# Patient Record
Sex: MALE | Race: WHITE | NOT HISPANIC OR LATINO | Employment: OTHER | URBAN - METROPOLITAN AREA
[De-identification: names, ages, dates, MRNs, and addresses within clinical notes are randomized per-mention and may not be internally consistent; named-entity substitution may affect disease eponyms.]

---

## 2017-01-03 RX ORDER — AMLODIPINE BESYLATE AND BENAZEPRIL HYDROCHLORIDE 5; 40 MG/1; MG/1
CAPSULE ORAL
Qty: 90 CAPSULE | Refills: 2 | Status: SHIPPED | OUTPATIENT
Start: 2017-01-03 | End: 2017-11-20 | Stop reason: SDUPTHER

## 2017-01-16 DIAGNOSIS — F41.9 ANXIETY: ICD-10-CM

## 2017-01-16 RX ORDER — ALPRAZOLAM 2 MG/1
TABLET ORAL
Qty: 90 TABLET | Refills: 0 | OUTPATIENT
Start: 2017-01-16 | End: 2017-02-15 | Stop reason: SDUPTHER

## 2017-02-15 DIAGNOSIS — F41.9 ANXIETY: ICD-10-CM

## 2017-02-15 RX ORDER — ALPRAZOLAM 2 MG/1
TABLET ORAL
Qty: 90 TABLET | Refills: 0 | Status: SHIPPED | OUTPATIENT
Start: 2017-02-15 | End: 2017-03-08 | Stop reason: SDUPTHER

## 2017-02-15 RX ORDER — HYDRALAZINE HYDROCHLORIDE 50 MG/1
TABLET, FILM COATED ORAL
Qty: 60 TABLET | Refills: 2 | Status: SHIPPED | OUTPATIENT
Start: 2017-02-15 | End: 2017-08-11 | Stop reason: SDUPTHER

## 2017-03-08 ENCOUNTER — OFFICE VISIT (OUTPATIENT)
Dept: FAMILY MEDICINE CLINIC | Facility: CLINIC | Age: 57
End: 2017-03-08

## 2017-03-08 VITALS
TEMPERATURE: 98.1 F | WEIGHT: 276.6 LBS | DIASTOLIC BLOOD PRESSURE: 86 MMHG | HEIGHT: 74 IN | OXYGEN SATURATION: 95 % | SYSTOLIC BLOOD PRESSURE: 130 MMHG | BODY MASS INDEX: 35.5 KG/M2 | HEART RATE: 62 BPM

## 2017-03-08 DIAGNOSIS — F41.9 ANXIETY: Primary | ICD-10-CM

## 2017-03-08 PROCEDURE — 99213 OFFICE O/P EST LOW 20 MIN: CPT | Performed by: NURSE PRACTITIONER

## 2017-03-08 RX ORDER — ALPRAZOLAM 2 MG/1
2 TABLET ORAL 3 TIMES DAILY PRN
Qty: 90 TABLET | Refills: 0 | Status: SHIPPED | OUTPATIENT
Start: 2017-03-08 | End: 2017-04-20 | Stop reason: SDUPTHER

## 2017-03-08 NOTE — PROGRESS NOTES
"Subjective   Phani Landis is a 56 y.o. male.     History of Present Illness   Anxiety: Patient complains of anxiety disorder.  He has the following symptoms: chest pain, difficulty concentrating, racing thoughts, shortness of breath. Onset of symptoms was approximately several years ago, stable since that time. He denies current suicidal and homicidal ideation. Family history significant for no psychiatric illness.Possible organic causes contributing are: none. Risk factors: none Previous treatment includes Xanax He complains of the following side effects from the treatment: none.        The following portions of the patient's history were reviewed and updated as appropriate: allergies, current medications, past social history and problem list.    Review of Systems   All other systems reviewed and are negative.      Objective   Visit Vitals   • /86 (BP Location: Right arm, Patient Position: Sitting)   • Pulse 62   • Temp 98.1 °F (36.7 °C)   • Ht 74\" (188 cm)   • Wt 276 lb 9.6 oz (125 kg)   • SpO2 95%   • BMI 35.51 kg/m2     Physical Exam   Constitutional: He is oriented to person, place, and time. Vital signs are normal. He appears well-developed and well-nourished. No distress.   HENT:   Head: Normocephalic.   Cardiovascular: Normal rate, regular rhythm and normal heart sounds.    Pulmonary/Chest: Effort normal and breath sounds normal.   Neurological: He is alert and oriented to person, place, and time. Gait normal.   Psychiatric: He has a normal mood and affect. His behavior is normal. Judgment and thought content normal.   Vitals reviewed.  The patient has read and signed the [facility] Controlled Substance Contract.  I will continue to see patient for regular follow up appointments.  They are well controlled on their medication.  YUE has been reviewed by me and is updated every 3 months. The patient is aware of the potential for addiction and dependence.      Assessment/Plan   Problem List Items " Addressed This Visit        Other    Anxiety - Primary    Relevant Medications    ALPRAZolam (XANAX) 2 MG tablet        rto in 3 mons   Discussed with Patricia that he needs to cut back on his Xanax the patient states that he will actively try to only take 2 a day we will reassess this in 3 months and hopefully decrease the prescription amount    Pt refusing colonoscopy

## 2017-03-15 RX ORDER — CHLORTHALIDONE 50 MG/1
TABLET ORAL
Qty: 90 TABLET | Refills: 1 | Status: CANCELLED | OUTPATIENT
Start: 2017-03-15

## 2017-03-15 RX ORDER — BISOPROLOL FUMARATE 10 MG/1
TABLET, FILM COATED ORAL
Qty: 90 TABLET | Refills: 1 | Status: CANCELLED | OUTPATIENT
Start: 2017-03-15

## 2017-03-15 RX ORDER — BISOPROLOL FUMARATE 10 MG/1
10 TABLET, FILM COATED ORAL DAILY
Qty: 90 TABLET | Refills: 2 | Status: SHIPPED | OUTPATIENT
Start: 2017-03-15 | End: 2018-03-27 | Stop reason: SDUPTHER

## 2017-03-15 RX ORDER — CHLORTHALIDONE 50 MG/1
50 TABLET ORAL DAILY
Qty: 90 TABLET | Refills: 2 | Status: SHIPPED | OUTPATIENT
Start: 2017-03-15 | End: 2018-03-27 | Stop reason: SDUPTHER

## 2017-04-20 DIAGNOSIS — F41.9 ANXIETY: ICD-10-CM

## 2017-04-20 RX ORDER — INDOMETHACIN 50 MG/1
50 CAPSULE ORAL
Qty: 30 CAPSULE | Refills: 3 | Status: SHIPPED | OUTPATIENT
Start: 2017-04-20 | End: 2018-06-29 | Stop reason: SDUPTHER

## 2017-04-20 RX ORDER — ALPRAZOLAM 2 MG/1
2 TABLET ORAL 3 TIMES DAILY PRN
Qty: 90 TABLET | Refills: 0 | OUTPATIENT
Start: 2017-04-20 | End: 2017-05-23 | Stop reason: SDUPTHER

## 2017-04-20 RX ORDER — INDOMETHACIN 50 MG/1
CAPSULE ORAL
Qty: 30 CAPSULE | Refills: 1 | Status: CANCELLED | OUTPATIENT
Start: 2017-04-20

## 2017-04-20 RX ORDER — ALPRAZOLAM 2 MG/1
TABLET ORAL
Qty: 90 TABLET | Refills: 0 | Status: CANCELLED | OUTPATIENT
Start: 2017-04-20

## 2017-04-24 DIAGNOSIS — F41.9 ANXIETY: ICD-10-CM

## 2017-04-24 RX ORDER — ALPRAZOLAM 2 MG/1
TABLET ORAL
Qty: 90 TABLET | Refills: 0 | OUTPATIENT
Start: 2017-04-24

## 2017-05-23 DIAGNOSIS — F41.9 ANXIETY: ICD-10-CM

## 2017-05-23 RX ORDER — ALPRAZOLAM 2 MG/1
TABLET ORAL
Qty: 90 TABLET | Refills: 0 | OUTPATIENT
Start: 2017-05-23 | End: 2017-07-24 | Stop reason: SDUPTHER

## 2017-07-24 ENCOUNTER — OFFICE VISIT (OUTPATIENT)
Dept: FAMILY MEDICINE CLINIC | Facility: CLINIC | Age: 57
End: 2017-07-24

## 2017-07-24 VITALS
TEMPERATURE: 97.4 F | OXYGEN SATURATION: 98 % | HEART RATE: 68 BPM | HEIGHT: 74 IN | WEIGHT: 267 LBS | SYSTOLIC BLOOD PRESSURE: 112 MMHG | DIASTOLIC BLOOD PRESSURE: 70 MMHG | BODY MASS INDEX: 34.27 KG/M2

## 2017-07-24 DIAGNOSIS — F41.9 ANXIETY: ICD-10-CM

## 2017-07-24 DIAGNOSIS — R21 RASH: Primary | ICD-10-CM

## 2017-07-24 PROCEDURE — 99214 OFFICE O/P EST MOD 30 MIN: CPT | Performed by: NURSE PRACTITIONER

## 2017-07-24 RX ORDER — CLOTRIMAZOLE AND BETAMETHASONE DIPROPIONATE 10; .64 MG/G; MG/G
CREAM TOPICAL 2 TIMES DAILY
Qty: 45 G | Refills: 1 | Status: SHIPPED | OUTPATIENT
Start: 2017-07-24 | End: 2018-04-03 | Stop reason: SDUPTHER

## 2017-07-24 RX ORDER — TRAZODONE HYDROCHLORIDE 150 MG/1
150 TABLET ORAL NIGHTLY
Qty: 30 TABLET | Refills: 6 | Status: SHIPPED | OUTPATIENT
Start: 2017-07-24 | End: 2019-06-05

## 2017-07-24 RX ORDER — ALPRAZOLAM 2 MG/1
2 TABLET ORAL 2 TIMES DAILY
Qty: 60 TABLET | Refills: 0 | Status: SHIPPED | OUTPATIENT
Start: 2017-07-24 | End: 2017-08-28 | Stop reason: SDUPTHER

## 2017-07-24 NOTE — PROGRESS NOTES
"Subjective   Phani Landis is a 56 y.o. male.     History of Present Illness   Patient presented to the office today for refill of his Xanax.  His last refill was in May 90 pills that he is just now ran out about a week ago.  He states his anxiety has been pretty high lately.  I discussed with him at length the need to get him off this medication completely he understands and he is willing to try another medication to help him with his anxiety.  Also needing a cream refilled for rash.    The following portions of the patient's history were reviewed and updated as appropriate: allergies, current medications, past social history and problem list.    Review of Systems   Constitutional: Negative for fever.   All other systems reviewed and are negative.      Objective   /70 (BP Location: Right arm, Patient Position: Sitting)  Pulse 68  Temp 97.4 °F (36.3 °C)  Ht 74\" (188 cm)  Wt 267 lb (121 kg)  SpO2 98%  BMI 34.28 kg/m2  Physical Exam   Constitutional: He is oriented to person, place, and time. Vital signs are normal. He appears well-developed and well-nourished. No distress.   HENT:   Head: Normocephalic.   Cardiovascular: Normal rate, regular rhythm and normal heart sounds.    Pulmonary/Chest: Effort normal and breath sounds normal.   Neurological: He is alert and oriented to person, place, and time. Gait normal.   Psychiatric: He has a normal mood and affect. His behavior is normal. Judgment and thought content normal.   Vitals reviewed.    The patient has read and signed the [facility] Controlled Substance Contract.  I will continue to see patient for regular follow up appointments.  They are well controlled on their medication.  YUE has been reviewed by me and is updated every 3 months. The patient is aware of the potential for addiction and dependence.    Assessment/Plan   Problem List Items Addressed This Visit        Musculoskeletal and Integument    Rash - Primary    Relevant Medications    " clotrimazole-betamethasone (LOTRISONE) 1-0.05 % cream       Other    Anxiety    Relevant Medications    ALPRAZolam (XANAX) 2 MG tablet    traZODone (DESYREL) 150 MG tablet      Alexandre trazodone 150 mg at night to help him with sleep and anxiety.  Decreasing his Xanax down to 60 pills  will decrease again at next visit

## 2017-08-11 RX ORDER — HYDRALAZINE HYDROCHLORIDE 50 MG/1
TABLET, FILM COATED ORAL
Qty: 60 TABLET | Refills: 1 | Status: SHIPPED | OUTPATIENT
Start: 2017-08-11 | End: 2018-03-27 | Stop reason: SDUPTHER

## 2017-08-28 DIAGNOSIS — F41.9 ANXIETY: ICD-10-CM

## 2017-08-28 RX ORDER — ALPRAZOLAM 2 MG/1
2 TABLET ORAL 2 TIMES DAILY
Qty: 60 TABLET | Refills: 0 | OUTPATIENT
Start: 2017-08-28 | End: 2017-09-29 | Stop reason: SDUPTHER

## 2017-09-29 ENCOUNTER — TELEPHONE (OUTPATIENT)
Dept: FAMILY MEDICINE CLINIC | Facility: CLINIC | Age: 57
End: 2017-09-29

## 2017-09-29 DIAGNOSIS — F41.9 ANXIETY: ICD-10-CM

## 2017-09-29 RX ORDER — ALPRAZOLAM 2 MG/1
2 TABLET ORAL 2 TIMES DAILY
Qty: 60 TABLET | Refills: 0 | OUTPATIENT
Start: 2017-09-29 | End: 2018-03-29

## 2017-09-29 NOTE — TELEPHONE ENCOUNTER
Patient requesting refill for Xanax - pharmacy is saying they have already contacted the office.

## 2017-11-21 RX ORDER — AMLODIPINE BESYLATE AND BENAZEPRIL HYDROCHLORIDE 5; 40 MG/1; MG/1
CAPSULE ORAL
Qty: 90 CAPSULE | Refills: 1 | Status: SHIPPED | OUTPATIENT
Start: 2017-11-21 | End: 2018-03-27 | Stop reason: SDUPTHER

## 2018-03-27 ENCOUNTER — TELEPHONE (OUTPATIENT)
Dept: FAMILY MEDICINE CLINIC | Facility: CLINIC | Age: 58
End: 2018-03-27

## 2018-03-27 RX ORDER — BISOPROLOL FUMARATE 10 MG/1
10 TABLET, FILM COATED ORAL DAILY
Qty: 90 TABLET | Refills: 2 | Status: SHIPPED | OUTPATIENT
Start: 2018-03-27 | End: 2018-12-27 | Stop reason: SDUPTHER

## 2018-03-27 RX ORDER — CHLORTHALIDONE 50 MG/1
50 TABLET ORAL DAILY
Qty: 90 TABLET | Refills: 2 | Status: SHIPPED | OUTPATIENT
Start: 2018-03-27 | End: 2018-12-27 | Stop reason: SDUPTHER

## 2018-03-27 RX ORDER — AMLODIPINE BESYLATE AND BENAZEPRIL HYDROCHLORIDE 5; 40 MG/1; MG/1
1 CAPSULE ORAL DAILY
Qty: 90 CAPSULE | Refills: 1 | Status: SHIPPED | OUTPATIENT
Start: 2018-03-27 | End: 2018-12-27 | Stop reason: SDUPTHER

## 2018-03-27 RX ORDER — HYDRALAZINE HYDROCHLORIDE 50 MG/1
50 TABLET, FILM COATED ORAL 2 TIMES DAILY
Qty: 180 TABLET | Refills: 0 | Status: SHIPPED | OUTPATIENT
Start: 2018-03-27 | End: 2018-09-20 | Stop reason: SDUPTHER

## 2018-03-29 ENCOUNTER — TELEPHONE (OUTPATIENT)
Dept: FAMILY MEDICINE CLINIC | Facility: CLINIC | Age: 58
End: 2018-03-29

## 2018-03-29 ENCOUNTER — OFFICE VISIT (OUTPATIENT)
Dept: FAMILY MEDICINE CLINIC | Facility: CLINIC | Age: 58
End: 2018-03-29

## 2018-03-29 VITALS
DIASTOLIC BLOOD PRESSURE: 100 MMHG | TEMPERATURE: 98.5 F | SYSTOLIC BLOOD PRESSURE: 140 MMHG | HEART RATE: 74 BPM | OXYGEN SATURATION: 98 % | BODY MASS INDEX: 38.58 KG/M2 | HEIGHT: 74 IN | WEIGHT: 300.6 LBS

## 2018-03-29 DIAGNOSIS — I10 ESSENTIAL HYPERTENSION: ICD-10-CM

## 2018-03-29 DIAGNOSIS — I10 ESSENTIAL HYPERTENSION: Primary | ICD-10-CM

## 2018-03-29 DIAGNOSIS — L03.90 CELLULITIS, UNSPECIFIED CELLULITIS SITE: ICD-10-CM

## 2018-03-29 PROCEDURE — 99214 OFFICE O/P EST MOD 30 MIN: CPT | Performed by: NURSE PRACTITIONER

## 2018-03-29 RX ORDER — CLONIDINE HYDROCHLORIDE 0.1 MG/1
0.1 TABLET ORAL DAILY
Qty: 30 TABLET | Refills: 2 | Status: SHIPPED | OUTPATIENT
Start: 2018-03-29 | End: 2018-03-29 | Stop reason: SDUPTHER

## 2018-03-29 RX ORDER — CEPHALEXIN 500 MG/1
500 CAPSULE ORAL 3 TIMES DAILY
Qty: 30 CAPSULE | Refills: 0 | Status: SHIPPED | OUTPATIENT
Start: 2018-03-29 | End: 2018-05-04 | Stop reason: SDUPTHER

## 2018-03-29 RX ORDER — CLONIDINE HYDROCHLORIDE 0.1 MG/1
0.1 TABLET ORAL DAILY
Qty: 30 TABLET | Refills: 2 | Status: SHIPPED | OUTPATIENT
Start: 2018-03-29 | End: 2019-06-05

## 2018-03-29 NOTE — TELEPHONE ENCOUNTER
Patient forgot to ask when he was in the room if his blood pressure changes is it ok for him to take two blood pressure pills. Please call him at [phone]

## 2018-03-29 NOTE — PROGRESS NOTES
"Subjective   Phani Landis is a 57 y.o. male.     History of Present Illness   Patient presenting to the office today to follow-up on his elevated blood pressure.  He has not used any of his blood pressure medications for 2 months because he ran out of the medication he called the office 2 days ago and got refills on them 3 start taking his blood pressure medication for 2 days.  He has been monitoring his blood pressure every day and today and yesterday.  The best blood pressures that he is having quite a while.    He has also has a red swollen rash on the right lower leg that has been there for at least 2 weeks he did have an abrasion on it about 3 weeks ago but that is healed.    The following portions of the patient's history were reviewed and updated as appropriate: allergies, current medications, past social history and problem list.    Review of Systems   Neurological: Positive for headaches.   All other systems reviewed and are negative.      Objective   /100 (BP Location: Left arm, Patient Position: Sitting)   Pulse 74   Temp 98.5 °F (36.9 °C)   Ht 188 cm (74.02\")   Wt (!) 136 kg (300 lb 9.6 oz)   SpO2 98%   BMI 38.58 kg/m²   Physical Exam   Constitutional: He is oriented to person, place, and time. Vital signs are normal. He appears well-developed and well-nourished. No distress.   HENT:   Head: Normocephalic.   Cardiovascular: Normal rate, regular rhythm and normal heart sounds.    Pulmonary/Chest: Effort normal and breath sounds normal.   Neurological: He is alert and oriented to person, place, and time. Gait normal.   Psychiatric: He has a normal mood and affect. His behavior is normal. Judgment and thought content normal.   Vitals reviewed.      Assessment/Plan   Problem List Items Addressed This Visit        Cardiovascular and Mediastinum    Hypertension - Primary       Other    Cellulitis    Relevant Medications    cephalexin (KEFLEX) 500 MG capsule      Other Visit Diagnoses    None.   "     Call the office Monday with blood pressure results.  Make an appointment for a CPE with fasting labs in 4 weeks

## 2018-04-02 ENCOUNTER — TELEPHONE (OUTPATIENT)
Dept: FAMILY MEDICINE CLINIC | Facility: CLINIC | Age: 58
End: 2018-04-02

## 2018-04-02 NOTE — TELEPHONE ENCOUNTER
Patient was told to call [facility] on Monday with b/p readings. His readings are 125/80 and 117/76. Patient says he is feeling much better no more headaches. He did stay at the ER and had CT scan and IV medication to help with migraine

## 2018-04-03 DIAGNOSIS — R21 RASH: ICD-10-CM

## 2018-04-04 RX ORDER — CLOTRIMAZOLE AND BETAMETHASONE DIPROPIONATE 10; .64 MG/G; MG/G
CREAM TOPICAL
Qty: 45 G | Refills: 0 | Status: SHIPPED | OUTPATIENT
Start: 2018-04-04 | End: 2018-05-17 | Stop reason: SDUPTHER

## 2018-05-04 ENCOUNTER — TELEPHONE (OUTPATIENT)
Dept: FAMILY MEDICINE CLINIC | Facility: CLINIC | Age: 58
End: 2018-05-04

## 2018-05-04 DIAGNOSIS — L03.90 CELLULITIS, UNSPECIFIED CELLULITIS SITE: ICD-10-CM

## 2018-05-04 RX ORDER — CEPHALEXIN 500 MG/1
500 CAPSULE ORAL 3 TIMES DAILY
Qty: 30 CAPSULE | Refills: 0 | Status: SHIPPED | OUTPATIENT
Start: 2018-05-04 | End: 2018-11-12 | Stop reason: SDUPTHER

## 2018-05-04 NOTE — TELEPHONE ENCOUNTER
Patient called and said the antibiotic he was given for cellulitis almost cleared it up but he finished that and says he is having redness again on on his legs he is wondering if he can have another prescription of antibiotic for it?    Patient was informed that [facility] is out of the office today and wont receive this message until Monday was advised if symptoms get worse over the weekend to go to an immediate care center

## 2018-05-17 DIAGNOSIS — R21 RASH: ICD-10-CM

## 2018-05-17 RX ORDER — CLOTRIMAZOLE AND BETAMETHASONE DIPROPIONATE 10; .64 MG/G; MG/G
CREAM TOPICAL
Qty: 1 EACH | Refills: 3 | Status: SHIPPED | OUTPATIENT
Start: 2018-05-17 | End: 2019-09-12 | Stop reason: SDUPTHER

## 2018-05-25 ENCOUNTER — TELEPHONE (OUTPATIENT)
Dept: FAMILY MEDICINE CLINIC | Facility: CLINIC | Age: 58
End: 2018-05-25

## 2018-05-25 NOTE — TELEPHONE ENCOUNTER
Patient called and said he finished the second dose of cream for his cellulitis and still has some pinkness in his leg is wondering what he needs to do?

## 2018-05-25 NOTE — TELEPHONE ENCOUNTER
Some pinkness is probably going to be your baseline.  I may need to look at your leg early next week.  Give it through the weekend

## 2018-07-02 RX ORDER — INDOMETHACIN 50 MG/1
CAPSULE ORAL
Qty: 30 CAPSULE | Refills: 2 | Status: SHIPPED | OUTPATIENT
Start: 2018-07-02 | End: 2018-10-22 | Stop reason: SDUPTHER

## 2018-09-20 RX ORDER — HYDRALAZINE HYDROCHLORIDE 50 MG/1
TABLET, FILM COATED ORAL
Qty: 180 TABLET | Refills: 0 | Status: SHIPPED | OUTPATIENT
Start: 2018-09-20 | End: 2018-12-27 | Stop reason: SDUPTHER

## 2018-10-22 RX ORDER — INDOMETHACIN 50 MG/1
CAPSULE ORAL
Qty: 30 CAPSULE | Refills: 1 | Status: SHIPPED | OUTPATIENT
Start: 2018-10-22 | End: 2018-12-27 | Stop reason: SDUPTHER

## 2018-11-12 ENCOUNTER — TELEPHONE (OUTPATIENT)
Dept: FAMILY MEDICINE CLINIC | Facility: CLINIC | Age: 58
End: 2018-11-12

## 2018-11-12 RX ORDER — CEPHALEXIN 500 MG/1
500 CAPSULE ORAL 3 TIMES DAILY
Qty: 21 CAPSULE | Refills: 0 | Status: SHIPPED | OUTPATIENT
Start: 2018-11-12 | End: 2019-01-17

## 2018-11-12 NOTE — TELEPHONE ENCOUNTER
Pt is experiencing itchiness from the cellulitis. He states you prescribed cephalexin (KEFLEX) 500 MG in May that helped at the time. He is curious if he can get a refill for this? Pt does not live close so he is wanting this without appt if possible  ([facility])

## 2018-11-16 ENCOUNTER — TELEPHONE (OUTPATIENT)
Dept: FAMILY MEDICINE CLINIC | Facility: CLINIC | Age: 58
End: 2018-11-16

## 2018-11-16 RX ORDER — SULFAMETHOXAZOLE AND TRIMETHOPRIM 800; 160 MG/1; MG/1
1 TABLET ORAL 2 TIMES DAILY
Qty: 20 TABLET | Refills: 0 | Status: SHIPPED | OUTPATIENT
Start: 2018-11-16 | End: 2019-01-17

## 2018-11-16 NOTE — TELEPHONE ENCOUNTER
Pt may have experienced a allergic reaction to the cephalexin that was prescribed. Pt took first dose on Monday. By Tuesday pt could not stop itching and a red rash formed on pt's chest. Pt discontinued medication by Wednesday. Rash and itching are gone. Did pt have an allergic reaction? Should another antibiotic be prescribed?

## 2018-11-16 NOTE — TELEPHONE ENCOUNTER
Sounds like you should avoid the med.  Is the cellulitis better or no  If not bactrim ds #20 1 po bid

## 2018-12-27 ENCOUNTER — TELEPHONE (OUTPATIENT)
Dept: FAMILY MEDICINE CLINIC | Facility: CLINIC | Age: 58
End: 2018-12-27

## 2018-12-27 RX ORDER — HYDRALAZINE HYDROCHLORIDE 50 MG/1
50 TABLET, FILM COATED ORAL 2 TIMES DAILY
Qty: 180 TABLET | Refills: 0 | Status: SHIPPED | OUTPATIENT
Start: 2018-12-27 | End: 2019-09-12 | Stop reason: SDUPTHER

## 2018-12-27 RX ORDER — BISOPROLOL FUMARATE 10 MG/1
10 TABLET, FILM COATED ORAL DAILY
Qty: 90 TABLET | Refills: 2 | Status: SHIPPED | OUTPATIENT
Start: 2018-12-27 | End: 2019-09-12 | Stop reason: SDUPTHER

## 2018-12-27 RX ORDER — AMLODIPINE BESYLATE AND BENAZEPRIL HYDROCHLORIDE 5; 40 MG/1; MG/1
1 CAPSULE ORAL DAILY
Qty: 90 CAPSULE | Refills: 1 | Status: SHIPPED | OUTPATIENT
Start: 2018-12-27 | End: 2019-06-13 | Stop reason: SDUPTHER

## 2018-12-27 RX ORDER — CHLORTHALIDONE 50 MG/1
50 TABLET ORAL DAILY
Qty: 90 TABLET | Refills: 2 | Status: SHIPPED | OUTPATIENT
Start: 2018-12-27 | End: 2019-09-12 | Stop reason: SDUPTHER

## 2018-12-27 RX ORDER — INDOMETHACIN 50 MG/1
CAPSULE ORAL
Qty: 30 CAPSULE | Refills: 1 | Status: SHIPPED | OUTPATIENT
Start: 2018-12-27 | End: 2019-02-12

## 2018-12-27 NOTE — TELEPHONE ENCOUNTER
Pt requesting five, one month supply prescriptions to be sent to [facility] in [place], [place].    amLODIPine-benazepril (LOTREL) 5-40 MG per capsule    bisoprolol (ZEBeta) 10 MG tablet    chlorthalidone (HYGROTEN) 50 MG tablet    hydrALAZINE (APRESOLINE) 50 MG tablet     indomethacin (INDOCIN) 50 MG capsule

## 2019-01-17 ENCOUNTER — OFFICE VISIT (OUTPATIENT)
Dept: FAMILY MEDICINE CLINIC | Facility: CLINIC | Age: 59
End: 2019-01-17

## 2019-01-17 VITALS
DIASTOLIC BLOOD PRESSURE: 90 MMHG | HEIGHT: 74 IN | HEART RATE: 78 BPM | BODY MASS INDEX: 37.22 KG/M2 | OXYGEN SATURATION: 98 % | TEMPERATURE: 98.3 F | WEIGHT: 290 LBS | SYSTOLIC BLOOD PRESSURE: 138 MMHG

## 2019-01-17 DIAGNOSIS — I10 ESSENTIAL HYPERTENSION: Primary | ICD-10-CM

## 2019-01-17 DIAGNOSIS — M25.562 ACUTE PAIN OF LEFT KNEE: ICD-10-CM

## 2019-01-17 DIAGNOSIS — Z13.6 SCREENING FOR ISCHEMIC HEART DISEASE: ICD-10-CM

## 2019-01-17 DIAGNOSIS — Z12.5 SPECIAL SCREENING FOR MALIGNANT NEOPLASM OF PROSTATE: ICD-10-CM

## 2019-01-17 DIAGNOSIS — Z13.0 SCREENING FOR IRON DEFICIENCY ANEMIA: ICD-10-CM

## 2019-01-17 DIAGNOSIS — Z13.1 SCREENING FOR DIABETES MELLITUS: ICD-10-CM

## 2019-01-17 PROBLEM — L03.90 CELLULITIS: Status: RESOLVED | Noted: 2018-03-29 | Resolved: 2019-01-17

## 2019-01-17 PROBLEM — R21 RASH: Status: RESOLVED | Noted: 2017-07-24 | Resolved: 2019-01-17

## 2019-01-17 PROCEDURE — 99213 OFFICE O/P EST LOW 20 MIN: CPT | Performed by: NURSE PRACTITIONER

## 2019-01-17 RX ORDER — METHYLPREDNISOLONE 4 MG/1
TABLET ORAL
Qty: 21 TABLET | Refills: 0 | Status: SHIPPED | OUTPATIENT
Start: 2019-01-17 | End: 2019-02-12

## 2019-01-17 NOTE — PROGRESS NOTES
"Subjective   Phani Landis is a 58 y.o. male.     History of Present Illness   Patient presenting to the office today to follow-up on his blood pressure.  He's taking his medications daily as directed without side effects and blood pressure is well-controlled at home.    Patient has been experiencing left knee pain for the past month he believes it to be gout he has been using indomethacin which has been helping but is not totally gone pain and stiffness and slight swelling is been there for 2 months.  The following portions of the patient's history were reviewed and updated as appropriate: allergies, current medications, past social history and problem list.    Review of Systems   Musculoskeletal:        Knee pain       Objective   /90   Pulse 78   Temp 98.3 °F (36.8 °C) (Oral)   Ht 188 cm (74\")   Wt 132 kg (290 lb)   SpO2 98%   BMI 37.23 kg/m²   Physical Exam   Constitutional: He is oriented to person, place, and time. Vital signs are normal. He appears well-developed and well-nourished. No distress.   HENT:   Head: Normocephalic.   Cardiovascular: Normal rate, regular rhythm and normal heart sounds.   Pulmonary/Chest: Effort normal and breath sounds normal.   Neurological: He is alert and oriented to person, place, and time. Gait normal.   Psychiatric: He has a normal mood and affect. His behavior is normal. Judgment and thought content normal.   Vitals reviewed.      Assessment/Plan      Diagnosis Plan   1. Essential hypertension     2. Acute pain of left knee  MethylPREDNISolone (MEDROL) 4 MG tablet    Uric Acid   3. Screening for iron deficiency anemia  CBC & Differential   4. Screening for diabetes mellitus  Comprehensive Metabolic Panel   5. Screening for ischemic heart disease  Lipid Panel With LDL / HDL Ratio   6. Special screening for malignant neoplasm of prostate  PSA Screen     Follow up after labs   rto if medrol doesn't help  Castillo Velasco, APRN  1/17/2019    "

## 2019-01-18 LAB
ALBUMIN SERPL-MCNC: 4.3 G/DL (ref 3.5–5.2)
ALBUMIN/GLOB SERPL: 1.2 G/DL
ALP SERPL-CCNC: 80 U/L (ref 39–117)
ALT SERPL-CCNC: 46 U/L (ref 1–41)
AST SERPL-CCNC: 34 U/L (ref 1–40)
BASOPHILS # BLD AUTO: 0.21 10*3/MM3 (ref 0–0.2)
BASOPHILS NFR BLD AUTO: 2 % (ref 0–1.5)
BILIRUB SERPL-MCNC: 0.3 MG/DL (ref 0.1–1.2)
BUN SERPL-MCNC: 16 MG/DL (ref 6–20)
BUN/CREAT SERPL: 14.7 (ref 7–25)
CALCIUM SERPL-MCNC: 10.9 MG/DL (ref 8.6–10.5)
CHLORIDE SERPL-SCNC: 98 MMOL/L (ref 98–107)
CHOLEST SERPL-MCNC: 118 MG/DL (ref 0–200)
CO2 SERPL-SCNC: 28.7 MMOL/L (ref 22–29)
CREAT SERPL-MCNC: 1.09 MG/DL (ref 0.76–1.27)
EOSINOPHIL # BLD AUTO: 0.29 10*3/MM3 (ref 0–0.7)
EOSINOPHIL NFR BLD AUTO: 2.7 % (ref 0.3–6.2)
ERYTHROCYTE [DISTWIDTH] IN BLOOD BY AUTOMATED COUNT: 14.7 % (ref 11.5–14.5)
GLOBULIN SER CALC-MCNC: 3.6 GM/DL
GLUCOSE SERPL-MCNC: 96 MG/DL (ref 65–99)
HCT VFR BLD AUTO: 50.3 % (ref 40.4–52.2)
HDLC SERPL-MCNC: 42 MG/DL (ref 40–60)
HGB BLD-MCNC: 16.6 G/DL (ref 13.7–17.6)
IMM GRANULOCYTES # BLD AUTO: 0.05 10*3/MM3 (ref 0–0.03)
IMM GRANULOCYTES NFR BLD AUTO: 0.5 % (ref 0–0.5)
LDLC SERPL CALC-MCNC: 58 MG/DL (ref 0–100)
LDLC/HDLC SERPL: 1.37 {RATIO}
LYMPHOCYTES # BLD AUTO: 2.2 10*3/MM3 (ref 0.9–4.8)
LYMPHOCYTES NFR BLD AUTO: 20.8 % (ref 19.6–45.3)
MCH RBC QN AUTO: 29.3 PG (ref 27–32.7)
MCHC RBC AUTO-ENTMCNC: 33 G/DL (ref 32.6–36.4)
MCV RBC AUTO: 88.9 FL (ref 79.8–96.2)
MONOCYTES # BLD AUTO: 1.27 10*3/MM3 (ref 0.2–1.2)
MONOCYTES NFR BLD AUTO: 12 % (ref 5–12)
NEUTROPHILS # BLD AUTO: 6.59 10*3/MM3 (ref 1.9–8.1)
NEUTROPHILS NFR BLD AUTO: 62.5 % (ref 42.7–76)
PLATELET # BLD AUTO: 819 10*3/MM3 (ref 140–500)
POTASSIUM SERPL-SCNC: 4.8 MMOL/L (ref 3.5–5.2)
PROT SERPL-MCNC: 7.9 G/DL (ref 6–8.5)
PSA SERPL-MCNC: 0.51 NG/ML (ref 0–4)
RBC # BLD AUTO: 5.66 10*6/MM3 (ref 4.6–6)
SODIUM SERPL-SCNC: 139 MMOL/L (ref 136–145)
TRIGL SERPL-MCNC: 92 MG/DL (ref 0–150)
URATE SERPL-MCNC: 7.1 MG/DL (ref 3.4–7)
VLDLC SERPL CALC-MCNC: 18.4 MG/DL (ref 5–40)
WBC # BLD AUTO: 10.56 10*3/MM3 (ref 4.5–10.7)

## 2019-02-12 ENCOUNTER — OFFICE VISIT (OUTPATIENT)
Dept: FAMILY MEDICINE CLINIC | Facility: CLINIC | Age: 59
End: 2019-02-12

## 2019-02-12 VITALS
DIASTOLIC BLOOD PRESSURE: 86 MMHG | HEART RATE: 66 BPM | WEIGHT: 309.2 LBS | SYSTOLIC BLOOD PRESSURE: 130 MMHG | TEMPERATURE: 98.8 F | HEIGHT: 74 IN | OXYGEN SATURATION: 96 % | BODY MASS INDEX: 39.68 KG/M2

## 2019-02-12 DIAGNOSIS — M25.562 ACUTE PAIN OF LEFT KNEE: Primary | ICD-10-CM

## 2019-02-12 PROCEDURE — 99213 OFFICE O/P EST LOW 20 MIN: CPT | Performed by: NURSE PRACTITIONER

## 2019-02-12 RX ORDER — MELOXICAM 15 MG/1
15 TABLET ORAL DAILY
Qty: 90 TABLET | Refills: 3 | Status: SHIPPED | OUTPATIENT
Start: 2019-02-12 | End: 2020-03-12

## 2019-02-12 NOTE — PROGRESS NOTES
"Subjective   Phani Landis is a 58 y.o. male.     History of Present Illness   Patient presenting to the office today to follow-up on left knee pain that was diagnosed a few weeks ago he had a steroid pack which worked well we do believe that's an acute gout flare.  He states that all that is over but he still has some stiffness is worse after he is been sitting for a long time.  He's not tried any other medications for this.  The following portions of the patient's history were reviewed and updated as appropriate: allergies, current medications, past social history and problem list.    Review of Systems   All other systems reviewed and are negative.      Objective   /86 (BP Location: Right arm, Patient Position: Sitting)   Pulse 66   Temp 98.8 °F (37.1 °C)   Ht 188 cm (74.02\")   Wt (!) 140 kg (309 lb 3.2 oz)   SpO2 96%   BMI 39.68 kg/m²   Physical Exam   Constitutional: He is oriented to person, place, and time. Vital signs are normal. He appears well-developed and well-nourished. No distress.   HENT:   Head: Normocephalic.   Cardiovascular: Normal rate, regular rhythm and normal heart sounds.   Pulmonary/Chest: Effort normal and breath sounds normal.   Neurological: He is alert and oriented to person, place, and time. Gait normal.   Psychiatric: He has a normal mood and affect. His behavior is normal. Judgment and thought content normal.   Vitals reviewed.      Assessment/Plan      Diagnosis Plan   1. Acute pain of left knee  meloxicam (MOBIC) 15 MG tablet     rto prn   Castillo Velasco, APRN  2/12/2019    "

## 2019-02-19 ENCOUNTER — TELEPHONE (OUTPATIENT)
Dept: FAMILY MEDICINE CLINIC | Facility: CLINIC | Age: 59
End: 2019-02-19

## 2019-02-19 NOTE — TELEPHONE ENCOUNTER
Patient called and said that he was seen not long ago and had chest congestion cough he said elvia told him to call if he did not get better. He said his cough is still there hurting ribs when coughing a lot of chest congestion wanting to know if elvia will call him in something?

## 2019-02-20 RX ORDER — AZITHROMYCIN 250 MG/1
TABLET, FILM COATED ORAL
Qty: 6 TABLET | Refills: 0 | Status: SHIPPED | OUTPATIENT
Start: 2019-02-20 | End: 2019-06-05

## 2019-06-05 ENCOUNTER — OFFICE VISIT (OUTPATIENT)
Dept: FAMILY MEDICINE CLINIC | Facility: CLINIC | Age: 59
End: 2019-06-05

## 2019-06-05 VITALS
BODY MASS INDEX: 40.07 KG/M2 | TEMPERATURE: 98.4 F | DIASTOLIC BLOOD PRESSURE: 78 MMHG | HEART RATE: 80 BPM | WEIGHT: 312.2 LBS | OXYGEN SATURATION: 95 % | SYSTOLIC BLOOD PRESSURE: 132 MMHG | HEIGHT: 74 IN

## 2019-06-05 DIAGNOSIS — M25.562 ACUTE PAIN OF LEFT KNEE: Primary | ICD-10-CM

## 2019-06-05 PROBLEM — Z12.5 SPECIAL SCREENING FOR MALIGNANT NEOPLASM OF PROSTATE: Status: RESOLVED | Noted: 2019-01-17 | Resolved: 2019-06-05

## 2019-06-05 PROBLEM — Z13.6 SCREENING FOR ISCHEMIC HEART DISEASE: Status: RESOLVED | Noted: 2019-01-17 | Resolved: 2019-06-05

## 2019-06-05 PROCEDURE — 99214 OFFICE O/P EST MOD 30 MIN: CPT | Performed by: NURSE PRACTITIONER

## 2019-06-05 PROCEDURE — 73560 X-RAY EXAM OF KNEE 1 OR 2: CPT | Performed by: NURSE PRACTITIONER

## 2019-06-05 NOTE — PROGRESS NOTES
"Subjective   Phani Landis is a 58 y.o. male.     History of Present Illness   Patient presented to the office today with continued left knee pain.  He states that it does hurt him to continuously.  Hurts him worse after he has been on it for quite a while.  It is very swollen today no redness.  He has been using ice and elevation without much help.      The following portions of the patient's history were reviewed and updated as appropriate: allergies, current medications, past social history and problem list.    Review of Systems   Musculoskeletal:        Knee pain   All other systems reviewed and are negative.      Objective   /78 (BP Location: Left arm, Patient Position: Sitting)   Pulse 80   Temp 98.4 °F (36.9 °C)   Ht 188 cm (74.02\")   Wt (!) 142 kg (312 lb 3.2 oz)   SpO2 95%   BMI 40.07 kg/m²   Physical Exam   Constitutional: He is oriented to person, place, and time. Vital signs are normal. He appears well-developed and well-nourished. No distress.   HENT:   Head: Normocephalic.   Cardiovascular: Normal rate, regular rhythm and normal heart sounds.   Pulmonary/Chest: Effort normal and breath sounds normal.   Neurological: He is alert and oriented to person, place, and time. Gait normal.   Psychiatric: He has a normal mood and affect. His behavior is normal. Judgment and thought content normal.   Vitals reviewed.    Xray- left knee    Findings-arthritis decreased spacing  No comparison      Injection Tendon or Ligament  Date/Time: 6/6/2019 7:35 AM  Performed by: Castillo Velasco APRN  Authorized by: Castillo Velasco APRN   Consent: Verbal consent obtained.  Risks and benefits: risks, benefits and alternatives were discussed  Consent given by: patient  Patient understanding: patient states understanding of the procedure being performed  Patient consent: the patient's understanding of the procedure matches consent given  Procedure consent: procedure consent matches procedure scheduled  Relevant " documents: relevant documents present and verified  Test results: test results available and properly labeled  Site marked: the operative site was marked  Imaging studies: imaging studies available  Patient identity confirmed: verbally with patient  Preparation: Patient was prepped and draped in the usual sterile fashion.  Local anesthesia used: yes    Anesthesia:  Local anesthesia used: yes  Local Anesthetic: topical anesthetic    Sedation:  Patient sedated: no    Patient tolerance: Patient tolerated the procedure well with no immediate complications  Comments: Right knee  Medications administered: 40 mg triamcinolone acetonide 40 MG/ML          Assessment/Plan      Diagnosis Plan   1. Acute pain of left knee  XR Knee 1 or 2 View Left    Injection Tendon or Ligament    Ambulatory Referral to Orthopedic Surgery     Ice knee  elevate    Castillo Velasco, AG  6/6/2019

## 2019-06-06 ENCOUNTER — TELEPHONE (OUTPATIENT)
Dept: FAMILY MEDICINE CLINIC | Facility: CLINIC | Age: 59
End: 2019-06-06

## 2019-06-06 RX ORDER — TRIAMCINOLONE ACETONIDE 40 MG/ML
40 INJECTION, SUSPENSION INTRA-ARTICULAR; INTRAMUSCULAR
Status: DISCONTINUED | OUTPATIENT
Start: 2019-06-06 | End: 2019-06-06 | Stop reason: HOSPADM

## 2019-06-06 RX ADMIN — TRIAMCINOLONE ACETONIDE 40 MG: 40 INJECTION, SUSPENSION INTRA-ARTICULAR; INTRAMUSCULAR at 07:35

## 2019-06-06 NOTE — TELEPHONE ENCOUNTER
Pt seen yesterday and states there was suppose to be an rx sent in for sleep that was not. Please advise    [facility]

## 2019-06-07 DIAGNOSIS — G47.00 INSOMNIA, UNSPECIFIED TYPE: Primary | ICD-10-CM

## 2019-06-07 RX ORDER — ZOLPIDEM TARTRATE 10 MG/1
10 TABLET ORAL NIGHTLY PRN
Qty: 90 TABLET | Refills: 0 | Status: SHIPPED | OUTPATIENT
Start: 2019-06-07 | End: 2020-06-22 | Stop reason: SDUPTHER

## 2019-06-13 ENCOUNTER — PRIOR AUTHORIZATION (OUTPATIENT)
Dept: FAMILY MEDICINE CLINIC | Facility: CLINIC | Age: 59
End: 2019-06-13

## 2019-06-13 RX ORDER — AMLODIPINE BESYLATE AND BENAZEPRIL HYDROCHLORIDE 5; 40 MG/1; MG/1
CAPSULE ORAL
Qty: 90 CAPSULE | Refills: 0 | Status: SHIPPED | OUTPATIENT
Start: 2019-06-13 | End: 2019-09-12 | Stop reason: SDUPTHER

## 2019-06-18 ENCOUNTER — OFFICE VISIT (OUTPATIENT)
Dept: FAMILY MEDICINE CLINIC | Facility: CLINIC | Age: 59
End: 2019-06-18

## 2019-06-18 VITALS
HEART RATE: 83 BPM | BODY MASS INDEX: 40.43 KG/M2 | WEIGHT: 315 LBS | SYSTOLIC BLOOD PRESSURE: 122 MMHG | HEIGHT: 74 IN | OXYGEN SATURATION: 99 % | DIASTOLIC BLOOD PRESSURE: 86 MMHG | TEMPERATURE: 98.8 F

## 2019-06-18 DIAGNOSIS — M25.462 PAIN AND SWELLING OF LEFT KNEE: Primary | ICD-10-CM

## 2019-06-18 DIAGNOSIS — M25.562 PAIN AND SWELLING OF LEFT KNEE: Primary | ICD-10-CM

## 2019-06-18 PROCEDURE — 20610 DRAIN/INJ JOINT/BURSA W/O US: CPT | Performed by: NURSE PRACTITIONER

## 2019-06-18 NOTE — PROGRESS NOTES
Procedure   Arthrocentesis  Date/Time: 6/18/2019 2:12 PM  Performed by: Castillo Velasco APRN  Authorized by: Castillo Velasco APRN   Consent: Verbal consent obtained.  Risks and benefits: risks, benefits and alternatives were discussed  Consent given by: patient  Patient understanding: patient states understanding of the procedure being performed  Patient consent: the patient's understanding of the procedure matches consent given  Procedure consent: procedure consent matches procedure scheduled  Relevant documents: relevant documents present and verified  Test results: test results available and properly labeled  Site marked: the operative site was marked  Imaging studies: imaging studies available  Patient identity confirmed: verbally with patient  Indications: joint swelling and pain   Body area: knee  Joint: left knee  Local anesthesia used: yes    Anesthesia:  Local anesthesia used: yes  Local Anesthetic: topical anesthetic    Sedation:  Patient sedated: no    Needle size: 18 G  Ultrasound guidance: no  Approach: lateral  Aspirate: clear and yellow  Aspirate amount: 100 mL  Patient tolerance: Patient tolerated the procedure well with no immediate complications

## 2019-07-10 ENCOUNTER — OFFICE VISIT (OUTPATIENT)
Dept: ORTHOPEDIC SURGERY | Facility: CLINIC | Age: 59
End: 2019-07-10

## 2019-07-10 VITALS — WEIGHT: 313.8 LBS | TEMPERATURE: 99.5 F | BODY MASS INDEX: 40.27 KG/M2 | HEIGHT: 74 IN

## 2019-07-10 DIAGNOSIS — M17.12 ARTHRITIS OF LEFT KNEE: Primary | ICD-10-CM

## 2019-07-10 PROCEDURE — 99203 OFFICE O/P NEW LOW 30 MIN: CPT | Performed by: ORTHOPAEDIC SURGERY

## 2019-07-10 NOTE — PROGRESS NOTES
Patient Name: Phani Landis   YOB: 1960  Referring Primary Care Physician: Castillo Velasco APRN  BMI: Body mass index is 40.29 kg/m².    Chief Complaint:    Chief Complaint   Patient presents with   • Left Knee - Establish Care, Pain        HPI:     Phani Landis is a 58 y.o. male who presents today for evaluation of   Chief Complaint   Patient presents with   • Left Knee - Establish Care, Pain   .  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  He has noticed since November 2018 that he has had more pain in his left knee.  Really no trauma.  It hurts him after he has been mowing for example and gets stiff and sore.  He has had 2 cortisone shots including one about a month ago by his primary care physician that helped some.  He is also been on Mobic for about 2 months.  It hurts when he pivots and it sore it does not lock    This problem is new to this examiner.     Subjective   Medications:   Home Medications:  Current Outpatient Medications on File Prior to Visit   Medication Sig   • amLODIPine-benazepril (LOTREL) 5-40 MG per capsule TAKE ONE CAPSULE BY MOUTH DAILY   • bisoprolol (ZEBeta) 10 MG tablet Take 1 tablet by mouth Daily.   • chlorthalidone (HYGROTEN) 50 MG tablet Take 1 tablet by mouth Daily.   • clotrimazole-betamethasone (LOTRISONE) 1-0.05 % cream APPLY 2 TIMES A DAY   • hydrALAZINE (APRESOLINE) 50 MG tablet Take 1 tablet by mouth 2 (Two) Times a Day.   • meloxicam (MOBIC) 15 MG tablet Take 1 tablet by mouth Daily.   • zolpidem (AMBIEN) 10 MG tablet Take 1 tablet by mouth At Night As Needed for Sleep.     No current facility-administered medications on file prior to visit.      Current Medications:  Scheduled Meds:  Continuous Infusions:  No current facility-administered medications for this visit.   PRN Meds:.    I have reviewed the patient's medical history in detail and updated the computerized patient record.  Review and summarization of old records includes:    Past Medical  "History:   Diagnosis Date   • Anxiety    • Hypertension         Past Surgical History:   Procedure Laterality Date   • CHEST SURGERY          Social History     Occupational History   • Not on file   Tobacco Use   • Smoking status: Never Smoker   • Smokeless tobacco: Never Used   Substance and Sexual Activity   • Alcohol use: Yes   • Drug use: Not on file   • Sexual activity: Not on file      Social History     Social History Narrative   • Not on file        Family History   Problem Relation Age of Onset   • Hypertension Father        ROS: 14 point review of systems was performed and all other systems were reviewed and are negative except for documented findings in HPI and today's encounter.     Allergies:   Allergies   Allergen Reactions   • Cephalexin Itching     Constitutional:  Denies fever, shaking or chills   Eyes:  Denies change in visual acuity   HENT:  Denies nasal congestion or sore throat   Respiratory:  Denies cough or shortness of breath   Cardiovascular:  Denies chest pain or severe LE edema   GI:  Denies abdominal pain, nausea, vomiting, bloody stools or diarrhea   Musculoskeletal:  Numbness, tingling, pain, or loss of motor function only as noted above in history of present illness.  : Denies painful urination or hematuria  Integument:  Denies rash, lesion or ulceration   Neurologic:  Denies headache or focal weakness  Endocrine:  Denies lymphadenopathy  Psych:  Denies confusion or change in mental status   Hem:  Denies active bleeding    OBJECTIVE:  Physical Exam:   Temp 99.5 °F (37.5 °C) (Temporal)   Ht 188 cm (74\")   Wt (!) 142 kg (313 lb 12.8 oz)   BMI 40.29 kg/m²     General Appearance:    Alert, cooperative, in no acute distress                  Eyes: conjunctiva clear  ENT: external ears and nose atraumatic  CV: no peripheral edema  Resp: normal respiratory effort  Skin: no rashes or wounds; normal turgor  Psych: mood and affect appropriate  Lymph: no nodes appreciated  Neuro: gross " sensation intact  Vascular:  Palpable peripheral pulse in noted extremity  Musculoskeletal Extremities: Exam today shows pleasant gentleman BMI of 40.  He has diffuse medial joint line tenderness Yonathan's is negative a little pseudolaxity hips noncontributory some synovitis no true limp    Radiology:   AP lateral x-rays from [facility] office show advanced end-stage osteoarthritis with a varus pattern without comparison views available    Assessment:     ICD-10-CM ICD-9-CM   1. Arthritis of left knee M17.12 716.96        Procedures       Plan: Biomechanics of pertinent body area discussed.  Risks, benefits, alternatives, comparisons, and complications of accepted medicines, injections, recommendations, surgical procedures, and therapies explained and education provided in laymen's terms. Natural history and expected course of this patient's diagnosis discussed along with evaluation of therapies. Questions answered. When appropriate I also discussed proper use of cane, walker, trekking poles.   BMI:  The concept of BMI body mass index and its importance and implications discussed.  BMI suggested to be < 40 or as low as possible. Lifestyle measures for weight loss and how this affects orthopedic condition.  EXERCISES:  Advice on benefits of, and types of regular/moderate exercise including biomechanical forces involved as it pertains to this complaint.  RICE: Rest, ice, compression, and elevation therapy, Cryotherapy/brachy therapy, and or OTC linaments as indicated with instructions.   PT referral.have him follow-up in about 6 to 8 weeks for new injection if he is not doing better.      7/10/2019    Much of this encounter note is an electronic transcription/translation of spoken language to printed text. The electronic translation of spoken language may permit erroneous, or at times, nonsensical words or phrases to be inadvertently transcribed; Although I have reviewed the note for such errors, some may still  exist

## 2019-07-26 ENCOUNTER — PRIOR AUTHORIZATION (OUTPATIENT)
Dept: FAMILY MEDICINE CLINIC | Facility: CLINIC | Age: 59
End: 2019-07-26

## 2019-08-21 ENCOUNTER — OFFICE VISIT (OUTPATIENT)
Dept: ORTHOPEDIC SURGERY | Facility: CLINIC | Age: 59
End: 2019-08-21

## 2019-08-21 VITALS — BODY MASS INDEX: 39.27 KG/M2 | WEIGHT: 306 LBS | HEIGHT: 74 IN | TEMPERATURE: 97.8 F

## 2019-08-21 DIAGNOSIS — M17.12 ARTHRITIS OF LEFT KNEE: Primary | ICD-10-CM

## 2019-08-21 PROCEDURE — 99213 OFFICE O/P EST LOW 20 MIN: CPT | Performed by: ORTHOPAEDIC SURGERY

## 2019-08-21 PROCEDURE — 73560 X-RAY EXAM OF KNEE 1 OR 2: CPT | Performed by: ORTHOPAEDIC SURGERY

## 2019-08-21 NOTE — PROGRESS NOTES
Patient Name: Phani Landis   YOB: 1960  Referring Primary Care Physician: Castillo Velasco APRN  BMI: Body mass index is 39.29 kg/m².    Chief Complaint:    Chief Complaint   Patient presents with   • Left Knee - Follow-up, Pain        HPI:     Phani Landis is a 58 y.o. male who presents today for evaluation of   Chief Complaint   Patient presents with   • Left Knee - Follow-up, Pain   .  She follows up today on his left knee.  He said he and injections been doing some therapy and doing activities.  Feels much much better.  He built a pole barn last week is been mowing his grass etc.    This problem is not new to this examiner.     Subjective   Medications:   Home Medications:  Current Outpatient Medications on File Prior to Visit   Medication Sig   • amLODIPine-benazepril (LOTREL) 5-40 MG per capsule TAKE ONE CAPSULE BY MOUTH DAILY   • bisoprolol (ZEBeta) 10 MG tablet Take 1 tablet by mouth Daily.   • chlorthalidone (HYGROTEN) 50 MG tablet Take 1 tablet by mouth Daily.   • clotrimazole-betamethasone (LOTRISONE) 1-0.05 % cream APPLY 2 TIMES A DAY   • hydrALAZINE (APRESOLINE) 50 MG tablet Take 1 tablet by mouth 2 (Two) Times a Day.   • meloxicam (MOBIC) 15 MG tablet Take 1 tablet by mouth Daily.   • zolpidem (AMBIEN) 10 MG tablet Take 1 tablet by mouth At Night As Needed for Sleep.     No current facility-administered medications on file prior to visit.      Current Medications:  Scheduled Meds:  Continuous Infusions:  No current facility-administered medications for this visit.   PRN Meds:.    I have reviewed the patient's medical history in detail and updated the computerized patient record.  Review and summarization of old records includes:    Past Medical History:   Diagnosis Date   • Anxiety    • Hypertension         Past Surgical History:   Procedure Laterality Date   • CHEST SURGERY          Social History     Occupational History   • Not on file   Tobacco Use   • Smoking status: Never Smoker   •  "Smokeless tobacco: Never Used   Substance and Sexual Activity   • Alcohol use: Yes   • Drug use: Not on file   • Sexual activity: Not on file      Social History     Social History Narrative   • Not on file        Family History   Problem Relation Age of Onset   • Hypertension Father        ROS: 14 point review of systems was performed and all other systems were reviewed and are negative except for documented findings in HPI and today's encounter.     Allergies:   Allergies   Allergen Reactions   • Cephalexin Itching     Constitutional:  Denies fever, shaking or chills   Eyes:  Denies change in visual acuity   HENT:  Denies nasal congestion or sore throat   Respiratory:  Denies cough or shortness of breath   Cardiovascular:  Denies chest pain or severe LE edema   GI:  Denies abdominal pain, nausea, vomiting, bloody stools or diarrhea   Musculoskeletal:  Numbness, tingling, pain, or loss of motor function only as noted above in history of present illness.  : Denies painful urination or hematuria  Integument:  Denies rash, lesion or ulceration   Neurologic:  Denies headache or focal weakness  Endocrine:  Denies lymphadenopathy  Psych:  Denies confusion or change in mental status   Hem:  Denies active bleeding    OBJECTIVE:  Physical Exam:   Temp 97.8 °F (36.6 °C) (Temporal)   Ht 188 cm (74\")   Wt (!) 139 kg (306 lb)   BMI 39.29 kg/m²     General Appearance:    Alert, cooperative, in no acute distress                  Eyes: conjunctiva clear  ENT: external ears and nose atraumatic  CV: no peripheral edema  Resp: normal respiratory effort  Skin: no rashes or wounds; normal turgor  Psych: mood and affect appropriate  Lymph: no nodes appreciated  Neuro: gross sensation intact  Vascular:  Palpable peripheral pulse in noted extremity  Musculoskeletal Extremities: Exam today shows good range of motion of his left knee with good stability the effusion is gone down and is mildly swollen compared the opposite side his calf " "is soft he is walking without a limp    Radiology:   AP lateral x-rays left knee taken the office today compared to old nonweightbearing x-rays that he had before show advanced medial compartment arthritis with \"bone-on-bone    Assessment:     ICD-10-CM ICD-9-CM   1. Arthritis of left knee M17.12 716.96        Procedures       Plan: Biomechanics of pertinent body area discussed.  Risks, benefits, alternatives, comparisons, and complications of accepted medicines, injections, recommendations, surgical procedures, and therapies explained and education provided in laymen's terms. Natural history and expected course of this patient's diagnosis discussed along with evaluation of therapies. Questions answered. When appropriate I also discussed proper use of cane, walker, trekking poles.   EXERCISES:  Advice on benefits of, and types of regular/moderate exercise including biomechanical forces involved as it pertains to this complaint.  MEDICATIONS:  Prescription, OTC and Monitoring of Medications per orders to address ortho complaints; Evaluation and discussion of safety, precautions, side effects, and warnings given especially of long term NSAID or steroid therapy.    RICE: Rest, ice, compression, and elevation therapy, Cryotherapy/brachy therapy, and or OTC linaments as indicated with instructions. Encourage continuation of exercises anti-inflammatories and other measures.  If starts bother more we could see him back.  I also spoke to him about his BMI.  He weighs about 300 pounds but he is tall and has a BMI of 39 currently      8/21/2019    Much of this encounter note is an electronic transcription/translation of spoken language to printed text. The electronic translation of spoken language may permit erroneous, or at times, nonsensical words or phrases to be inadvertently transcribed; Although I have reviewed the note for such errors, some may still exist    "

## 2019-09-12 DIAGNOSIS — R21 RASH: ICD-10-CM

## 2019-09-12 RX ORDER — HYDRALAZINE HYDROCHLORIDE 50 MG/1
TABLET, FILM COATED ORAL
Qty: 180 TABLET | Refills: 0 | Status: SHIPPED | OUTPATIENT
Start: 2019-09-12 | End: 2020-03-12

## 2019-09-12 RX ORDER — BISOPROLOL FUMARATE 10 MG/1
TABLET, FILM COATED ORAL
Qty: 90 TABLET | Refills: 1 | Status: SHIPPED | OUTPATIENT
Start: 2019-09-12 | End: 2020-03-12

## 2019-09-12 RX ORDER — CHLORTHALIDONE 50 MG/1
TABLET ORAL
Qty: 90 TABLET | Refills: 1 | Status: SHIPPED | OUTPATIENT
Start: 2019-09-12 | End: 2020-03-12

## 2019-09-12 RX ORDER — CLOTRIMAZOLE AND BETAMETHASONE DIPROPIONATE 10; .64 MG/G; MG/G
CREAM TOPICAL
Qty: 15 G | Refills: 5 | Status: SHIPPED | OUTPATIENT
Start: 2019-09-12 | End: 2019-11-26 | Stop reason: SDUPTHER

## 2019-09-12 RX ORDER — AMLODIPINE BESYLATE AND BENAZEPRIL HYDROCHLORIDE 5; 40 MG/1; MG/1
CAPSULE ORAL
Qty: 90 CAPSULE | Refills: 0 | Status: SHIPPED | OUTPATIENT
Start: 2019-09-12 | End: 2019-12-16 | Stop reason: SDUPTHER

## 2019-10-07 DIAGNOSIS — G47.00 INSOMNIA, UNSPECIFIED TYPE: ICD-10-CM

## 2019-10-07 RX ORDER — ZOLPIDEM TARTRATE 10 MG/1
TABLET ORAL
Qty: 30 TABLET | Refills: 0 | OUTPATIENT
Start: 2019-10-07

## 2019-10-13 DIAGNOSIS — G47.00 INSOMNIA, UNSPECIFIED TYPE: ICD-10-CM

## 2019-10-14 RX ORDER — ZOLPIDEM TARTRATE 10 MG/1
TABLET ORAL
Qty: 30 TABLET | Refills: 0 | OUTPATIENT
Start: 2019-10-14

## 2019-10-30 ENCOUNTER — OFFICE VISIT (OUTPATIENT)
Dept: FAMILY MEDICINE CLINIC | Facility: CLINIC | Age: 59
End: 2019-10-30

## 2019-10-30 VITALS
HEIGHT: 74 IN | OXYGEN SATURATION: 98 % | WEIGHT: 311 LBS | HEART RATE: 55 BPM | TEMPERATURE: 98 F | DIASTOLIC BLOOD PRESSURE: 88 MMHG | SYSTOLIC BLOOD PRESSURE: 138 MMHG | BODY MASS INDEX: 39.91 KG/M2

## 2019-10-30 DIAGNOSIS — H66.91 RIGHT OTITIS MEDIA, UNSPECIFIED OTITIS MEDIA TYPE: Primary | ICD-10-CM

## 2019-10-30 PROCEDURE — 99213 OFFICE O/P EST LOW 20 MIN: CPT | Performed by: NURSE PRACTITIONER

## 2019-10-30 PROCEDURE — 96372 THER/PROPH/DIAG INJ SC/IM: CPT | Performed by: NURSE PRACTITIONER

## 2019-10-30 RX ORDER — TRIAMCINOLONE ACETONIDE 40 MG/ML
40 INJECTION, SUSPENSION INTRA-ARTICULAR; INTRAMUSCULAR ONCE
Status: COMPLETED | OUTPATIENT
Start: 2019-10-30 | End: 2019-10-30

## 2019-10-30 RX ORDER — AMOXICILLIN 875 MG/1
875 TABLET, COATED ORAL 2 TIMES DAILY
Qty: 20 TABLET | Refills: 0 | Status: SHIPPED | OUTPATIENT
Start: 2019-10-30 | End: 2020-06-22

## 2019-10-30 RX ADMIN — TRIAMCINOLONE ACETONIDE 40 MG: 40 INJECTION, SUSPENSION INTRA-ARTICULAR; INTRAMUSCULAR at 12:51

## 2019-10-30 NOTE — PROGRESS NOTES
"Subjective   Phani Landis is a 59 y.o. male.     History of Present Illness   Upper Respiratory Infection: Patient complains of symptoms of a URI. Symptoms include right ear pain. Onset of symptoms was 7 days ago, gradually worsening since that time.   He is drinking plenty of fluids. Evaluation to date: none. Treatment to date: antihistamines.      The following portions of the patient's history were reviewed and updated as appropriate: allergies, current medications, past social history and problem list.    Review of Systems   HENT: Positive for ear pain.    All other systems reviewed and are negative.      Objective   /88 (BP Location: Right arm, Patient Position: Sitting)   Pulse 55   Temp 98 °F (36.7 °C)   Ht 188 cm (74.02\")   Wt (!) 141 kg (311 lb)   SpO2 98%   BMI 39.91 kg/m²   Physical Exam   Constitutional: He is oriented to person, place, and time. Vital signs are normal. He appears well-developed and well-nourished. No distress.   HENT:   Head: Normocephalic.   Right Ear: Tympanic membrane is erythematous.   Cardiovascular: Normal rate, regular rhythm and normal heart sounds.   Pulmonary/Chest: Effort normal and breath sounds normal.   Neurological: He is alert and oriented to person, place, and time. Gait normal.   Psychiatric: He has a normal mood and affect. His behavior is normal. Judgment and thought content normal.   Vitals reviewed.      Assessment/Plan      Diagnosis Plan   1. Right otitis media, unspecified otitis media type  triamcinolone acetonide (KENALOG-40) injection 40 mg    amoxicillin (AMOXIL) 875 MG tablet     rto prn  Castillo Velasco, AG  10/30/2019  "

## 2019-11-26 DIAGNOSIS — R21 RASH: ICD-10-CM

## 2019-11-26 RX ORDER — CLOTRIMAZOLE AND BETAMETHASONE DIPROPIONATE 10; .64 MG/G; MG/G
CREAM TOPICAL
Qty: 15 G | Refills: 4 | Status: SHIPPED | OUTPATIENT
Start: 2019-11-26 | End: 2019-12-31

## 2019-12-16 RX ORDER — AMLODIPINE BESYLATE AND BENAZEPRIL HYDROCHLORIDE 5; 40 MG/1; MG/1
CAPSULE ORAL
Qty: 90 CAPSULE | Refills: 3 | Status: SHIPPED | OUTPATIENT
Start: 2019-12-16 | End: 2020-12-14

## 2019-12-30 DIAGNOSIS — R21 RASH: ICD-10-CM

## 2019-12-31 RX ORDER — CLOTRIMAZOLE AND BETAMETHASONE DIPROPIONATE 10; .64 MG/G; MG/G
CREAM TOPICAL
Qty: 45 EACH | Refills: 3 | Status: SHIPPED | OUTPATIENT
Start: 2019-12-31 | End: 2022-06-01

## 2020-03-12 DIAGNOSIS — M25.562 ACUTE PAIN OF LEFT KNEE: ICD-10-CM

## 2020-03-12 RX ORDER — HYDRALAZINE HYDROCHLORIDE 50 MG/1
TABLET, FILM COATED ORAL
Qty: 180 TABLET | Refills: 0 | Status: SHIPPED | OUTPATIENT
Start: 2020-03-12 | End: 2020-06-16

## 2020-03-12 RX ORDER — BISOPROLOL FUMARATE 10 MG/1
TABLET, FILM COATED ORAL
Qty: 90 TABLET | Refills: 0 | Status: SHIPPED | OUTPATIENT
Start: 2020-03-12 | End: 2020-06-16

## 2020-03-12 RX ORDER — CHLORTHALIDONE 50 MG/1
TABLET ORAL
Qty: 90 TABLET | Refills: 0 | Status: SHIPPED | OUTPATIENT
Start: 2020-03-12 | End: 2020-06-16

## 2020-03-12 RX ORDER — MELOXICAM 15 MG/1
TABLET ORAL
Qty: 90 TABLET | Refills: 2 | Status: SHIPPED | OUTPATIENT
Start: 2020-03-12 | End: 2020-07-15

## 2020-06-16 RX ORDER — BISOPROLOL FUMARATE 10 MG/1
TABLET, FILM COATED ORAL
Qty: 90 TABLET | Refills: 0 | Status: SHIPPED | OUTPATIENT
Start: 2020-06-16 | End: 2020-09-14

## 2020-06-16 RX ORDER — CHLORTHALIDONE 50 MG/1
TABLET ORAL
Qty: 90 TABLET | Refills: 0 | Status: SHIPPED | OUTPATIENT
Start: 2020-06-16 | End: 2020-09-14

## 2020-06-16 RX ORDER — HYDRALAZINE HYDROCHLORIDE 50 MG/1
TABLET, FILM COATED ORAL
Qty: 180 TABLET | Refills: 0 | Status: SHIPPED | OUTPATIENT
Start: 2020-06-16 | End: 2021-03-15

## 2020-06-22 ENCOUNTER — OFFICE VISIT (OUTPATIENT)
Dept: FAMILY MEDICINE CLINIC | Facility: CLINIC | Age: 60
End: 2020-06-22

## 2020-06-22 VITALS
HEART RATE: 67 BPM | HEIGHT: 74 IN | BODY MASS INDEX: 34.39 KG/M2 | TEMPERATURE: 98 F | SYSTOLIC BLOOD PRESSURE: 122 MMHG | WEIGHT: 268 LBS | DIASTOLIC BLOOD PRESSURE: 80 MMHG

## 2020-06-22 DIAGNOSIS — Z13.0 SCREENING FOR IRON DEFICIENCY ANEMIA: ICD-10-CM

## 2020-06-22 DIAGNOSIS — G47.00 INSOMNIA, UNSPECIFIED TYPE: ICD-10-CM

## 2020-06-22 DIAGNOSIS — Z13.6 SCREENING FOR ISCHEMIC HEART DISEASE: ICD-10-CM

## 2020-06-22 DIAGNOSIS — M25.562 ACUTE PAIN OF LEFT KNEE: Primary | ICD-10-CM

## 2020-06-22 DIAGNOSIS — Z13.1 SCREENING FOR DIABETES MELLITUS: ICD-10-CM

## 2020-06-22 DIAGNOSIS — Z12.5 SPECIAL SCREENING FOR MALIGNANT NEOPLASM OF PROSTATE: ICD-10-CM

## 2020-06-22 PROCEDURE — 99213 OFFICE O/P EST LOW 20 MIN: CPT | Performed by: NURSE PRACTITIONER

## 2020-06-22 PROCEDURE — 20610 DRAIN/INJ JOINT/BURSA W/O US: CPT | Performed by: NURSE PRACTITIONER

## 2020-06-22 RX ORDER — TRIAMCINOLONE ACETONIDE 40 MG/ML
40 INJECTION, SUSPENSION INTRA-ARTICULAR; INTRAMUSCULAR
Status: COMPLETED | OUTPATIENT
Start: 2020-06-22 | End: 2020-06-22

## 2020-06-22 RX ORDER — ZOLPIDEM TARTRATE 10 MG/1
10 TABLET ORAL NIGHTLY PRN
Qty: 90 TABLET | Refills: 0 | Status: SHIPPED | OUTPATIENT
Start: 2020-06-22 | End: 2020-11-11 | Stop reason: SDUPTHER

## 2020-06-22 RX ADMIN — TRIAMCINOLONE ACETONIDE 40 MG: 40 INJECTION, SUSPENSION INTRA-ARTICULAR; INTRAMUSCULAR at 11:04

## 2020-06-22 NOTE — PROGRESS NOTES
"Subjective   Phani Landis is a 59 y.o. male.     History of Present Illness   Phani Landis 59 y.o. male presents for follow up of insomnia with onset of symptoms years ago. Patient describes symptoms as early morning awakening and frequent night time awakening. Patient has found complete relief with Ambien (Zolpidem). Associated symptoms include: fatigue if unable to take Rx . Patient denies anxiety and daytime somnolence Symptoms have stabilized.  The patient has failed multiple OTC medications for insomnia.  They are well controlled on current Rx and will continue to try to take Rx PRN.  He will use the lowest effective dose.  The patient has read and signed the [facility] Controlled Substance Contract.  I will continue to see patient for regular follow up appointments and be prescribed the lowest effective dose.  YUE has been reviewed by me and is updated every 3 months. The patient is aware of the potential for addiction and dependence.  He denies that Ambien (Zolpidem) causes excessive daytime drowsiness and sleep walking.  Patient voices understanding to take Ambien (Zolpidem) and go straight to bed. Patient must be able to sleep 7 hours or more when taking this.  Patient will hold Rx and contact me if they experience any impaired mental alertness the next day.      Pt requesting a steroid shot in left knee for chronic pain.  Hasn't had a shot in 9 mons     The following portions of the patient's history were reviewed and updated as appropriate: allergies, current medications, past social history and problem list.    Review of Systems   Musculoskeletal: Positive for arthralgias.   All other systems reviewed and are negative.      Objective   /80   Pulse 67   Temp 98 °F (36.7 °C) (Temporal)   Ht 188 cm (74\")   Wt 122 kg (268 lb)   BMI 34.41 kg/m²     Physical Exam   Constitutional: He is oriented to person, place, and time. Vital signs are normal. He appears well-developed and " well-nourished. No distress.   HENT:   Head: Normocephalic.   Cardiovascular: Normal rate, regular rhythm and normal heart sounds.   Pulmonary/Chest: Effort normal and breath sounds normal.   Neurological: He is alert and oriented to person, place, and time. Gait normal.   Psychiatric: He has a normal mood and affect. His behavior is normal. Judgment and thought content normal.   Vitals reviewed.    Arthrocentesis  Date/Time: 6/22/2020 11:04 AM  Performed by: Castillo Velasco APRN  Authorized by: Castillo Velasco APRN   Consent: Verbal consent obtained.  Risks and benefits: risks, benefits and alternatives were discussed  Consent given by: patient  Patient understanding: patient states understanding of the procedure being performed  Patient consent: the patient's understanding of the procedure matches consent given  Procedure consent: procedure consent matches procedure scheduled  Relevant documents: relevant documents present and verified  Test results: test results available and properly labeled  Site marked: the operative site was marked  Imaging studies: imaging studies available  Indications: pain   Body area: knee  Joint: left knee  Local anesthesia used: no    Anesthesia:  Local anesthesia used: no    Sedation:  Patient sedated: no    Preparation: Patient was prepped and draped in the usual sterile fashion.  Needle size: 20 G  Approach: lateral  Patient tolerance: Patient tolerated the procedure well with no immediate complications        Assessment/Plan      Diagnosis Plan   1. Acute pain of left knee  Arthrocentesis   2. Insomnia, unspecified type  zolpidem (AMBIEN) 10 MG tablet   3. Screening for iron deficiency anemia  CBC & Differential   4. Screening for diabetes mellitus  Comprehensive Metabolic Panel   5. Screening for ischemic heart disease  Lipid Panel With LDL / HDL Ratio   6. Special screening for malignant neoplasm of prostate  PSA Screen     Follow up after labs   Cont same  Ice to knee  Castillo  Rod, AG  6/22/2020

## 2020-06-23 LAB
ALBUMIN SERPL-MCNC: 4.6 G/DL (ref 3.5–5.2)
ALBUMIN/GLOB SERPL: 1.5 G/DL
ALP SERPL-CCNC: 62 U/L (ref 39–117)
ALT SERPL-CCNC: 40 U/L (ref 1–41)
AST SERPL-CCNC: 37 U/L (ref 1–40)
BASOPHILS # BLD AUTO: 0.2 10*3/MM3 (ref 0–0.2)
BASOPHILS NFR BLD AUTO: 2.1 % (ref 0–1.5)
BILIRUB SERPL-MCNC: 0.6 MG/DL (ref 0.2–1.2)
BUN SERPL-MCNC: 26 MG/DL (ref 6–20)
BUN/CREAT SERPL: 20.3 (ref 7–25)
CALCIUM SERPL-MCNC: 10.9 MG/DL (ref 8.6–10.5)
CHLORIDE SERPL-SCNC: 102 MMOL/L (ref 98–107)
CHOLEST SERPL-MCNC: 152 MG/DL (ref 0–200)
CO2 SERPL-SCNC: 26.2 MMOL/L (ref 22–29)
CREAT SERPL-MCNC: 1.28 MG/DL (ref 0.76–1.27)
EOSINOPHIL # BLD AUTO: 0.31 10*3/MM3 (ref 0–0.4)
EOSINOPHIL NFR BLD AUTO: 3.2 % (ref 0.3–6.2)
ERYTHROCYTE [DISTWIDTH] IN BLOOD BY AUTOMATED COUNT: 14 % (ref 12.3–15.4)
GLOBULIN SER CALC-MCNC: 3.1 GM/DL
GLUCOSE SERPL-MCNC: 106 MG/DL (ref 65–99)
HCT VFR BLD AUTO: 48.1 % (ref 37.5–51)
HDLC SERPL-MCNC: 42 MG/DL (ref 40–60)
HGB BLD-MCNC: 16.3 G/DL (ref 13–17.7)
IMM GRANULOCYTES # BLD AUTO: 0.04 10*3/MM3 (ref 0–0.05)
IMM GRANULOCYTES NFR BLD AUTO: 0.4 % (ref 0–0.5)
LDLC SERPL CALC-MCNC: 86 MG/DL (ref 0–100)
LDLC/HDLC SERPL: 2.05 {RATIO}
LYMPHOCYTES # BLD AUTO: 1.8 10*3/MM3 (ref 0.7–3.1)
LYMPHOCYTES NFR BLD AUTO: 18.5 % (ref 19.6–45.3)
MCH RBC QN AUTO: 29.4 PG (ref 26.6–33)
MCHC RBC AUTO-ENTMCNC: 33.9 G/DL (ref 31.5–35.7)
MCV RBC AUTO: 86.8 FL (ref 79–97)
MONOCYTES # BLD AUTO: 0.86 10*3/MM3 (ref 0.1–0.9)
MONOCYTES NFR BLD AUTO: 8.8 % (ref 5–12)
NEUTROPHILS # BLD AUTO: 6.54 10*3/MM3 (ref 1.7–7)
NEUTROPHILS NFR BLD AUTO: 67 % (ref 42.7–76)
NRBC BLD AUTO-RTO: 0 /100 WBC (ref 0–0.2)
PLATELET # BLD AUTO: 932 10*3/MM3 (ref 140–450)
POTASSIUM SERPL-SCNC: 4.6 MMOL/L (ref 3.5–5.2)
PROT SERPL-MCNC: 7.7 G/DL (ref 6–8.5)
PSA SERPL-MCNC: 0.49 NG/ML (ref 0–4)
RBC # BLD AUTO: 5.54 10*6/MM3 (ref 4.14–5.8)
SODIUM SERPL-SCNC: 139 MMOL/L (ref 136–145)
TRIGL SERPL-MCNC: 120 MG/DL (ref 0–150)
VLDLC SERPL CALC-MCNC: 24 MG/DL
WBC # BLD AUTO: 9.75 10*3/MM3 (ref 3.4–10.8)

## 2020-06-26 ENCOUNTER — TELEPHONE (OUTPATIENT)
Dept: FAMILY MEDICINE CLINIC | Facility: CLINIC | Age: 60
End: 2020-06-26

## 2020-06-26 DIAGNOSIS — R79.89 HIGH PLATELET COUNT: Primary | ICD-10-CM

## 2020-06-26 DIAGNOSIS — N28.9 KIDNEY FUNCTION ABNORMAL: ICD-10-CM

## 2020-06-26 NOTE — TELEPHONE ENCOUNTER
Spoke with Phani regarding his lab results. Paper copy will be mailed out to patient's address on Monday. Patient is curious if he can have more clarification on his platelet count and should he discontinue the meloxicam? Please advise.

## 2020-06-26 NOTE — TELEPHONE ENCOUNTER
----- Message from Jaxon Madison MA sent at 6/26/2020  8:40 AM EDT -----  Please call pt back to schedule 8 weeks lab appoint. Labs are entered . Thanks       Referral placed as well.

## 2020-07-01 ENCOUNTER — RESULTS ENCOUNTER (OUTPATIENT)
Dept: FAMILY MEDICINE CLINIC | Facility: CLINIC | Age: 60
End: 2020-07-01

## 2020-07-01 DIAGNOSIS — N28.9 KIDNEY FUNCTION ABNORMAL: ICD-10-CM

## 2020-07-08 ENCOUNTER — LAB (OUTPATIENT)
Dept: OTHER | Facility: HOSPITAL | Age: 60
End: 2020-07-08

## 2020-07-08 ENCOUNTER — CONSULT (OUTPATIENT)
Dept: ONCOLOGY | Facility: CLINIC | Age: 60
End: 2020-07-08

## 2020-07-08 VITALS
DIASTOLIC BLOOD PRESSURE: 82 MMHG | OXYGEN SATURATION: 95 % | RESPIRATION RATE: 18 BRPM | TEMPERATURE: 98.2 F | BODY MASS INDEX: 36.62 KG/M2 | WEIGHT: 261.6 LBS | HEART RATE: 69 BPM | SYSTOLIC BLOOD PRESSURE: 137 MMHG | HEIGHT: 71 IN

## 2020-07-08 DIAGNOSIS — R63.4 WEIGHT LOSS: ICD-10-CM

## 2020-07-08 DIAGNOSIS — D75.839 THROMBOCYTOSIS: ICD-10-CM

## 2020-07-08 DIAGNOSIS — D75.1 POLYCYTHEMIA: ICD-10-CM

## 2020-07-08 DIAGNOSIS — R89.9 ABNORMAL LABORATORY TEST: Primary | ICD-10-CM

## 2020-07-08 DIAGNOSIS — D72.829 LEUKOCYTOSIS, UNSPECIFIED TYPE: ICD-10-CM

## 2020-07-08 DIAGNOSIS — D75.839 THROMBOCYTOSIS: Primary | ICD-10-CM

## 2020-07-08 LAB
ALBUMIN SERPL-MCNC: 4.6 G/DL (ref 3.5–5.2)
ALBUMIN/GLOB SERPL: 1.5 G/DL
ALP SERPL-CCNC: 61 U/L (ref 39–117)
ALT SERPL W P-5'-P-CCNC: 42 U/L (ref 1–41)
ANION GAP SERPL CALCULATED.3IONS-SCNC: 10.6 MMOL/L (ref 5–15)
AST SERPL-CCNC: 36 U/L (ref 1–40)
BASOPHILS # BLD AUTO: 0.23 10*3/MM3 (ref 0–0.2)
BASOPHILS NFR BLD AUTO: 2 % (ref 0–1.5)
BILIRUB SERPL-MCNC: 0.4 MG/DL (ref 0–1.2)
BUN SERPL-MCNC: 20 MG/DL (ref 6–20)
BUN/CREAT SERPL: 16.3 (ref 7–25)
CA-I BLD-MCNC: 5.8 MG/DL (ref 4.6–5.4)
CA-I SERPL ISE-MCNC: 1.45 MMOL/L (ref 1.15–1.35)
CALCIUM SPEC-SCNC: 10.5 MG/DL (ref 8.6–10.5)
CHLORIDE SERPL-SCNC: 102 MMOL/L (ref 98–107)
CO2 SERPL-SCNC: 26.4 MMOL/L (ref 22–29)
CREAT SERPL-MCNC: 1.23 MG/DL (ref 0.76–1.27)
CRP SERPL-MCNC: 0.03 MG/DL (ref 0–0.5)
DEPRECATED RDW RBC AUTO: 47 FL (ref 37–54)
EOSINOPHIL # BLD AUTO: 0.22 10*3/MM3 (ref 0–0.4)
EOSINOPHIL NFR BLD AUTO: 1.9 % (ref 0.3–6.2)
ERYTHROCYTE [DISTWIDTH] IN BLOOD BY AUTOMATED COUNT: 14.8 % (ref 12.3–15.4)
ERYTHROCYTE [SEDIMENTATION RATE] IN BLOOD: 1 MM/HR (ref 0–20)
GFR SERPL CREATININE-BSD FRML MDRD: 60 ML/MIN/1.73
GLOBULIN UR ELPH-MCNC: 3.1 GM/DL
GLUCOSE SERPL-MCNC: 102 MG/DL (ref 65–99)
HCT VFR BLD AUTO: 52.8 % (ref 37.5–51)
HGB BLD-MCNC: 17.6 G/DL (ref 13–17.7)
HOLD SPECIMEN: NORMAL
IMM GRANULOCYTES # BLD AUTO: 0.04 10*3/MM3 (ref 0–0.05)
IMM GRANULOCYTES NFR BLD AUTO: 0.3 % (ref 0–0.5)
LYMPHOCYTES # BLD AUTO: 1.88 10*3/MM3 (ref 0.7–3.1)
LYMPHOCYTES NFR BLD AUTO: 16.3 % (ref 19.6–45.3)
MCH RBC QN AUTO: 29.3 PG (ref 26.6–33)
MCHC RBC AUTO-ENTMCNC: 33.3 G/DL (ref 31.5–35.7)
MCV RBC AUTO: 88 FL (ref 79–97)
MONOCYTES # BLD AUTO: 0.9 10*3/MM3 (ref 0.1–0.9)
MONOCYTES NFR BLD AUTO: 7.8 % (ref 5–12)
NEUTROPHILS NFR BLD AUTO: 71.7 % (ref 42.7–76)
NEUTROPHILS NFR BLD AUTO: 8.29 10*3/MM3 (ref 1.7–7)
NRBC BLD AUTO-RTO: 0 /100 WBC (ref 0–0.2)
PLATELET # BLD AUTO: 974 10*3/MM3 (ref 140–450)
PMV BLD AUTO: 10.1 FL (ref 6–12)
POTASSIUM SERPL-SCNC: 3.5 MMOL/L (ref 3.5–5.2)
PROT SERPL-MCNC: 7.7 G/DL (ref 6–8.5)
PTH-INTACT SERPL-MCNC: 32.1 PG/ML (ref 15–65)
RBC # BLD AUTO: 6 10*6/MM3 (ref 4.14–5.8)
SODIUM SERPL-SCNC: 139 MMOL/L (ref 136–145)
URATE SERPL-MCNC: 8.9 MG/DL (ref 3.4–7)
WBC # BLD AUTO: 11.56 10*3/MM3 (ref 3.4–10.8)
WHOLE BLOOD HOLD SPECIMEN: NORMAL

## 2020-07-08 PROCEDURE — 36415 COLL VENOUS BLD VENIPUNCTURE: CPT

## 2020-07-08 PROCEDURE — 99205 OFFICE O/P NEW HI 60 MIN: CPT | Performed by: INTERNAL MEDICINE

## 2020-07-08 PROCEDURE — 82375 ASSAY CARBOXYHB QUANT: CPT | Performed by: INTERNAL MEDICINE

## 2020-07-08 PROCEDURE — 85652 RBC SED RATE AUTOMATED: CPT | Performed by: INTERNAL MEDICINE

## 2020-07-08 PROCEDURE — 84550 ASSAY OF BLOOD/URIC ACID: CPT | Performed by: INTERNAL MEDICINE

## 2020-07-08 PROCEDURE — 80053 COMPREHEN METABOLIC PANEL: CPT | Performed by: INTERNAL MEDICINE

## 2020-07-08 PROCEDURE — 82668 ASSAY OF ERYTHROPOIETIN: CPT | Performed by: INTERNAL MEDICINE

## 2020-07-08 PROCEDURE — 85025 COMPLETE CBC W/AUTO DIFF WBC: CPT | Performed by: INTERNAL MEDICINE

## 2020-07-08 PROCEDURE — 86140 C-REACTIVE PROTEIN: CPT | Performed by: INTERNAL MEDICINE

## 2020-07-08 PROCEDURE — 82330 ASSAY OF CALCIUM: CPT | Performed by: INTERNAL MEDICINE

## 2020-07-08 PROCEDURE — 83970 ASSAY OF PARATHORMONE: CPT | Performed by: INTERNAL MEDICINE

## 2020-07-08 NOTE — PROGRESS NOTES
Subjective     REASON FOR CONSULTATION: Polycythemia and thrombocytosis and leukocytosis  Provide an opinion on any further workup or treatment                             REQUESTING PHYSICIAN:  AG Matt    RECORDS OBTAINED:  Records of the patients history including those obtained from the referring provider were reviewed and summarized in detail.    HISTORY OF PRESENT ILLNESS:  The patient is a 59 y.o. year old male who is here for an opinion about the above issue.    History of Present Illness   Patient is a 59-year-old gentleman who has been referred by his primary care Castillo Velasco for evaluation of thrombocytosis and polycythemia.  He has lost more than 50 pounds of weight.  In the last 6 months.  Looking back his blood counts have been high starting in 2016.  Initially his platelets were high around 670 and gradually increased in the 900 range.  More recently his hemoglobin has been increasing and his white count has been increasing.  He has not had a DVT or pulmonary embolism.  He has had no issues with itching with hot showers.  Has not had a stroke.  He had pneumonia last year but none recently.  He does have some shortness of breath but no chest pain.  He is exposed to secondhand smoking.    He does not have any nausea vomiting or abdominal pain at present.  He did have gout on several locations and was placed on meloxicam.  His renal function is mildly elevated.  He denies any fever or night sweats.    Patient does have a cough productive of yellow sputum but has no fevers.    Past Medical History:   Diagnosis Date   • Anxiety    • Arthritis    • Hypertension         Past Surgical History:   Procedure Laterality Date   • CHEST SURGERY          Current Outpatient Medications on File Prior to Visit   Medication Sig Dispense Refill   • amLODIPine-benazepril (LOTREL) 5-40 MG per capsule TAKE ONE CAPSULE BY MOUTH DAILY 90 capsule 3   • bisoprolol (ZEBeta) 10 MG tablet TAKE ONE TABLET BY MOUTH  DAILY 90 tablet 0   • chlorthalidone (HYGROTEN) 50 MG tablet TAKE ONE TABLET BY MOUTH DAILY 90 tablet 0   • clotrimazole-betamethasone (LOTRISONE) 1-0.05 % cream APPLY TO AFFECTED AREA(S) TWO TIMES A DAY 45 each 3   • hydrALAZINE (APRESOLINE) 50 MG tablet TAKE ONE TABLET BY MOUTH TWICE A  tablet 0   • meloxicam (MOBIC) 15 MG tablet TAKE ONE TABLET BY MOUTH DAILY 90 tablet 2   • zolpidem (AMBIEN) 10 MG tablet Take 1 tablet by mouth At Night As Needed for Sleep. 90 tablet 0     No current facility-administered medications on file prior to visit.         ALLERGIES:    Allergies   Allergen Reactions   • Cephalexin Itching        Social History     Socioeconomic History   • Marital status:      Spouse name: Not on file   • Number of children: Not on file   • Years of education: Not on file   • Highest education level: Not on file   Tobacco Use   • Smoking status: Never Smoker   • Smokeless tobacco: Never Used   Substance and Sexual Activity   • Alcohol use: Yes        Family History   Problem Relation Age of Onset   • Hypertension Father         Review of Systems   Constitutional: Negative for appetite change, chills, diaphoresis, fatigue, fever and unexpected weight change.   HENT: Negative for hearing loss, sore throat and trouble swallowing.    Respiratory: Negative for cough, chest tightness, shortness of breath and wheezing.    Cardiovascular: Negative for chest pain, palpitations and leg swelling.   Gastrointestinal: Negative for abdominal distention, abdominal pain, constipation, diarrhea, nausea and vomiting.   Genitourinary: Negative for dysuria, frequency, hematuria and urgency.   Musculoskeletal: Negative for joint swelling.        No muscle weakness.   Skin: Negative for rash and wound.   Neurological: Negative for seizures, syncope, speech difficulty, weakness, numbness and headaches.   Hematological: Negative for adenopathy. Does not bruise/bleed easily.   Psychiatric/Behavioral: Negative for  "behavioral problems, confusion and suicidal ideas.      Patient has lost greater than 50 pounds of weight in the last 6 months    Objective     Vitals:    07/08/20 1442   BP: 137/82   Pulse: 69   Resp: 18   Temp: 98.2 °F (36.8 °C)   TempSrc: Skin   SpO2: 95%   Weight: 119 kg (261 lb 9.6 oz)   Height: 179.5 cm (70.67\")   PainSc: 0-No pain     Current Status 7/8/2020   ECOG score 0       Physical Exam      CONSTITUTIONAL:  Vital signs reviewed.  No distress, looks comfortable.  EYES:  Conjunctiva and lids unremarkable.  PERRLA  EARS,NOSE,MOUTH,THROAT:  Ears and nose appear unremarkable.  Lips, teeth, gums appear unremarkable.  RESPIRATORY:  Normal respiratory effort.  Lungs clear to auscultation bilaterally.  CARDIOVASCULAR:  Normal S1, S2.  No murmurs rubs or gallops.  No significant lower extremity edema.  GASTROINTESTINAL: Abdomen appears unremarkable.  Nontender.  No hepatomegaly.  No splenomegaly.  LYMPHATIC:  No cervical, supraclavicular, axillary lymphadenopathy.  SKIN:  Warm.  No rashes.  PSYCHIATRIC:  Normal judgment and insight.  Normal mood and affect.      RECENT LABS:  Hematology WBC   Date Value Ref Range Status   07/08/2020 11.56 (H) 3.40 - 10.80 10*3/mm3 Final   06/22/2020 9.75 3.40 - 10.80 10*3/mm3 Final     RBC   Date Value Ref Range Status   07/08/2020 6.00 (H) 4.14 - 5.80 10*6/mm3 Final   06/22/2020 5.54 4.14 - 5.80 10*6/mm3 Final     Hemoglobin   Date Value Ref Range Status   07/08/2020 17.6 13.0 - 17.7 g/dL Final     Hematocrit   Date Value Ref Range Status   07/08/2020 52.8 (H) 37.5 - 51.0 % Final     Platelets   Date Value Ref Range Status   07/08/2020 974 (H) 140 - 450 10*3/mm3 Final          Assessment/Plan     1.  Polycythemia with leukocytosis and thrombocytosis, my concern is that this patient may have myeloproliferative disorder.  He does not have any itching with hot showers.  He does not have any headaches or DVT.  He does have blood counts worsening gradually from 2016.  His platelets " today are 974K.  Hemoglobin is 17.6 with hematocrit of 52.8 and white count of 11.56.  He does not have any headache.  On exam I did not appreciate the splenomegaly or lymphadenopathy.  He denies fever night sweats but has significant weight loss greater than 50 pounds in 6 months.  He does have a cough which is productive of yellow sputum and some shortness of breath.  He has history of secondhand smoking exposure.    We will need to obtain peripheral blood for Tino 2 mutation, peripheral blood for BCR C able, flow, EFE reticulin and MPL mutation.    2.  Weight loss greater than 50 pounds in last 6 months, certainly need to be concerned about underlying malignancy as a cause for his thrombocytosis and leukocytosis.  We will also need to make sure there is no evidence of splenomegaly    3.  Cough productive of yellow sputum and some shortness of breath, patient has history of exposure to secondhand smoking and had pneumonia in the past.  He does not show evidence of fever.    Plan  1. Obtain peripheral blood for Tino 2 mutation, BCR C able by PCR, EFE reticulin and MPL mutation  2. Obtain EPO level, C-reactive protein, ESR, flow cytometry  3. Carbon monoxide in the blood  4. Obtain a CT scan to evaluate for any underlying malignancy as a cause for his significant weight loss as well as to rule out splenomegaly  5. Reviewed peripheral smear  6. Discussed with him to take 1 baby aspirin a day  7. Follow-up with me in 1 week to evaluate these results.  8. Recommendations finally to be made after all the tests are obtained.    MD Castillo Mathew APRN

## 2020-07-09 PROCEDURE — 88185 FLOWCYTOMETRY/TC ADD-ON: CPT

## 2020-07-09 PROCEDURE — 88184 FLOWCYTOMETRY/ TC 1 MARKER: CPT

## 2020-07-10 LAB
ETHNIC BACKGROUND STATED: 1.3 MIU/ML (ref 2.6–18.5)
REF LAB TEST METHOD: NORMAL

## 2020-07-13 ENCOUNTER — HOSPITAL ENCOUNTER (OUTPATIENT)
Dept: PET IMAGING | Facility: HOSPITAL | Age: 60
Discharge: HOME | End: 2020-07-13
Admitting: INTERNAL MEDICINE

## 2020-07-13 DIAGNOSIS — R63.4 WEIGHT LOSS: ICD-10-CM

## 2020-07-13 DIAGNOSIS — D75.839 THROMBOCYTOSIS: ICD-10-CM

## 2020-07-13 LAB
COHGB MFR BLD: 1.8 % (ref 0–3.6)
REF LAB TEST METHOD: NORMAL

## 2020-07-13 PROCEDURE — 70490 CT SOFT TISSUE NECK W/O DYE: CPT

## 2020-07-13 PROCEDURE — 74176 CT ABD & PELVIS W/O CONTRAST: CPT

## 2020-07-13 PROCEDURE — 71250 CT THORAX DX C-: CPT

## 2020-07-13 PROCEDURE — 0 DIATRIZOATE MEGLUMINE & SODIUM PER 1 ML: Performed by: INTERNAL MEDICINE

## 2020-07-13 RX ADMIN — DIATRIZOATE MEGLUMINE AND DIATRIZOATE SODIUM 30 ML: 660; 100 LIQUID ORAL; RECTAL at 10:40

## 2020-07-15 ENCOUNTER — INFUSION (OUTPATIENT)
Dept: ONCOLOGY | Facility: HOSPITAL | Age: 60
End: 2020-07-15

## 2020-07-15 ENCOUNTER — OFFICE VISIT (OUTPATIENT)
Dept: OTHER | Facility: HOSPITAL | Age: 60
End: 2020-07-15

## 2020-07-15 ENCOUNTER — OFFICE VISIT (OUTPATIENT)
Dept: ONCOLOGY | Facility: CLINIC | Age: 60
End: 2020-07-15

## 2020-07-15 VITALS
HEIGHT: 71 IN | TEMPERATURE: 97.5 F | DIASTOLIC BLOOD PRESSURE: 92 MMHG | RESPIRATION RATE: 18 BRPM | WEIGHT: 259.9 LBS | OXYGEN SATURATION: 95 % | BODY MASS INDEX: 36.39 KG/M2 | HEART RATE: 59 BPM | SYSTOLIC BLOOD PRESSURE: 134 MMHG

## 2020-07-15 DIAGNOSIS — D75.839 THROMBOCYTOSIS: ICD-10-CM

## 2020-07-15 DIAGNOSIS — D75.1 POLYCYTHEMIA: Primary | ICD-10-CM

## 2020-07-15 DIAGNOSIS — D45 POLYCYTHEMIA VERA (HCC): Primary | ICD-10-CM

## 2020-07-15 LAB
ALBUMIN SERPL-MCNC: 4.6 G/DL (ref 3.5–5.2)
ALBUMIN/GLOB SERPL: 1.4 G/DL
ALP SERPL-CCNC: 63 U/L (ref 39–117)
ALT SERPL W P-5'-P-CCNC: 38 U/L (ref 1–41)
ANION GAP SERPL CALCULATED.3IONS-SCNC: 10.4 MMOL/L (ref 5–15)
AST SERPL-CCNC: 35 U/L (ref 1–40)
BASOPHILS # BLD AUTO: 0.24 10*3/MM3 (ref 0–0.2)
BASOPHILS NFR BLD AUTO: 2 % (ref 0–1.5)
BILIRUB SERPL-MCNC: 0.8 MG/DL (ref 0–1.2)
BUN SERPL-MCNC: 25 MG/DL (ref 6–20)
BUN/CREAT SERPL: 20.8 (ref 7–25)
CALCIUM SPEC-SCNC: 10.5 MG/DL (ref 8.6–10.5)
CHLORIDE SERPL-SCNC: 100 MMOL/L (ref 98–107)
CO2 SERPL-SCNC: 25.6 MMOL/L (ref 22–29)
CREAT SERPL-MCNC: 1.2 MG/DL (ref 0.76–1.27)
DEPRECATED RDW RBC AUTO: 47 FL (ref 37–54)
EOSINOPHIL # BLD AUTO: 0.3 10*3/MM3 (ref 0–0.4)
EOSINOPHIL NFR BLD AUTO: 2.4 % (ref 0.3–6.2)
ERYTHROCYTE [DISTWIDTH] IN BLOOD BY AUTOMATED COUNT: 14.6 % (ref 12.3–15.4)
GFR SERPL CREATININE-BSD FRML MDRD: 62 ML/MIN/1.73
GLOBULIN UR ELPH-MCNC: 3.3 GM/DL
GLUCOSE SERPL-MCNC: 84 MG/DL (ref 65–99)
HCT VFR BLD AUTO: 52.8 % (ref 37.5–51)
HGB BLD-MCNC: 17.3 G/DL (ref 13–17.7)
IMM GRANULOCYTES # BLD AUTO: 0.05 10*3/MM3 (ref 0–0.05)
IMM GRANULOCYTES NFR BLD AUTO: 0.4 % (ref 0–0.5)
LYMPHOCYTES # BLD AUTO: 2.28 10*3/MM3 (ref 0.7–3.1)
LYMPHOCYTES NFR BLD AUTO: 18.6 % (ref 19.6–45.3)
MCH RBC QN AUTO: 28.7 PG (ref 26.6–33)
MCHC RBC AUTO-ENTMCNC: 32.8 G/DL (ref 31.5–35.7)
MCV RBC AUTO: 87.7 FL (ref 79–97)
MONOCYTES # BLD AUTO: 1.12 10*3/MM3 (ref 0.1–0.9)
MONOCYTES NFR BLD AUTO: 9.1 % (ref 5–12)
NEUTROPHILS NFR BLD AUTO: 67.5 % (ref 42.7–76)
NEUTROPHILS NFR BLD AUTO: 8.29 10*3/MM3 (ref 1.7–7)
NRBC BLD AUTO-RTO: 0 /100 WBC (ref 0–0.2)
PLATELET # BLD AUTO: 977 10*3/MM3 (ref 140–450)
PMV BLD AUTO: 10.2 FL (ref 6–12)
POTASSIUM SERPL-SCNC: 3.4 MMOL/L (ref 3.5–5.2)
PROT SERPL-MCNC: 7.9 G/DL (ref 6–8.5)
RBC # BLD AUTO: 6.02 10*6/MM3 (ref 4.14–5.8)
SODIUM SERPL-SCNC: 136 MMOL/L (ref 136–145)
WBC # BLD AUTO: 12.28 10*3/MM3 (ref 3.4–10.8)

## 2020-07-15 PROCEDURE — 36415 COLL VENOUS BLD VENIPUNCTURE: CPT

## 2020-07-15 PROCEDURE — 99195 PHLEBOTOMY: CPT

## 2020-07-15 PROCEDURE — 96360 HYDRATION IV INFUSION INIT: CPT

## 2020-07-15 PROCEDURE — 85025 COMPLETE CBC W/AUTO DIFF WBC: CPT | Performed by: INTERNAL MEDICINE

## 2020-07-15 PROCEDURE — 80053 COMPREHEN METABOLIC PANEL: CPT | Performed by: INTERNAL MEDICINE

## 2020-07-15 PROCEDURE — 99215 OFFICE O/P EST HI 40 MIN: CPT | Performed by: INTERNAL MEDICINE

## 2020-07-15 RX ORDER — SODIUM CHLORIDE 9 MG/ML
500 INJECTION, SOLUTION INTRAVENOUS ONCE
Status: COMPLETED | OUTPATIENT
Start: 2020-07-15 | End: 2020-07-15

## 2020-07-15 RX ORDER — SODIUM CHLORIDE 9 MG/ML
500 INJECTION, SOLUTION INTRAVENOUS ONCE
Status: CANCELLED | OUTPATIENT
Start: 2020-07-15

## 2020-07-15 RX ORDER — HYDROXYUREA 500 MG/1
500 CAPSULE ORAL 2 TIMES DAILY
Qty: 60 CAPSULE | Refills: 4 | Status: SHIPPED | OUTPATIENT
Start: 2020-07-15 | End: 2020-07-22 | Stop reason: DRUGHIGH

## 2020-07-15 RX ORDER — SODIUM CHLORIDE 9 MG/ML
250 INJECTION, SOLUTION INTRAVENOUS ONCE
Status: CANCELLED | OUTPATIENT
Start: 2020-07-15

## 2020-07-15 RX ORDER — SODIUM CHLORIDE 9 MG/ML
500 INJECTION, SOLUTION INTRAVENOUS ONCE
Status: CANCELLED | OUTPATIENT
Start: 2020-08-12

## 2020-07-15 RX ORDER — SODIUM CHLORIDE 9 MG/ML
250 INJECTION, SOLUTION INTRAVENOUS ONCE
Status: CANCELLED | OUTPATIENT
Start: 2020-08-12

## 2020-07-15 RX ORDER — ASPIRIN 81 MG/1
81 TABLET, CHEWABLE ORAL DAILY
COMMUNITY

## 2020-07-15 RX ADMIN — SODIUM CHLORIDE 500 ML: 900 INJECTION, SOLUTION INTRAVENOUS at 15:35

## 2020-07-15 NOTE — PROGRESS NOTES
Subjective     REASON FOR CONSULTATION:    1.  Polycythemia vera, polycythemia and thrombocytosis and leukocytosis, EPO level 1.3, Tino 2+    HISTORY OF PRESENT ILLNESS:  The patient is a 59 y.o. year old male who is here for an opinion about the above issue.    History of Present Illness patient is a 59-year-old gentleman who presented with polycythemia with leukocytosis and thrombocytosis which he had for several years and had worsened.  He did not have headache.  He has not had any strokes or MI.  When I first saw him his platelets were 930,000 with a hematocrit of 52% and a white count of 11.1.  We did a complete work-up.  His CT abdomen pelvis shows splenomegaly.  He is not a smoker and his CT chest was negative.  His Tino 2 mutation was positive.  He has been taking aspirin a day.  Today we discussed about phlebotomizing him given the hematocrit was high.          Hematological oncologic history:  Patient is a 59-year-old gentleman who has been referred by his primary care Castillo Velasco for evaluation of thrombocytosis and polycythemia.  He has lost more than 50 pounds of weight.  In the last 6 months.  Looking back his blood counts have been high starting in 2016.  Initially his platelets were high around 670 and gradually increased in the 900 range.  More recently his hemoglobin has been increasing and his white count has been increasing.  He has not had a DVT or pulmonary embolism.  He has had no issues with itching with hot showers.  Has not had a stroke.  He had pneumonia last year but none recently.  He does have some shortness of breath but no chest pain.  He is exposed to secondhand smoking.    He does not have any nausea vomiting or abdominal pain at present.  He did have gout on several locations and was placed on meloxicam.  His renal function is mildly elevated.  He denies any fever or night sweats.    Patient does have a cough productive of yellow sputum but has no fevers.    Peripheral blood for  "BCR C able not detected  Peripheral blood for Tino 2 mutation positive, EPO 1.3 low  CT chest negative, CT abdomen pelvis shows splenomegaly    Past Medical History:   Diagnosis Date   • Anxiety    • Arthritis    • Hypertension         Past Surgical History:   Procedure Laterality Date   • CHEST SURGERY     • OTHER SURGICAL HISTORY  1985    \"Exploratory\"        Current Outpatient Medications on File Prior to Visit   Medication Sig Dispense Refill   • amLODIPine-benazepril (LOTREL) 5-40 MG per capsule TAKE ONE CAPSULE BY MOUTH DAILY 90 capsule 3   • aspirin 81 MG chewable tablet Chew 81 mg Daily.     • bisoprolol (ZEBeta) 10 MG tablet TAKE ONE TABLET BY MOUTH DAILY 90 tablet 0   • chlorthalidone (HYGROTEN) 50 MG tablet TAKE ONE TABLET BY MOUTH DAILY 90 tablet 0   • clotrimazole-betamethasone (LOTRISONE) 1-0.05 % cream APPLY TO AFFECTED AREA(S) TWO TIMES A DAY 45 each 3   • hydrALAZINE (APRESOLINE) 50 MG tablet TAKE ONE TABLET BY MOUTH TWICE A  tablet 0   • zolpidem (AMBIEN) 10 MG tablet Take 1 tablet by mouth At Night As Needed for Sleep. 90 tablet 0   • meloxicam (MOBIC) 15 MG tablet TAKE ONE TABLET BY MOUTH DAILY 90 tablet 2     No current facility-administered medications on file prior to visit.         ALLERGIES:    Allergies   Allergen Reactions   • Cephalexin Itching        Social History     Socioeconomic History   • Marital status:      Spouse name: Not on file   • Number of children: Not on file   • Years of education: Not on file   • Highest education level: Not on file   Occupational History   • Occupation:      Employer: RETIRED   Tobacco Use   • Smoking status: Never Smoker   • Smokeless tobacco: Never Used   Substance and Sexual Activity   • Alcohol use: Yes        Family History   Problem Relation Age of Onset   • Hypertension Father    • Hypertension Other    • Heart disease Other    • Cervical cancer Other         Review of Systems   Constitutional: Negative for appetite " "change, chills, diaphoresis, fatigue, fever and unexpected weight change.   HENT: Negative for hearing loss, sore throat and trouble swallowing.    Respiratory: Negative for cough, chest tightness, shortness of breath and wheezing.    Cardiovascular: Negative for chest pain, palpitations and leg swelling.   Gastrointestinal: Negative for abdominal distention, abdominal pain, constipation, diarrhea, nausea and vomiting.   Genitourinary: Negative for dysuria, frequency, hematuria and urgency.   Musculoskeletal: Negative for joint swelling.        No muscle weakness.   Skin: Negative for rash and wound.   Neurological: Negative for seizures, syncope, speech difficulty, weakness, numbness and headaches.   Hematological: Negative for adenopathy. Does not bruise/bleed easily.   Psychiatric/Behavioral: Negative for behavioral problems, confusion and suicidal ideas.      I have reviewed and confirmed the accuracy of the ROS as documented by the MA/LPN/YESSENIA Fleming MD      Objective     Vitals:    07/15/20 1417   BP: 134/92   Pulse: 59   Resp: 18   Temp: 97.5 °F (36.4 °C)   TempSrc: Oral   SpO2: 95%   Weight: 118 kg (259 lb 14.4 oz)   Height: 179.5 cm (70.67\")   PainSc: 0-No pain     Current Status 7/15/2020   ECOG score 0       Physical Exam      CONSTITUTIONAL:  Vital signs reviewed.  No distress, looks comfortable.  EYES:  Conjunctiva and lids unremarkable.  PERRLA  EARS,NOSE,MOUTH,THROAT:  Ears and nose appear unremarkable.  Lips, teeth, gums appear unremarkable.  RESPIRATORY:  Normal respiratory effort.  Lungs clear to auscultation bilaterally.  CARDIOVASCULAR:  Normal S1, S2.  No murmurs rubs or gallops.  No significant lower extremity edema.  GASTROINTESTINAL: Abdomen appears unremarkable.  Nontender.  No hepatomegaly.  No splenomegaly.  LYMPHATIC:  No cervical, supraclavicular, axillary lymphadenopathy.  SKIN:  Warm.  No rashes.  PSYCHIATRIC:  Normal judgment and insight.  Normal mood and affect.  I have " reexamined the patient and the results are consistent with the previously documented exam. Eliana Fleming MD       RECENT LABS:  Hematology WBC   Date Value Ref Range Status   07/15/2020 12.28 (H) 3.40 - 10.80 10*3/mm3 Final   06/22/2020 9.75 3.40 - 10.80 10*3/mm3 Final     RBC   Date Value Ref Range Status   07/15/2020 6.02 (H) 4.14 - 5.80 10*6/mm3 Final   06/22/2020 5.54 4.14 - 5.80 10*6/mm3 Final     Hemoglobin   Date Value Ref Range Status   07/15/2020 17.3 13.0 - 17.7 g/dL Final     Hematocrit   Date Value Ref Range Status   07/15/2020 52.8 (H) 37.5 - 51.0 % Final     Platelets   Date Value Ref Range Status   07/15/2020 977 (H) 140 - 450 10*3/mm3 Final      NONCONTRAST CT OF THE NECK, CHEST, ABDOMEN, AND PELVIS 07/13/20     IMPRESSION:  Mildly enlarged spleen. No lymphadenopathy or other evidence  of tumor. 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      Assessment/Plan     1.  Polycythemia with leukocytosis and thrombocytosis, my concern is that this patient may have myeloproliferative disorder.  He does not have any itching with hot showers.  He does not have any headaches or DVT.  He does have blood counts worsening gradually from 2016.  His platelets today are 974K.  Hemoglobin is 17.6 with hematocrit of 52.8 and white count of 11.56.  He does not have any headache.  On exam I did not appreciate the splenomegaly or lymphadenopathy.  He denies fever night sweats but has significant weight loss greater than 50 pounds in 6 months.  He does have a cough which is productive of yellow sputum and some shortness of breath.  He has history of secondhand smoking exposure.    We will need to obtain peripheral blood for Tino 2 mutation, peripheral blood for BCR C able, flow, EFE reticulin and MPL mutation.  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  CT chest  negative  We will start hydroxyurea 500 mg p.o. twice daily  2.  Weight loss greater than 50 pounds in last 6 months, certainly need to be concerned about underlying malignancy as a cause for his thrombocytosis and leukocytosis.  We will also need to make sure there is no evidence of splenomegaly  · Reviewed CT chest abdomen pelvis negative for malignancy    3.  Cough productive of yellow sputum and some shortness of breath, patient has history of exposure to secondhand smoking and had pneumonia in the past.  He does not show evidence of fever.  · No evidence of pneumonia    Plan  1. Continue aspirin 1 a day  2. Reviewed CT scan results today with patient  3. Start hydroxyurea 500 mg p.o. twice daily.  Discussed the side effects.  4. Weekly CBC  5. With hematocrit of 52, will phlebotomize today 500 cc  6. Nurse practitioner to closely follow in 1 week and 2 weeks as we may need to adjust the dose of hydroxyurea  7. Follow-up with me in 3 weeks with labs      I spent 40  total minutes, face-to-face, caring for Phani today.  Greater than 50% of this time involved counseling and/or coordination of care as documented within this note.      MD Castillo Mathew APRN

## 2020-07-15 NOTE — PROGRESS NOTES
Therapy plan entered for Phlebotomy every 4 weeks if hct > 50, remove 500 cc and hold if hct < 50 V.O. Dr. Fleming

## 2020-07-16 ENCOUNTER — MEDICATION THERAPY MANAGEMENT (OUTPATIENT)
Dept: PHARMACY | Facility: HOSPITAL | Age: 60
End: 2020-07-16

## 2020-07-16 ENCOUNTER — DOCUMENTATION (OUTPATIENT)
Dept: ONCOLOGY | Facility: CLINIC | Age: 60
End: 2020-07-16

## 2020-07-16 ENCOUNTER — TELEPHONE (OUTPATIENT)
Dept: GENERAL RADIOLOGY | Facility: HOSPITAL | Age: 60
End: 2020-07-16

## 2020-07-16 NOTE — TELEPHONE ENCOUNTER
----- Message from Amanda Morrison [facility] sent at 7/16/2020  2:15 PM EDT -----  Regarding: oral chemo education  Please schedule a quick, in person, oral chemo education session for Raj when he comes to [place] 7/22. Please include in appointment notes that he needs to sign consents.    Thank you,  Amanda Morrison

## 2020-07-16 NOTE — PROGRESS NOTES
MTM telephone encounter- Hydroxyurea    No answer this morning.  direct line [phone].    Thanks,  Nuno JensenD

## 2020-07-16 NOTE — PROGRESS NOTES
Staff message rec from Dr Fleming asking me to order Hydrea for pt. She sent a second message stating she sent the rx for 500 mg BID and asked me to notify pt. Pt will be contacted.    Eliana Fleming MD sent to [facility].             Can you please start hydroxyurea 500 mg 2 p.o. alternating with 3 p.o. every other day.  Can you please make sure this is sent to his pharmacy.  Give him a month supply with several refills.  Also can you check if the pharmacist discussed with him about oral medication.  Thanks   MD Bernadette Mathew Leela, MD sent to [facility]   I sent his prescription for 500 mg 1 pill twice a day.  Can you please go ahead and let the patient know that I wrote for 1 pill twice a day.  Let us see how he does with that and gradually we can increase it.   Eliana Fleming MD

## 2020-07-16 NOTE — PROGRESS NOTES
Oral Chemotherapy Teaching      Patient Name/:  Phani Landis   1960  Oral Chemotherapy Regimen:  Hydroxyurea 500 mg capsules- 500 mg twice daily  Date Started Medication: 20    Initial Teaching Follow Up Comments     Safety     Storage instructions (away from children; away from heat/cold, sunlight, or moisture), handling - use of gloves (caregivers), washing hands after touching pills, managing waste     “How are you storing your medications?”, reminders on storage, proper handling (caregivers using gloves, washing hands, away from children, managing waste, etc.), disposal of medication with D/C or dosage change    Store medication in a safe dry place away from any children or pets. Keep out of direct sunlight. Raj will wash his hands before and after medication administration.      Adherence      patient and/or caregiver on how to take medication, take with/without food, assess their adherence potential, stress importance of adherence, ways to manage adherence (pill boxes, phone reminders, calendars), what to do if miss a dose   “How are you taking your medication?” “How are you remembering to take your medication?”, “How many doses have you missed?”, determine reasons for non-adherence (not remembering, side effects, etc), ways to improve, overadherence? Remind patient of ways to improve/maintain adherence   Take hydroxyurea 500 mg capsules- one capsule twice daily about 12 hrs apart. You may take with or without food. If you miss a dose, try to take it within 2 hrs of the normal time, if > 2 hrs, skip that dose and continue taking as you normally would. DO NOT double up.     Side Effects/Adverse Reactions      patient on potential side effects, s/s, ways to manage, when to call MD/seek help     Determine if patient experiencing side effects, ways to manage  We discussed the following side effects:  - N/V/D  - low blood counts  - fatigue  Raj verbalizes understanding of side effects  and how to manage.     Miscellaneous     Food interactions, DDIs, financial issues Determine if patient started any new medications since being placed on oral chemo (analyze for DDI) NO DDI identified.      Additional Notes:  Education provided telephonically to Raj prior to therapy initiation. Raj verbalizes understanding of his medication regimen. Per Dr. Fleming, his dose may be increased as tolerated. He has no additional questions. I will mail Raj written education materials. He will need to sign consents and CCA at his next appointment. Pharmacy will continue to follow.    Thanks,  Amanda Morrison, PharmD

## 2020-07-22 ENCOUNTER — APPOINTMENT (OUTPATIENT)
Dept: ONCOLOGY | Facility: HOSPITAL | Age: 60
End: 2020-07-22

## 2020-07-22 ENCOUNTER — MEDICATION THERAPY MANAGEMENT (OUTPATIENT)
Dept: PHARMACY | Facility: HOSPITAL | Age: 60
End: 2020-07-22

## 2020-07-22 ENCOUNTER — LAB (OUTPATIENT)
Dept: OTHER | Facility: HOSPITAL | Age: 60
End: 2020-07-22

## 2020-07-22 ENCOUNTER — DOCUMENTATION (OUTPATIENT)
Dept: ONCOLOGY | Facility: CLINIC | Age: 60
End: 2020-07-22

## 2020-07-22 ENCOUNTER — OFFICE VISIT (OUTPATIENT)
Dept: ONCOLOGY | Facility: CLINIC | Age: 60
End: 2020-07-22

## 2020-07-22 VITALS
HEART RATE: 57 BPM | DIASTOLIC BLOOD PRESSURE: 80 MMHG | WEIGHT: 257 LBS | HEIGHT: 71 IN | TEMPERATURE: 98.4 F | SYSTOLIC BLOOD PRESSURE: 112 MMHG | BODY MASS INDEX: 35.98 KG/M2 | OXYGEN SATURATION: 95 % | RESPIRATION RATE: 18 BRPM

## 2020-07-22 DIAGNOSIS — D75.839 THROMBOCYTOSIS: ICD-10-CM

## 2020-07-22 DIAGNOSIS — D45 POLYCYTHEMIA VERA (HCC): Primary | ICD-10-CM

## 2020-07-22 DIAGNOSIS — D72.829 LEUKOCYTOSIS, UNSPECIFIED TYPE: ICD-10-CM

## 2020-07-22 DIAGNOSIS — D75.1 POLYCYTHEMIA: ICD-10-CM

## 2020-07-22 LAB
BASOPHILS # BLD AUTO: 0.22 10*3/MM3 (ref 0–0.2)
BASOPHILS NFR BLD AUTO: 2.1 % (ref 0–1.5)
DEPRECATED RDW RBC AUTO: 47 FL (ref 37–54)
EOSINOPHIL # BLD AUTO: 0.16 10*3/MM3 (ref 0–0.4)
EOSINOPHIL NFR BLD AUTO: 1.5 % (ref 0.3–6.2)
ERYTHROCYTE [DISTWIDTH] IN BLOOD BY AUTOMATED COUNT: 14.6 % (ref 12.3–15.4)
HCT VFR BLD AUTO: 49 % (ref 37.5–51)
HGB BLD-MCNC: 16.1 G/DL (ref 13–17.7)
IMM GRANULOCYTES # BLD AUTO: 0.05 10*3/MM3 (ref 0–0.05)
IMM GRANULOCYTES NFR BLD AUTO: 0.5 % (ref 0–0.5)
LYMPHOCYTES # BLD AUTO: 2.22 10*3/MM3 (ref 0.7–3.1)
LYMPHOCYTES NFR BLD AUTO: 21.3 % (ref 19.6–45.3)
MCH RBC QN AUTO: 29.1 PG (ref 26.6–33)
MCHC RBC AUTO-ENTMCNC: 32.9 G/DL (ref 31.5–35.7)
MCV RBC AUTO: 88.6 FL (ref 79–97)
MONOCYTES # BLD AUTO: 0.95 10*3/MM3 (ref 0.1–0.9)
MONOCYTES NFR BLD AUTO: 9.1 % (ref 5–12)
NEUTROPHILS NFR BLD AUTO: 6.84 10*3/MM3 (ref 1.7–7)
NEUTROPHILS NFR BLD AUTO: 65.5 % (ref 42.7–76)
NRBC BLD AUTO-RTO: 0 /100 WBC (ref 0–0.2)
PLAT MORPH BLD: NORMAL
PLATELET # BLD AUTO: 1091 10*3/MM3 (ref 140–450)
PMV BLD AUTO: 10 FL (ref 6–12)
RBC # BLD AUTO: 5.53 10*6/MM3 (ref 4.14–5.8)
RBC MORPH BLD: NORMAL
WBC # BLD AUTO: 10.44 10*3/MM3 (ref 3.4–10.8)
WBC MORPH BLD: NORMAL

## 2020-07-22 PROCEDURE — 99214 OFFICE O/P EST MOD 30 MIN: CPT | Performed by: NURSE PRACTITIONER

## 2020-07-22 PROCEDURE — 36415 COLL VENOUS BLD VENIPUNCTURE: CPT

## 2020-07-22 PROCEDURE — 85007 BL SMEAR W/DIFF WBC COUNT: CPT | Performed by: INTERNAL MEDICINE

## 2020-07-22 PROCEDURE — 85025 COMPLETE CBC W/AUTO DIFF WBC: CPT | Performed by: INTERNAL MEDICINE

## 2020-07-22 RX ORDER — ONDANSETRON 4 MG/1
4 TABLET, FILM COATED ORAL EVERY 8 HOURS PRN
Qty: 30 TABLET | Refills: 0 | Status: SHIPPED | OUTPATIENT
Start: 2020-07-22 | End: 2023-02-13

## 2020-07-22 RX ORDER — HYDROXYUREA 500 MG/1
CAPSULE ORAL
Qty: 92 CAPSULE | Refills: 1 | Status: SHIPPED | OUTPATIENT
Start: 2020-07-22 | End: 2020-07-22

## 2020-07-22 RX ORDER — HYDROXYUREA 500 MG/1
CAPSULE ORAL
Qty: 92 CAPSULE | Refills: 1 | Status: SHIPPED | OUTPATIENT
Start: 2020-07-22 | End: 2020-10-14 | Stop reason: SDUPTHER

## 2020-07-22 NOTE — PROGRESS NOTES
MTM telephone encounter    No answer this morning. LM direct [phone].     Thanks,  Nuno JensenD

## 2020-07-22 NOTE — PROGRESS NOTES
Subjective     REASON FOR CONSULTATION:    1.  Polycythemia vera, polycythemia and thrombocytosis and leukocytosis, EPO level 1.3, Tino 2+    HISTORY OF PRESENT ILLNESS:  The patient is a 59 y.o. year old male who is here for an opinion about the above issue.    History of Present Illness patient is a 59-year-old gentleman who presented with polycythemia with leukocytosis and thrombocytosis which he had for several years with eventual progression.  Thankfully, he did not have accompanying headaches or history of stroke or MI.      When initially seen, his platelets were 930,000 with a hematocrit of 52% and a total white blood cell count of 11.1.  A complete work-up was obtained including a CT of the abdomen pelvis which demonstrated mild splenomegaly but otherwise no evidence of malignancy.  The patient is not a smoker and had a negative chest CT as well.  His Tino 2 mutation was positive.  He has been taking aspirin daily.    The patient's platelets have been gradually increasing and were noted to be 977,000 last week.  Thankfully, for the most part the patient is asymptomatic.  His hematocrit has been quite high additionally and therefore the patient began monthly phlebotomies last week.  Per Dr. Fleming, the patient also initiated Hydrea at 500 mg twice daily last week.    The patient has generally tolerated treatment well with the exception of mild nausea and fatigue.  He did take his dose this morning with milk and has not had any issues with nausea.    Unfortunately, his platelet count has further increased to 1,091,000.  The patient again denies any headaches, lethargy, or shortness of breath.     The remainder of his CBC is within normal limits and unremarkable.  He has no other concerns.          Hematological oncologic history:  Patient is a 59-year-old gentleman who has been referred by his primary care Castillo Velasco for evaluation of thrombocytosis and polycythemia.  He has lost more than 50 pounds of  "weight.  In the last 6 months.  Looking back his blood counts have been high starting in 2016.  Initially his platelets were high around 670 and gradually increased in the 900 range.  More recently his hemoglobin has been increasing and his white count has been increasing.  He has not had a DVT or pulmonary embolism.  He has had no issues with itching with hot showers.  Has not had a stroke.  He had pneumonia last year but none recently.  He does have some shortness of breath but no chest pain.  He is exposed to secondhand smoking.    He does not have any nausea vomiting or abdominal pain at present.  He did have gout on several locations and was placed on meloxicam.  His renal function is mildly elevated.  He denies any fever or night sweats.    Patient does have a cough productive of yellow sputum but has no fevers.    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     • bisoprolol (ZEBeta) 10 MG tablet TAKE ONE TABLET BY MOUTH DAILY 90 tablet 0   • chlorthalidone (HYGROTEN) 50 MG tablet TAKE ONE TABLET BY MOUTH DAILY 90 tablet 0   • clotrimazole-betamethasone (LOTRISONE) 1-0.05 % cream APPLY TO AFFECTED AREA(S) TWO TIMES A DAY 45 each 3   • hydrALAZINE (APRESOLINE) 50 MG tablet TAKE ONE TABLET BY MOUTH TWICE A  tablet 0   • zolpidem (AMBIEN) 10 MG tablet Take 1 tablet by mouth At Night As Needed for Sleep. 90 tablet 0   • [DISCONTINUED] " hydroxyurea (HYDREA) 500 MG capsule Take 1 capsule by mouth 2 (Two) Times a Day. 60 capsule 4     No current facility-administered medications on file prior to visit.         ALLERGIES:    Allergies   Allergen Reactions   • Cephalexin Itching        Social History     Socioeconomic History   • Marital status:      Spouse name: Not on file   • Number of children: Not on file   • Years of education: Not on file   • Highest education level: Not on file   Occupational History   • Occupation:      Employer: RETIRED   Tobacco Use   • Smoking status: Never Smoker   • Smokeless tobacco: Never Used   Substance and Sexual Activity   • Alcohol use: Yes        Family History   Problem Relation Age of Onset   • Hypertension Father    • Hypertension Other    • Heart disease Other    • Cervical cancer Other         Review of Systems   Constitutional: Positive for fatigue. Negative for appetite change, chills, diaphoresis, fever and unexpected weight change.   HENT: Negative for hearing loss, sore throat and trouble swallowing.    Respiratory: Negative for cough, chest tightness, shortness of breath and wheezing.    Cardiovascular: Negative for chest pain, palpitations and leg swelling.   Gastrointestinal: Positive for nausea (see HPI ). Negative for abdominal distention, abdominal pain, constipation, diarrhea and vomiting.   Genitourinary: Negative for dysuria, frequency, hematuria and urgency.   Musculoskeletal: Negative for joint swelling.        No muscle weakness.   Skin: Negative for rash and wound.   Neurological: Negative for seizures, syncope, speech difficulty, weakness, numbness and headaches.   Hematological: Negative for adenopathy. Does not bruise/bleed easily.   Psychiatric/Behavioral: Negative for behavioral problems, confusion and suicidal ideas.      I have reviewed and confirmed the accuracy of the ROS as documented by the MA/LPN/RN AG Simms      Objective     Vitals:    07/22/20  "1413   BP: 112/80   Pulse: 57   Resp: 18   Temp: 98.4 °F (36.9 °C)   TempSrc: Oral   SpO2: 95%   Weight: 117 kg (257 lb)   Height: 179.5 cm (70.67\")   PainSc: 0-No pain     Current Status 7/22/2020   ECOG score 0       Physical Exam      CONSTITUTIONAL:  Vital signs reviewed.  No distress, looks comfortable.  EYES:  Conjunctiva and lids unremarkable.  PERRLA  EARS,NOSE,MOUTH,THROAT:  Ears and nose appear unremarkable.  Lips, teeth, gums appear unremarkable.  RESPIRATORY:  Normal respiratory effort.  Lungs clear to auscultation bilaterally.  CARDIOVASCULAR:  Normal S1, S2.  No murmurs rubs or gallops.  No significant lower extremity edema.  GASTROINTESTINAL: Abdomen appears unremarkable.  Nontender.  No hepatomegaly.  No splenomegaly.  LYMPHATIC:  No cervical, supraclavicular, axillary lymphadenopathy.  SKIN:  Warm.  No rashes.  PSYCHIATRIC:  Normal judgment and insight.  Normal mood and affect.  Physical exam unchanged from previous as of July 22, 2020      RECENT LABS:  Hematology WBC   Date Value Ref Range Status   07/22/2020 10.44 3.40 - 10.80 10*3/mm3 Final   06/22/2020 9.75 3.40 - 10.80 10*3/mm3 Final     RBC   Date Value Ref Range Status   07/22/2020 5.53 4.14 - 5.80 10*6/mm3 Final   06/22/2020 5.54 4.14 - 5.80 10*6/mm3 Final     Hemoglobin   Date Value Ref Range Status   07/22/2020 16.1 13.0 - 17.7 g/dL Final     Hematocrit   Date Value Ref Range Status   07/22/2020 49.0 37.5 - 51.0 % Final     Platelets   Date Value Ref Range Status   07/22/2020 1,091 (C) 140 - 450 10*3/mm3 Final      NONCONTRAST CT OF THE NECK, CHEST, ABDOMEN, AND PELVIS 07/13/20     IMPRESSION:  Mildly enlarged spleen. No lymphadenopathy or other evidence  of tumor. 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      Assessment/Plan     1.  Polycythemia with leukocytosis and thrombocytosis, my concern is that this patient may have myeloproliferative disorder.  He does not have any " itching with hot showers.  He does not have any headaches or DVT.  He does have blood counts worsening gradually from 2016.  His platelets today are 974K.  Hemoglobin is 17.6 with hematocrit of 52.8 and white count of 11.56.  He does not have any headache.  On exam I did not appreciate the splenomegaly or lymphadenopathy.  He denies fever night sweats but has significant weight loss greater than 50 pounds in 6 months.  He does have a cough which is productive of yellow sputum and some shortness of breath.  He has history of secondhand smoking exposure.    We will need to obtain peripheral blood for Tino 2 mutation, peripheral blood for BCR C able, flow, EFE reticulin and MPL mutation.  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  CT chest negative  · Hydroxyurea 500 mg twice daily was initiated on July 16, 2020.  Patient has tolerated treatment reasonably well with exception of mild nausea and fatigue.  · As noted above, his platelet count has further increased and therefore we will need to adjust his Hydrea dose.  Per Dr. Fleming, the patient will increase his dose to 1500 mg (1000 mg in am and 500 mg in pm) Monday-Friday and 2000 mg (1000 mg in am and 1000 mg in PM) on Saturdays and Sundays  · I have provided written instructions to the patient regarding his new dosing and have also reviewed possible side effects of the Hydrea with him today.  I have also contacted [facility] about a new prescription  2.  Weight loss greater than 50 pounds in last 6 months, certainly need to be concerned about underlying malignancy as a cause for his thrombocytosis and leukocytosis.  We will also need to make sure there is no evidence of splenomegaly  · Reviewed CT chest abdomen pelvis negative for malignancy  · The patient admits that his weight loss has actually been more intentional recently    3.  Cough productive of  yellow sputum and some shortness of breath, patient has history of exposure to secondhand smoking and had pneumonia in the past.  He does not show evidence of fever.  · No complaints of cough today    Plan  1. Continue aspirin 1 a day  2. Increase Hydrea to 1500 mg Monday through Friday and 2000 mg on Saturday and Sunday  3. Weekly CBC.  The patient will return in 1 week for nurse practitioner reevaluation with AG Alicia  4. Continue with monthly phlebotomies with a hematocrit greater than 50  5. MD reevaluation with Dr. Fleming in 2 weeks    A total of 25 minutes spent with the patient with greater than 50% this time in face-to-face interaction and discussion of possible side effects of Hydrea and and providing instruction regarding dose adjustment of his Hydrea       AG Simms APRN

## 2020-07-22 NOTE — PROGRESS NOTES
VM rec from Radha Dominguez, NP-Dr Fleming wants to increase the Hydrea dose to 1000 mg in the AM and 500 mg at night M-F and 1000 mg BID on Saturday and Sunday.    I have escribed a new rx to [facility] in [place], [place]

## 2020-07-22 NOTE — PROGRESS NOTES
MTM Follow up    VM received from AG Simms, stating that she was not going to have Phani sign consents today without education. Phani received oral chemo education telephonically for hydroxyurea on 7/16/20 as documented in his chart. I will ask scheduling to set up a face-to-face oral chemo session to follow up with Phani an ensure consents are signed. Pharmacy will continue to follow.    Thank you,  Amanda Morrison, PharmD

## 2020-07-24 ENCOUNTER — MEDICATION THERAPY MANAGEMENT (OUTPATIENT)
Dept: PHARMACY | Facility: HOSPITAL | Age: 60
End: 2020-07-24

## 2020-07-24 LAB — REF LAB TEST METHOD: NORMAL

## 2020-07-24 NOTE — PROGRESS NOTES
MTM telephone follow up- hydroxyurea    No answer this morning.  direct line [phone].    Thanks,  Nuno JensenD

## 2020-07-24 NOTE — PROGRESS NOTES
MTM telephone follow up re adherence and side effects- Hydroxyurea    Raj is doing well today. He reports some nausea with his medication and has picked up an anti-emetic to help. We also discussed eating small, more frequent meals throughout the day and staying hydrated to help with the nausea. Phani's dose was recently increased and he verbalized understanding of current dose. Upon further review, consents and CCA were signed on 7/22/20 and scanned into Epic. He has no additional questions or concerns today. Pharmacy will continue to follow.    Thanks,  Amanda Morrison, PharmD

## 2020-07-29 ENCOUNTER — LAB (OUTPATIENT)
Dept: OTHER | Facility: HOSPITAL | Age: 60
End: 2020-07-29

## 2020-07-29 ENCOUNTER — OFFICE VISIT (OUTPATIENT)
Dept: ONCOLOGY | Facility: CLINIC | Age: 60
End: 2020-07-29

## 2020-07-29 ENCOUNTER — SPECIALTY PHARMACY (OUTPATIENT)
Dept: ONCOLOGY | Facility: CLINIC | Age: 60
End: 2020-07-29

## 2020-07-29 VITALS
HEART RATE: 58 BPM | RESPIRATION RATE: 18 BRPM | HEIGHT: 71 IN | OXYGEN SATURATION: 96 % | SYSTOLIC BLOOD PRESSURE: 110 MMHG | DIASTOLIC BLOOD PRESSURE: 74 MMHG | BODY MASS INDEX: 36.05 KG/M2 | WEIGHT: 257.5 LBS | TEMPERATURE: 98.2 F

## 2020-07-29 DIAGNOSIS — D72.829 LEUKOCYTOSIS, UNSPECIFIED TYPE: ICD-10-CM

## 2020-07-29 DIAGNOSIS — D75.1 POLYCYTHEMIA: ICD-10-CM

## 2020-07-29 DIAGNOSIS — D45 POLYCYTHEMIA VERA (HCC): Primary | ICD-10-CM

## 2020-07-29 DIAGNOSIS — D75.839 THROMBOCYTOSIS: ICD-10-CM

## 2020-07-29 LAB
BASOPHILS # BLD AUTO: 0.16 10*3/MM3 (ref 0–0.2)
BASOPHILS NFR BLD AUTO: 2.1 % (ref 0–1.5)
DEPRECATED RDW RBC AUTO: 46.5 FL (ref 37–54)
EOSINOPHIL # BLD AUTO: 0.13 10*3/MM3 (ref 0–0.4)
EOSINOPHIL NFR BLD AUTO: 1.7 % (ref 0.3–6.2)
ERYTHROCYTE [DISTWIDTH] IN BLOOD BY AUTOMATED COUNT: 15.5 % (ref 12.3–15.4)
HCT VFR BLD AUTO: 48.4 % (ref 37.5–51)
HGB BLD-MCNC: 16.4 G/DL (ref 13–17.7)
IMM GRANULOCYTES # BLD AUTO: 0.04 10*3/MM3 (ref 0–0.05)
IMM GRANULOCYTES NFR BLD AUTO: 0.5 % (ref 0–0.5)
LYMPHOCYTES # BLD AUTO: 1.9 10*3/MM3 (ref 0.7–3.1)
LYMPHOCYTES NFR BLD AUTO: 24.4 % (ref 19.6–45.3)
MCH RBC QN AUTO: 29.4 PG (ref 26.6–33)
MCHC RBC AUTO-ENTMCNC: 33.9 G/DL (ref 31.5–35.7)
MCV RBC AUTO: 86.7 FL (ref 79–97)
MONOCYTES # BLD AUTO: 0.58 10*3/MM3 (ref 0.1–0.9)
MONOCYTES NFR BLD AUTO: 7.5 % (ref 5–12)
NEUTROPHILS NFR BLD AUTO: 4.97 10*3/MM3 (ref 1.7–7)
NEUTROPHILS NFR BLD AUTO: 63.8 % (ref 42.7–76)
NRBC BLD AUTO-RTO: 0 /100 WBC (ref 0–0.2)
PLATELET # BLD AUTO: 821 10*3/MM3 (ref 140–450)
PMV BLD AUTO: 9.6 FL (ref 6–12)
RBC # BLD AUTO: 5.58 10*6/MM3 (ref 4.14–5.8)
WBC # BLD AUTO: 7.78 10*3/MM3 (ref 3.4–10.8)

## 2020-07-29 PROCEDURE — 99213 OFFICE O/P EST LOW 20 MIN: CPT | Performed by: NURSE PRACTITIONER

## 2020-07-29 PROCEDURE — 36415 COLL VENOUS BLD VENIPUNCTURE: CPT

## 2020-07-29 PROCEDURE — 85025 COMPLETE CBC W/AUTO DIFF WBC: CPT | Performed by: INTERNAL MEDICINE

## 2020-07-29 NOTE — PROGRESS NOTES
MTM in person follow up- Hydroxyurea    Raj is doing well. He continues on hydroxyurea with no missed doses. He has occasional nausea that he is able to manage at this time. Raj also complains of fatigue and he is able to rest as needed. He does reports itching and looks to have potential bug bites on his legs- he will be sure to have APRN assess this today. Raj has no additional questions or concerns with hydroxyurea. Pharmacy will continue to follow.    Thanks,  Amanda Morrison, PharmD

## 2020-07-29 NOTE — PROGRESS NOTES
Subjective     REASON FOR CONSULTATION:    1.  Polycythemia vera, polycythemia and thrombocytosis and leukocytosis, EPO level 1.3, Tino 2+    HISTORY OF PRESENT ILLNESS:  The patient is a 59 y.o. year old male who is here for an opinion about the above issue.    History of Present Illness with the above-mentioned history who is here today for lab review, continuing on Hydrea.  Currently, he is taking 1500 mg Monday through Friday, and 2000 mg on Saturday and Sunday.  He feels like, overall he is tolerating this fairly well.  He states that when he is on the sun, he does get fatigued more easily, but otherwise has not had any noticeable side effects.  No fevers or chills.  He continues on aspirin daily.  No bleeding issues.  No increase in shortness of breath.       Hematological oncologic history:  Patient is a 59-year-old gentleman who has been referred by his primary care Castillo Velasco for evaluation of thrombocytosis and polycythemia.  He has lost more than 50 pounds of weight.  In the last 6 months.  Looking back his blood counts have been high starting in 2016.  Initially his platelets were high around 670 and gradually increased in the 900 range.  More recently his hemoglobin has been increasing and his white count has been increasing.  He has not had a DVT or pulmonary embolism.  He has had no issues with itching with hot showers.  Has not had a stroke.  He had pneumonia last year but none recently.  He does have some shortness of breath but no chest pain.  He is exposed to secondhand smoking.    He does not have any nausea vomiting or abdominal pain at present.  He did have gout on several locations and was placed on meloxicam.  His renal function is mildly elevated.  He denies any fever or night sweats.    Patient does have a cough productive of yellow sputum but has no fevers.    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     • bisoprolol (ZEBeta) 10 MG tablet TAKE ONE TABLET BY MOUTH DAILY 90 tablet 0   • chlorthalidone (HYGROTEN) 50 MG tablet TAKE ONE TABLET BY MOUTH DAILY 90 tablet 0   • clotrimazole-betamethasone (LOTRISONE) 1-0.05 % cream APPLY TO AFFECTED AREA(S) TWO TIMES A DAY 45 each 3   • hydrALAZINE (APRESOLINE) 50 MG tablet TAKE ONE TABLET BY MOUTH TWICE A  tablet 0   • hydroxyurea (HYDREA) 500 MG capsule Take 2 tabs (1000 mg) po in the AM then take 1 tab (500 mg) po at night Mon-Fri. Take 2 tabs (1000 mg) twice per day on Saturday and Sunday. 92 capsule 1   • ondansetron (ZOFRAN) 4 MG tablet Take 1 tablet by mouth Every 8 (Eight) Hours As Needed for Nausea or Vomiting. 30 tablet 0   • zolpidem (AMBIEN) 10 MG tablet Take 1 tablet by mouth At Night As Needed for Sleep. 90 tablet 0     No current facility-administered medications on file prior to visit.         ALLERGIES:    Allergies   Allergen Reactions   • Cephalexin Itching        Social History     Socioeconomic History   • Marital status:      Spouse name: Not on file   • Number of children: Not on file   • Years of education: Not on file   • Highest education level: Not on file   Occupational History   • Occupation:      Employer: RETIRED   Tobacco Use   • Smoking status: Never Smoker   • Smokeless tobacco: Never Used   Substance and Sexual Activity   • Alcohol use: Yes        Family History   Problem Relation Age of Onset   • Hypertension Father    • " "Hypertension Other    • Heart disease Other    • Cervical cancer Other         Review of Systems   Constitutional: Positive for fatigue. Negative for appetite change, chills, diaphoresis, fever and unexpected weight change.   HENT: Negative for hearing loss, sore throat and trouble swallowing.    Respiratory: Negative for cough, chest tightness, shortness of breath and wheezing.    Cardiovascular: Negative for chest pain, palpitations and leg swelling.   Gastrointestinal: Negative for abdominal distention, abdominal pain, constipation, diarrhea, nausea and vomiting.   Genitourinary: Negative for dysuria, frequency, hematuria and urgency.   Musculoskeletal: Negative for joint swelling.   Skin: Negative for rash and wound.   Neurological: Negative for seizures, syncope, speech difficulty, weakness, numbness and headaches.   Hematological: Negative for adenopathy. Does not bruise/bleed easily.   Psychiatric/Behavioral: Negative for behavioral problems, confusion and suicidal ideas.   7/29/2020 ROS unchanged from above except as updated.    Objective     Vitals:    07/29/20 1403   BP: 110/74   Pulse: 58   Resp: 18   Temp: 98.2 °F (36.8 °C)   TempSrc: Oral   SpO2: 96%   Weight: 117 kg (257 lb 8 oz)   Height: 179.5 cm (70.67\")   PainSc: 0-No pain     Current Status 7/29/2020   ECOG score 0       Physical Exam   Constitutional: He is oriented to person, place, and time. He appears well-developed and well-nourished. No distress.   HENT:   Head: Normocephalic and atraumatic.   Mouth/Throat: Oropharynx is clear and moist and mucous membranes are normal. No oropharyngeal exudate.   Eyes: Pupils are equal, round, and reactive to light.   Neck: Normal range of motion.   Cardiovascular: Normal rate, regular rhythm and normal heart sounds.   No murmur heard.  Pulmonary/Chest: Effort normal and breath sounds normal. No respiratory distress. He has no wheezes. He has no rhonchi. He has no rales.   Abdominal: Soft. Normal appearance " and bowel sounds are normal. He exhibits no distension. There is no hepatosplenomegaly.   Musculoskeletal: Normal range of motion. He exhibits no edema.   Neurological: He is alert and oriented to person, place, and time.   Skin: Skin is warm and dry. No rash noted.   Psychiatric: He has a normal mood and affect.   Vitals reviewed.        RECENT LABS:  Hematology WBC   Date Value Ref Range Status   07/29/2020 7.78 3.40 - 10.80 10*3/mm3 Final   06/22/2020 9.75 3.40 - 10.80 10*3/mm3 Final     RBC   Date Value Ref Range Status   07/29/2020 5.58 4.14 - 5.80 10*6/mm3 Final   06/22/2020 5.54 4.14 - 5.80 10*6/mm3 Final     Hemoglobin   Date Value Ref Range Status   07/29/2020 16.4 13.0 - 17.7 g/dL Final     Hematocrit   Date Value Ref Range Status   07/29/2020 48.4 37.5 - 51.0 % Final     Platelets   Date Value Ref Range Status   07/29/2020 821 (H) 140 - 450 10*3/mm3 Final      NONCONTRAST CT OF THE NECK, CHEST, ABDOMEN, AND PELVIS 07/13/20     IMPRESSION:  Mildly enlarged spleen. No lymphadenopathy or other evidence  of tumor. 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      Assessment/Plan     1.  Polycythemia with leukocytosis and thrombocytosis, my concern is that this patient may have myeloproliferative disorder.  He does not have any itching with hot showers.  He does not have any headaches or DVT.  He does have blood counts worsening gradually from 2016.  His platelets today are 974K.  Hemoglobin is 17.6 with hematocrit of 52.8 and white count of 11.56.  He does not have any headache.  On exam I did not appreciate the splenomegaly or lymphadenopathy.  He denies fever night sweats but has significant weight loss greater than 50 pounds in 6 months.  He does have a cough which is productive of yellow sputum and some shortness of breath.  He has history of secondhand smoking exposure.    We will need to obtain peripheral blood for Tino 2 mutation, peripheral  blood for BCR C able, flow, EFE reticulin and MPL mutation.  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  CT chest negative  · Hydroxyurea 500 mg twice daily was initiated on July 16, 2020.  Patient has tolerated treatment reasonably well with exception of mild nausea and fatigue.  · As noted above, his platelet count has further increased and therefore we will need to adjust his Hydrea dose.  Per Dr. Fleming, the patient will increase his dose to 1500 mg (1000 mg in am and 500 mg in pm) Monday-Friday and 2000 mg (1000 mg in am and 1000 mg in PM) on Saturdays and Sundays  · I have provided written instructions to the patient regarding his new dosing and have also reviewed possible side effects of the Hydrea with him today.  I have also contacted Sherly Milan about a new prescription  · 7/29/2020 patient's platelet count today is 821.  White count 7.78, hemoglobin 16.4.  I have reviewed with Dr. Fleming.  Patient to continue Hydrea at 1500 mg Monday through Friday, and 2000 mg on Saturdays and Sundays.  He will return in 1 week for follow-up visit with Dr. Fleming.  Hopefully we can slowly start to taper his medication as his platelet count stabilizes.      2.  Weight loss greater than 50 pounds in last 6 months, certainly need to be concerned about underlying malignancy as a cause for his thrombocytosis and leukocytosis.  We will also need to make sure there is no evidence of splenomegaly  · Reviewed CT chest abdomen pelvis negative for malignancy  · The patient admits that his weight loss has actually been more intentional recently    3.  Cough productive of yellow sputum and some shortness of breath, patient has history of exposure to secondhand smoking and had pneumonia in the past.  He does not show evidence of fever.  · No complaints of cough today    4.  Polycythemia vera:: Monthly phlebotomies for  hematocrit greater than or equal to 50.  Last phlebotomy 7/15/2020.  7/29/2020 hematocrit is 48.4.    Plan  1. Continue Hydrea 1500 mg Monday through Friday, and 2000 mg on Saturday and Sunday.  2. Return in 1 week for follow-up visit with Dr. Fleming with repeat labs.  3. Patient will be due again in 2 weeks for possible phlebotomy.    4. Continue ASA 81 mg daily.    AG Choudhary APRN

## 2020-07-30 ENCOUNTER — MEDICATION THERAPY MANAGEMENT (OUTPATIENT)
Dept: PHARMACY | Facility: HOSPITAL | Age: 60
End: 2020-07-30

## 2020-07-30 NOTE — PROGRESS NOTES
MT Lab Review- Hydroxyurea      7/29/2020   WBC 3.40 - 10.80 10*3/mm3 7.78   Neutrophils Absolute 1.70 - 7.00 10*3/mm3 4.97   Hemoglobin 13.0 - 17.7 g/dL 16.4   Hematocrit 37.5 - 51.0 % 48.4   Platelets 140 - 450 10*3/mm3 821 (A)     APRN dictation noted- continue current dose. Pharmacy will continue to follow.    Thanks,  Amanda Morrison, PharmD

## 2020-08-05 ENCOUNTER — LAB (OUTPATIENT)
Dept: OTHER | Facility: HOSPITAL | Age: 60
End: 2020-08-05

## 2020-08-05 ENCOUNTER — OFFICE VISIT (OUTPATIENT)
Dept: ONCOLOGY | Facility: CLINIC | Age: 60
End: 2020-08-05

## 2020-08-05 VITALS
OXYGEN SATURATION: 96 % | WEIGHT: 256.5 LBS | RESPIRATION RATE: 18 BRPM | TEMPERATURE: 97.5 F | DIASTOLIC BLOOD PRESSURE: 72 MMHG | HEART RATE: 57 BPM | SYSTOLIC BLOOD PRESSURE: 103 MMHG | HEIGHT: 71 IN | BODY MASS INDEX: 35.91 KG/M2

## 2020-08-05 DIAGNOSIS — D72.829 LEUKOCYTOSIS, UNSPECIFIED TYPE: ICD-10-CM

## 2020-08-05 DIAGNOSIS — D45 POLYCYTHEMIA VERA (HCC): Primary | ICD-10-CM

## 2020-08-05 DIAGNOSIS — D75.1 POLYCYTHEMIA: ICD-10-CM

## 2020-08-05 DIAGNOSIS — D75.839 THROMBOCYTOSIS: ICD-10-CM

## 2020-08-05 LAB
ALBUMIN SERPL-MCNC: 4.5 G/DL (ref 3.5–5.2)
ALBUMIN/GLOB SERPL: 1.5 G/DL
ALP SERPL-CCNC: 56 U/L (ref 39–117)
ALT SERPL W P-5'-P-CCNC: 35 U/L (ref 1–41)
ANION GAP SERPL CALCULATED.3IONS-SCNC: 10.2 MMOL/L (ref 5–15)
AST SERPL-CCNC: 30 U/L (ref 1–40)
BASOPHILS # BLD AUTO: 0.16 10*3/MM3 (ref 0–0.2)
BASOPHILS NFR BLD AUTO: 2.3 % (ref 0–1.5)
BILIRUB SERPL-MCNC: 0.7 MG/DL (ref 0–1.2)
BUN SERPL-MCNC: 17 MG/DL (ref 6–20)
BUN/CREAT SERPL: 13 (ref 7–25)
CALCIUM SPEC-SCNC: 10.2 MG/DL (ref 8.6–10.5)
CHLORIDE SERPL-SCNC: 101 MMOL/L (ref 98–107)
CO2 SERPL-SCNC: 26.8 MMOL/L (ref 22–29)
CREAT SERPL-MCNC: 1.31 MG/DL (ref 0.76–1.27)
DEPRECATED RDW RBC AUTO: 49.2 FL (ref 37–54)
EOSINOPHIL # BLD AUTO: 0.11 10*3/MM3 (ref 0–0.4)
EOSINOPHIL NFR BLD AUTO: 1.6 % (ref 0.3–6.2)
ERYTHROCYTE [DISTWIDTH] IN BLOOD BY AUTOMATED COUNT: 17.3 % (ref 12.3–15.4)
GFR SERPL CREATININE-BSD FRML MDRD: 56 ML/MIN/1.73
GLOBULIN UR ELPH-MCNC: 3 GM/DL
GLUCOSE SERPL-MCNC: 95 MG/DL (ref 65–99)
HCT VFR BLD AUTO: 49.9 % (ref 37.5–51)
HGB BLD-MCNC: 16.8 G/DL (ref 13–17.7)
IMM GRANULOCYTES # BLD AUTO: 0.02 10*3/MM3 (ref 0–0.05)
IMM GRANULOCYTES NFR BLD AUTO: 0.3 % (ref 0–0.5)
LYMPHOCYTES # BLD AUTO: 1.79 10*3/MM3 (ref 0.7–3.1)
LYMPHOCYTES NFR BLD AUTO: 25.3 % (ref 19.6–45.3)
MCH RBC QN AUTO: 29.9 PG (ref 26.6–33)
MCHC RBC AUTO-ENTMCNC: 33.7 G/DL (ref 31.5–35.7)
MCV RBC AUTO: 88.8 FL (ref 79–97)
MONOCYTES # BLD AUTO: 0.6 10*3/MM3 (ref 0.1–0.9)
MONOCYTES NFR BLD AUTO: 8.5 % (ref 5–12)
NEUTROPHILS NFR BLD AUTO: 4.4 10*3/MM3 (ref 1.7–7)
NEUTROPHILS NFR BLD AUTO: 62 % (ref 42.7–76)
NRBC BLD AUTO-RTO: 0 /100 WBC (ref 0–0.2)
PLATELET # BLD AUTO: 654 10*3/MM3 (ref 140–450)
PMV BLD AUTO: 9.5 FL (ref 6–12)
POTASSIUM SERPL-SCNC: 3 MMOL/L (ref 3.5–5.2)
PROT SERPL-MCNC: 7.5 G/DL (ref 6–8.5)
RBC # BLD AUTO: 5.62 10*6/MM3 (ref 4.14–5.8)
SODIUM SERPL-SCNC: 138 MMOL/L (ref 136–145)
WBC # BLD AUTO: 7.08 10*3/MM3 (ref 3.4–10.8)

## 2020-08-05 PROCEDURE — 80053 COMPREHEN METABOLIC PANEL: CPT | Performed by: INTERNAL MEDICINE

## 2020-08-05 PROCEDURE — 85025 COMPLETE CBC W/AUTO DIFF WBC: CPT | Performed by: INTERNAL MEDICINE

## 2020-08-05 PROCEDURE — 36415 COLL VENOUS BLD VENIPUNCTURE: CPT | Performed by: NURSE PRACTITIONER

## 2020-08-05 PROCEDURE — 99213 OFFICE O/P EST LOW 20 MIN: CPT | Performed by: INTERNAL MEDICINE

## 2020-08-05 NOTE — PROGRESS NOTES
Subjective     REASON FOR CONSULTATION:    1.  Polycythemia vera, polycythemia and thrombocytosis and leukocytosis, EPO level 1.3, Tino 2+    HISTORY OF PRESENT ILLNESS:  The patient is a 59 y.o. year old male who is here for an opinion about the above issue.    History of Present Illness with the above-mentioned history who is here today for lab review, continuing on Hydrea.  Currently, he is taking 1500 mg Monday through Friday, and 2000 mg on Saturday and Sunday.  He feels like, overall he is tolerating this fairly well.  He states that when he is on the sun, he does get fatigued more easily, but otherwise has not had any noticeable side effects.  No fevers or chills.  He continues on aspirin daily.  No bleeding issues.  No increase in shortness of breath.     Interval history:.  Patient is taking thousand 1500 mg of Hydrea Monday through Friday and 2000 mg on Saturday and Sunday.  But he has dropped significantly and we need to use less of the dose.  He currently has no complaints he gets phlebotomy if hematocrit greater than 48    Hematological oncologic history:  Patient is a 59-year-old gentleman who has been referred by his primary care Castillo Velasco for evaluation of thrombocytosis and polycythemia.  He has lost more than 50 pounds of weight.  In the last 6 months.  Looking back his blood counts have been high starting in 2016.  Initially his platelets were high around 670 and gradually increased in the 900 range.  More recently his hemoglobin has been increasing and his white count has been increasing.  He has not had a DVT or pulmonary embolism.  He has had no issues with itching with hot showers.  Has not had a stroke.  He had pneumonia last year but none recently.  He does have some shortness of breath but no chest pain.  He is exposed to secondhand smoking.    He does not have any nausea vomiting or abdominal pain at present.  He did have gout on several locations and was placed on meloxicam.  His  "renal function is mildly elevated.  He denies any fever or night sweats.    Patient does have a cough productive of yellow sputum but has no fevers.    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     • bisoprolol (ZEBeta) 10 MG tablet TAKE ONE TABLET BY MOUTH DAILY 90 tablet 0   • chlorthalidone (HYGROTEN) 50 MG tablet TAKE ONE TABLET BY MOUTH DAILY 90 tablet 0   • clotrimazole-betamethasone (LOTRISONE) 1-0.05 % cream APPLY TO AFFECTED AREA(S) TWO TIMES A DAY 45 each 3   • hydrALAZINE (APRESOLINE) 50 MG tablet TAKE ONE TABLET BY MOUTH TWICE A  tablet 0   • hydroxyurea (HYDREA) 500 MG capsule Take 2 tabs (1000 mg) po in the AM then take 1 tab (500 mg) po at night Mon-Fri. Take 2 tabs (1000 mg) twice per day on Saturday and Sunday. 92 capsule 1   • ondansetron (ZOFRAN) 4 MG tablet Take 1 tablet by mouth Every 8 (Eight) Hours As Needed for Nausea or Vomiting. 30 tablet 0   • zolpidem (AMBIEN) 10 MG tablet Take 1 tablet by mouth At Night As Needed for Sleep. 90 tablet 0     No current facility-administered medications on file prior to visit.         ALLERGIES:    Allergies   Allergen Reactions   • Cephalexin Itching        Social History     Socioeconomic History   • Marital status:      Spouse name: Not on file   • Number of children: Not on file   • Years of education: Not on file   • Highest education level: Not on file   Occupational " "History   • Occupation:      Employer: RETIRED   Tobacco Use   • Smoking status: Never Smoker   • Smokeless tobacco: Never Used   Substance and Sexual Activity   • Alcohol use: Yes        Family History   Problem Relation Age of Onset   • Hypertension Father    • Hypertension Other    • Heart disease Other    • Cervical cancer Other         Review of Systems   Constitutional: Positive for fatigue (08/05/20-Unchanged). Negative for appetite change, chills, diaphoresis, fever and unexpected weight change.   HENT: Negative for hearing loss, sore throat and trouble swallowing.    Respiratory: Negative for cough, chest tightness, shortness of breath and wheezing.    Cardiovascular: Negative for chest pain, palpitations and leg swelling.   Gastrointestinal: Positive for nausea (08/05/20). Negative for abdominal distention, abdominal pain, constipation, diarrhea and vomiting.   Genitourinary: Negative for dysuria, frequency, hematuria and urgency.   Musculoskeletal: Negative for joint swelling.        No muscle weakness.   Skin: Negative for rash and wound.   Neurological: Negative for seizures, syncope, speech difficulty, weakness, numbness and headaches.   Hematological: Negative for adenopathy. Does not bruise/bleed easily.   Psychiatric/Behavioral: Negative for behavioral problems, confusion and suicidal ideas.   I have reviewed and confirmed the accuracy of the ROS as documented by the MA/LPN/RN Eliana Fleming MD        Objective     Vitals:    08/05/20 1437   BP: 103/72   Pulse: 57   Resp: 18   Temp: 97.5 °F (36.4 °C)   TempSrc: Skin   SpO2: 96%   Weight: 116 kg (256 lb 8 oz)   Height: 179.5 cm (70.67\")   PainSc: 0-No pain     Current Status 8/5/2020   ECOG score 0       Physical Exam   Constitutional: He is oriented to person, place, and time. He appears well-developed and well-nourished. No distress.   HENT:   Head: Normocephalic and atraumatic.   Mouth/Throat: Oropharynx is clear and moist and mucous " membranes are normal. No oropharyngeal exudate.   Eyes: Pupils are equal, round, and reactive to light.   Neck: Normal range of motion.   Cardiovascular: Normal rate, regular rhythm and normal heart sounds.   No murmur heard.  Pulmonary/Chest: Effort normal and breath sounds normal. No respiratory distress. He has no wheezes. He has no rhonchi. He has no rales.   Abdominal: Soft. Normal appearance and bowel sounds are normal. He exhibits no distension. There is no hepatosplenomegaly.   Musculoskeletal: Normal range of motion. He exhibits no edema.   Neurological: He is alert and oriented to person, place, and time.   Skin: Skin is warm and dry. No rash noted.   Psychiatric: He has a normal mood and affect.   Vitals reviewed.      I have reexamined the patient and the results are consistent with the previously documented exam. Eliana Fleming MD     RECENT LABS:  Hematology WBC   Date Value Ref Range Status   08/05/2020 7.08 3.40 - 10.80 10*3/mm3 Final   06/22/2020 9.75 3.40 - 10.80 10*3/mm3 Final     RBC   Date Value Ref Range Status   08/05/2020 5.62 4.14 - 5.80 10*6/mm3 Final   06/22/2020 5.54 4.14 - 5.80 10*6/mm3 Final     Hemoglobin   Date Value Ref Range Status   08/05/2020 16.8 13.0 - 17.7 g/dL Final     Hematocrit   Date Value Ref Range Status   08/05/2020 49.9 37.5 - 51.0 % Final     Platelets   Date Value Ref Range Status   08/05/2020 654 (H) 140 - 450 10*3/mm3 Final      NONCONTRAST CT OF THE NECK, CHEST, ABDOMEN, AND PELVIS 07/13/20     IMPRESSION:  Mildly enlarged spleen. No lymphadenopathy or other evidence  of tumor. 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      Assessment/Plan     1.  Polycythemia with leukocytosis and thrombocytosis, my concern is that this patient may have myeloproliferative disorder.  He does not have any itching with hot showers.  He does not have any headaches or DVT.  He does have blood counts worsening gradually from  2016.  His platelets today are 974K.  Hemoglobin is 17.6 with hematocrit of 52.8 and white count of 11.56.  He does not have any headache.  On exam I did not appreciate the splenomegaly or lymphadenopathy.  He denies fever night sweats but has significant weight loss greater than 50 pounds in 6 months.  He does have a cough which is productive of yellow sputum and some shortness of breath.  He has history of secondhand smoking exposure.    We will need to obtain peripheral blood for Tino 2 mutation, peripheral blood for BCR C able, flow, EFE reticulin and MPL mutation.  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  CT chest negative  · Hydroxyurea 500 mg twice daily was initiated on July 16, 2020.  Patient has tolerated treatment reasonably well with exception of mild nausea and fatigue.  · As noted above, his platelet count has further increased and therefore we will need to adjust his Hydrea dose.  Per Dr. Fleming, the patient will increase his dose to 1500 mg (1000 mg in am and 500 mg in pm) Monday-Friday and 2000 mg (1000 mg in am and 1000 mg in PM) on Saturdays and Sundays  · I have provided written instructions to the patient regarding his new dosing and have also reviewed possible side effects of the Hydrea with him today.  I have also contacted Sherly Milan about a new prescription  · 7/29/2020 patient's platelet count today is 821.  White count 7.78, hemoglobin 16.4.  I have reviewed with Dr. Fleming.  Patient to continue Hydrea at 1500 mg Monday through Friday, and 2000 mg on Saturdays and Sundays.  He will return in 1 week for follow-up visit with Dr. Fleming.  Hopefully we can slowly start to taper his medication as his platelet count stabilizes.  · August 5, 2020, hematocrit 49 but will hold off phlebotomy today.  However in future he may need to do CBC and if hematocrit greater than 48 then he will  receive phlebotomy.      2.  Weight loss greater than 50 pounds in last 6 months, certainly need to be concerned about underlying malignancy as a cause for his thrombocytosis and leukocytosis.  We will also need to make sure there is no evidence of splenomegaly  · Reviewed CT chest abdomen pelvis negative for malignancy  · The patient admits that his weight loss has actually been more intentional recently    3.  Cough productive of yellow sputum and some shortness of breath, patient has history of exposure to secondhand smoking and had pneumonia in the past.  He does not show evidence of fever.  · No complaints of cough today    4.  Polycythemia vera:: Monthly phlebotomies for hematocrit greater than or equal to 50.  Last phlebotomy 7/15/2020.  7/29/2020 hematocrit is 48.4.    Plan  1. Continue Hydrea 1500 mg Monday through Saturday, and 2000 mg on  Sunday.  2. Return in 1 week for follow-up visit with Dr. Fleming with repeat labs.  3. Patient will be due again in 2 weeks for possible phlebotomy.    4. Continue ASA 81 mg daily.  We will continue to follow.  5.  Phlebotomy only if hematocrit greater than 48.  Today we will hold off phlebotomy    MD Castillo Mathew APRN

## 2020-08-06 ENCOUNTER — MEDICATION THERAPY MANAGEMENT (OUTPATIENT)
Dept: PHARMACY | Facility: HOSPITAL | Age: 60
End: 2020-08-06

## 2020-08-06 NOTE — PROGRESS NOTES
[facility] Lab Review- [facility]      8/5/2020   WBC 3.40 - 10.80 10*3/mm3 7.08   Neutrophils Absolute 1.70 - 7.00 10*3/mm3 4.40   Hemoglobin 13.0 - 17.7 g/dL 16.8   Hematocrit 37.5 - 51.0 % 49.9   Platelets 140 - 450 10*3/mm3 654 (A)   Creatinine 0.76 - 1.27 mg/dL 1.31 (A)   eGFR Non African Am >60 mL/min/1.73 56 (A)   BUN 6 - 20 mg/dL 17   Sodium 136 - 145 mmol/L 138   Potassium 3.5 - 5.2 mmol/L 3.0 (A)   Glucose 65 - 99 mg/dL 95   Calcium 8.6 - 10.5 mg/dL 10.2   Albumin 3.50 - 5.20 g/dL 4.50   Total Protein 6.0 - 8.5 g/dL 7.5   AST (SGOT) 1 - 40 U/L 30   ALT (SGPT) 1 - 41 U/L 35   Alkaline Phosphatase 39 - 117 U/L 56   Total Bilirubin 0.0 - 1.2 mg/dL 0.7     MD dictation noted- Phani will continue current dose.     Thanks,  Amanda Morrison, PharmD

## 2020-08-07 ENCOUNTER — DOCUMENTATION (OUTPATIENT)
Dept: ONCOLOGY | Facility: CLINIC | Age: 60
End: 2020-08-07

## 2020-08-07 ENCOUNTER — TELEPHONE (OUTPATIENT)
Dept: ONCOLOGY | Facility: HOSPITAL | Age: 60
End: 2020-08-07

## 2020-08-07 RX ORDER — POTASSIUM CHLORIDE 20 MEQ/1
10 TABLET, EXTENDED RELEASE ORAL DAILY
Qty: 30 TABLET | Refills: 4 | Status: SHIPPED | OUTPATIENT
Start: 2020-08-07 | End: 2021-05-26

## 2020-08-07 NOTE — TELEPHONE ENCOUNTER
Called patient per Dr Fleming request. Potassium 20 meq daily. Had to leave patient a message. Medication e scribed.

## 2020-08-07 NOTE — PROGRESS NOTES
Patient to start 20 mEq of KCl per day.  Triage nurse will call patient to let him know to start treatment with KCl.  He is on chlorthalidone and will need to be on potassium supplementation  Eliana Fleming MD

## 2020-08-12 ENCOUNTER — APPOINTMENT (OUTPATIENT)
Dept: ONCOLOGY | Facility: HOSPITAL | Age: 60
End: 2020-08-12

## 2020-08-12 ENCOUNTER — APPOINTMENT (OUTPATIENT)
Dept: OTHER | Facility: HOSPITAL | Age: 60
End: 2020-08-12

## 2020-08-19 ENCOUNTER — LAB (OUTPATIENT)
Dept: OTHER | Facility: HOSPITAL | Age: 60
End: 2020-08-19

## 2020-08-19 ENCOUNTER — CLINICAL SUPPORT (OUTPATIENT)
Dept: ONCOLOGY | Facility: HOSPITAL | Age: 60
End: 2020-08-19

## 2020-08-19 VITALS
RESPIRATION RATE: 18 BRPM | HEIGHT: 74 IN | HEART RATE: 57 BPM | OXYGEN SATURATION: 97 % | TEMPERATURE: 98.1 F | DIASTOLIC BLOOD PRESSURE: 82 MMHG | BODY MASS INDEX: 32.98 KG/M2 | WEIGHT: 257 LBS | SYSTOLIC BLOOD PRESSURE: 121 MMHG

## 2020-08-19 DIAGNOSIS — D45 POLYCYTHEMIA VERA (HCC): ICD-10-CM

## 2020-08-19 DIAGNOSIS — D45 POLYCYTHEMIA VERA (HCC): Primary | ICD-10-CM

## 2020-08-19 DIAGNOSIS — N28.9 KIDNEY FUNCTION ABNORMAL: Primary | ICD-10-CM

## 2020-08-19 LAB
BASOPHILS # BLD AUTO: 0.21 10*3/MM3 (ref 0–0.2)
BASOPHILS NFR BLD AUTO: 2.7 % (ref 0–1.5)
DEPRECATED RDW RBC AUTO: 60.9 FL (ref 37–54)
EOSINOPHIL # BLD AUTO: 0.09 10*3/MM3 (ref 0–0.4)
EOSINOPHIL NFR BLD AUTO: 1.2 % (ref 0.3–6.2)
ERYTHROCYTE [DISTWIDTH] IN BLOOD BY AUTOMATED COUNT: 19.4 % (ref 12.3–15.4)
HCT VFR BLD AUTO: 45.9 % (ref 37.5–51)
HGB BLD-MCNC: 15.8 G/DL (ref 13–17.7)
IMM GRANULOCYTES # BLD AUTO: 0.08 10*3/MM3 (ref 0–0.05)
IMM GRANULOCYTES NFR BLD AUTO: 1 % (ref 0–0.5)
LYMPHOCYTES # BLD AUTO: 2.14 10*3/MM3 (ref 0.7–3.1)
LYMPHOCYTES NFR BLD AUTO: 27.8 % (ref 19.6–45.3)
MCH RBC QN AUTO: 31 PG (ref 26.6–33)
MCHC RBC AUTO-ENTMCNC: 34.4 G/DL (ref 31.5–35.7)
MCV RBC AUTO: 90.2 FL (ref 79–97)
MONOCYTES # BLD AUTO: 1.1 10*3/MM3 (ref 0.1–0.9)
MONOCYTES NFR BLD AUTO: 14.3 % (ref 5–12)
NEUTROPHILS NFR BLD AUTO: 4.08 10*3/MM3 (ref 1.7–7)
NEUTROPHILS NFR BLD AUTO: 53 % (ref 42.7–76)
NRBC BLD AUTO-RTO: 0 /100 WBC (ref 0–0.2)
PLATELET # BLD AUTO: 459 10*3/MM3 (ref 140–450)
PMV BLD AUTO: 10 FL (ref 6–12)
RBC # BLD AUTO: 5.09 10*6/MM3 (ref 4.14–5.8)
WBC # BLD AUTO: 7.7 10*3/MM3 (ref 3.4–10.8)

## 2020-08-19 PROCEDURE — 36415 COLL VENOUS BLD VENIPUNCTURE: CPT

## 2020-08-19 PROCEDURE — 85025 COMPLETE CBC W/AUTO DIFF WBC: CPT | Performed by: INTERNAL MEDICINE

## 2020-08-19 PROCEDURE — G0463 HOSPITAL OUTPT CLINIC VISIT: HCPCS

## 2020-08-19 NOTE — NURSING NOTE
Pt is here for lab with RN review and possible Phlebotomy.  CBC reviewed with pt, counts are stable for this pt at this time and Hct 45.9 which does not meet parameters for phlebo. Also verified pt did receive telephone message and is taking potassium supplement.Pt has no complaints. Copy of labs given to pt and f/u appt reviewed. Pt is instructed to call the office with any concerns or new symptoms prior to next visit. Pt vu

## 2020-08-20 ENCOUNTER — MEDICATION THERAPY MANAGEMENT (OUTPATIENT)
Dept: PHARMACY | Facility: HOSPITAL | Age: 60
End: 2020-08-20

## 2020-08-20 NOTE — PROGRESS NOTES
[facility] Lab Review- Hydroxyurea      8/19/2020   WBC 3.40 - 10.80 10*3/mm3 7.70   Neutrophils Absolute 1.70 - 7.00 10*3/mm3 4.08   Hemoglobin 13.0 - 17.7 g/dL 15.8   Hematocrit 37.5 - 51.0 % 45.9   Platelets 140 - 450 10*3/mm3 459 (A)     CMP not collected. Pharmacy will continue to follow.    Thanks,  Amanda Morrison, PharmD

## 2020-08-27 ENCOUNTER — MEDICATION THERAPY MANAGEMENT (OUTPATIENT)
Dept: PHARMACY | Facility: HOSPITAL | Age: 60
End: 2020-08-27

## 2020-08-27 NOTE — PROGRESS NOTES
[facility] telephone follow up- Hydroxyurea    Phani is doing well today. He reports occasional nausea and mouth sores; however, he states this has improved with the reduction in his hydroxyurea dose- 1000 mg q am and 500 mg q pm. Medication administration and adherence seem appropriate; he has not missed any doses of his medication. He denies any recent medication changes. He asks about the effects of long term usage of this medication and we discussed concerns with secondary leukemia and skin cancer- Phani is aware to take precautions if out in the sun. He verbalized understanding. Pharmacy will continue to follow.     Thanks,  Amanda Morrison, PharmD

## 2020-08-27 NOTE — PROGRESS NOTES
[facility] telephone follow up- Hydroxyurea    No answer this morning.  direct line [phone].     Thanks,  Nuno JensenD

## 2020-09-02 ENCOUNTER — APPOINTMENT (OUTPATIENT)
Dept: ONCOLOGY | Facility: HOSPITAL | Age: 60
End: 2020-09-02

## 2020-09-02 ENCOUNTER — OFFICE VISIT (OUTPATIENT)
Dept: ONCOLOGY | Facility: CLINIC | Age: 60
End: 2020-09-02

## 2020-09-02 ENCOUNTER — LAB (OUTPATIENT)
Dept: OTHER | Facility: HOSPITAL | Age: 60
End: 2020-09-02

## 2020-09-02 VITALS
BODY MASS INDEX: 35.45 KG/M2 | HEIGHT: 71 IN | HEART RATE: 64 BPM | SYSTOLIC BLOOD PRESSURE: 100 MMHG | DIASTOLIC BLOOD PRESSURE: 68 MMHG | TEMPERATURE: 97.7 F | WEIGHT: 253.2 LBS | RESPIRATION RATE: 16 BRPM | OXYGEN SATURATION: 95 %

## 2020-09-02 DIAGNOSIS — D45 POLYCYTHEMIA VERA (HCC): Primary | ICD-10-CM

## 2020-09-02 DIAGNOSIS — D45 POLYCYTHEMIA VERA (HCC): ICD-10-CM

## 2020-09-02 DIAGNOSIS — D75.839 THROMBOCYTOSIS: ICD-10-CM

## 2020-09-02 LAB
BASOPHILS # BLD AUTO: 0.15 10*3/MM3 (ref 0–0.2)
BASOPHILS NFR BLD AUTO: 1.7 % (ref 0–1.5)
DEPRECATED RDW RBC AUTO: 63.8 FL (ref 37–54)
EOSINOPHIL # BLD AUTO: 0.12 10*3/MM3 (ref 0–0.4)
EOSINOPHIL NFR BLD AUTO: 1.4 % (ref 0.3–6.2)
ERYTHROCYTE [DISTWIDTH] IN BLOOD BY AUTOMATED COUNT: 20.8 % (ref 12.3–15.4)
HCT VFR BLD AUTO: 44.5 % (ref 37.5–51)
HGB BLD-MCNC: 15.8 G/DL (ref 13–17.7)
IMM GRANULOCYTES # BLD AUTO: 0.03 10*3/MM3 (ref 0–0.05)
IMM GRANULOCYTES NFR BLD AUTO: 0.3 % (ref 0–0.5)
LYMPHOCYTES # BLD AUTO: 2.28 10*3/MM3 (ref 0.7–3.1)
LYMPHOCYTES NFR BLD AUTO: 26.1 % (ref 19.6–45.3)
MCH RBC QN AUTO: 31.9 PG (ref 26.6–33)
MCHC RBC AUTO-ENTMCNC: 35.5 G/DL (ref 31.5–35.7)
MCV RBC AUTO: 89.9 FL (ref 79–97)
MONOCYTES # BLD AUTO: 0.95 10*3/MM3 (ref 0.1–0.9)
MONOCYTES NFR BLD AUTO: 10.9 % (ref 5–12)
NEUTROPHILS NFR BLD AUTO: 5.21 10*3/MM3 (ref 1.7–7)
NEUTROPHILS NFR BLD AUTO: 59.6 % (ref 42.7–76)
NRBC BLD AUTO-RTO: 0 /100 WBC (ref 0–0.2)
PLATELET # BLD AUTO: 386 10*3/MM3 (ref 140–450)
PMV BLD AUTO: 9.7 FL (ref 6–12)
RBC # BLD AUTO: 4.95 10*6/MM3 (ref 4.14–5.8)
WBC # BLD AUTO: 8.74 10*3/MM3 (ref 3.4–10.8)

## 2020-09-02 PROCEDURE — 85025 COMPLETE CBC W/AUTO DIFF WBC: CPT | Performed by: INTERNAL MEDICINE

## 2020-09-02 PROCEDURE — 36415 COLL VENOUS BLD VENIPUNCTURE: CPT

## 2020-09-02 PROCEDURE — 99213 OFFICE O/P EST LOW 20 MIN: CPT | Performed by: NURSE PRACTITIONER

## 2020-09-02 NOTE — PROGRESS NOTES
Subjective     REASON FOR CONSULTATION:    1.  Polycythemia vera, polycythemia and thrombocytosis and leukocytosis, EPO level 1.3, Tino 2+    HISTORY OF PRESENT ILLNESS:  The patient is a 59 y.o. year old male who is here for an opinion about the above issue.    History of Present Illness with the above-mentioned history who is here today for follow-up and lab review.  He continues on Hydrea 1500 mg daily.  He reports that he has been feeling pretty good.  His nausea has improved with a reduced dose.  He continues on aspirin.  He denies any bleeding issues.      Hematological oncologic history:  Patient is a 59-year-old gentleman who has been referred by his primary care Castillo Velasco for evaluation of thrombocytosis and polycythemia.  He has lost more than 50 pounds of weight.  In the last 6 months.  Looking back his blood counts have been high starting in 2016.  Initially his platelets were high around 670 and gradually increased in the 900 range.  More recently his hemoglobin has been increasing and his white count has been increasing.  He has not had a DVT or pulmonary embolism.  He has had no issues with itching with hot showers.  Has not had a stroke.  He had pneumonia last year but none recently.  He does have some shortness of breath but no chest pain.  He is exposed to secondhand smoking.    He does not have any nausea vomiting or abdominal pain at present.  He did have gout on several locations and was placed on meloxicam.  His renal function is mildly elevated.  He denies any fever or night sweats.    Patient does have a cough productive of yellow sputum but has no fevers.    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     • bisoprolol (ZEBeta) 10 MG tablet TAKE ONE TABLET BY MOUTH DAILY 90 tablet 0   • chlorthalidone (HYGROTEN) 50 MG tablet TAKE ONE TABLET BY MOUTH DAILY 90 tablet 0   • clotrimazole-betamethasone (LOTRISONE) 1-0.05 % cream APPLY TO AFFECTED AREA(S) TWO TIMES A DAY 45 each 3   • hydrALAZINE (APRESOLINE) 50 MG tablet TAKE ONE TABLET BY MOUTH TWICE A  tablet 0   • hydroxyurea (HYDREA) 500 MG capsule Take 2 tabs (1000 mg) po in the AM then take 1 tab (500 mg) po at night Mon-Fri. Take 2 tabs (1000 mg) twice per day on Saturday and Sunday. 92 capsule 1   • ondansetron (ZOFRAN) 4 MG tablet Take 1 tablet by mouth Every 8 (Eight) Hours As Needed for Nausea or Vomiting. 30 tablet 0   • potassium chloride (K-DUR,KLOR-CON) 20 MEQ CR tablet Take 0.5 tablets by mouth Daily. 30 tablet 4   • zolpidem (AMBIEN) 10 MG tablet Take 1 tablet by mouth At Night As Needed for Sleep. 90 tablet 0     No current facility-administered medications on file prior to visit.         ALLERGIES:    Allergies   Allergen Reactions   • Cephalexin Itching        Social History     Socioeconomic History   • Marital status:      Spouse name: Not on file   • Number of children: Not on file   • Years of education: Not on file   • Highest education level: Not on file   Occupational History   • Occupation:      Employer: RETIRED   Tobacco Use   • Smoking status: Never Smoker   • Smokeless tobacco: Never Used   Substance and Sexual Activity   • Alcohol use: Yes        Family History   Problem Relation Age of Onset   • Hypertension Father    • Hypertension Other    • Heart disease Other    • Cervical cancer Other         Review of Systems " "  Constitutional: Positive for fatigue. Negative for appetite change, chills, diaphoresis, fever and unexpected weight change.   HENT: Negative for hearing loss, sore throat and trouble swallowing.    Respiratory: Negative for cough, chest tightness, shortness of breath and wheezing.    Cardiovascular: Negative for chest pain, palpitations and leg swelling.   Gastrointestinal: Positive for nausea (improved). Negative for abdominal distention, abdominal pain, constipation, diarrhea and vomiting.   Genitourinary: Negative for dysuria, frequency, hematuria and urgency.   Musculoskeletal: Negative for joint swelling.   Skin: Negative for rash and wound.   Neurological: Negative for seizures, syncope, speech difficulty, weakness, numbness and headaches.   Hematological: Negative for adenopathy. Does not bruise/bleed easily.   Psychiatric/Behavioral: Negative for behavioral problems, confusion and suicidal ideas.   9/2/2020 ROS unchanged from above except as updated.      Objective     Vitals:    09/02/20 1310   BP: 100/68   Pulse: 64   Resp: 16   Temp: 97.7 °F (36.5 °C)   SpO2: 95%   Weight: 115 kg (253 lb 3.2 oz)   Height: 179.5 cm (70.67\")   PainSc: 0-No pain     Current Status 9/2/2020   ECOG score 0       Physical Exam   Constitutional: He is oriented to person, place, and time. He appears well-developed and well-nourished. No distress.   Pulmonary/Chest: Effort normal. No respiratory distress.   Musculoskeletal: He exhibits no edema.   Neurological: He is alert and oriented to person, place, and time.   Skin: Skin is warm and dry.   Psychiatric: He has a normal mood and affect.         RECENT LABS:  Hematology WBC   Date Value Ref Range Status   09/02/2020 8.74 3.40 - 10.80 10*3/mm3 Final   06/22/2020 9.75 3.40 - 10.80 10*3/mm3 Final     RBC   Date Value Ref Range Status   09/02/2020 4.95 4.14 - 5.80 10*6/mm3 Final   06/22/2020 5.54 4.14 - 5.80 10*6/mm3 Final     Hemoglobin   Date Value Ref Range Status   09/02/2020 " 15.8 13.0 - 17.7 g/dL Final     Hematocrit   Date Value Ref Range Status   09/02/2020 44.5 37.5 - 51.0 % Final     Platelets   Date Value Ref Range Status   09/02/2020 386 140 - 450 10*3/mm3 Final      NONCONTRAST CT OF THE NECK, CHEST, ABDOMEN, AND PELVIS 07/13/20     IMPRESSION:  Mildly enlarged spleen. No lymphadenopathy or other evidence  of tumor. 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      Assessment/Plan     1.  Polycythemia with leukocytosis and thrombocytosis, my concern is that this patient may have myeloproliferative disorder.  He does not have any itching with hot showers.  He does not have any headaches or DVT.  He does have blood counts worsening gradually from 2016.  His platelets today are 974K.  Hemoglobin is 17.6 with hematocrit of 52.8 and white count of 11.56.  He does not have any headache.  On exam I did not appreciate the splenomegaly or lymphadenopathy.  He denies fever night sweats but has significant weight loss greater than 50 pounds in 6 months.  He does have a cough which is productive of yellow sputum and some shortness of breath.  He has history of secondhand smoking exposure.    We will need to obtain peripheral blood for Tino 2 mutation, peripheral blood for BCR C able, flow, EFE reticulin and MPL mutation.  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  CT chest negative  · Hydroxyurea 500 mg twice daily was initiated on July 16, 2020.  Patient has tolerated treatment reasonably well with exception of mild nausea and fatigue.  · As noted above, his platelet count has further increased and therefore we will need to adjust his Hydrea dose.  Per Dr. Fleming, the patient will increase his dose to 1500 mg (1000 mg in am and 500 mg in pm) Monday-Friday and 2000 mg (1000 mg in am and 1000 mg in PM) on Saturdays and Sundays  · I  have provided written instructions to the patient regarding his new dosing and have also reviewed possible side effects of the Hydrea with him today.  I have also contacted Sherly Bjorn about a new prescription  · 7/29/2020 patient's platelet count today is 821.  White count 7.78, hemoglobin 16.4.  I have reviewed with Dr. Fleming.  Patient to continue Hydrea at 1500 mg Monday through Friday, and 2000 mg on Saturdays and Sundays.  He will return in 1 week for follow-up visit with Dr. Fleming.  Hopefully we can slowly start to taper his medication as his platelet count stabilizes.  · August 5, 2020, hematocrit 49 but will hold off phlebotomy today.  However in future he may need to do CBC and if hematocrit greater than 48 then he will receive phlebotomy.  · 9/2/2020 hematocrit 44.5.  Currently on Hydrea 1500 mg daily.  WBC 8.74, hemoglobin 15.8, platelets 386.  No phlebotomy indicated today.  We will decrease the Hydrea to 1000 mg Monday through Saturday, and 1500 mg on Sundays.  Continue biweekly CBCs.      2.  Weight loss greater than 50 pounds in last 6 months, certainly need to be concerned about underlying malignancy as a cause for his thrombocytosis and leukocytosis.  We will also need to make sure there is no evidence of splenomegaly  · Reviewed CT chest abdomen pelvis negative for malignancy  · The patient admits that his weight loss has actually been more intentional recently    3.  Cough productive of yellow sputum and some shortness of breath, patient has history of exposure to secondhand smoking and had pneumonia in the past.  He does not show evidence of fever.  · No complaints of cough today    4.  Polycythemia vera:: Monthly phlebotomies for hematocrit greater than or equal to 50.  Last phlebotomy 7/15/2020.  7/29/2020 hematocrit is 48.4.    Plan  1. Decrease Hydrea to 1000 mg every day, except 1500 mg on Sundays.    2. No phlebotomy indicated today as hematocrit is 44.5.  Phlebotomy only if  hematocrit greater than 48.  3. In 2 weeks CBC with RN review.  4. In 4 weeks follow-up with nurse practitioner with CBC and possible phlebotomy.     5. Continue ASA 81 mg daily.      AG Choudhary APRN

## 2020-09-03 ENCOUNTER — MEDICATION THERAPY MANAGEMENT (OUTPATIENT)
Dept: PHARMACY | Facility: HOSPITAL | Age: 60
End: 2020-09-03

## 2020-09-03 NOTE — PROGRESS NOTES
[facility] Lab Review- Hydrea        9/2/2020   WBC 3.40 - 10.80 10*3/mm3 8.74   Neutrophils Absolute 1.70 - 7.00 10*3/mm3 5.21   Hemoglobin 13.0 - 17.7 g/dL 15.8   Hematocrit 37.5 - 51.0 % 44.5   Platelets 140 - 450 10*3/mm3 386     APRN dictation noted- hydrea dose decreased. Pharmacy will continue to follow.    Thanks,  Amanda Morrison, PharmD

## 2020-09-14 RX ORDER — CHLORTHALIDONE 50 MG/1
TABLET ORAL
Qty: 90 TABLET | Refills: 0 | Status: SHIPPED | OUTPATIENT
Start: 2020-09-14 | End: 2020-12-14

## 2020-09-14 RX ORDER — BISOPROLOL FUMARATE 10 MG/1
TABLET, FILM COATED ORAL
Qty: 90 TABLET | Refills: 0 | Status: SHIPPED | OUTPATIENT
Start: 2020-09-14 | End: 2020-12-14

## 2020-09-16 ENCOUNTER — CLINICAL SUPPORT (OUTPATIENT)
Dept: ONCOLOGY | Facility: HOSPITAL | Age: 60
End: 2020-09-16

## 2020-09-16 ENCOUNTER — LAB (OUTPATIENT)
Dept: OTHER | Facility: HOSPITAL | Age: 60
End: 2020-09-16

## 2020-09-16 VITALS
WEIGHT: 253 LBS | TEMPERATURE: 97.3 F | HEIGHT: 74 IN | BODY MASS INDEX: 32.47 KG/M2 | SYSTOLIC BLOOD PRESSURE: 105 MMHG | HEART RATE: 57 BPM | DIASTOLIC BLOOD PRESSURE: 69 MMHG | RESPIRATION RATE: 18 BRPM | OXYGEN SATURATION: 98 %

## 2020-09-16 DIAGNOSIS — D45 POLYCYTHEMIA VERA (HCC): Primary | ICD-10-CM

## 2020-09-16 LAB
BASOPHILS # BLD AUTO: 0.1 10*3/MM3 (ref 0–0.2)
BASOPHILS NFR BLD AUTO: 1.3 % (ref 0–1.5)
DEPRECATED RDW RBC AUTO: 68.1 FL (ref 37–54)
EOSINOPHIL # BLD AUTO: 0.14 10*3/MM3 (ref 0–0.4)
EOSINOPHIL NFR BLD AUTO: 1.8 % (ref 0.3–6.2)
ERYTHROCYTE [DISTWIDTH] IN BLOOD BY AUTOMATED COUNT: 21.8 % (ref 12.3–15.4)
HCT VFR BLD AUTO: 41 % (ref 37.5–51)
HGB BLD-MCNC: 14.3 G/DL (ref 13–17.7)
IMM GRANULOCYTES # BLD AUTO: 0.05 10*3/MM3 (ref 0–0.05)
IMM GRANULOCYTES NFR BLD AUTO: 0.6 % (ref 0–0.5)
LYMPHOCYTES # BLD AUTO: 2.19 10*3/MM3 (ref 0.7–3.1)
LYMPHOCYTES NFR BLD AUTO: 27.7 % (ref 19.6–45.3)
MCH RBC QN AUTO: 32 PG (ref 26.6–33)
MCHC RBC AUTO-ENTMCNC: 34.9 G/DL (ref 31.5–35.7)
MCV RBC AUTO: 91.7 FL (ref 79–97)
MONOCYTES # BLD AUTO: 0.99 10*3/MM3 (ref 0.1–0.9)
MONOCYTES NFR BLD AUTO: 12.5 % (ref 5–12)
NEUTROPHILS NFR BLD AUTO: 4.44 10*3/MM3 (ref 1.7–7)
NEUTROPHILS NFR BLD AUTO: 56.1 % (ref 42.7–76)
NRBC BLD AUTO-RTO: 0 /100 WBC (ref 0–0.2)
PLATELET # BLD AUTO: 389 10*3/MM3 (ref 140–450)
PMV BLD AUTO: 9.8 FL (ref 6–12)
RBC # BLD AUTO: 4.47 10*6/MM3 (ref 4.14–5.8)
WBC # BLD AUTO: 7.91 10*3/MM3 (ref 3.4–10.8)

## 2020-09-16 PROCEDURE — G0463 HOSPITAL OUTPT CLINIC VISIT: HCPCS

## 2020-09-16 PROCEDURE — 36415 COLL VENOUS BLD VENIPUNCTURE: CPT

## 2020-09-16 PROCEDURE — 85025 COMPLETE CBC W/AUTO DIFF WBC: CPT | Performed by: INTERNAL MEDICINE

## 2020-09-16 NOTE — NURSING NOTE
Pt is here for lab with RN review.  CBC reviewed with pt, counts are stable for this pt at this time. Pt has no complaints.  Copy of labs given to pt and f/u appt reviewed. Pt is instructed to call the office with any concerns or new symptoms prior to next visit. Pt vu

## 2020-09-17 ENCOUNTER — MEDICATION THERAPY MANAGEMENT (OUTPATIENT)
Dept: PHARMACY | Facility: HOSPITAL | Age: 60
End: 2020-09-17

## 2020-09-17 NOTE — PROGRESS NOTES
[facility] Lab Review- hydroxyurea      9/16/2020   WBC 3.40 - 10.80 10*3/mm3 7.91   Neutrophils Absolute 1.70 - 7.00 10*3/mm3 4.44   Hemoglobin 13.0 - 17.7 g/dL 14.3   Hematocrit 37.5 - 51.0 % 41.0   Platelets 140 - 450 10*3/mm3 389     Pharmacy will continue to follow.     Thanks,   Amanda Morrison, PharmD

## 2020-09-30 ENCOUNTER — APPOINTMENT (OUTPATIENT)
Dept: ONCOLOGY | Facility: HOSPITAL | Age: 60
End: 2020-09-30

## 2020-09-30 ENCOUNTER — LAB (OUTPATIENT)
Dept: OTHER | Facility: HOSPITAL | Age: 60
End: 2020-09-30

## 2020-09-30 ENCOUNTER — OFFICE VISIT (OUTPATIENT)
Dept: ONCOLOGY | Facility: CLINIC | Age: 60
End: 2020-09-30

## 2020-09-30 VITALS
SYSTOLIC BLOOD PRESSURE: 114 MMHG | DIASTOLIC BLOOD PRESSURE: 78 MMHG | WEIGHT: 255.9 LBS | HEIGHT: 74 IN | HEART RATE: 59 BPM | BODY MASS INDEX: 32.84 KG/M2 | TEMPERATURE: 97.3 F | RESPIRATION RATE: 16 BRPM | OXYGEN SATURATION: 95 %

## 2020-09-30 DIAGNOSIS — D45 POLYCYTHEMIA VERA (HCC): ICD-10-CM

## 2020-09-30 DIAGNOSIS — D45 POLYCYTHEMIA VERA (HCC): Primary | ICD-10-CM

## 2020-09-30 LAB
ANISOCYTOSIS BLD QL: NORMAL
BASOPHILS # BLD AUTO: 0.08 10*3/MM3 (ref 0–0.2)
BASOPHILS NFR BLD AUTO: 1.1 % (ref 0–1.5)
EOSINOPHIL # BLD AUTO: 0.1 10*3/MM3 (ref 0–0.4)
EOSINOPHIL NFR BLD AUTO: 1.4 % (ref 0.3–6.2)
ERYTHROCYTE [DISTWIDTH] IN BLOOD BY AUTOMATED COUNT: ABNORMAL %
HCT VFR BLD AUTO: 40.2 % (ref 37.5–51)
HGB BLD-MCNC: 14.1 G/DL (ref 13–17.7)
IMM GRANULOCYTES # BLD AUTO: 0.03 10*3/MM3 (ref 0–0.05)
IMM GRANULOCYTES NFR BLD AUTO: 0.4 % (ref 0–0.5)
LYMPHOCYTES # BLD AUTO: 2.22 10*3/MM3 (ref 0.7–3.1)
LYMPHOCYTES NFR BLD AUTO: 31.6 % (ref 19.6–45.3)
MCH RBC QN AUTO: 33.2 PG (ref 26.6–33)
MCHC RBC AUTO-ENTMCNC: 35.1 G/DL (ref 31.5–35.7)
MCV RBC AUTO: 94.6 FL (ref 79–97)
MONOCYTES # BLD AUTO: 0.94 10*3/MM3 (ref 0.1–0.9)
MONOCYTES NFR BLD AUTO: 13.4 % (ref 5–12)
NEUTROPHILS NFR BLD AUTO: 3.65 10*3/MM3 (ref 1.7–7)
NEUTROPHILS NFR BLD AUTO: 52.1 % (ref 42.7–76)
NRBC BLD AUTO-RTO: 0 /100 WBC (ref 0–0.2)
PLAT MORPH BLD: NORMAL
PLATELET # BLD AUTO: 387 10*3/MM3 (ref 140–450)
PMV BLD AUTO: 9.8 FL (ref 6–12)
RBC # BLD AUTO: 4.25 10*6/MM3 (ref 4.14–5.8)
STOMATOCYTES BLD QL SMEAR: NORMAL
WBC # BLD AUTO: 7.02 10*3/MM3 (ref 3.4–10.8)
WBC MORPH BLD: NORMAL

## 2020-09-30 PROCEDURE — 36415 COLL VENOUS BLD VENIPUNCTURE: CPT

## 2020-09-30 PROCEDURE — 85025 COMPLETE CBC W/AUTO DIFF WBC: CPT | Performed by: INTERNAL MEDICINE

## 2020-09-30 PROCEDURE — 85007 BL SMEAR W/DIFF WBC COUNT: CPT | Performed by: INTERNAL MEDICINE

## 2020-09-30 PROCEDURE — 99213 OFFICE O/P EST LOW 20 MIN: CPT | Performed by: NURSE PRACTITIONER

## 2020-10-01 ENCOUNTER — MEDICATION THERAPY MANAGEMENT (OUTPATIENT)
Dept: PHARMACY | Facility: HOSPITAL | Age: 60
End: 2020-10-01

## 2020-10-01 NOTE — PROGRESS NOTES
[facility] telephone follow up- Hyrdoxyurea    No answer this morning.  direct line [phone].     Thanks,   Nuno JensenD

## 2020-10-01 NOTE — PROGRESS NOTES
[facility] telephone follow up- hydroxyurea    Raj returned my call. He is doing well on hydroxyurea. He does report some mouth soreness and pain in his teeth. I have encouraged Raj to have a follow up with his dentist. He will also use baking soda + salt in warm water rinses four times daily to help. He may also try some acetaminophen for pain as needed. Medication administration and adherence seem appropriate; he reports no missed doses this past month. He takes 500 mg twice daily (8am and 8pm). He denies any recent medication changes. Pharmacy will continue to follow.     Thanks,   Amanda Morrison, PharmD

## 2020-10-01 NOTE — PROGRESS NOTES
Subjective     REASON FOR CONSULTATION:    1.  Polycythemia vera, polycythemia and thrombocytosis and leukocytosis, EPO level 1.3, Tino 2+    HISTORY OF PRESENT ILLNESS:  The patient is a 60 y.o. year old male who is here for an opinion about the above issue.    History of Present Illness with the above-mentioned history who is here today for follow-up and lab review.  He continues on Hydrea 1000 mg daily except on Sundays when he takes 1500mg on Sundays.    He reports feeling relatively well. He tolerates Hydrea reasonably well. His CBC is stable. He denies GI symptoms. He does report teeth sensitivity that is somewhat unpredictable, he does correlate this sensation to starting Hydrea. He denies infectious symptoms, bleeding, swelling of the gums. He denies history of tooth abcsess He experiences this primarily when eating only occasionally.          Hematological oncologic history:  Patient is a 59-year-old gentleman who has been referred by his primary care Castillo Velasco for evaluation of thrombocytosis and polycythemia.  He has lost more than 50 pounds of weight.  In the last 6 months.  Looking back his blood counts have been high starting in 2016.  Initially his platelets were high around 670 and gradually increased in the 900 range.  More recently his hemoglobin has been increasing and his white count has been increasing.  He has not had a DVT or pulmonary embolism.  He has had no issues with itching with hot showers.  Has not had a stroke.  He had pneumonia last year but none recently.  He does have some shortness of breath but no chest pain.  He is exposed to secondhand smoking.    He does not have any nausea vomiting or abdominal pain at present.  He did have gout on several locations and was placed on meloxicam.  His renal function is mildly elevated.  He denies any fever or night sweats.    Patient does have a cough productive of yellow sputum but has no fevers.    Peripheral blood for BCR C able not  "detected  Peripheral blood for Tino 2 mutation positive, EPO 1.3 low  CT chest negative, CT abdomen pelvis shows splenomegaly    Past Medical History:   Diagnosis Date   • Anxiety    • Arthritis    • Hypertension         Past Surgical History:   Procedure Laterality Date   • CHEST SURGERY     • OTHER SURGICAL HISTORY  1985    \"Exploratory\"        Current Outpatient Medications on File Prior to Visit   Medication Sig Dispense Refill   • amLODIPine-benazepril (LOTREL) 5-40 MG per capsule TAKE ONE CAPSULE BY MOUTH DAILY 90 capsule 3   • aspirin 81 MG chewable tablet Chew 81 mg Daily.     • bisoprolol (ZEBeta) 10 MG tablet TAKE ONE TABLET BY MOUTH DAILY 90 tablet 0   • chlorthalidone (HYGROTEN) 50 MG tablet TAKE ONE TABLET BY MOUTH DAILY 90 tablet 0   • clotrimazole-betamethasone (LOTRISONE) 1-0.05 % cream APPLY TO AFFECTED AREA(S) TWO TIMES A DAY 45 each 3   • hydrALAZINE (APRESOLINE) 50 MG tablet TAKE ONE TABLET BY MOUTH TWICE A  tablet 0   • hydroxyurea (HYDREA) 500 MG capsule Take 2 tabs (1000 mg) po in the AM then take 1 tab (500 mg) po at night Mon-Fri. Take 2 tabs (1000 mg) twice per day on Saturday and Sunday. 92 capsule 1   • ondansetron (ZOFRAN) 4 MG tablet Take 1 tablet by mouth Every 8 (Eight) Hours As Needed for Nausea or Vomiting. 30 tablet 0   • potassium chloride (K-DUR,KLOR-CON) 20 MEQ CR tablet Take 0.5 tablets by mouth Daily. 30 tablet 4   • zolpidem (AMBIEN) 10 MG tablet Take 1 tablet by mouth At Night As Needed for Sleep. 90 tablet 0     No current facility-administered medications on file prior to visit.         ALLERGIES:    Allergies   Allergen Reactions   • Cephalexin Itching        Social History     Socioeconomic History   • Marital status:      Spouse name: Not on file   • Number of children: Not on file   • Years of education: Not on file   • Highest education level: Not on file   Occupational History   • Occupation:      Employer: RETIRED   Tobacco Use   • Smoking " "status: Never Smoker   • Smokeless tobacco: Never Used   Substance and Sexual Activity   • Alcohol use: Yes        Family History   Problem Relation Age of Onset   • Hypertension Father    • Hypertension Other    • Heart disease Other    • Cervical cancer Other         Review of Systems   Constitutional: Positive for fatigue. Negative for appetite change, chills, diaphoresis, fever and unexpected weight change.   HENT: Negative for hearing loss, sore throat and trouble swallowing.         See hpi   Respiratory: Negative for cough, chest tightness, shortness of breath and wheezing.    Cardiovascular: Negative for chest pain, palpitations and leg swelling.   Gastrointestinal: Negative for abdominal distention, abdominal pain, constipation, diarrhea and vomiting.   Genitourinary: Negative for dysuria, frequency, hematuria and urgency.   Musculoskeletal: Negative for joint swelling.   Skin: Negative for rash and wound.   Neurological: Negative for seizures, syncope, speech difficulty, weakness, numbness and headaches.   Hematological: Negative for adenopathy. Does not bruise/bleed easily.   Psychiatric/Behavioral: Negative for behavioral problems, confusion and suicidal ideas.         Objective     Vitals:    09/30/20 1335   BP: 114/78   Pulse: 59   Resp: 16   Temp: 97.3 °F (36.3 °C)   TempSrc: Temporal   SpO2: 95%   Weight: 116 kg (255 lb 14.4 oz)   Height: 188 cm (74.02\")   PainSc: 0-No pain     Current Status 9/30/2020   ECOG score 0       Physical Exam   Constitutional: He is oriented to person, place, and time. He appears well-developed. No distress.   Pulmonary/Chest: Effort normal. No respiratory distress.   Neurological: He is alert and oriented to person, place, and time.   Skin: Skin is warm and dry.   Unchanged from prior exam    RECENT LABS:  Hematology WBC   Date Value Ref Range Status   09/30/2020 7.02 3.40 - 10.80 10*3/mm3 Final   06/22/2020 9.75 3.40 - 10.80 10*3/mm3 Final     RBC   Date Value Ref Range " Status   09/30/2020 4.25 4.14 - 5.80 10*6/mm3 Final   06/22/2020 5.54 4.14 - 5.80 10*6/mm3 Final     Hemoglobin   Date Value Ref Range Status   09/30/2020 14.1 13.0 - 17.7 g/dL Final     Hematocrit   Date Value Ref Range Status   09/30/2020 40.2 37.5 - 51.0 % Final     Platelets   Date Value Ref Range Status   09/30/2020 387 140 - 450 10*3/mm3 Final      NONCONTRAST CT OF THE NECK, CHEST, ABDOMEN, AND PELVIS 07/13/20     IMPRESSION:  Mildly enlarged spleen. No lymphadenopathy or other evidence  of tumor. 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      Assessment/Plan     1.  Polycythemia with leukocytosis and thrombocytosis, my concern is that this patient may have myeloproliferative disorder.  He does not have any itching with hot showers.  He does not have any headaches or DVT.  He does have blood counts worsening gradually from 2016.  His platelets today are 974K.  Hemoglobin is 17.6 with hematocrit of 52.8 and white count of 11.56.  He does not have any headache.  On exam I did not appreciate the splenomegaly or lymphadenopathy.  He denies fever night sweats but has significant weight loss greater than 50 pounds in 6 months.  He does have a cough which is productive of yellow sputum and some shortness of breath.  He has history of secondhand smoking exposure.    We will need to obtain peripheral blood for Tino 2 mutation, peripheral blood for BCR C able, flow, EFE reticulin and MPL mutation.  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  CT chest negative  · Hydroxyurea 500 mg twice daily was initiated on July 16, 2020.  Patient has tolerated treatment reasonably well with exception of mild nausea and fatigue.  · As noted above, his platelet count has further increased and therefore we will need to adjust his Hydrea dose.  Per Dr. Fleming, the patient  will increase his dose to 1500 mg (1000 mg in am and 500 mg in pm) Monday-Friday and 2000 mg (1000 mg in am and 1000 mg in PM) on Saturdays and Sundays  · I have provided written instructions to the patient regarding his new dosing and have also reviewed possible side effects of the Hydrea with him today.  I have also contacted Sherly Bjorn about a new prescription  · 7/29/2020 patient's platelet count today is 821.  White count 7.78, hemoglobin 16.4.  I have reviewed with Dr. Fleming.  Patient to continue Hydrea at 1500 mg Monday through Friday, and 2000 mg on Saturdays and Sundays.  He will return in 1 week for follow-up visit with Dr. Fleming.  Hopefully we can slowly start to taper his medication as his platelet count stabilizes.  · August 5, 2020, hematocrit 49 but will hold off phlebotomy today.  However in future he may need to do CBC and if hematocrit greater than 48 then he will receive phlebotomy.  · 9/2/2020 hematocrit 44.5.  Currently on Hydrea 1500 mg daily.  WBC 8.74, hemoglobin 15.8, platelets 386.  No phlebotomy indicated today.  We will decrease the Hydrea to 1000 mg Monday through Saturday, and 1500 mg on Sundays.  Continue biweekly CBCs.  · 10/1/2020 stable labs, continues Hydrea 1000mg daily except Sunday 1500mg. He does not require phlebotomy today.      2.  Weight loss greater than 50 pounds in last 6 months, certainly need to be concerned about underlying malignancy as a cause for his thrombocytosis and leukocytosis.  We will also need to make sure there is no evidence of splenomegaly  · Reviewed CT chest abdomen pelvis negative for malignancy  · The patient admits that his weight loss has actually been more intentional recently    3.  Cough productive of yellow sputum and some shortness of breath, patient has history of exposure to secondhand smoking and had pneumonia in the past.  He does not show evidence of fever.  · No complaints of cough today    4.  Polycythemia vera:: Monthly  phlebotomies for hematocrit greater than or equal to 50.  Last phlebotomy 7/15/2020.  7/29/2020 hematocrit is 48.4.  9/30/2020 hct today 40.2.    5. Mouth/teeth sensitivity. He is not experiencing infectious symptoms.     Plan  1. Continue Hydrea to 1000 mg every day, except 1500 mg on Sundays.    2. No phlebotomy indicated today.  3. Return in  2 weeks CBC with RN review then in 4 weeks to see Dr Fleming with CBC and possible phlebotomy.     4. Continue ASA 81 mg daily.  5. I have asked the patient to call the office with any new symptoms, we reviewed symptoms such as increased tooth pain, drainage, fevers for which he needs to call the office. Continue to monitor closely.      AG Smith APRN

## 2020-10-14 ENCOUNTER — LAB (OUTPATIENT)
Dept: OTHER | Facility: HOSPITAL | Age: 60
End: 2020-10-14

## 2020-10-14 ENCOUNTER — CLINICAL SUPPORT (OUTPATIENT)
Dept: ONCOLOGY | Facility: HOSPITAL | Age: 60
End: 2020-10-14

## 2020-10-14 VITALS — BODY MASS INDEX: 32.52 KG/M2 | WEIGHT: 253.4 LBS | TEMPERATURE: 97.3 F

## 2020-10-14 DIAGNOSIS — D75.839 THROMBOCYTOSIS: ICD-10-CM

## 2020-10-14 DIAGNOSIS — D45 POLYCYTHEMIA VERA (HCC): Primary | ICD-10-CM

## 2020-10-14 LAB
ANISOCYTOSIS BLD QL: NORMAL
BASOPHILS # BLD AUTO: 0.12 10*3/MM3 (ref 0–0.2)
BASOPHILS NFR BLD AUTO: 1.5 % (ref 0–1.5)
DEPRECATED RDW RBC AUTO: ABNORMAL FL
EOSINOPHIL # BLD AUTO: 0.13 10*3/MM3 (ref 0–0.4)
EOSINOPHIL NFR BLD AUTO: 1.6 % (ref 0.3–6.2)
ERYTHROCYTE [DISTWIDTH] IN BLOOD BY AUTOMATED COUNT: ABNORMAL %
HCT VFR BLD AUTO: 42.4 % (ref 37.5–51)
HGB BLD-MCNC: 15.2 G/DL (ref 13–17.7)
IMM GRANULOCYTES # BLD AUTO: 0.03 10*3/MM3 (ref 0–0.05)
IMM GRANULOCYTES NFR BLD AUTO: 0.4 % (ref 0–0.5)
LYMPHOCYTES # BLD AUTO: 2.22 10*3/MM3 (ref 0.7–3.1)
LYMPHOCYTES NFR BLD AUTO: 27.9 % (ref 19.6–45.3)
MCH RBC QN AUTO: 35.3 PG (ref 26.6–33)
MCHC RBC AUTO-ENTMCNC: 35.8 G/DL (ref 31.5–35.7)
MCV RBC AUTO: 98.4 FL (ref 79–97)
MONOCYTES # BLD AUTO: 0.98 10*3/MM3 (ref 0.1–0.9)
MONOCYTES NFR BLD AUTO: 12.3 % (ref 5–12)
NEUTROPHILS NFR BLD AUTO: 4.49 10*3/MM3 (ref 1.7–7)
NEUTROPHILS NFR BLD AUTO: 56.3 % (ref 42.7–76)
NRBC BLD AUTO-RTO: 0 /100 WBC (ref 0–0.2)
PLAT MORPH BLD: NORMAL
PLATELET # BLD AUTO: 450 10*3/MM3 (ref 140–450)
PMV BLD AUTO: 9.9 FL (ref 6–12)
RBC # BLD AUTO: 4.31 10*6/MM3 (ref 4.14–5.8)
STOMATOCYTES BLD QL SMEAR: NORMAL
WBC # BLD AUTO: 7.97 10*3/MM3 (ref 3.4–10.8)
WBC MORPH BLD: NORMAL

## 2020-10-14 PROCEDURE — 36415 COLL VENOUS BLD VENIPUNCTURE: CPT

## 2020-10-14 PROCEDURE — 85007 BL SMEAR W/DIFF WBC COUNT: CPT | Performed by: INTERNAL MEDICINE

## 2020-10-14 PROCEDURE — G0463 HOSPITAL OUTPT CLINIC VISIT: HCPCS

## 2020-10-14 PROCEDURE — 85025 COMPLETE CBC W/AUTO DIFF WBC: CPT | Performed by: INTERNAL MEDICINE

## 2020-10-14 RX ORDER — HYDROXYUREA 500 MG/1
CAPSULE ORAL
Qty: 92 CAPSULE | Refills: 1 | Status: SHIPPED | OUTPATIENT
Start: 2020-10-14 | End: 2020-12-22 | Stop reason: SDUPTHER

## 2020-10-14 NOTE — NURSING NOTE
Pt is here for lab with RN review.  CBC reviewed with pt, counts are stable for this pt at this time. Pt has no complaints.  Copy of labs given to pt and f/u appt reviewed. Pt stated he needed a refill on his Hydrea. Per Sherly okay to route to doctor for signature. Refill request sent to Dr. Pablo as doc #2 as Dr. Tuttle on vacation. Pt is instructed to call the office with any concerns or new symptoms prior to next visit. Pt vu and discharged ambulatory.

## 2020-10-15 ENCOUNTER — MEDICATION THERAPY MANAGEMENT (OUTPATIENT)
Dept: PHARMACY | Facility: HOSPITAL | Age: 60
End: 2020-10-15

## 2020-10-15 NOTE — PROGRESS NOTES
[facility] Lab Review- Hydroxyurea      10/14/2020   WBC 3.40 - 10.80 10*3/mm3 7.97   Neutrophils Absolute 1.70 - 7.00 10*3/mm3 4.49   Hemoglobin 13.0 - 17.7 g/dL 15.2   Hematocrit 37.5 - 51.0 % 42.4   Platelets 140 - 450 10*3/mm3 450     Pharmacy will continue to follow.     Thanks,   Amanda Morrison, PharmD

## 2020-10-27 ENCOUNTER — OFFICE VISIT (OUTPATIENT)
Dept: ONCOLOGY | Facility: CLINIC | Age: 60
End: 2020-10-27

## 2020-10-27 ENCOUNTER — APPOINTMENT (OUTPATIENT)
Dept: ONCOLOGY | Facility: HOSPITAL | Age: 60
End: 2020-10-27

## 2020-10-27 ENCOUNTER — LAB (OUTPATIENT)
Dept: LAB | Facility: HOSPITAL | Age: 60
End: 2020-10-27

## 2020-10-27 VITALS
DIASTOLIC BLOOD PRESSURE: 79 MMHG | BODY MASS INDEX: 32.75 KG/M2 | WEIGHT: 255.2 LBS | RESPIRATION RATE: 18 BRPM | HEART RATE: 57 BPM | SYSTOLIC BLOOD PRESSURE: 120 MMHG | OXYGEN SATURATION: 97 % | TEMPERATURE: 97.5 F | HEIGHT: 74 IN

## 2020-10-27 DIAGNOSIS — D75.839 THROMBOCYTOSIS: Primary | ICD-10-CM

## 2020-10-27 DIAGNOSIS — D75.1 POLYCYTHEMIA: ICD-10-CM

## 2020-10-27 LAB
ALBUMIN SERPL-MCNC: 4.6 G/DL (ref 3.5–5.2)
ALBUMIN/GLOB SERPL: 1.5 G/DL (ref 1.1–2.4)
ALP SERPL-CCNC: 54 U/L (ref 38–116)
ALT SERPL W P-5'-P-CCNC: 39 U/L (ref 0–41)
ANION GAP SERPL CALCULATED.3IONS-SCNC: 9.2 MMOL/L (ref 5–15)
AST SERPL-CCNC: 38 U/L (ref 0–40)
BASOPHILS # BLD AUTO: 0.18 10*3/MM3 (ref 0–0.2)
BASOPHILS NFR BLD AUTO: 2.3 % (ref 0–1.5)
BILIRUB SERPL-MCNC: 0.7 MG/DL (ref 0.2–1.2)
BUN SERPL-MCNC: 17 MG/DL (ref 6–20)
BUN/CREAT SERPL: 13.7 (ref 7.3–30)
CALCIUM SPEC-SCNC: 10.7 MG/DL (ref 8.5–10.2)
CHLORIDE SERPL-SCNC: 99 MMOL/L (ref 98–107)
CO2 SERPL-SCNC: 29.8 MMOL/L (ref 22–29)
CREAT SERPL-MCNC: 1.24 MG/DL (ref 0.7–1.3)
DEPRECATED RDW RBC AUTO: 74.5 FL (ref 37–54)
EOSINOPHIL # BLD AUTO: 0.09 10*3/MM3 (ref 0–0.4)
EOSINOPHIL NFR BLD AUTO: 1.1 % (ref 0.3–6.2)
ERYTHROCYTE [DISTWIDTH] IN BLOOD BY AUTOMATED COUNT: 20.1 % (ref 12.3–15.4)
GFR SERPL CREATININE-BSD FRML MDRD: 59 ML/MIN/1.73
GLOBULIN UR ELPH-MCNC: 3.1 GM/DL (ref 1.8–3.5)
GLUCOSE SERPL-MCNC: 95 MG/DL (ref 74–124)
HCT VFR BLD AUTO: 40.5 % (ref 37.5–51)
HGB BLD-MCNC: 14.4 G/DL (ref 13–17.7)
IMM GRANULOCYTES # BLD AUTO: 0.2 10*3/MM3 (ref 0–0.05)
IMM GRANULOCYTES NFR BLD AUTO: 2.5 % (ref 0–0.5)
LYMPHOCYTES # BLD AUTO: 1.98 10*3/MM3 (ref 0.7–3.1)
LYMPHOCYTES NFR BLD AUTO: 25 % (ref 19.6–45.3)
MCH RBC QN AUTO: 36.6 PG (ref 26.6–33)
MCHC RBC AUTO-ENTMCNC: 35.6 G/DL (ref 31.5–35.7)
MCV RBC AUTO: 103.1 FL (ref 79–97)
MONOCYTES # BLD AUTO: 1.04 10*3/MM3 (ref 0.1–0.9)
MONOCYTES NFR BLD AUTO: 13.1 % (ref 5–12)
NEUTROPHILS NFR BLD AUTO: 4.44 10*3/MM3 (ref 1.7–7)
NEUTROPHILS NFR BLD AUTO: 56 % (ref 42.7–76)
NRBC BLD AUTO-RTO: 0 /100 WBC (ref 0–0.2)
PLATELET # BLD AUTO: 276 10*3/MM3 (ref 140–450)
PMV BLD AUTO: 10.7 FL (ref 6–12)
POTASSIUM SERPL-SCNC: 4.2 MMOL/L (ref 3.5–4.7)
PROT SERPL-MCNC: 7.7 G/DL (ref 6.3–8)
RBC # BLD AUTO: 3.93 10*6/MM3 (ref 4.14–5.8)
SODIUM SERPL-SCNC: 138 MMOL/L (ref 134–145)
URATE SERPL-MCNC: 7.7 MG/DL (ref 2.8–7.4)
WBC # BLD AUTO: 7.93 10*3/MM3 (ref 3.4–10.8)

## 2020-10-27 PROCEDURE — 80053 COMPREHEN METABOLIC PANEL: CPT | Performed by: INTERNAL MEDICINE

## 2020-10-27 PROCEDURE — 84550 ASSAY OF BLOOD/URIC ACID: CPT

## 2020-10-27 PROCEDURE — 99213 OFFICE O/P EST LOW 20 MIN: CPT | Performed by: INTERNAL MEDICINE

## 2020-10-27 PROCEDURE — 36415 COLL VENOUS BLD VENIPUNCTURE: CPT

## 2020-10-27 PROCEDURE — 85025 COMPLETE CBC W/AUTO DIFF WBC: CPT

## 2020-10-27 NOTE — PROGRESS NOTES
Subjective     REASON FOR follow-up:    1.  Polycythemia vera, polycythemia and thrombocytosis and leukocytosis, EPO level 1.3, Tino 2+    HISTORY OF PRESENT ILLNESS:  The patient is a 60 y.o. year old male who is here for an opinion about the above issue.    History of Present Illness with the above-mentioned history who is here today for follow-up and lab review.  He continues on Hydrea 1000 mg daily except on Sundays when he takes 1500mg on Sundays.    He reports feeling relatively well. He tolerates Hydrea reasonably well. His CBC is stable. He denies GI symptoms. He does report teeth sensitivity that is somewhat unpredictable, he does correlate this sensation to starting Hydrea. He denies infectious symptoms, bleeding, swelling of the gums. He denies history of tooth abcsess He experiences this primarily when eating only occasionally.    Interval history: Patient is currently receiving hydroxyurea 1000 mg Monday through Saturday and 1500 mg on Sunday.  Currently his platelets have dropped down to 276.  We discussed with him that we will need to change the dose of hydroxyurea again.  He is tolerating it well except some fatigue.          Hematological oncologic history:  Patient is a 59-year-old gentleman who has been referred by his primary care Castillo Velasco for evaluation of thrombocytosis and polycythemia.  He has lost more than 50 pounds of weight.  In the last 6 months.  Looking back his blood counts have been high starting in 2016.  Initially his platelets were high around 670 and gradually increased in the 900 range.  More recently his hemoglobin has been increasing and his white count has been increasing.  He has not had a DVT or pulmonary embolism.  He has had no issues with itching with hot showers.  Has not had a stroke.  He had pneumonia last year but none recently.  He does have some shortness of breath but no chest pain.  He is exposed to secondhand smoking.    He does not have any nausea vomiting  "or abdominal pain at present.  He did have gout on several locations and was placed on meloxicam.  His renal function is mildly elevated.  He denies any fever or night sweats.    Patient does have a cough productive of yellow sputum but has no fevers.    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     • bisoprolol (ZEBeta) 10 MG tablet TAKE ONE TABLET BY MOUTH DAILY 90 tablet 0   • chlorthalidone (HYGROTEN) 50 MG tablet TAKE ONE TABLET BY MOUTH DAILY 90 tablet 0   • clotrimazole-betamethasone (LOTRISONE) 1-0.05 % cream APPLY TO AFFECTED AREA(S) TWO TIMES A DAY 45 each 3   • hydrALAZINE (APRESOLINE) 50 MG tablet TAKE ONE TABLET BY MOUTH TWICE A  tablet 0   • hydroxyurea (HYDREA) 500 MG capsule Take 2 tabs (1000 mg) po in the AM then take 1 tab (500 mg) po at night Mon-Fri. Take 2 tabs (1000 mg) twice per day on Saturday and Sunday. 92 capsule 1   • ondansetron (ZOFRAN) 4 MG tablet Take 1 tablet by mouth Every 8 (Eight) Hours As Needed for Nausea or Vomiting. 30 tablet 0   • potassium chloride (K-DUR,KLOR-CON) 20 MEQ CR tablet Take 0.5 tablets by mouth Daily. 30 tablet 4   • zolpidem (AMBIEN) 10 MG tablet Take 1 tablet by mouth At Night As Needed for Sleep. 90 tablet 0     No current facility-administered medications on file prior to visit.         ALLERGIES:    Allergies   Allergen Reactions   • Cephalexin Itching        Social History " "    Socioeconomic History   • Marital status:      Spouse name: Not on file   • Number of children: Not on file   • Years of education: Not on file   • Highest education level: Not on file   Occupational History   • Occupation:      Employer: RETIRED   Tobacco Use   • Smoking status: Never Smoker   • Smokeless tobacco: Never Used   Substance and Sexual Activity   • Alcohol use: Yes        Family History   Problem Relation Age of Onset   • Hypertension Father    • Hypertension Other    • Heart disease Other    • Cervical cancer Other         Review of Systems   Constitutional: Positive for fatigue (10/27/20-Unchanged). Negative for appetite change, chills, diaphoresis, fever and unexpected weight change.   HENT: Negative for hearing loss, sore throat and trouble swallowing.         See hpi   Respiratory: Negative for cough, chest tightness, shortness of breath and wheezing.    Cardiovascular: Negative for chest pain, palpitations and leg swelling.   Gastrointestinal: Negative for abdominal distention, abdominal pain, constipation, diarrhea, nausea and vomiting.   Genitourinary: Negative for dysuria, frequency, hematuria and urgency.   Musculoskeletal: Negative for joint swelling.        No muscle weakness.   Skin: Negative for rash and wound.   Neurological: Negative for seizures, syncope, speech difficulty, weakness, numbness and headaches.   Hematological: Negative for adenopathy. Does not bruise/bleed easily.   Psychiatric/Behavioral: Negative for behavioral problems, confusion and suicidal ideas.   All other systems reviewed and are negative.  I have reviewed and confirmed the accuracy of the ROS as documented by the MA/LPN/RN Eliana Fleming MD          Objective     Vitals:    10/27/20 1308   BP: 120/79   Pulse: 57   Resp: 18   Temp: 97.5 °F (36.4 °C)   TempSrc: Skin   SpO2: 97%   Weight: 116 kg (255 lb 3.2 oz)   Height: 188 cm (74.02\")   PainSc: 0-No pain     Current Status 10/27/2020   ECOG " score 0              CONSTITUTIONAL:  Vital signs reviewed.    No distress, looks comfortable.  EYES:  Conjunctiva and lids unremarkable.  Extraocular eye movements intact.  EARS,NOSE,MOUTH,THROAT:  Ears and nose appear unremarkable.  Lips, teeth, gums appear unremarkable.  RESPIRATORY:  Normal respiratory effort.    CARDIOVASCULAR:    No significant lower extremity edema.  SKIN: No wounds.  No rashes.  MUSCULOSKELETAL/EXTREMITIES: No clubbing or cyanosis.  No apparent unilateral weakness.  NEURO: CN 2-12 appear intact. No focal neurological deficits noted.  PSYCHIATRIC:  Normal judgment and insight.  Normal mood and affect.    RECENT LABS:  Hematology WBC   Date Value Ref Range Status   10/27/2020 7.93 3.40 - 10.80 10*3/mm3 Final   06/22/2020 9.75 3.40 - 10.80 10*3/mm3 Final     RBC   Date Value Ref Range Status   10/27/2020 3.93 (L) 4.14 - 5.80 10*6/mm3 Final   06/22/2020 5.54 4.14 - 5.80 10*6/mm3 Final     Hemoglobin   Date Value Ref Range Status   10/27/2020 14.4 13.0 - 17.7 g/dL Final     Hematocrit   Date Value Ref Range Status   10/27/2020 40.5 37.5 - 51.0 % Final     Platelets   Date Value Ref Range Status   10/27/2020 276 140 - 450 10*3/mm3 Final      NONCONTRAST CT OF THE NECK, CHEST, ABDOMEN, AND PELVIS 07/13/20     IMPRESSION:  Mildly enlarged spleen. No lymphadenopathy or other evidence  of tumor. 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      Assessment/Plan     1.  Polycythemia with leukocytosis and thrombocytosis, my concern is that this patient may have myeloproliferative disorder.  He does not have any itching with hot showers.  He does not have any headaches or DVT.  He does have blood counts worsening gradually from 2016.  His platelets today are 974K.  Hemoglobin is 17.6 with hematocrit of 52.8 and white count of 11.56.  He does not have any headache.  On exam I did not appreciate the splenomegaly or lymphadenopathy.  He denies fever  night sweats but has significant weight loss greater than 50 pounds in 6 months.  He does have a cough which is productive of yellow sputum and some shortness of breath.  He has history of secondhand smoking exposure.    We will need to obtain peripheral blood for Tino 2 mutation, peripheral blood for BCR C able, flow, EFE reticulin and MPL mutation.  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  CT chest negative  · Hydroxyurea 500 mg twice daily was initiated on July 16, 2020.  Patient has tolerated treatment reasonably well with exception of mild nausea and fatigue.  · As noted above, his platelet count has further increased and therefore we will need to adjust his Hydrea dose.  Per Dr. Fleming, the patient will increase his dose to 1500 mg (1000 mg in am and 500 mg in pm) Monday-Friday and 2000 mg (1000 mg in am and 1000 mg in PM) on Saturdays and Sundays  · I have provided written instructions to the patient regarding his new dosing and have also reviewed possible side effects of the Hydrea with him today.  I have also contacted Sherly Milan about a new prescription  · 7/29/2020 patient's platelet count today is 821.  White count 7.78, hemoglobin 16.4.  I have reviewed with Dr. Fleming.  Patient to continue Hydrea at 1500 mg Monday through Friday, and 2000 mg on Saturdays and Sundays.  He will return in 1 week for follow-up visit with Dr. Fleming.  Hopefully we can slowly start to taper his medication as his platelet count stabilizes.  · August 5, 2020, hematocrit 49 but will hold off phlebotomy today.  However in future he may need to do CBC and if hematocrit greater than 48 then he will receive phlebotomy.  · 9/2/2020 hematocrit 44.5.  Currently on Hydrea 1500 mg daily.  WBC 8.74, hemoglobin 15.8, platelets 386.  No phlebotomy indicated today.  We will decrease the Hydrea to 1000 mg Monday through  Saturday, and 1500 mg on Sundays.  Continue biweekly CBCs.  · 10/1/2020 stable labs, continues Hydrea 1000mg daily except Sunday 1500mg. He does not require phlebotomy today.  · October 26, 2020: Platelets dropped to 276.  Will change hydroxyurea to 1000 mg Monday through Friday and 5 mg on Saturday and Sunday      2.  Weight loss greater than 50 pounds in last 6 months, certainly need to be concerned about underlying malignancy as a cause for his thrombocytosis and leukocytosis.  We will also need to make sure there is no evidence of splenomegaly  · Reviewed CT chest abdomen pelvis negative for malignancy  · The patient admits that his weight loss has actually been more intentional recently    3.  Cough productive of yellow sputum and some shortness of breath, patient has history of exposure to secondhand smoking and had pneumonia in the past.  He does not show evidence of fever.  · No complaints of cough today    4.  Polycythemia vera:: Monthly phlebotomies for hematocrit greater than or equal to 50.  Last phlebotomy 7/15/2020.  7/29/2020 hematocrit is 48.4.  9/30/2020 hct today 40.2.    5. Mouth/teeth sensitivity. He is not experiencing infectious symptoms.     Plan  1. Hydroxyurea 1000 mg Monday through Friday and 500 mg Saturday and Sunday  2. No phlebotomy indicated today.  3. Return in  2 weeks CBC with RN review then in 4 weeks to see Dr Fleming with CBC and possible phlebotomy.     4. Continue ASA 81 mg daily.  5. I have asked the patient to call the office with any new symptoms, we reviewed symptoms such as increased tooth pain, drainage, fevers for which he needs to call the office. Continue to monitor closely.      MD Castillo Mathew, AG

## 2020-10-28 ENCOUNTER — TELEPHONE (OUTPATIENT)
Dept: ONCOLOGY | Facility: CLINIC | Age: 60
End: 2020-10-28

## 2020-10-28 DIAGNOSIS — D75.839 THROMBOCYTOSIS: Primary | ICD-10-CM

## 2020-10-28 DIAGNOSIS — E79.0 ELEVATED URIC ACID IN BLOOD: ICD-10-CM

## 2020-10-28 DIAGNOSIS — D45 POLYCYTHEMIA VERA (HCC): ICD-10-CM

## 2020-10-28 RX ORDER — ALLOPURINOL 100 MG/1
100 TABLET ORAL 2 TIMES DAILY
Qty: 60 TABLET | Refills: 2 | Status: SHIPPED | OUTPATIENT
Start: 2020-10-28 | End: 2021-01-27

## 2020-10-28 NOTE — TELEPHONE ENCOUNTER
PATIENT RETURNING A CALL FROM THIS MORNING, NOT SURE WHO CALLED, PLEASE ADVISE?    PT CALL BACK #[phone]

## 2020-10-28 NOTE — TELEPHONE ENCOUNTER
Call to  Sabiha to let him know his uric acid was increased and  had requested he drink more fluids and prescription was being sent for Allopurinol 100 mg two times a day. Verbalized understanding.

## 2020-10-29 ENCOUNTER — MEDICATION THERAPY MANAGEMENT (OUTPATIENT)
Dept: PHARMACY | Facility: HOSPITAL | Age: 60
End: 2020-10-29

## 2020-10-29 NOTE — PROGRESS NOTES
[facility] Lab Review- Hydroxyurea      10/27/2020   WBC 3.40 - 10.80 10*3/mm3 7.93   Neutrophils Absolute 1.70 - 7.00 10*3/mm3 4.44   Hemoglobin 13.0 - 17.7 g/dL 14.4   Hematocrit 37.5 - 51.0 % 40.5   Platelets 140 - 450 10*3/mm3 276   Creatinine 0.70 - 1.30 mg/dL 1.24   eGFR Non African Am >60 mL/min/1.73 59 (A)   BUN 6 - 20 mg/dL 17   Sodium 134 - 145 mmol/L 138   Potassium 3.5 - 4.7 mmol/L 4.2   Glucose 74 - 124 mg/dL 95   Calcium 8.5 - 10.2 mg/dL 10.7 (A)   Albumin 3.50 - 5.20 g/dL 4.60   Total Protein 6.3 - 8.0 g/dL 7.7   AST (SGOT) 0 - 40 U/L 38   ALT (SGPT) 0 - 41 U/L 39   Alkaline Phosphatase 38 - 116 U/L 54   Total Bilirubin 0.2 - 1.2 mg/dL 0.7     MD dictation noted, hydroxyurea dose changed. Pharmacy will continue to follow.     Thanks,   Amanda Morrison, PharmD

## 2020-11-04 ENCOUNTER — MEDICATION THERAPY MANAGEMENT (OUTPATIENT)
Dept: PHARMACY | Facility: HOSPITAL | Age: 60
End: 2020-11-04

## 2020-11-04 NOTE — PROGRESS NOTES
MTM telephone follow up- hydroxyurea    No answer this afternoon. Pharmacy will continue to follow.     Thanks,   Amanda Morrison, PharmD

## 2020-11-04 NOTE — PROGRESS NOTES
MTM telephone follow up- hydroxyurea    Raj is doing well today. He has no new issues or concerns with his hydroxyurea. Medication administration and adherence seem appropriate; he reports no missed doses this past month. His only recent medication change is the addition of allopurinol; there are no interactions with his other medications. Raj has no additional questions or concerns today. Pharmacy will continue to follow.     Thanks,   Amanda Morrison, PharmD

## 2020-11-11 ENCOUNTER — OFFICE VISIT (OUTPATIENT)
Dept: FAMILY MEDICINE CLINIC | Facility: CLINIC | Age: 60
End: 2020-11-11

## 2020-11-11 VITALS
OXYGEN SATURATION: 98 % | HEIGHT: 74 IN | TEMPERATURE: 97.8 F | BODY MASS INDEX: 32.67 KG/M2 | WEIGHT: 254.6 LBS | DIASTOLIC BLOOD PRESSURE: 70 MMHG | SYSTOLIC BLOOD PRESSURE: 118 MMHG | HEART RATE: 51 BPM

## 2020-11-11 DIAGNOSIS — G47.00 INSOMNIA, UNSPECIFIED TYPE: ICD-10-CM

## 2020-11-11 DIAGNOSIS — Z79.899 HIGH RISK MEDICATION USE: ICD-10-CM

## 2020-11-11 DIAGNOSIS — M25.562 ACUTE PAIN OF LEFT KNEE: Primary | ICD-10-CM

## 2020-11-11 PROCEDURE — 99213 OFFICE O/P EST LOW 20 MIN: CPT | Performed by: NURSE PRACTITIONER

## 2020-11-11 PROCEDURE — 20610 DRAIN/INJ JOINT/BURSA W/O US: CPT | Performed by: NURSE PRACTITIONER

## 2020-11-11 RX ORDER — ZOLPIDEM TARTRATE 10 MG/1
10 TABLET ORAL NIGHTLY PRN
Qty: 90 TABLET | Refills: 0 | Status: SHIPPED | OUTPATIENT
Start: 2020-11-11 | End: 2022-02-01 | Stop reason: SDUPTHER

## 2020-11-11 RX ORDER — TRIAMCINOLONE ACETONIDE 40 MG/ML
40 INJECTION, SUSPENSION INTRA-ARTICULAR; INTRAMUSCULAR
Status: COMPLETED | OUTPATIENT
Start: 2020-11-11 | End: 2020-11-11

## 2020-11-11 RX ADMIN — TRIAMCINOLONE ACETONIDE 40 MG: 40 INJECTION, SUSPENSION INTRA-ARTICULAR; INTRAMUSCULAR at 10:53

## 2020-11-11 NOTE — PROGRESS NOTES
"Subjective   Phani Landis is a 60 y.o. male.   Chief Complaint   Patient presents with   • Insomnia     Patient is needing his ambien refilled needs drug screen ( wore mask and goggles)    • Knee Pain     Patient is wanting a cortisone injection in left knee        History of Present Illness   Phani Landis 60 y.o. male presents for follow up of insomnia with onset of symptoms years ago. Patient describes symptoms as early morning awakening and frequent night time awakening. Patient has found complete relief with Ambien (Zolpidem). Associated symptoms include: fatigue if unable to take Rx . Patient denies daytime somnolence Symptoms have stabilized.  The patient has failed multiple OTC medications for insomnia.  They are well controlled on current Rx and will continue to try to take Rx PRN.  He will use the lowest effective dose.  The patient has read and signed the [facility] Controlled Substance Contract.  I will continue to see patient for regular follow up appointments and be prescribed the lowest effective dose.  YUE has been reviewed by me and is updated every 3 months. The patient is aware of the potential for addiction and dependence.  He denies that Ambien (Zolpidem) causes excessive daytime drowsiness and sleep walking.  Patient voices understanding to take Ambien (Zolpidem) and go straight to bed. Patient must be able to sleep 7 hours or more when taking this.  Patient will hold Rx and contact me if they experience any impaired mental alertness the next day.        The following portions of the patient's history were reviewed and updated as appropriate: allergies, current medications, past social history and problem list.    Review of Systems   Musculoskeletal: Positive for arthralgias.   All other systems reviewed and are negative.      Objective   /70   Pulse 51   Temp 97.8 °F (36.6 °C)   Ht 188 cm (74.02\")   Wt 115 kg (254 lb 9.6 oz)   SpO2 98%   BMI 32.67 kg/m²   Physical " Exam  Vitals signs reviewed.   Constitutional:       General: He is not in acute distress.     Appearance: He is well-developed.   HENT:      Head: Normocephalic.   Cardiovascular:      Rate and Rhythm: Normal rate and regular rhythm.      Heart sounds: Normal heart sounds.   Pulmonary:      Effort: Pulmonary effort is normal.      Breath sounds: Normal breath sounds.   Neurological:      Mental Status: He is alert and oriented to person, place, and time.      Gait: Gait normal.   Psychiatric:         Behavior: Behavior normal.         Thought Content: Thought content normal.         Judgment: Judgment normal.       The patient has read and signed the [facility] Controlled Substance Contract.  I will continue to see patient for regular follow up appointments.  They are well controlled on their medication.  YUE has been reviewed by me and is updated every 3 months. The patient is aware of the potential for addiction and dependence.      Arthrocentesis    Date/Time: 11/11/2020 10:53 AM  Performed by: Castillo Velasco APRN  Authorized by: Castillo Velasco APRN   Consent: Verbal consent obtained.  Risks and benefits: risks, benefits and alternatives were discussed  Consent given by: patient  Patient understanding: patient states understanding of the procedure being performed  Patient consent: the patient's understanding of the procedure matches consent given  Procedure consent: procedure consent matches procedure scheduled  Relevant documents: relevant documents present and verified  Patient identity confirmed: verbally with patient  Indications: pain   Body area: knee  Joint: left knee  Local anesthesia used: no    Anesthesia:  Local anesthesia used: no    Sedation:  Patient sedated: no    Needle size: 20 G  Approach: lateral  Patient tolerance: patient tolerated the procedure well with no immediate complications          Assessment/Plan      Diagnosis Plan   1. Acute pain of left knee  Arthrocentesis   2. Insomnia,  unspecified type  zolpidem (AMBIEN) 10 MG tablet   3. High risk medication use  ToxASSURE Select 13 (MW) - Urine, Clean Catch     Cont same with med  rto in 3 mons     Mask and googles worn    Castillo Velasco, APRN  11/11/2020

## 2020-11-15 LAB — DRUGS UR: NORMAL

## 2020-11-25 ENCOUNTER — LAB (OUTPATIENT)
Dept: LAB | Facility: HOSPITAL | Age: 60
End: 2020-11-25

## 2020-11-25 ENCOUNTER — OFFICE VISIT (OUTPATIENT)
Dept: ONCOLOGY | Facility: CLINIC | Age: 60
End: 2020-11-25

## 2020-11-25 VITALS
DIASTOLIC BLOOD PRESSURE: 78 MMHG | RESPIRATION RATE: 18 BRPM | BODY MASS INDEX: 33.1 KG/M2 | WEIGHT: 257.9 LBS | HEIGHT: 74 IN | SYSTOLIC BLOOD PRESSURE: 117 MMHG | TEMPERATURE: 97.6 F | OXYGEN SATURATION: 97 % | HEART RATE: 58 BPM

## 2020-11-25 DIAGNOSIS — D75.1 POLYCYTHEMIA: ICD-10-CM

## 2020-11-25 DIAGNOSIS — D45 POLYCYTHEMIA VERA (HCC): ICD-10-CM

## 2020-11-25 DIAGNOSIS — D75.839 THROMBOCYTOSIS: ICD-10-CM

## 2020-11-25 DIAGNOSIS — D45 POLYCYTHEMIA VERA (HCC): Primary | ICD-10-CM

## 2020-11-25 LAB
ALBUMIN SERPL-MCNC: 4.2 G/DL (ref 3.5–5.2)
ALBUMIN/GLOB SERPL: 1.4 G/DL (ref 1.1–2.4)
ALP SERPL-CCNC: 57 U/L (ref 38–116)
ALT SERPL W P-5'-P-CCNC: 36 U/L (ref 0–41)
ANION GAP SERPL CALCULATED.3IONS-SCNC: 7.7 MMOL/L (ref 5–15)
AST SERPL-CCNC: 29 U/L (ref 0–40)
BASOPHILS # BLD AUTO: 0.19 10*3/MM3 (ref 0–0.2)
BASOPHILS NFR BLD AUTO: 1.9 % (ref 0–1.5)
BILIRUB SERPL-MCNC: 0.5 MG/DL (ref 0.2–1.2)
BUN SERPL-MCNC: 14 MG/DL (ref 6–20)
BUN/CREAT SERPL: 12.2 (ref 7.3–30)
CA-I BLD-MCNC: 6 MG/DL (ref 4.6–5.4)
CA-I SERPL ISE-MCNC: 1.49 MMOL/L (ref 1.15–1.35)
CALCIUM SPEC-SCNC: 10.2 MG/DL (ref 8.5–10.2)
CHLORIDE SERPL-SCNC: 100 MMOL/L (ref 98–107)
CO2 SERPL-SCNC: 27.3 MMOL/L (ref 22–29)
CREAT SERPL-MCNC: 1.15 MG/DL (ref 0.7–1.3)
DEPRECATED RDW RBC AUTO: 52.4 FL (ref 37–54)
EOSINOPHIL # BLD AUTO: 0.14 10*3/MM3 (ref 0–0.4)
EOSINOPHIL NFR BLD AUTO: 1.4 % (ref 0.3–6.2)
ERYTHROCYTE [DISTWIDTH] IN BLOOD BY AUTOMATED COUNT: 12.9 % (ref 12.3–15.4)
GFR SERPL CREATININE-BSD FRML MDRD: 65 ML/MIN/1.73
GLOBULIN UR ELPH-MCNC: 2.9 GM/DL (ref 1.8–3.5)
GLUCOSE SERPL-MCNC: 97 MG/DL (ref 74–124)
HCT VFR BLD AUTO: 40.7 % (ref 37.5–51)
HGB BLD-MCNC: 14.1 G/DL (ref 13–17.7)
IMM GRANULOCYTES # BLD AUTO: 0.06 10*3/MM3 (ref 0–0.05)
IMM GRANULOCYTES NFR BLD AUTO: 0.6 % (ref 0–0.5)
LYMPHOCYTES # BLD AUTO: 2.13 10*3/MM3 (ref 0.7–3.1)
LYMPHOCYTES NFR BLD AUTO: 20.9 % (ref 19.6–45.3)
MCH RBC QN AUTO: 38.8 PG (ref 26.6–33)
MCHC RBC AUTO-ENTMCNC: 34.6 G/DL (ref 31.5–35.7)
MCV RBC AUTO: 112.1 FL (ref 79–97)
MONOCYTES # BLD AUTO: 0.78 10*3/MM3 (ref 0.1–0.9)
MONOCYTES NFR BLD AUTO: 7.7 % (ref 5–12)
NEUTROPHILS NFR BLD AUTO: 6.89 10*3/MM3 (ref 1.7–7)
NEUTROPHILS NFR BLD AUTO: 67.5 % (ref 42.7–76)
NRBC BLD AUTO-RTO: 0 /100 WBC (ref 0–0.2)
PLATELET # BLD AUTO: 500 10*3/MM3 (ref 140–450)
PMV BLD AUTO: 9.4 FL (ref 6–12)
POTASSIUM SERPL-SCNC: 3.8 MMOL/L (ref 3.5–4.7)
PROT SERPL-MCNC: 7.1 G/DL (ref 6.3–8)
PTH-INTACT SERPL-MCNC: 42.7 PG/ML (ref 15–65)
RBC # BLD AUTO: 3.63 10*6/MM3 (ref 4.14–5.8)
SODIUM SERPL-SCNC: 135 MMOL/L (ref 134–145)
URATE SERPL-MCNC: 5.6 MG/DL (ref 2.8–7.4)
WBC # BLD AUTO: 10.19 10*3/MM3 (ref 3.4–10.8)

## 2020-11-25 PROCEDURE — 82330 ASSAY OF CALCIUM: CPT | Performed by: INTERNAL MEDICINE

## 2020-11-25 PROCEDURE — 99213 OFFICE O/P EST LOW 20 MIN: CPT | Performed by: NURSE PRACTITIONER

## 2020-11-25 PROCEDURE — 83970 ASSAY OF PARATHORMONE: CPT | Performed by: INTERNAL MEDICINE

## 2020-11-25 PROCEDURE — 84550 ASSAY OF BLOOD/URIC ACID: CPT

## 2020-11-25 PROCEDURE — 85025 COMPLETE CBC W/AUTO DIFF WBC: CPT

## 2020-11-25 PROCEDURE — 36415 COLL VENOUS BLD VENIPUNCTURE: CPT

## 2020-11-25 PROCEDURE — 80053 COMPREHEN METABOLIC PANEL: CPT

## 2020-11-25 NOTE — PROGRESS NOTES
Subjective     REASON FOR FOLLOW UP:  1.  Polycythemia vera, polycythemia and thrombocytosis and leukocytosis, EPO level 1.3, Tino 2+    HISTORY OF PRESENT ILLNESS:  The patient is a 60 y.o. year old male who is here for an opinion about the above issue.    History of Present Illness    The patient returns the office today, 11/25/2020 for labs and nurse practitioner follow-up.  He currently continues on Hydrea 1000 mg Monday through Friday, 500 mg Saturday and Sunday.  He does continue to have some side effects from Hydrea, most notably fatigue.  He does report he is taking 500 mg in the morning and 500 mg in the evening.  He also reports nausea about an hour after taking his morning dose of Hydrea.  He does feel better on the weekends when taking only 500 mg in the morning.    He denies fevers or chills, signs or symptoms of infection.  He continues on an aspirin 81 mg daily with good tolerance without signs or symptoms of bleeding.  His weight has stabilized.  He continues to have an ECOG performance status of 0.    Hematological oncologic history:  Patient is a 59-year-old gentleman who has been referred by his primary care Castillo Velasco for evaluation of thrombocytosis and polycythemia.  He has lost more than 50 pounds of weight.  In the last 6 months.  Looking back his blood counts have been high starting in 2016.  Initially his platelets were high around 670 and gradually increased in the 900 range.  More recently his hemoglobin has been increasing and his white count has been increasing.  He has not had a DVT or pulmonary embolism.  He has had no issues with itching with hot showers.  Has not had a stroke.  He had pneumonia last year but none recently.  He does have some shortness of breath but no chest pain.  He is exposed to secondhand smoking.    He does not have any nausea vomiting or abdominal pain at present.  He did have gout on several locations and was placed on meloxicam.  His renal function is  "mildly elevated.  He denies any fever or night sweats.    Patient does have a cough productive of yellow sputum but has no fevers.    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 60 tablet 2   • amLODIPine-benazepril (LOTREL) 5-40 MG per capsule TAKE ONE CAPSULE BY MOUTH DAILY 90 capsule 3   • aspirin 81 MG chewable tablet Chew 81 mg Daily.     • bisoprolol (ZEBeta) 10 MG tablet TAKE ONE TABLET BY MOUTH DAILY 90 tablet 0   • chlorthalidone (HYGROTEN) 50 MG tablet TAKE ONE TABLET BY MOUTH DAILY 90 tablet 0   • clotrimazole-betamethasone (LOTRISONE) 1-0.05 % cream APPLY TO AFFECTED AREA(S) TWO TIMES A DAY 45 each 3   • hydrALAZINE (APRESOLINE) 50 MG tablet TAKE ONE TABLET BY MOUTH TWICE A  tablet 0   • hydroxyurea (HYDREA) 500 MG capsule Take 2 tabs (1000 mg) po in the AM then take 1 tab (500 mg) po at night Mon-Fri. Take 2 tabs (1000 mg) twice per day on Saturday and Sunday. 92 capsule 1   • ondansetron (ZOFRAN) 4 MG tablet Take 1 tablet by mouth Every 8 (Eight) Hours As Needed for Nausea or Vomiting. 30 tablet 0   • potassium chloride (K-DUR,KLOR-CON) 20 MEQ CR tablet Take 0.5 tablets by mouth Daily. 30 tablet 4   • zolpidem (AMBIEN) 10 MG tablet Take 1 tablet by mouth At Night As Needed for Sleep. 90 tablet 0     No current facility-administered medications on file prior to visit.         ALLERGIES:    Allergies   Allergen Reactions   • Cephalexin Itching        Social History     Socioeconomic History   • " "Marital status:      Spouse name: Not on file   • Number of children: Not on file   • Years of education: Not on file   • Highest education level: Not on file   Occupational History   • Occupation:      Employer: RETIRED   Tobacco Use   • Smoking status: Never Smoker   • Smokeless tobacco: Never Used   Substance and Sexual Activity   • Alcohol use: Yes        Family History   Problem Relation Age of Onset   • Hypertension Father    • Hypertension Other    • Heart disease Other    • Cervical cancer Other       I have reviewed the patient's medical history in detail and updated the computerized patient record.    Review of Systems   Constitutional: Positive for fatigue. Negative for appetite change, chills, diaphoresis, fever and unexpected weight change.   HENT: Negative for hearing loss, sore throat and trouble swallowing.    Respiratory: Negative for cough, chest tightness, shortness of breath and wheezing.    Cardiovascular: Negative for chest pain, palpitations and leg swelling.   Gastrointestinal: Positive for nausea. Negative for abdominal distention, abdominal pain, constipation, diarrhea and vomiting.   Genitourinary: Negative for dysuria, frequency, hematuria and urgency.   Musculoskeletal: Negative for joint swelling.        No muscle weakness.   Skin: Negative for rash and wound.   Neurological: Negative for seizures, syncope, speech difficulty, weakness, numbness and headaches.   Hematological: Negative for adenopathy. Does not bruise/bleed easily.   Psychiatric/Behavioral: Negative for behavioral problems, confusion and suicidal ideas.   All other systems reviewed and are negative.  Review of systems 11/25/2020  unchanged from previous office visit except as updated.        Objective     Vitals:    11/25/20 1313   BP: 117/78   Pulse: 58   Resp: 18   Temp: 97.6 °F (36.4 °C)   TempSrc: Skin   SpO2: 97%   Weight: 117 kg (257 lb 14.4 oz)   Height: 188 cm (74.02\")   PainSc: 0-No pain "     Current Status 11/25/2020   ECOG score 0        PHYSICAL EXAM:  CONSTITUTIONAL:  Vital signs reviewed.    No distress, looks comfortable.  EYES:  Conjunctiva and lids unremarkable.  Extraocular eye movements intact.  RESPIRATORY:  Normal respiratory effort.    CARDIOVASCULAR:    No significant lower extremity edema.  SKIN: No wounds.  No rashes.  MUSCULOSKELETAL/EXTREMITIES: No clubbing or cyanosis.  No apparent unilateral weakness.  NEURO: CN 2-12 appear intact. No focal neurological deficits noted.  PSYCHIATRIC:  Normal judgment and insight.  Normal mood and affect.  Physical exam 11/25/2020  unchanged from previous office visit except as updated.      RECENT LABS:  Results from last 7 days   Lab Units 11/25/20  1305   WBC 10*3/mm3 10.19   NEUTROS ABS 10*3/mm3 6.89   HEMOGLOBIN g/dL 14.1   HEMATOCRIT % 40.7   PLATELETS 10*3/mm3 500*     Results from last 7 days   Lab Units 11/25/20  1305   SODIUM mmol/L 135   POTASSIUM mmol/L 3.8   CHLORIDE mmol/L 100   CO2 mmol/L 27.3   BUN mg/dL 14   CREATININE mg/dL 1.15   CALCIUM mg/dL 10.2   ALBUMIN g/dL 4.20   BILIRUBIN mg/dL 0.5   ALK PHOS U/L 57   ALT (SGPT) U/L 36   AST (SGOT) U/L 29   GLUCOSE mg/dL 97           NONCONTRAST CT OF THE NECK, CHEST, ABDOMEN, AND PELVIS 07/13/20     IMPRESSION:  Mildly enlarged spleen. No lymphadenopathy or other evidence  of tumor. 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      Assessment/Plan     1.  Polycythemia with leukocytosis and thrombocytosis.  He does not have any itching with hot showers.  He does not have any headaches or DVT.  He does have blood counts worsening gradually from 2016.  · 7/8/2020  His platelets 974K.  Hemoglobin is 17.6 with hematocrit of 52.8 and white count of 11.56.  He denies fever night sweats but has significant weight loss greater than 50 pounds in 6 months.  He does have a cough which is productive of yellow sputum and some shortness of  breath.  He has history of secondhand smoking exposure.  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  CT chest negative  · Hydroxyurea 500 mg twice daily was initiated on July 16, 2020.  .  · 7/22/2020 Hydrea increased to 1500 mg (1000 mg in am and 500 mg in pm) Monday-Friday and 2000 mg (1000 mg in am and 1000 mg in PM) on Saturdays and Sundays  · 7/29/2020 patient's platelet count today is 821.  White count 7.78, hemoglobin 16.4.   Increased Hydrea at 1500 mg Monday through Friday, and 2000 mg on Saturdays and Sundays.   · In future if hematocrit greater than 48 then he will receive phlebotomy.  · 9/2/2020 hematocrit 44.5.  Currently on Hydrea 1500 mg daily.  WBC 8.74, hemoglobin 15.8, platelets 386.  decreased the Hydrea to 1000 mg Monday through Saturday, and 1500 mg on Sundays.    · October 26, 2020: Platelets dropped to 276.  Hydrea decreased to 1000 mg Monday through Friday, 500 mg Saturday and Sunday.  · 11/25/2020, platelets of 500, continue current dosing of Hydrea.  Currently taking 500 mg twice daily, we discussed taking 1000 mg at night due to ongoing fatigue and nausea, continue 500 mg nightly Saturday and Sunday.    2.  Weight loss greater than 50 pounds in last 6 months.  We will also need to make sure there is no evidence of splenomegaly  · Reviewed CT chest abdomen pelvis negative for malignancy  · The patient admits that his weight loss has actually been more intentional recently    3.  Cough productive of yellow sputum and some shortness of breath, patient has history of exposure to secondhand smoking and had pneumonia in the past.  He does not show evidence of fever.  · No complaints of cough today    4.  Polycythemia vera: Monthly phlebotomies for hematocrit greater than or equal to 48%.  Last phlebotomy 7/15/2020.       Plan  1. Hydroxyurea 1000 mg Monday through Friday and 500 mg Saturday and  Sunday.  We discussed taking 1000 mg nightly rather than 500 mg twice daily due to ongoing fatigue  2. No phlebotomy indicated today.  3. Return in  2 weeks CBC with RN review.  If platelet counts are further increased, we will consider increasing hydroxyurea to 1000 mg daily.  4. Continue ASA 81 mg daily.  5. Return in 1 month for follow-up with Dr. Fleming, CBC      Miranda Back, APRN  11/25/2020

## 2020-12-09 ENCOUNTER — LAB (OUTPATIENT)
Dept: LAB | Facility: HOSPITAL | Age: 60
End: 2020-12-09

## 2020-12-09 ENCOUNTER — CLINICAL SUPPORT (OUTPATIENT)
Dept: ONCOLOGY | Facility: HOSPITAL | Age: 60
End: 2020-12-09

## 2020-12-09 DIAGNOSIS — D75.839 THROMBOCYTOSIS: Primary | ICD-10-CM

## 2020-12-09 LAB
BASOPHILS # BLD AUTO: 0.17 10*3/MM3 (ref 0–0.2)
BASOPHILS NFR BLD AUTO: 2 % (ref 0–1.5)
DEPRECATED RDW RBC AUTO: 44.2 FL (ref 37–54)
EOSINOPHIL # BLD AUTO: 0.21 10*3/MM3 (ref 0–0.4)
EOSINOPHIL NFR BLD AUTO: 2.5 % (ref 0.3–6.2)
ERYTHROCYTE [DISTWIDTH] IN BLOOD BY AUTOMATED COUNT: 11 % (ref 12.3–15.4)
HCT VFR BLD AUTO: 39.8 % (ref 37.5–51)
HGB BLD-MCNC: 14.3 G/DL (ref 13–17.7)
IMM GRANULOCYTES # BLD AUTO: 0.1 10*3/MM3 (ref 0–0.05)
IMM GRANULOCYTES NFR BLD AUTO: 1.2 % (ref 0–0.5)
LYMPHOCYTES # BLD AUTO: 2.24 10*3/MM3 (ref 0.7–3.1)
LYMPHOCYTES NFR BLD AUTO: 26.6 % (ref 19.6–45.3)
MCH RBC QN AUTO: 38.9 PG (ref 26.6–33)
MCHC RBC AUTO-ENTMCNC: 35.9 G/DL (ref 31.5–35.7)
MCV RBC AUTO: 108.2 FL (ref 79–97)
MONOCYTES # BLD AUTO: 1.13 10*3/MM3 (ref 0.1–0.9)
MONOCYTES NFR BLD AUTO: 13.4 % (ref 5–12)
NEUTROPHILS NFR BLD AUTO: 4.58 10*3/MM3 (ref 1.7–7)
NEUTROPHILS NFR BLD AUTO: 54.3 % (ref 42.7–76)
NRBC BLD AUTO-RTO: 0 /100 WBC (ref 0–0.2)
PLATELET # BLD AUTO: 405 10*3/MM3 (ref 140–450)
PMV BLD AUTO: 10 FL (ref 6–12)
RBC # BLD AUTO: 3.68 10*6/MM3 (ref 4.14–5.8)
WBC # BLD AUTO: 8.43 10*3/MM3 (ref 3.4–10.8)

## 2020-12-09 PROCEDURE — 85025 COMPLETE CBC W/AUTO DIFF WBC: CPT

## 2020-12-09 PROCEDURE — G0463 HOSPITAL OUTPT CLINIC VISIT: HCPCS

## 2020-12-09 PROCEDURE — 36415 COLL VENOUS BLD VENIPUNCTURE: CPT

## 2020-12-10 ENCOUNTER — MEDICATION THERAPY MANAGEMENT (OUTPATIENT)
Dept: PHARMACY | Facility: HOSPITAL | Age: 60
End: 2020-12-10

## 2020-12-10 NOTE — PROGRESS NOTES
MT Lab Review- hydroxyurea      12/9/2020   WBC 3.40 - 10.80 10*3/mm3 8.43   Neutrophils Absolute 1.70 - 7.00 10*3/mm3 4.58   Hemoglobin 13.0 - 17.7 g/dL 14.3   Hematocrit 37.5 - 51.0 % 39.8   Platelets 140 - 450 10*3/mm3 405     APRN dictation noted. Pharmacy will continue to follow.     Thanks,   Amanda Morrison, PharmD

## 2020-12-14 RX ORDER — AMLODIPINE BESYLATE AND BENAZEPRIL HYDROCHLORIDE 5; 40 MG/1; MG/1
CAPSULE ORAL
Qty: 90 CAPSULE | Refills: 2 | Status: SHIPPED | OUTPATIENT
Start: 2020-12-14 | End: 2021-09-13 | Stop reason: SDUPTHER

## 2020-12-14 RX ORDER — BISOPROLOL FUMARATE 10 MG/1
TABLET, FILM COATED ORAL
Qty: 90 TABLET | Refills: 0 | Status: SHIPPED | OUTPATIENT
Start: 2020-12-14 | End: 2021-03-15

## 2020-12-14 RX ORDER — CHLORTHALIDONE 50 MG/1
TABLET ORAL
Qty: 90 TABLET | Refills: 0 | Status: SHIPPED | OUTPATIENT
Start: 2020-12-14 | End: 2021-03-15

## 2020-12-22 ENCOUNTER — OFFICE VISIT (OUTPATIENT)
Dept: ONCOLOGY | Facility: CLINIC | Age: 60
End: 2020-12-22

## 2020-12-22 ENCOUNTER — LAB (OUTPATIENT)
Dept: LAB | Facility: HOSPITAL | Age: 60
End: 2020-12-22

## 2020-12-22 VITALS
OXYGEN SATURATION: 96 % | WEIGHT: 260.5 LBS | DIASTOLIC BLOOD PRESSURE: 82 MMHG | HEART RATE: 58 BPM | RESPIRATION RATE: 18 BRPM | SYSTOLIC BLOOD PRESSURE: 125 MMHG | TEMPERATURE: 98.4 F | HEIGHT: 74 IN | BODY MASS INDEX: 33.43 KG/M2

## 2020-12-22 DIAGNOSIS — D75.1 POLYCYTHEMIA: ICD-10-CM

## 2020-12-22 DIAGNOSIS — D75.839 THROMBOCYTOSIS: ICD-10-CM

## 2020-12-22 DIAGNOSIS — D45 POLYCYTHEMIA VERA (HCC): Primary | ICD-10-CM

## 2020-12-22 LAB
ALBUMIN SERPL-MCNC: 4.5 G/DL (ref 3.5–5.2)
ALBUMIN/GLOB SERPL: 1.6 G/DL (ref 1.1–2.4)
ALP SERPL-CCNC: 55 U/L (ref 38–116)
ALT SERPL W P-5'-P-CCNC: 33 U/L (ref 0–41)
ANION GAP SERPL CALCULATED.3IONS-SCNC: 11.6 MMOL/L (ref 5–15)
AST SERPL-CCNC: 34 U/L (ref 0–40)
BASOPHILS # BLD AUTO: 0.12 10*3/MM3 (ref 0–0.2)
BASOPHILS NFR BLD AUTO: 1.4 % (ref 0–1.5)
BILIRUB SERPL-MCNC: 0.6 MG/DL (ref 0.2–1.2)
BUN SERPL-MCNC: 14 MG/DL (ref 6–20)
BUN/CREAT SERPL: 12.1 (ref 7.3–30)
CALCIUM SPEC-SCNC: 10.2 MG/DL (ref 8.5–10.2)
CHLORIDE SERPL-SCNC: 94 MMOL/L (ref 98–107)
CO2 SERPL-SCNC: 27.4 MMOL/L (ref 22–29)
CREAT SERPL-MCNC: 1.16 MG/DL (ref 0.7–1.3)
DEPRECATED RDW RBC AUTO: 43.5 FL (ref 37–54)
EOSINOPHIL # BLD AUTO: 0.09 10*3/MM3 (ref 0–0.4)
EOSINOPHIL NFR BLD AUTO: 1 % (ref 0.3–6.2)
ERYTHROCYTE [DISTWIDTH] IN BLOOD BY AUTOMATED COUNT: 11 % (ref 12.3–15.4)
GFR SERPL CREATININE-BSD FRML MDRD: 64 ML/MIN/1.73
GLOBULIN UR ELPH-MCNC: 2.9 GM/DL (ref 1.8–3.5)
GLUCOSE SERPL-MCNC: 95 MG/DL (ref 74–124)
HCT VFR BLD AUTO: 42.2 % (ref 37.5–51)
HGB BLD-MCNC: 15 G/DL (ref 13–17.7)
IMM GRANULOCYTES # BLD AUTO: 0.05 10*3/MM3 (ref 0–0.05)
IMM GRANULOCYTES NFR BLD AUTO: 0.6 % (ref 0–0.5)
LYMPHOCYTES # BLD AUTO: 1.87 10*3/MM3 (ref 0.7–3.1)
LYMPHOCYTES NFR BLD AUTO: 21.3 % (ref 19.6–45.3)
MCH RBC QN AUTO: 38.8 PG (ref 26.6–33)
MCHC RBC AUTO-ENTMCNC: 35.5 G/DL (ref 31.5–35.7)
MCV RBC AUTO: 109 FL (ref 79–97)
MONOCYTES # BLD AUTO: 0.89 10*3/MM3 (ref 0.1–0.9)
MONOCYTES NFR BLD AUTO: 10.2 % (ref 5–12)
NEUTROPHILS NFR BLD AUTO: 5.74 10*3/MM3 (ref 1.7–7)
NEUTROPHILS NFR BLD AUTO: 65.5 % (ref 42.7–76)
NRBC BLD AUTO-RTO: 0 /100 WBC (ref 0–0.2)
PLATELET # BLD AUTO: 498 10*3/MM3 (ref 140–450)
PMV BLD AUTO: 9.5 FL (ref 6–12)
POTASSIUM SERPL-SCNC: 3.7 MMOL/L (ref 3.5–4.7)
PROT SERPL-MCNC: 7.4 G/DL (ref 6.3–8)
RBC # BLD AUTO: 3.87 10*6/MM3 (ref 4.14–5.8)
SODIUM SERPL-SCNC: 133 MMOL/L (ref 134–145)
WBC # BLD AUTO: 8.76 10*3/MM3 (ref 3.4–10.8)

## 2020-12-22 PROCEDURE — 36415 COLL VENOUS BLD VENIPUNCTURE: CPT

## 2020-12-22 PROCEDURE — 80053 COMPREHEN METABOLIC PANEL: CPT

## 2020-12-22 PROCEDURE — 85025 COMPLETE CBC W/AUTO DIFF WBC: CPT

## 2020-12-22 PROCEDURE — 99214 OFFICE O/P EST MOD 30 MIN: CPT | Performed by: INTERNAL MEDICINE

## 2020-12-22 RX ORDER — HYDROXYUREA 500 MG/1
CAPSULE ORAL
Qty: 90 CAPSULE | Refills: 3 | Status: SHIPPED | OUTPATIENT
Start: 2020-12-22 | End: 2021-04-14 | Stop reason: SDUPTHER

## 2020-12-22 NOTE — PROGRESS NOTES
Subjective     REASON FOR FOLLOW UP:  1.  Polycythemia vera, polycythemia and thrombocytosis and leukocytosis, EPO level 1.3, Tino 2+    HISTORY OF PRESENT ILLNESS:  The patient is a 60 y.o. year old male who is here for an opinion about the above issue.    History of Present Illness    The patient returns the office today, 11/25/2020 for labs and nurse practitioner follow-up.  He currently continues on Hydrea 1000 mg Monday through Friday, 500 mg Saturday and Sunday.  He does continue to have some side effects from Hydrea, most notably fatigue.  He does report he is taking 500 mg in the morning and 500 mg in the evening.  He also reports nausea about an hour after taking his morning dose of Hydrea.  He does feel better on the weekends when taking only 500 mg in the morning.    He denies fevers or chills, signs or symptoms of infection.  He continues on an aspirin 81 mg daily with good tolerance without signs or symptoms of bleeding.  His weight has stabilized.  He continues to have an ECOG performance status of 0.    Interval history: Patient's platelets of 498.  Will increase Hydrea to 1000 mg daily Monday through Saturday and 5 tympanograms every Sunday    Hematological oncologic history:  Patient is a 59-year-old gentleman who has been referred by his primary care Castillo Velasco for evaluation of thrombocytosis and polycythemia.  He has lost more than 50 pounds of weight.  In the last 6 months.  Looking back his blood counts have been high starting in 2016.  Initially his platelets were high around 670 and gradually increased in the 900 range.  More recently his hemoglobin has been increasing and his white count has been increasing.  He has not had a DVT or pulmonary embolism.  He has had no issues with itching with hot showers.  Has not had a stroke.  He had pneumonia last year but none recently.  He does have some shortness of breath but no chest pain.  He is exposed to secondhand smoking.    He does not have  "any nausea vomiting or abdominal pain at present.  He did have gout on several locations and was placed on meloxicam.  His renal function is mildly elevated.  He denies any fever or night sweats.    Patient does have a cough productive of yellow sputum but has no fevers.    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, EPO 1.3 low  CT chest negative, CT abdomen pelvis shows splenomegaly    Past Medical History:   Diagnosis Date   • Anxiety    • Arthritis    • History of thrombocytosis    • Hypertension         Past Surgical History:   Procedure Laterality Date   • CHEST SURGERY     • OTHER SURGICAL HISTORY  1985    \"Exploratory\"        Current Outpatient Medications on File Prior to Visit   Medication Sig Dispense Refill   • allopurinol (ZYLOPRIM) 100 MG tablet Take 1 tablet by mouth 2 (Two) Times a Day. 60 tablet 2   • amLODIPine-benazepril (LOTREL) 5-40 MG per capsule TAKE ONE CAPSULE BY MOUTH DAILY 90 capsule 2   • aspirin 81 MG chewable tablet Chew 81 mg Daily.     • bisoprolol (ZEBeta) 10 MG tablet TAKE ONE TABLET BY MOUTH DAILY 90 tablet 0   • chlorthalidone (HYGROTEN) 50 MG tablet TAKE ONE TABLET BY MOUTH DAILY 90 tablet 0   • clotrimazole-betamethasone (LOTRISONE) 1-0.05 % cream APPLY TO AFFECTED AREA(S) TWO TIMES A DAY 45 each 3   • hydrALAZINE (APRESOLINE) 50 MG tablet TAKE ONE TABLET BY MOUTH TWICE A  tablet 0   • ondansetron (ZOFRAN) 4 MG tablet Take 1 tablet by mouth Every 8 (Eight) Hours As Needed for Nausea or Vomiting. 30 tablet 0   • potassium chloride (K-DUR,KLOR-CON) 20 MEQ CR tablet Take 0.5 tablets by mouth Daily. 30 tablet 4   • zolpidem (AMBIEN) 10 MG tablet Take 1 tablet by mouth At Night As Needed for Sleep. 90 tablet 0   • [DISCONTINUED] hydroxyurea (HYDREA) 500 MG capsule Take 2 tabs (1000 mg) po in the AM then take 1 tab (500 mg) po at night Mon-Fri. Take 2 tabs (1000 mg) twice per day on Saturday and Sunday. 92 capsule 1     No current " facility-administered medications on file prior to visit.         ALLERGIES:    Allergies   Allergen Reactions   • Cephalexin Itching        Social History     Socioeconomic History   • Marital status:      Spouse name: Not on file   • Number of children: Not on file   • Years of education: Not on file   • Highest education level: Not on file   Occupational History   • Occupation:      Employer: RETIRED   Tobacco Use   • Smoking status: Never Smoker   • Smokeless tobacco: Never Used   Substance and Sexual Activity   • Alcohol use: Yes        Family History   Problem Relation Age of Onset   • Hypertension Father    • Hypertension Other    • Heart disease Other    • Cervical cancer Other       I have reviewed the patient's medical history in detail and updated the computerized patient record.    Review of Systems   Constitutional: Positive for fatigue (12/22/20-Unchanged). Negative for appetite change, chills, diaphoresis, fever and unexpected weight change.   HENT: Negative for hearing loss, sore throat and trouble swallowing.    Respiratory: Negative for cough, chest tightness, shortness of breath and wheezing.    Cardiovascular: Negative for chest pain, palpitations and leg swelling.   Gastrointestinal: Positive for nausea (12/22/20-Improved). Negative for abdominal distention, abdominal pain, constipation, diarrhea and vomiting.   Genitourinary: Negative for dysuria, frequency, hematuria and urgency.   Musculoskeletal: Positive for back pain (Low back rt side 12/22/20). Negative for joint swelling.        No muscle weakness.   Skin: Negative for rash and wound.   Neurological: Negative for seizures, syncope, speech difficulty, weakness, numbness and headaches.   Hematological: Negative for adenopathy. Does not bruise/bleed easily.   Psychiatric/Behavioral: Negative for behavioral problems, confusion and suicidal ideas.   All other systems reviewed and are negative.  I have reviewed and confirmed  "the accuracy of the ROS as documented by the MA/LPN/RN Eliana Fleming MD    .        Objective     Vitals:    12/22/20 1446   BP: 125/82   Pulse: 58   Resp: 18   Temp: 98.4 °F (36.9 °C)   TempSrc: Temporal   SpO2: 96%   Weight: 118 kg (260 lb 8 oz)   Height: 188 cm (74.02\")   PainSc:   5   PainLoc: Back     Current Status 12/22/2020   ECOG score 0        PHYSICAL EXAM:  CONSTITUTIONAL:  Vital signs reviewed.    No distress, looks comfortable.  EYES:  Conjunctiva and lids unremarkable.  Extraocular eye movements intact.  RESPIRATORY:  Normal respiratory effort.    CARDIOVASCULAR:    No significant lower extremity edema.  SKIN: No wounds.  No rashes.  MUSCULOSKELETAL/EXTREMITIES: No clubbing or cyanosis.  No apparent unilateral weakness.  NEURO: CN 2-12 appear intact. No focal neurological deficits noted.  PSYCHIATRIC:  Normal judgment and insight.  Normal mood and affect.  Physical exam 11/25/2020  unchanged from previous office visit except as updated.    I have reexamined the patient and the results are consistent with the previously documented exam. Eliana Fleming MD     RECENT LABS:  Results from last 7 days   Lab Units 12/22/20  1443   WBC 10*3/mm3 8.76   NEUTROS ABS 10*3/mm3 5.74   HEMOGLOBIN g/dL 15.0   HEMATOCRIT % 42.2   PLATELETS 10*3/mm3 498*     Results from last 7 days   Lab Units 12/22/20  1443   SODIUM mmol/L 133*   POTASSIUM mmol/L 3.7   CHLORIDE mmol/L 94*   CO2 mmol/L 27.4   BUN mg/dL 14   CREATININE mg/dL 1.16   CALCIUM mg/dL 10.2   ALBUMIN g/dL 4.50   BILIRUBIN mg/dL 0.6   ALK PHOS U/L 55   ALT (SGPT) U/L 33   AST (SGOT) U/L 34   GLUCOSE mg/dL 95           NONCONTRAST CT OF THE NECK, CHEST, ABDOMEN, AND PELVIS 07/13/20     IMPRESSION:  Mildly enlarged spleen. No lymphadenopathy or other evidence  of tumor. 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      Assessment/Plan     1.  Polycythemia with leukocytosis and thrombocytosis.  He does " not have any itching with hot showers.  He does not have any headaches or DVT.  He does have blood counts worsening gradually from 2016.  · 7/8/2020  His platelets 974K.  Hemoglobin is 17.6 with hematocrit of 52.8 and white count of 11.56.  He denies fever night sweats but has significant weight loss greater than 50 pounds in 6 months.  He does have a cough which is productive of yellow sputum and some shortness of breath.  He has history of secondhand smoking exposure.  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  CT chest negative  · Hydroxyurea 500 mg twice daily was initiated on July 16, 2020.  .  · 7/22/2020 Hydrea increased to 1500 mg (1000 mg in am and 500 mg in pm) Monday-Friday and 2000 mg (1000 mg in am and 1000 mg in PM) on Saturdays and Sundays  · 7/29/2020 patient's platelet count today is 821.  White count 7.78, hemoglobin 16.4.   Increased Hydrea at 1500 mg Monday through Friday, and 2000 mg on Saturdays and Sundays.   · In future if hematocrit greater than 48 then he will receive phlebotomy.  · 9/2/2020 hematocrit 44.5.  Currently on Hydrea 1500 mg daily.  WBC 8.74, hemoglobin 15.8, platelets 386.  decreased the Hydrea to 1000 mg Monday through Saturday, and 1500 mg on Sundays.    · October 26, 2020: Platelets dropped to 276.  Hydrea decreased to 1000 mg Monday through Friday, 500 mg Saturday and Sunday.  · 11/25/2020, platelets of 500, continue current dosing of Hydrea.  Currently taking 500 mg twice daily, we discussed taking 1000 mg at night due to ongoing fatigue and nausea, continue 500 mg nightly Saturday and Sunday.  · Platelets 498 today    2.  Weight loss greater than 50 pounds in last 6 months.  We will also need to make sure there is no evidence of splenomegaly  · Reviewed CT chest abdomen pelvis negative for malignancy  · The patient admits that his weight loss has actually been more  intentional recently  · No weight loss but weight gain    3.  Cough productive of yellow sputum and some shortness of breath, patient has history of exposure to secondhand smoking and had pneumonia in the past.  He does not show evidence of fever.  · No complaints of cough today    4.  Polycythemia vera: Monthly phlebotomies for hematocrit greater than or equal to 48%.  Last phlebotomy 7/15/2020.       Plan  1. Hydroxyurea 1000 mg Monday through Saturday and 500 mg every Sunday.    2. No phlebotomy indicated today  3. Tinea baby aspirin  4. Follow-up once a month with CBC nurse review time 6 months  5. Nurse practitioner in 3 months and follow-up with me in 6 months.      Eliana Fleming MD

## 2020-12-23 ENCOUNTER — MEDICATION THERAPY MANAGEMENT (OUTPATIENT)
Dept: PHARMACY | Facility: HOSPITAL | Age: 60
End: 2020-12-23

## 2020-12-23 NOTE — PROGRESS NOTES
[facility] Lab Review- Hydroxyurea      12/22/2020   WBC 3.40 - 10.80 10*3/mm3 8.76   Neutrophils Absolute 1.70 - 7.00 10*3/mm3 5.74   Hemoglobin 13.0 - 17.7 g/dL 15.0   Hematocrit 37.5 - 51.0 % 42.2   Platelets 140 - 450 10*3/mm3 498 (A)   Creatinine 0.70 - 1.30 mg/dL 1.16   eGFR Non African Am >60 mL/min/1.73 64   BUN 6 - 20 mg/dL 14   Sodium 134 - 145 mmol/L 133 (A)   Potassium 3.5 - 4.7 mmol/L 3.7   Glucose 74 - 124 mg/dL 95   Calcium 8.5 - 10.2 mg/dL 10.2   Albumin 3.50 - 5.20 g/dL 4.50   Total Protein 6.3 - 8.0 g/dL 7.4   AST (SGOT) 0 - 40 U/L 34   ALT (SGPT) 0 - 41 U/L 33   Alkaline Phosphatase 38 - 116 U/L 55   Total Bilirubin 0.2 - 1.2 mg/dL 0.6     MD dictation noted; hydroxyurea dose increased. Pharmacy will continue to follow.     Thanks,   Amanda Morrison, PharmD

## 2021-01-19 ENCOUNTER — TELEPHONE (OUTPATIENT)
Dept: ONCOLOGY | Facility: CLINIC | Age: 61
End: 2021-01-19

## 2021-01-19 ENCOUNTER — LAB (OUTPATIENT)
Dept: LAB | Facility: HOSPITAL | Age: 61
End: 2021-01-19

## 2021-01-19 ENCOUNTER — CLINICAL SUPPORT (OUTPATIENT)
Dept: ONCOLOGY | Facility: HOSPITAL | Age: 61
End: 2021-01-19

## 2021-01-19 DIAGNOSIS — D45 POLYCYTHEMIA VERA (HCC): ICD-10-CM

## 2021-01-19 LAB
BASOPHILS # BLD AUTO: 0.14 10*3/MM3 (ref 0–0.2)
BASOPHILS NFR BLD AUTO: 1.7 % (ref 0–1.5)
DEPRECATED RDW RBC AUTO: 43.5 FL (ref 37–54)
EOSINOPHIL # BLD AUTO: 0.18 10*3/MM3 (ref 0–0.4)
EOSINOPHIL NFR BLD AUTO: 2.2 % (ref 0.3–6.2)
ERYTHROCYTE [DISTWIDTH] IN BLOOD BY AUTOMATED COUNT: 11.2 % (ref 12.3–15.4)
HCT VFR BLD AUTO: 44 % (ref 37.5–51)
HGB BLD-MCNC: 15.4 G/DL (ref 13–17.7)
IMM GRANULOCYTES # BLD AUTO: 0.03 10*3/MM3 (ref 0–0.05)
IMM GRANULOCYTES NFR BLD AUTO: 0.4 % (ref 0–0.5)
LYMPHOCYTES # BLD AUTO: 1.91 10*3/MM3 (ref 0.7–3.1)
LYMPHOCYTES NFR BLD AUTO: 23.6 % (ref 19.6–45.3)
MCH RBC QN AUTO: 36.8 PG (ref 26.6–33)
MCHC RBC AUTO-ENTMCNC: 35 G/DL (ref 31.5–35.7)
MCV RBC AUTO: 105.3 FL (ref 79–97)
MONOCYTES # BLD AUTO: 1.06 10*3/MM3 (ref 0.1–0.9)
MONOCYTES NFR BLD AUTO: 13.1 % (ref 5–12)
NEUTROPHILS NFR BLD AUTO: 4.79 10*3/MM3 (ref 1.7–7)
NEUTROPHILS NFR BLD AUTO: 59 % (ref 42.7–76)
NRBC BLD AUTO-RTO: 0 /100 WBC (ref 0–0.2)
PLATELET # BLD AUTO: 596 10*3/MM3 (ref 140–450)
PMV BLD AUTO: 9.7 FL (ref 6–12)
RBC # BLD AUTO: 4.18 10*6/MM3 (ref 4.14–5.8)
WBC # BLD AUTO: 8.11 10*3/MM3 (ref 3.4–10.8)

## 2021-01-19 PROCEDURE — G0463 HOSPITAL OUTPT CLINIC VISIT: HCPCS

## 2021-01-19 PROCEDURE — 85025 COMPLETE CBC W/AUTO DIFF WBC: CPT

## 2021-01-19 PROCEDURE — 36415 COLL VENOUS BLD VENIPUNCTURE: CPT

## 2021-01-19 NOTE — TELEPHONE ENCOUNTER
Call to Eva Sabiha, after reviewing lab work with Dr. Fleming, to let him know to take Hydrea 1000 mg every day.  Patient verbalized understanding. Medication list updated.

## 2021-01-19 NOTE — PROGRESS NOTES
Pt here for RN review. Platelets have increased since last visit.  Pt reports taking hydrea 1000 mg M-Sat and 500 mg on Sunday as prescribed. Pt has no complaints. Sent Cecilia Graves a message to have Dr. OSORIO review counts. Provided copy of labs.    Lab Results   Component Value Date    WBC 8.11 01/19/2021    HGB 15.4 01/19/2021    HCT 44.0 01/19/2021    .3 (H) 01/19/2021     (H) 01/19/2021

## 2021-01-27 DIAGNOSIS — E79.0 ELEVATED URIC ACID IN BLOOD: ICD-10-CM

## 2021-01-27 DIAGNOSIS — D45 POLYCYTHEMIA VERA (HCC): ICD-10-CM

## 2021-01-27 DIAGNOSIS — D75.839 THROMBOCYTOSIS: ICD-10-CM

## 2021-01-27 RX ORDER — ALLOPURINOL 100 MG/1
TABLET ORAL
Qty: 180 TABLET | Refills: 0 | Status: SHIPPED | OUTPATIENT
Start: 2021-01-27 | End: 2021-04-26

## 2021-02-04 ENCOUNTER — MEDICATION THERAPY MANAGEMENT (OUTPATIENT)
Dept: PHARMACY | Facility: HOSPITAL | Age: 61
End: 2021-02-04

## 2021-02-04 NOTE — PROGRESS NOTES
MTM telephone follow up- hydroxyurea    No answer this morning.  direct line [phone].     Thanks,   Nuno JensenD

## 2021-02-04 NOTE — PROGRESS NOTES
MT telephone follow up- Hydroxyurea    Raj is doing okay today. He reports some nausea with his medication; however he has started to feel better the past couple days. He takes 1000 mg every night and reports no missed doses. He currently uses a medication organizer to help him keep track. He denies any recent medication or supplement changes. Pharmacy will continue to follow.     Thanks,   Amanda Morrison, PharmD

## 2021-02-15 ENCOUNTER — TELEPHONE (OUTPATIENT)
Dept: ONCOLOGY | Facility: CLINIC | Age: 61
End: 2021-02-15

## 2021-02-15 NOTE — TELEPHONE ENCOUNTER
Caller: maynor    Relationship to patient: self    Best call back number: [phone]    Pt would like to resched his lab and rn review appt on 2/16/21. Pt prefers the [facility] [facility] on the week of 2/22/21. Unable to resched active treatment pt.

## 2021-02-16 ENCOUNTER — APPOINTMENT (OUTPATIENT)
Dept: LAB | Facility: HOSPITAL | Age: 61
End: 2021-02-16

## 2021-02-16 ENCOUNTER — APPOINTMENT (OUTPATIENT)
Dept: ONCOLOGY | Facility: HOSPITAL | Age: 61
End: 2021-02-16

## 2021-02-23 ENCOUNTER — LAB (OUTPATIENT)
Dept: LAB | Facility: HOSPITAL | Age: 61
End: 2021-02-23

## 2021-02-23 ENCOUNTER — CLINICAL SUPPORT (OUTPATIENT)
Dept: ONCOLOGY | Facility: HOSPITAL | Age: 61
End: 2021-02-23

## 2021-02-23 DIAGNOSIS — D45 POLYCYTHEMIA VERA (HCC): ICD-10-CM

## 2021-02-23 LAB
BASOPHILS # BLD AUTO: 0.18 10*3/MM3 (ref 0–0.2)
BASOPHILS NFR BLD AUTO: 2 % (ref 0–1.5)
DEPRECATED RDW RBC AUTO: 43.8 FL (ref 37–54)
EOSINOPHIL # BLD AUTO: 0.19 10*3/MM3 (ref 0–0.4)
EOSINOPHIL NFR BLD AUTO: 2.1 % (ref 0.3–6.2)
ERYTHROCYTE [DISTWIDTH] IN BLOOD BY AUTOMATED COUNT: 11.9 % (ref 12.3–15.4)
HCT VFR BLD AUTO: 45.2 % (ref 37.5–51)
HGB BLD-MCNC: 16.1 G/DL (ref 13–17.7)
IMM GRANULOCYTES # BLD AUTO: 0.06 10*3/MM3 (ref 0–0.05)
IMM GRANULOCYTES NFR BLD AUTO: 0.7 % (ref 0–0.5)
LYMPHOCYTES # BLD AUTO: 2.29 10*3/MM3 (ref 0.7–3.1)
LYMPHOCYTES NFR BLD AUTO: 24.9 % (ref 19.6–45.3)
MCH RBC QN AUTO: 35.6 PG (ref 26.6–33)
MCHC RBC AUTO-ENTMCNC: 35.6 G/DL (ref 31.5–35.7)
MCV RBC AUTO: 100 FL (ref 79–97)
MONOCYTES # BLD AUTO: 1.21 10*3/MM3 (ref 0.1–0.9)
MONOCYTES NFR BLD AUTO: 13.1 % (ref 5–12)
NEUTROPHILS NFR BLD AUTO: 5.28 10*3/MM3 (ref 1.7–7)
NEUTROPHILS NFR BLD AUTO: 57.2 % (ref 42.7–76)
NRBC BLD AUTO-RTO: 0 /100 WBC (ref 0–0.2)
PLATELET # BLD AUTO: 607 10*3/MM3 (ref 140–450)
PMV BLD AUTO: 9.4 FL (ref 6–12)
RBC # BLD AUTO: 4.52 10*6/MM3 (ref 4.14–5.8)
WBC # BLD AUTO: 9.21 10*3/MM3 (ref 3.4–10.8)

## 2021-02-23 PROCEDURE — 85025 COMPLETE CBC W/AUTO DIFF WBC: CPT

## 2021-02-23 PROCEDURE — 36415 COLL VENOUS BLD VENIPUNCTURE: CPT

## 2021-02-23 PROCEDURE — G0463 HOSPITAL OUTPT CLINIC VISIT: HCPCS

## 2021-02-23 NOTE — PROGRESS NOTES
Pt here for RN review. Copy of labs given to pt. Counts stable. Continues taking hydrea 1000 mg daily with no c/o. Pt will return march 16th to see NP. Advised pt to call with any concerns.     Lab Results   Component Value Date    WBC 9.21 02/23/2021    HGB 16.1 02/23/2021    HCT 45.2 02/23/2021    .0 (H) 02/23/2021     (H) 02/23/2021

## 2021-03-15 RX ORDER — CHLORTHALIDONE 50 MG/1
TABLET ORAL
Qty: 90 TABLET | Refills: 3 | Status: SHIPPED | OUTPATIENT
Start: 2021-03-15 | End: 2022-02-01 | Stop reason: SDUPTHER

## 2021-03-15 RX ORDER — BISOPROLOL FUMARATE 10 MG/1
TABLET, FILM COATED ORAL
Qty: 90 TABLET | Refills: 3 | Status: SHIPPED | OUTPATIENT
Start: 2021-03-15 | End: 2022-02-01 | Stop reason: SDUPTHER

## 2021-03-15 RX ORDER — HYDRALAZINE HYDROCHLORIDE 50 MG/1
TABLET, FILM COATED ORAL
Qty: 180 TABLET | Refills: 3 | Status: SHIPPED | OUTPATIENT
Start: 2021-03-15 | End: 2022-02-01 | Stop reason: SDUPTHER

## 2021-03-16 ENCOUNTER — OFFICE VISIT (OUTPATIENT)
Dept: ONCOLOGY | Facility: CLINIC | Age: 61
End: 2021-03-16

## 2021-03-16 ENCOUNTER — LAB (OUTPATIENT)
Dept: LAB | Facility: HOSPITAL | Age: 61
End: 2021-03-16

## 2021-03-16 VITALS
RESPIRATION RATE: 19 BRPM | BODY MASS INDEX: 34.46 KG/M2 | DIASTOLIC BLOOD PRESSURE: 82 MMHG | HEART RATE: 64 BPM | SYSTOLIC BLOOD PRESSURE: 119 MMHG | WEIGHT: 268.5 LBS | OXYGEN SATURATION: 96 % | TEMPERATURE: 97.7 F | HEIGHT: 74 IN

## 2021-03-16 DIAGNOSIS — Z79.899 HIGH RISK MEDICATION USE: ICD-10-CM

## 2021-03-16 DIAGNOSIS — D75.839 THROMBOCYTOSIS: ICD-10-CM

## 2021-03-16 DIAGNOSIS — D45 POLYCYTHEMIA VERA (HCC): ICD-10-CM

## 2021-03-16 DIAGNOSIS — D45 POLYCYTHEMIA VERA (HCC): Primary | ICD-10-CM

## 2021-03-16 LAB
BASOPHILS # BLD AUTO: 0.16 10*3/MM3 (ref 0–0.2)
BASOPHILS NFR BLD AUTO: 1.8 % (ref 0–1.5)
DEPRECATED RDW RBC AUTO: 45.4 FL (ref 37–54)
EOSINOPHIL # BLD AUTO: 0.19 10*3/MM3 (ref 0–0.4)
EOSINOPHIL NFR BLD AUTO: 2.1 % (ref 0.3–6.2)
ERYTHROCYTE [DISTWIDTH] IN BLOOD BY AUTOMATED COUNT: 12.6 % (ref 12.3–15.4)
HCT VFR BLD AUTO: 46.2 % (ref 37.5–51)
HGB BLD-MCNC: 16.9 G/DL (ref 13–17.7)
IMM GRANULOCYTES # BLD AUTO: 0.09 10*3/MM3 (ref 0–0.05)
IMM GRANULOCYTES NFR BLD AUTO: 1 % (ref 0–0.5)
LYMPHOCYTES # BLD AUTO: 2.17 10*3/MM3 (ref 0.7–3.1)
LYMPHOCYTES NFR BLD AUTO: 23.7 % (ref 19.6–45.3)
MCH RBC QN AUTO: 36 PG (ref 26.6–33)
MCHC RBC AUTO-ENTMCNC: 36.6 G/DL (ref 31.5–35.7)
MCV RBC AUTO: 98.5 FL (ref 79–97)
MONOCYTES # BLD AUTO: 1.02 10*3/MM3 (ref 0.1–0.9)
MONOCYTES NFR BLD AUTO: 11.2 % (ref 5–12)
NEUTROPHILS NFR BLD AUTO: 5.51 10*3/MM3 (ref 1.7–7)
NEUTROPHILS NFR BLD AUTO: 60.2 % (ref 42.7–76)
NRBC BLD AUTO-RTO: 0 /100 WBC (ref 0–0.2)
PLATELET # BLD AUTO: 547 10*3/MM3 (ref 140–450)
PMV BLD AUTO: 9.7 FL (ref 6–12)
RBC # BLD AUTO: 4.69 10*6/MM3 (ref 4.14–5.8)
WBC # BLD AUTO: 9.14 10*3/MM3 (ref 3.4–10.8)

## 2021-03-16 PROCEDURE — 36415 COLL VENOUS BLD VENIPUNCTURE: CPT

## 2021-03-16 PROCEDURE — 99214 OFFICE O/P EST MOD 30 MIN: CPT | Performed by: NURSE PRACTITIONER

## 2021-03-16 PROCEDURE — 85025 COMPLETE CBC W/AUTO DIFF WBC: CPT

## 2021-03-16 NOTE — PROGRESS NOTES
Subjective     REASON FOR FOLLOW UP:  1.  Polycythemia vera, polycythemia and thrombocytosis and leukocytosis, EPO level 1.3, Tino 2+    HISTORY OF PRESENT ILLNESS:  The patient is a 60 y.o. year old male who is here for an opinion about the above issue.    History of Present Illness patient is a 60-year-old male with the above-mentioned history who is here today for lab review and evaluation continuing on Hydrea.  He takes 1000 mg of Hydrea nightly.  He takes it at night due to issues with side effects of fatigue and nausea.  He states he feels that he tolerates it better taking it at bedtime.    He has noticed occasional cough when he lies down at night.  Patient denies any reflux symptoms, although he does have some allergy symptoms.  We discussed adding an antihistamine, but the patient states that it is tolerable at this time.  Otherwise, denies issues with shortness of breath.  He reports normal bowel function.  No fevers or chills.        Hematological oncologic history:  Patient is a 59-year-old gentleman who has been referred by his primary care Castillo Velasco for evaluation of thrombocytosis and polycythemia.  He has lost more than 50 pounds of weight.  In the last 6 months.  Looking back his blood counts have been high starting in 2016.  Initially his platelets were high around 670 and gradually increased in the 900 range.  More recently his hemoglobin has been increasing and his white count has been increasing.  He has not had a DVT or pulmonary embolism.  He has had no issues with itching with hot showers.  Has not had a stroke.  He had pneumonia last year but none recently.  He does have some shortness of breath but no chest pain.  He is exposed to secondhand smoking.    He does not have any nausea vomiting or abdominal pain at present.  He did have gout on several locations and was placed on meloxicam.  His renal function is mildly elevated.  He denies any fever or night sweats.    Patient does have a  "cough productive of yellow sputum but has no fevers.    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, EPO 1.3 low  CT chest negative, CT abdomen pelvis shows splenomegaly    Past Medical History:   Diagnosis Date   • Anxiety    • Arthritis    • History of thrombocytosis    • Hypertension         Past Surgical History:   Procedure Laterality Date   • CHEST SURGERY     • OTHER SURGICAL HISTORY  1985    \"Exploratory\"        Current Outpatient Medications on File Prior to Visit   Medication Sig Dispense Refill   • allopurinol (ZYLOPRIM) 100 MG tablet TAKE ONE TABLET BY MOUTH TWICE A  tablet 0   • amLODIPine-benazepril (LOTREL) 5-40 MG per capsule TAKE ONE CAPSULE BY MOUTH DAILY 90 capsule 2   • aspirin 81 MG chewable tablet Chew 81 mg Daily.     • bisoprolol (ZEBeta) 10 MG tablet TAKE ONE TABLET BY MOUTH DAILY 90 tablet 3   • chlorthalidone (HYGROTEN) 50 MG tablet TAKE ONE TABLET BY MOUTH DAILY 90 tablet 3   • clotrimazole-betamethasone (LOTRISONE) 1-0.05 % cream APPLY TO AFFECTED AREA(S) TWO TIMES A DAY 45 each 3   • hydrALAZINE (APRESOLINE) 50 MG tablet TAKE ONE TABLET BY MOUTH TWICE A  tablet 3   • hydroxyurea (HYDREA) 500 MG capsule Take 2 tabs (1000 mg) po q hs mon through Saturday and then one po every sunday (Patient taking differently: Take 2 tabs (1000 mg) po every day) 90 capsule 3   • ondansetron (ZOFRAN) 4 MG tablet Take 1 tablet by mouth Every 8 (Eight) Hours As Needed for Nausea or Vomiting. 30 tablet 0   • potassium chloride (K-DUR,KLOR-CON) 20 MEQ CR tablet Take 0.5 tablets by mouth Daily. 30 tablet 4   • zolpidem (AMBIEN) 10 MG tablet Take 1 tablet by mouth At Night As Needed for Sleep. 90 tablet 0     No current facility-administered medications on file prior to visit.        ALLERGIES:    Allergies   Allergen Reactions   • Cephalexin Itching        Social History     Socioeconomic History   • Marital status:      Spouse name: Not on " "file   • Number of children: Not on file   • Years of education: Not on file   • Highest education level: Not on file   Tobacco Use   • Smoking status: Never Smoker   • Smokeless tobacco: Never Used   Substance and Sexual Activity   • Alcohol use: Yes        Family History   Problem Relation Age of Onset   • Hypertension Father    • Hypertension Other    • Heart disease Other    • Cervical cancer Other       I have reviewed the patient's medical history in detail and updated the computerized patient record.    Review of Systems   Constitutional: Negative for activity change, appetite change, chills, fatigue and fever.   HENT: Negative for mouth sores, nosebleeds and trouble swallowing.    Respiratory: Negative for cough and shortness of breath.    Cardiovascular: Negative for chest pain and leg swelling.   Gastrointestinal: Negative for abdominal pain, constipation, diarrhea, nausea and vomiting.   Genitourinary: Negative for difficulty urinating.   Skin: Negative for rash.   Neurological: Negative for dizziness, weakness and numbness.   Hematological: Negative for adenopathy. Does not bruise/bleed easily.   Psychiatric/Behavioral: Negative for sleep disturbance.       Objective     Vitals:    03/16/21 1435   BP: 119/82   Pulse: 64   Resp: 19   Temp: 97.7 °F (36.5 °C)   TempSrc: Temporal   SpO2: 96%   Weight: 122 kg (268 lb 8 oz)   Height: 188 cm (74.02\")   PainSc: 0-No pain     Current Status 3/16/2021   ECOG score 0        Physical Exam  Vitals reviewed.   Constitutional:       General: He is not in acute distress.     Appearance: Normal appearance. He is well-developed.   HENT:      Head: Normocephalic and atraumatic.      Mouth/Throat:      Pharynx: No oropharyngeal exudate.   Eyes:      Pupils: Pupils are equal, round, and reactive to light.   Cardiovascular:      Rate and Rhythm: Normal rate and regular rhythm.      Heart sounds: Normal heart sounds. No murmur.   Pulmonary:      Effort: Pulmonary effort is " normal. No respiratory distress.      Breath sounds: Normal breath sounds. No wheezing, rhonchi or rales.   Abdominal:      General: Bowel sounds are normal. There is no distension.      Palpations: Abdomen is soft.   Musculoskeletal:         General: Normal range of motion.      Cervical back: Normal range of motion.   Skin:     General: Skin is warm and dry.      Findings: No rash.   Neurological:      Mental Status: He is alert and oriented to person, place, and time.       RECENT LABS:  Results from last 7 days   Lab Units 03/16/21  1420   WBC 10*3/mm3 9.14   NEUTROS ABS 10*3/mm3 5.51   HEMOGLOBIN g/dL 16.9   HEMATOCRIT % 46.2   PLATELETS 10*3/mm3 547*             Patient screened negative for depression based on a PHQ-9 score of 1 on 3/16/2021.         Assessment/Plan     1.  Polycythemia with leukocytosis and thrombocytosis.  He does not have any itching with hot showers.  He does not have any headaches or DVT.  He does have blood counts worsening gradually from 2016.  · 7/8/2020  His platelets 974K.  Hemoglobin is 17.6 with hematocrit of 52.8 and white count of 11.56.  He denies fever night sweats but has significant weight loss greater than 50 pounds in 6 months.  He does have a cough which is productive of yellow sputum and some shortness of breath.  He has history of secondhand smoking exposure.  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  CT chest negative  · Hydroxyurea 500 mg twice daily was initiated on July 16, 2020.  .  · 7/22/2020 Hydrea increased to 1500 mg (1000 mg in am and 500 mg in pm) Monday-Friday and 2000 mg (1000 mg in am and 1000 mg in PM) on Saturdays and Sundays  · 7/29/2020 patient's platelet count today is 821.  White count 7.78, hemoglobin 16.4.   Increased Hydrea at 1500 mg Monday through Friday, and 2000 mg on Saturdays and Sundays.   · In future if hematocrit greater than 48 then  he will receive phlebotomy.  · 9/2/2020 hematocrit 44.5.  Currently on Hydrea 1500 mg daily.  WBC 8.74, hemoglobin 15.8, platelets 386.  decreased the Hydrea to 1000 mg Monday through Saturday, and 1500 mg on Sundays.    · October 26, 2020: Platelets dropped to 276.  Hydrea decreased to 1000 mg Monday through Friday, 500 mg Saturday and Sunday.  · 11/25/2020, platelets of 500, continue current dosing of Hydrea.  Currently taking 500 mg twice daily, we discussed taking 1000 mg at night due to ongoing fatigue and nausea, continue 500 mg nightly Saturday and Sunday.  · 1/19/2021 Platelets increasing- Hydrea increased to 1000 mg nightly.  · 3/16/2021 Platelets 547.  Reviewed with Dr. Fleming, we will increase Hydrea to 1500 mg on Sunday, and 1000 mg all other days.    2.  Weight loss greater than 50 pounds in last 6 months.  We will also need to make sure there is no evidence of splenomegaly  · Reviewed CT chest abdomen pelvis negative for malignancy  · The patient admits that his weight loss has actually been more intentional recently  · No weight loss but weight gain    3.  Cough productive of yellow sputum and some shortness of breath, patient has history of exposure to secondhand smoking and had pneumonia in the past.  He does not show evidence of fever.  · No complaints of cough today    4.  Polycythemia vera: Monthly phlebotomies for hematocrit greater than or equal to 48%.  Last phlebotomy 7/15/2020.   · 3/16/2021 Hct 46.2%, no phlebotomy indicated.      Plan  1. Increase Hydrea to 1000 mg Monday through Saturday, and 1500 mg every Sunday.    2. Return for monthly CBC with RN review.  3. Return for follow-up visit with Dr. Fleming in 3 months with repeat labs.    4. No phlebotomy indicated today, as hematocrit less than 48.  5. Continue ASA 81 mg daily.      AG Choudhary

## 2021-03-17 ENCOUNTER — MEDICATION THERAPY MANAGEMENT (OUTPATIENT)
Dept: PHARMACY | Facility: HOSPITAL | Age: 61
End: 2021-03-17

## 2021-03-17 NOTE — PROGRESS NOTES
[facility] Lab Review- Hydroxyurea      3/16/2021   WBC 3.40 - 10.80 10*3/mm3 9.14   Neutrophils Absolute 1.70 - 7.00 10*3/mm3 5.51   Hemoglobin 13.0 - 17.7 g/dL 16.9   Hematocrit 37.5 - 51.0 % 46.2   Platelets 140 - 450 10*3/mm3 547 (A)     APRN dictation noted; hydroxyurea dose increased. Pharmacy will continue to follow.     Thanks,   Amanda Morrison, PharmD

## 2021-03-24 ENCOUNTER — OFFICE VISIT (OUTPATIENT)
Dept: FAMILY MEDICINE CLINIC | Facility: CLINIC | Age: 61
End: 2021-03-24

## 2021-03-24 VITALS
OXYGEN SATURATION: 98 % | SYSTOLIC BLOOD PRESSURE: 134 MMHG | HEART RATE: 70 BPM | HEIGHT: 74 IN | WEIGHT: 271 LBS | BODY MASS INDEX: 34.78 KG/M2 | TEMPERATURE: 97.5 F | DIASTOLIC BLOOD PRESSURE: 92 MMHG

## 2021-03-24 DIAGNOSIS — M25.562 ACUTE PAIN OF LEFT KNEE: Primary | ICD-10-CM

## 2021-03-24 PROCEDURE — 20610 DRAIN/INJ JOINT/BURSA W/O US: CPT | Performed by: NURSE PRACTITIONER

## 2021-03-24 RX ORDER — TRIAMCINOLONE ACETONIDE 40 MG/ML
40 INJECTION, SUSPENSION INTRA-ARTICULAR; INTRAMUSCULAR
Status: COMPLETED | OUTPATIENT
Start: 2021-03-24 | End: 2021-03-24

## 2021-03-24 RX ADMIN — TRIAMCINOLONE ACETONIDE 40 MG: 40 INJECTION, SUSPENSION INTRA-ARTICULAR; INTRAMUSCULAR at 13:22

## 2021-03-24 NOTE — PROGRESS NOTES
"Chief Complaint  Knee Pain (Patient is wanting an injection in his left knee ( wore mask and goggles) )    Subjective          Phani Landis presents to [facility]  History of Present Illness  Patient presented to the office today get an injection in his left knee.  The last injection was in November 2020 he did tolerate it well and it lasted for quite some time.    Objective   Vital Signs:   /92   Pulse 70   Temp 97.5 °F (36.4 °C)   Ht 188 cm (74.02\")   Wt 123 kg (271 lb)   SpO2 98%   BMI 34.78 kg/m²     Physical Exam  Vitals reviewed.   Constitutional:       General: He is not in acute distress.     Appearance: He is well-developed.   HENT:      Head: Normocephalic.   Cardiovascular:      Rate and Rhythm: Normal rate and regular rhythm.      Heart sounds: Normal heart sounds.   Pulmonary:      Effort: Pulmonary effort is normal.      Breath sounds: Normal breath sounds.   Neurological:      Mental Status: He is alert and oriented to person, place, and time.      Gait: Gait normal.   Psychiatric:         Behavior: Behavior normal.         Thought Content: Thought content normal.         Judgment: Judgment normal.        Result Review :          Arthrocentesis    Date/Time: 3/24/2021 1:22 PM  Performed by: Castillo Velasco APRN  Authorized by: Castillo Velasco APRN   Indications: pain   Body area: knee  Joint: left knee  Local anesthesia used: no    Anesthesia:  Local anesthesia used: no    Sedation:  Patient sedated: no    Needle size: 20 G  Ultrasound guidance: no  Approach: lateral  Patient tolerance: patient tolerated the procedure well with no immediate complications            Assessment and Plan    Diagnoses and all orders for this visit:    1. Acute pain of left knee (Primary)  -     Arthrocentesis        Follow Up   No follow-ups on file.  Patient was given instructions and counseling regarding his condition or for health maintenance advice. Please see specific " information pulled into the AVS if appropriate.     Ice to knee return to the office as needed

## 2021-04-13 ENCOUNTER — CLINICAL SUPPORT (OUTPATIENT)
Dept: ONCOLOGY | Facility: HOSPITAL | Age: 61
End: 2021-04-13

## 2021-04-13 ENCOUNTER — LAB (OUTPATIENT)
Dept: LAB | Facility: HOSPITAL | Age: 61
End: 2021-04-13

## 2021-04-13 DIAGNOSIS — D45 POLYCYTHEMIA VERA (HCC): ICD-10-CM

## 2021-04-13 LAB
BASOPHILS # BLD AUTO: 0.14 10*3/MM3 (ref 0–0.2)
BASOPHILS NFR BLD AUTO: 1.9 % (ref 0–1.5)
DEPRECATED RDW RBC AUTO: 50.3 FL (ref 37–54)
EOSINOPHIL # BLD AUTO: 0.13 10*3/MM3 (ref 0–0.4)
EOSINOPHIL NFR BLD AUTO: 1.7 % (ref 0.3–6.2)
ERYTHROCYTE [DISTWIDTH] IN BLOOD BY AUTOMATED COUNT: 13.7 % (ref 12.3–15.4)
HCT VFR BLD AUTO: 43.2 % (ref 37.5–51)
HGB BLD-MCNC: 15.2 G/DL (ref 13–17.7)
IMM GRANULOCYTES # BLD AUTO: 0.05 10*3/MM3 (ref 0–0.05)
IMM GRANULOCYTES NFR BLD AUTO: 0.7 % (ref 0–0.5)
LYMPHOCYTES # BLD AUTO: 1.97 10*3/MM3 (ref 0.7–3.1)
LYMPHOCYTES NFR BLD AUTO: 26.4 % (ref 19.6–45.3)
MCH RBC QN AUTO: 35.3 PG (ref 26.6–33)
MCHC RBC AUTO-ENTMCNC: 35.2 G/DL (ref 31.5–35.7)
MCV RBC AUTO: 100.2 FL (ref 79–97)
MONOCYTES # BLD AUTO: 0.95 10*3/MM3 (ref 0.1–0.9)
MONOCYTES NFR BLD AUTO: 12.7 % (ref 5–12)
NEUTROPHILS NFR BLD AUTO: 4.22 10*3/MM3 (ref 1.7–7)
NEUTROPHILS NFR BLD AUTO: 56.6 % (ref 42.7–76)
NRBC BLD AUTO-RTO: 0 /100 WBC (ref 0–0.2)
PLATELET # BLD AUTO: 586 10*3/MM3 (ref 140–450)
PMV BLD AUTO: 9.6 FL (ref 6–12)
RBC # BLD AUTO: 4.31 10*6/MM3 (ref 4.14–5.8)
WBC # BLD AUTO: 7.46 10*3/MM3 (ref 3.4–10.8)

## 2021-04-13 PROCEDURE — 36415 COLL VENOUS BLD VENIPUNCTURE: CPT

## 2021-04-13 PROCEDURE — 85025 COMPLETE CBC W/AUTO DIFF WBC: CPT

## 2021-04-13 NOTE — NURSING NOTE
Lab Results   Component Value Date    WBC 7.46 04/13/2021    HGB 15.2 04/13/2021    HCT 43.2 04/13/2021    .2 (H) 04/13/2021     (H) 04/13/2021     Patients platelet count has raised since last visit. At that last visit his hydrea dose was increased to 1500mg sun and 1000mg all other days. Reviewed with Beti MCKEON who asked me to have  clinic nurse review with her next time they talk as MD is on vacation. msg was sent to Sheila CASAS to ask  if hydrea dose needed to be increased again or not. She or I will call pt with 's response.

## 2021-04-14 ENCOUNTER — TELEPHONE (OUTPATIENT)
Dept: ONCOLOGY | Facility: CLINIC | Age: 61
End: 2021-04-14

## 2021-04-14 RX ORDER — HYDROXYUREA 500 MG/1
CAPSULE ORAL
Qty: 90 CAPSULE | Refills: 3 | Status: SHIPPED | OUTPATIENT
Start: 2021-04-14 | End: 2021-11-12

## 2021-04-14 NOTE — TELEPHONE ENCOUNTER
I called patient to notify him to increase dose of Hydrea to 1500 mg on Saturday and Sunday, and 1000 mg Monday through Friday.  I have sent a new prescription to his pharmacy.  He is already scheduled for repeat CBC with RN review in May.  Patient verbalized understanding.    AG Alicia    ----- Message from Eliana Fleming MD sent at 4/14/2021 10:43 AM EDT -----  We can increase the hydrea to 1500 mgs po every Saturday and Sunday and leave it to 1000mgs po rest of week.    Roselyn  Can you please make sure this patient knows about that as Cecilia is on vacation the next 3 days.  Make sure his labs are checked again in a month and also that he has an appointment with me in 2 months or so.  Thanks    Eliana Fleming MD     ----- Message -----  From: Cecilia Griffiht RN  Sent: 4/13/2021   1:50 PM EDT  To: MD Dr. Bernadette Mathew,  Could you review note below and give advice.    Thank youCecilia  ----- Message -----  From: Roxy Clifford RN  Sent: 4/13/2021   1:24 PM EDT  To: Cecilia Griffith RN    Hey this patient was in for lab review today and I went to Roselyn Ireland about his platelet count and hydrea dose and she asked me to message you about it for the next time you talk to . His hydrea dose was raised on 3/16 to 1500mg Sunday and 1000mg all other days and his platelet count went up. Could you review this with her whenever you talk to her next and see if the dose of his hydrea needs to change again?     If its today you can msg me back and I will call him back, however I am off the rest of the week starting tomorrow and all next week so if its then would you mind calling the patient?     I appreciate your help!

## 2021-04-26 DIAGNOSIS — E79.0 ELEVATED URIC ACID IN BLOOD: ICD-10-CM

## 2021-04-26 DIAGNOSIS — D45 POLYCYTHEMIA VERA (HCC): ICD-10-CM

## 2021-04-26 DIAGNOSIS — D75.839 THROMBOCYTOSIS: ICD-10-CM

## 2021-04-26 RX ORDER — ALLOPURINOL 100 MG/1
TABLET ORAL
Qty: 180 TABLET | Refills: 0 | Status: SHIPPED | OUTPATIENT
Start: 2021-04-26 | End: 2021-07-23

## 2021-05-11 ENCOUNTER — CLINICAL SUPPORT (OUTPATIENT)
Dept: ONCOLOGY | Facility: HOSPITAL | Age: 61
End: 2021-05-11

## 2021-05-11 ENCOUNTER — LAB (OUTPATIENT)
Dept: LAB | Facility: HOSPITAL | Age: 61
End: 2021-05-11

## 2021-05-11 DIAGNOSIS — D45 POLYCYTHEMIA VERA (HCC): ICD-10-CM

## 2021-05-11 PROCEDURE — 36415 COLL VENOUS BLD VENIPUNCTURE: CPT

## 2021-05-11 PROCEDURE — G0463 HOSPITAL OUTPT CLINIC VISIT: HCPCS

## 2021-05-11 PROCEDURE — 85025 COMPLETE CBC W/AUTO DIFF WBC: CPT

## 2021-05-11 NOTE — PROGRESS NOTES
Pt here for RN review. Pt reports feeling well. Platelets have dropped since last visit. Pt reports taking hydrea 1000 mg M-F and 1500 mg S-S since 4/14. S/w Dr. OSORIO about new dosing and plts. Per Dr. OSORIO pt should remain on the same dose and will have plts checked again in 2 weeks to make sure they aren't dropping too much. Pt v/u. Provided copy of labs and reviewed schedule. Sent scheduling a message.    Lab Results   Component Value Date    WBC 6.12 05/11/2021    HGB 15.5 05/11/2021    HCT 43.1 05/11/2021    .1 (H) 05/11/2021     05/11/2021

## 2021-05-18 ENCOUNTER — MEDICATION THERAPY MANAGEMENT (OUTPATIENT)
Dept: PHARMACY | Facility: HOSPITAL | Age: 61
End: 2021-05-18

## 2021-05-18 NOTE — PROGRESS NOTES
"MTM encounter via phone -- Hydroxyurea    No answer today.  direct line [phone]    Called back this morning. Mr. Landis seemed to be doing well today. He reports that his nausea has gotten worse since the increase in his dose to 1500mg on Sat/Sun. He reports that he received zofran but hasn't tried to take any of it yet because he was worried it might make it worse. I counseled him that this medication is meant to help alleviate nausea and that it is a good option if he feels like his symptoms are getting worse. He reported mild fatigue, but also states that he is probably \"over-doing\" it in his spare time (noted things such as yardwork, etc). He reports no missed doses and administration/adherence seem appropriate. He reports no medication changes. Pharmacy will continue to follow.     Sophy Craven, Pharmacy Intern  "

## 2021-05-25 ENCOUNTER — CLINICAL SUPPORT (OUTPATIENT)
Dept: ONCOLOGY | Facility: HOSPITAL | Age: 61
End: 2021-05-25

## 2021-05-25 ENCOUNTER — LAB (OUTPATIENT)
Dept: LAB | Facility: HOSPITAL | Age: 61
End: 2021-05-25

## 2021-05-25 DIAGNOSIS — D45 POLYCYTHEMIA VERA (HCC): ICD-10-CM

## 2021-05-25 LAB
BASOPHILS # BLD AUTO: 0.17 10*3/MM3 (ref 0–0.2)
BASOPHILS NFR BLD AUTO: 2.3 % (ref 0–1.5)
DEPRECATED RDW RBC AUTO: 53.5 FL (ref 37–54)
EOSINOPHIL # BLD AUTO: 0.13 10*3/MM3 (ref 0–0.4)
EOSINOPHIL NFR BLD AUTO: 1.7 % (ref 0.3–6.2)
ERYTHROCYTE [DISTWIDTH] IN BLOOD BY AUTOMATED COUNT: 13.9 % (ref 12.3–15.4)
HCT VFR BLD AUTO: 42.3 % (ref 37.5–51)
HGB BLD-MCNC: 15.1 G/DL (ref 13–17.7)
IMM GRANULOCYTES # BLD AUTO: 0.07 10*3/MM3 (ref 0–0.05)
IMM GRANULOCYTES NFR BLD AUTO: 0.9 % (ref 0–0.5)
LYMPHOCYTES # BLD AUTO: 2.27 10*3/MM3 (ref 0.7–3.1)
LYMPHOCYTES NFR BLD AUTO: 30.1 % (ref 19.6–45.3)
MCH RBC QN AUTO: 37.2 PG (ref 26.6–33)
MCHC RBC AUTO-ENTMCNC: 35.7 G/DL (ref 31.5–35.7)
MCV RBC AUTO: 104.2 FL (ref 79–97)
MONOCYTES # BLD AUTO: 1 10*3/MM3 (ref 0.1–0.9)
MONOCYTES NFR BLD AUTO: 13.3 % (ref 5–12)
NEUTROPHILS NFR BLD AUTO: 3.89 10*3/MM3 (ref 1.7–7)
NEUTROPHILS NFR BLD AUTO: 51.7 % (ref 42.7–76)
NRBC BLD AUTO-RTO: 0 /100 WBC (ref 0–0.2)
PLATELET # BLD AUTO: 373 10*3/MM3 (ref 140–450)
PMV BLD AUTO: 9.8 FL (ref 6–12)
RBC # BLD AUTO: 4.06 10*6/MM3 (ref 4.14–5.8)
WBC # BLD AUTO: 7.53 10*3/MM3 (ref 3.4–10.8)

## 2021-05-25 PROCEDURE — G0463 HOSPITAL OUTPT CLINIC VISIT: HCPCS

## 2021-05-25 PROCEDURE — 85025 COMPLETE CBC W/AUTO DIFF WBC: CPT

## 2021-05-25 PROCEDURE — 36415 COLL VENOUS BLD VENIPUNCTURE: CPT

## 2021-05-25 NOTE — PROGRESS NOTES
Lab Results   Component Value Date    WBC 7.53 05/25/2021    HGB 15.1 05/25/2021    HCT 42.3 05/25/2021    .2 (H) 05/25/2021     05/25/2021     CBC RESULTS R/W PT. PLT COUNT LOOKS GOOD STILL. WE ARE WATCHING PLT COUNT TO BE SURE PLTS DON'T DROP TOO MUCH. PT HAS RECENTLY INC'D HYDREA DOSE TO 1500MG ON SAT AND SUN. HE ALREADY WAS TAKING 1000MG M-F. PT REPORTS NAUSEA BUT HASN'T TAKEN ZOFRAN YET. ENCOURAGED HIM TO TAKE IT IF THE NAUSEA IS BOTHERING HIM. WE WENT OVER NEXT UPCOMING APPT. PT WAS GIVEN COPY OF LABS AS WELL.

## 2021-05-26 ENCOUNTER — MEDICATION THERAPY MANAGEMENT (OUTPATIENT)
Dept: PHARMACY | Facility: HOSPITAL | Age: 61
End: 2021-05-26

## 2021-05-26 RX ORDER — POTASSIUM CHLORIDE 1500 MG/1
TABLET, EXTENDED RELEASE ORAL
Qty: 15 TABLET | Refills: 0 | Status: SHIPPED | OUTPATIENT
Start: 2021-05-26 | End: 2021-07-07

## 2021-05-26 NOTE — PROGRESS NOTES
MTM Lab Review: Hydroxyurea        5/25/2021   WBC 3.40 - 10.80 10*3/mm3 7.53   Neutrophils Absolute 1.70 - 7.00 10*3/mm3 3.89   Hemoglobin 13.0 - 17.7 g/dL 15.1   Hematocrit 37.5 - 51.0 % 42.3   Platelets 140 - 450 10*3/mm3 373     APRN Dictation noted. Labs reviewed. Pharmacy will continue to follow.     Sophy Craven, Pharmacy Intern

## 2021-06-08 ENCOUNTER — LAB (OUTPATIENT)
Dept: LAB | Facility: HOSPITAL | Age: 61
End: 2021-06-08

## 2021-06-08 ENCOUNTER — TELEPHONE (OUTPATIENT)
Dept: ONCOLOGY | Facility: CLINIC | Age: 61
End: 2021-06-08

## 2021-06-08 ENCOUNTER — OFFICE VISIT (OUTPATIENT)
Dept: ONCOLOGY | Facility: CLINIC | Age: 61
End: 2021-06-08

## 2021-06-08 VITALS
TEMPERATURE: 97.8 F | HEART RATE: 57 BPM | SYSTOLIC BLOOD PRESSURE: 120 MMHG | HEIGHT: 74 IN | RESPIRATION RATE: 16 BRPM | OXYGEN SATURATION: 94 % | BODY MASS INDEX: 35.23 KG/M2 | DIASTOLIC BLOOD PRESSURE: 77 MMHG | WEIGHT: 274.5 LBS

## 2021-06-08 DIAGNOSIS — D75.839 THROMBOCYTOSIS: ICD-10-CM

## 2021-06-08 DIAGNOSIS — D75.839 THROMBOCYTOSIS: Primary | ICD-10-CM

## 2021-06-08 DIAGNOSIS — D45 POLYCYTHEMIA VERA (HCC): ICD-10-CM

## 2021-06-08 LAB
ALBUMIN SERPL-MCNC: 4.5 G/DL (ref 3.5–5.2)
ALBUMIN/GLOB SERPL: 1.5 G/DL (ref 1.1–2.4)
ALP SERPL-CCNC: 73 U/L (ref 38–116)
ALT SERPL W P-5'-P-CCNC: 28 U/L (ref 0–41)
ANION GAP SERPL CALCULATED.3IONS-SCNC: 10.9 MMOL/L (ref 5–15)
AST SERPL-CCNC: 31 U/L (ref 0–40)
BASOPHILS # BLD AUTO: 0.09 10*3/MM3 (ref 0–0.2)
BASOPHILS NFR BLD AUTO: 1.3 % (ref 0–1.5)
BILIRUB SERPL-MCNC: 0.7 MG/DL (ref 0.2–1.2)
BUN SERPL-MCNC: 23 MG/DL (ref 6–20)
BUN/CREAT SERPL: 17.4 (ref 7.3–30)
CALCIUM SPEC-SCNC: 10.2 MG/DL (ref 8.5–10.2)
CHLORIDE SERPL-SCNC: 97 MMOL/L (ref 98–107)
CO2 SERPL-SCNC: 27.1 MMOL/L (ref 22–29)
CREAT SERPL-MCNC: 1.32 MG/DL (ref 0.7–1.3)
DEPRECATED RDW RBC AUTO: 55.3 FL (ref 37–54)
EOSINOPHIL # BLD AUTO: 0.1 10*3/MM3 (ref 0–0.4)
EOSINOPHIL NFR BLD AUTO: 1.4 % (ref 0.3–6.2)
ERYTHROCYTE [DISTWIDTH] IN BLOOD BY AUTOMATED COUNT: 13.7 % (ref 12.3–15.4)
GFR SERPL CREATININE-BSD FRML MDRD: 55 ML/MIN/1.73
GLOBULIN UR ELPH-MCNC: 3 GM/DL (ref 1.8–3.5)
GLUCOSE SERPL-MCNC: 90 MG/DL (ref 74–124)
HCT VFR BLD AUTO: 42.6 % (ref 37.5–51)
HGB BLD-MCNC: 15.4 G/DL (ref 13–17.7)
IMM GRANULOCYTES # BLD AUTO: 0.03 10*3/MM3 (ref 0–0.05)
IMM GRANULOCYTES NFR BLD AUTO: 0.4 % (ref 0–0.5)
LYMPHOCYTES # BLD AUTO: 1.82 10*3/MM3 (ref 0.7–3.1)
LYMPHOCYTES NFR BLD AUTO: 26 % (ref 19.6–45.3)
MCH RBC QN AUTO: 39.1 PG (ref 26.6–33)
MCHC RBC AUTO-ENTMCNC: 36.2 G/DL (ref 31.5–35.7)
MCV RBC AUTO: 108.1 FL (ref 79–97)
MONOCYTES # BLD AUTO: 0.84 10*3/MM3 (ref 0.1–0.9)
MONOCYTES NFR BLD AUTO: 12 % (ref 5–12)
NEUTROPHILS NFR BLD AUTO: 4.11 10*3/MM3 (ref 1.7–7)
NEUTROPHILS NFR BLD AUTO: 58.9 % (ref 42.7–76)
NRBC BLD AUTO-RTO: 0 /100 WBC (ref 0–0.2)
PLATELET # BLD AUTO: 371 10*3/MM3 (ref 140–450)
PMV BLD AUTO: 8.9 FL (ref 6–12)
POTASSIUM SERPL-SCNC: 3.9 MMOL/L (ref 3.5–4.7)
PROT SERPL-MCNC: 7.5 G/DL (ref 6.3–8)
RBC # BLD AUTO: 3.94 10*6/MM3 (ref 4.14–5.8)
SODIUM SERPL-SCNC: 135 MMOL/L (ref 134–145)
WBC # BLD AUTO: 6.99 10*3/MM3 (ref 3.4–10.8)

## 2021-06-08 PROCEDURE — 85025 COMPLETE CBC W/AUTO DIFF WBC: CPT

## 2021-06-08 PROCEDURE — 99214 OFFICE O/P EST MOD 30 MIN: CPT | Performed by: INTERNAL MEDICINE

## 2021-06-08 PROCEDURE — 36415 COLL VENOUS BLD VENIPUNCTURE: CPT

## 2021-06-08 PROCEDURE — 80053 COMPREHEN METABOLIC PANEL: CPT

## 2021-06-08 NOTE — PROGRESS NOTES
Subjective     REASON FOR FOLLOW UP:  1.  Polycythemia vera, polycythemia and thrombocytosis and leukocytosis, EPO level 1.3, Tino 2+    HISTORY OF PRESENT ILLNESS:  The patient is a 60 y.o. year old male who is here for an opinion about the above issue.    History of Present Illness patient is a 60-year-old male with the above-mentioned history who is here today for lab review and evaluation continuing on Hydrea.  He takes 1000 mg of Hydrea nightly.  He takes it at night due to issues with side effects of fatigue and nausea.  He states he feels that he tolerates it better taking it at bedtime.    He has noticed occasional cough when he lies down at night.  Patient denies any reflux symptoms, although he does have some allergy symptoms.  We discussed adding an antihistamine, but the patient states that it is tolerable at this time.  Otherwise, denies issues with shortness of breath.  He reports normal bowel function.  No fevers or chills.    Interval history:    Patient is currently on hydroxyurea 1000 mg daily Monday through Friday and 1500 mg on Saturday and Sunday.  His blood counts look good with his platelet being 371 his hemoglobin and white count are normal  He does get nausea associated with the hydroxyurea but he does not take the nausea medication.  I encouraged him to do so.  He has not lost weight and his got a good appetite    Hematological oncologic history:  Patient is a 59-year-old gentleman who has been referred by his primary care Castillo Velasco for evaluation of thrombocytosis and polycythemia.  He has lost more than 50 pounds of weight.  In the last 6 months.  Looking back his blood counts have been high starting in 2016.  Initially his platelets were high around 670 and gradually increased in the 900 range.  More recently his hemoglobin has been increasing and his white count has been increasing.  He has not had a DVT or pulmonary embolism.  He has had no issues with itching with hot showers.   "Has not had a stroke.  He had pneumonia last year but none recently.  He does have some shortness of breath but no chest pain.  He is exposed to secondhand smoking.    He does not have any nausea vomiting or abdominal pain at present.  He did have gout on several locations and was placed on meloxicam.  His renal function is mildly elevated.  He denies any fever or night sweats.    Patient does have a cough productive of yellow sputum but has no fevers.    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, EPO 1.3 low  CT chest negative, CT abdomen pelvis shows splenomegaly    Past Medical History:   Diagnosis Date   • Anxiety    • Arthritis    • History of thrombocytosis    • Hypertension         Past Surgical History:   Procedure Laterality Date   • CHEST SURGERY     • OTHER SURGICAL HISTORY  1985    \"Exploratory\"        Current Outpatient Medications on File Prior to Visit   Medication Sig Dispense Refill   • allopurinol (ZYLOPRIM) 100 MG tablet TAKE ONE TABLET BY MOUTH TWICE A  tablet 0   • amLODIPine-benazepril (LOTREL) 5-40 MG per capsule TAKE ONE CAPSULE BY MOUTH DAILY 90 capsule 2   • aspirin 81 MG chewable tablet Chew 81 mg Daily.     • bisoprolol (ZEBeta) 10 MG tablet TAKE ONE TABLET BY MOUTH DAILY 90 tablet 3   • chlorthalidone (HYGROTEN) 50 MG tablet TAKE ONE TABLET BY MOUTH DAILY 90 tablet 3   • clotrimazole-betamethasone (LOTRISONE) 1-0.05 % cream APPLY TO AFFECTED AREA(S) TWO TIMES A DAY 45 each 3   • hydrALAZINE (APRESOLINE) 50 MG tablet TAKE ONE TABLET BY MOUTH TWICE A  tablet 3   • hydroxyurea (HYDREA) 500 MG capsule 2 tablets Monday through Friday, 3 tablets Saturday and Sunday. 90 capsule 3   • KLOR-CON 20 MEQ CR tablet TAKE ONE-HALF TABLET BY MOUTH DAILY 15 tablet 0   • ondansetron (ZOFRAN) 4 MG tablet Take 1 tablet by mouth Every 8 (Eight) Hours As Needed for Nausea or Vomiting. 30 tablet 0   • zolpidem (AMBIEN) 10 MG tablet Take 1 tablet by " mouth At Night As Needed for Sleep. 90 tablet 0     No current facility-administered medications on file prior to visit.        ALLERGIES:    Allergies   Allergen Reactions   • Cephalexin Itching        Social History     Socioeconomic History   • Marital status:      Spouse name: Not on file   • Number of children: Not on file   • Years of education: Not on file   • Highest education level: Not on file   Tobacco Use   • Smoking status: Never Smoker   • Smokeless tobacco: Never Used   Substance and Sexual Activity   • Alcohol use: Yes        Family History   Problem Relation Age of Onset   • Hypertension Father    • Hypertension Other    • Heart disease Other    • Cervical cancer Other       I have reviewed the patient's medical history in detail and updated the computerized patient record.    Review of Systems   Constitutional: Positive for fatigue (06/08/21-Unchanged). Negative for activity change, appetite change, chills, diaphoresis, fever and unexpected weight change.   HENT: Negative for hearing loss, mouth sores, nosebleeds, sore throat and trouble swallowing.    Respiratory: Negative for cough, chest tightness, shortness of breath and wheezing.    Cardiovascular: Negative for chest pain, palpitations and leg swelling.   Gastrointestinal: Negative for abdominal distention, abdominal pain, constipation, diarrhea, nausea and vomiting.   Genitourinary: Negative for difficulty urinating, dysuria, frequency, hematuria and urgency.   Musculoskeletal: Negative for joint swelling.        No muscle weakness.   Skin: Negative for rash and wound.   Neurological: Negative for dizziness, seizures, syncope, speech difficulty, weakness, numbness and headaches.   Hematological: Negative for adenopathy. Does not bruise/bleed easily.   Psychiatric/Behavioral: Negative for behavioral problems, confusion, sleep disturbance and suicidal ideas.   All other systems reviewed and are negative.  She does complain of  "nausea    Objective     Vitals:    06/08/21 1422   BP: 120/77   Pulse: 57   Resp: 16   Temp: 97.8 °F (36.6 °C)   TempSrc: Temporal   SpO2: 94%   Weight: 125 kg (274 lb 8 oz)   Height: 188 cm (74.02\")   PainSc: 0-No pain     Current Status 6/8/2021   ECOG score 0     Physical examination         CONSTITUTIONAL:  Vital signs reviewed.  Alert and oriented x3  No distress, looks comfortable.  EYES:  Conjunctiva and lids unremarkable.  Extraocular eye movements intact.  HEENT: No evidence of lymphadenopathy, no thyromegaly  RESPIRATORY:  Normal respiratory effort.  , no rales  or wheezing, clear   CARDIOVASCULAR:  Regular rate and rhythm, no murmur  No significant lower extremity edema.  Abdomen: Soft nontender positive bowel sounds no hepatosplenomegaly  SKIN: No wounds.  No rashes.  MUSCULOSKELETAL/EXTREMITIES: No clubbing or cyanosis.  No apparent unilateral weakness.  NEURO: CN 2-12 appear intact. No focal neurological deficits noted.  PSYCHIATRIC:  Normal judgment and insight.  Normal mood and affect.    RECENT LABS:  Results from last 7 days   Lab Units 06/08/21  1422   WBC 10*3/mm3 6.99   NEUTROS ABS 10*3/mm3 4.11   HEMOGLOBIN g/dL 15.4   HEMATOCRIT % 42.6   PLATELETS 10*3/mm3 371     Results from last 7 days   Lab Units 06/08/21  1422   SODIUM mmol/L 135   POTASSIUM mmol/L 3.9   CHLORIDE mmol/L 97*   CO2 mmol/L 27.1   BUN mg/dL 23*   CREATININE mg/dL 1.32*   CALCIUM mg/dL 10.2   ALBUMIN g/dL 4.50   BILIRUBIN mg/dL 0.7   ALK PHOS U/L 73   ALT (SGPT) U/L 28   AST (SGOT) U/L 31   GLUCOSE mg/dL 90         Patient screened negative for depression based on a PHQ-9 score of 1 on 3/16/2021.         Assessment/Plan     1.  Polycythemia with leukocytosis and thrombocytosis.  He does not have any itching with hot showers.  He does not have any headaches or DVT.  He does have blood counts worsening gradually from 2016.  · 7/8/2020  His platelets 974K.  Hemoglobin is 17.6 with hematocrit of 52.8 and white count of 11.56.  He " denies fever night sweats but has significant weight loss greater than 50 pounds in 6 months.  He does have a cough which is productive of yellow sputum and some shortness of breath.  He has history of secondhand smoking exposure.  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  CT chest negative  · Hydroxyurea 500 mg twice daily was initiated on July 16, 2020.  .  · 7/22/2020 Hydrea increased to 1500 mg (1000 mg in am and 500 mg in pm) Monday-Friday and 2000 mg (1000 mg in am and 1000 mg in PM) on Saturdays and Sundays  · 7/29/2020 patient's platelet count today is 821.  White count 7.78, hemoglobin 16.4.   Increased Hydrea at 1500 mg Monday through Friday, and 2000 mg on Saturdays and Sundays.   · In future if hematocrit greater than 48 then he will receive phlebotomy.  · 9/2/2020 hematocrit 44.5.  Currently on Hydrea 1500 mg daily.  WBC 8.74, hemoglobin 15.8, platelets 386.  decreased the Hydrea to 1000 mg Monday through Saturday, and 1500 mg on Sundays.    · October 26, 2020: Platelets dropped to 276.  Hydrea decreased to 1000 mg Monday through Friday, 500 mg Saturday and Sunday.  · 11/25/2020, platelets of 500, continue current dosing of Hydrea.  Currently taking 500 mg twice daily, we discussed taking 1000 mg at night due to ongoing fatigue and nausea, continue 500 mg nightly Saturday and Sunday.  · 1/19/2021 Platelets increasing- Hydrea increased to 1000 mg nightly.  · 3/16/2021 Platelets 547.  Reviewed with Dr. Fleming, we will increase Hydrea to 1500 mg on Sunday, and 1000 mg all other days.  · June 8, 2021: Patient has hemoglobin of 15.4 white count of 6.99 and platelet 371.  His maintain his stability over the last several weeks.  · Does complain of some nausea related to hydroxyurea.  He is taking aspirin a day    2.  Weight loss greater than 50 pounds in last 6 months.  We will also need to make sure  there is no evidence of splenomegaly  · Reviewed CT chest abdomen pelvis negative for malignancy  · The patient admits that his weight loss has actually been more intentional recently  · No weight loss but weight gain    3.  Cough productive of yellow sputum and some shortness of breath, patient has history of exposure to secondhand smoking and had pneumonia in the past.  He does not show evidence of fever.  · No complaints of cough today  · Resolved    4.  Polycythemia vera: Monthly phlebotomies for hematocrit greater than or equal to 48%.  Last phlebotomy 7/15/2020.   · 3/16/2021 Hct 46.2%, no phlebotomy indicated.      Plan  · Continue hydroxyurea 1000 mg Monday through Friday and thousand 400 on Saturday and Sunday  · Patient can take Zofran or Compazine as needed for nausea  · Patient will see nurse practitioner in 2 months in 4 months and see me in 6 months with labs.    Eliana Fleming MD

## 2021-06-08 NOTE — TELEPHONE ENCOUNTER
Call to Mr. Landis to let him know his creatinine was elevated and Dr. Flmeing was wanting to let him know to stay hydrated, be sure to drink more fluids.  She also wanted to give instructions to follow up with his PCP in a couple of weeks to have it rechecked.  Eva Landis verbalized understanding.

## 2021-06-09 ENCOUNTER — MEDICATION THERAPY MANAGEMENT (OUTPATIENT)
Dept: PHARMACY | Facility: HOSPITAL | Age: 61
End: 2021-06-09

## 2021-06-09 NOTE — PROGRESS NOTES
[facility] Lab Review- hydroxyurea      6/8/2021   WBC 3.40 - 10.80 10*3/mm3 6.99   Neutrophils Absolute 1.70 - 7.00 10*3/mm3 4.11   Hemoglobin 13.0 - 17.7 g/dL 15.4   Hematocrit 37.5 - 51.0 % 42.6   Platelets 140 - 450 10*3/mm3 371   Creatinine 0.70 - 1.30 mg/dL 1.32 (A)   eGFR Non African Am >60 mL/min/1.73 55 (A)   BUN 6 - 20 mg/dL 23 (A)   Sodium 134 - 145 mmol/L 135   Potassium 3.5 - 4.7 mmol/L 3.9   Glucose 74 - 124 mg/dL 90   Calcium 8.5 - 10.2 mg/dL 10.2   Albumin 3.50 - 5.20 g/dL 4.50   Total Protein 6.3 - 8.0 g/dL 7.5   AST (SGOT) 0 - 40 U/L 31   ALT (SGPT) 0 - 41 U/L 28   Alkaline Phosphatase 38 - 116 U/L 73   Total Bilirubin 0.2 - 1.2 mg/dL 0.7     MD dictation noted; Scr was addressed by clinic RN. Mr. Landis is to continue current dose of hydroxyurea. Pharmacy will continue to follow.     Thanks,   Amanda Morrison, PharmD

## 2021-07-07 RX ORDER — POTASSIUM CHLORIDE 1500 MG/1
TABLET, EXTENDED RELEASE ORAL
Qty: 15 TABLET | Refills: 2 | Status: SHIPPED | OUTPATIENT
Start: 2021-07-07 | End: 2021-10-01 | Stop reason: SDUPTHER

## 2021-07-07 NOTE — TELEPHONE ENCOUNTER
Caller: PT  Relationship: PT    Best call back number: [phone]    Medication needed:   Requested Prescriptions     Pending Prescriptions Disp Refills   • KLOR-CON 20 MEQ CR tablet [Pharmacy Med Name: KLOR-CON M20 TABLET] 15 tablet 0     Sig: TAKE 1/2 TABLET BY MOUTH DAILY       When do you need the refill by: 7/7/21    What additional details did the patient provide when requesting the medication:     Does the patient have less than a 3 day supply:  [x] Yes  [] No    What is the patient's preferred pharmacy: [facility] [id number] - [place] - [address] [facility] [phone] [facility] [phone] FX

## 2021-07-07 NOTE — TELEPHONE ENCOUNTER
Pt has requested a refill of his Klor Con. There is no mention of this in Dr Fleming's last note. Pt is taking 1/2 tab daily.    I have routed the rx to Roselyn Ireland NP as she has seen pt in the past. Once signed it will be escribed to [facility].   detailed exam negative

## 2021-07-23 DIAGNOSIS — D45 POLYCYTHEMIA VERA (HCC): ICD-10-CM

## 2021-07-23 DIAGNOSIS — E79.0 ELEVATED URIC ACID IN BLOOD: ICD-10-CM

## 2021-07-23 DIAGNOSIS — D75.839 THROMBOCYTOSIS: ICD-10-CM

## 2021-07-23 NOTE — TELEPHONE ENCOUNTER
Allopurinol refill request rec electronically from [facility]. Per last office note from Dr Fleming does not mention this medication.    I have routed the rx to Dr Fleming for signature if appropriate. Once signed it will be escribed to [facility]    Confirmation rec that Dr Fleming has signed the Allopurinol rx. This was sent to [facility]

## 2021-07-25 RX ORDER — ALLOPURINOL 100 MG/1
TABLET ORAL
Qty: 180 TABLET | Refills: 0 | Status: SHIPPED | OUTPATIENT
Start: 2021-07-25 | End: 2022-01-21 | Stop reason: SDUPTHER

## 2021-08-02 ENCOUNTER — LAB (OUTPATIENT)
Dept: OTHER | Facility: HOSPITAL | Age: 61
End: 2021-08-02

## 2021-08-02 ENCOUNTER — OFFICE VISIT (OUTPATIENT)
Dept: ONCOLOGY | Facility: CLINIC | Age: 61
End: 2021-08-02

## 2021-08-02 VITALS
BODY MASS INDEX: 35 KG/M2 | HEIGHT: 74 IN | DIASTOLIC BLOOD PRESSURE: 89 MMHG | SYSTOLIC BLOOD PRESSURE: 137 MMHG | RESPIRATION RATE: 17 BRPM | WEIGHT: 272.7 LBS | OXYGEN SATURATION: 97 % | TEMPERATURE: 96.9 F | HEART RATE: 60 BPM

## 2021-08-02 DIAGNOSIS — D45 POLYCYTHEMIA VERA (HCC): ICD-10-CM

## 2021-08-02 DIAGNOSIS — D45 POLYCYTHEMIA VERA (HCC): Primary | ICD-10-CM

## 2021-08-02 DIAGNOSIS — D75.1 POLYCYTHEMIA: ICD-10-CM

## 2021-08-02 DIAGNOSIS — D75.839 THROMBOCYTOSIS: ICD-10-CM

## 2021-08-02 DIAGNOSIS — Z79.899 HIGH RISK MEDICATION USE: ICD-10-CM

## 2021-08-02 LAB
ALBUMIN SERPL-MCNC: 4.2 G/DL (ref 3.5–5.2)
ALBUMIN/GLOB SERPL: 1.4 G/DL
ALP SERPL-CCNC: 69 U/L (ref 39–117)
ALT SERPL W P-5'-P-CCNC: 23 U/L (ref 1–41)
ANION GAP SERPL CALCULATED.3IONS-SCNC: 10.5 MMOL/L (ref 5–15)
AST SERPL-CCNC: 30 U/L (ref 1–40)
BASOPHILS # BLD AUTO: 0.11 10*3/MM3 (ref 0–0.2)
BASOPHILS NFR BLD AUTO: 1.7 % (ref 0–1.5)
BILIRUB SERPL-MCNC: 0.9 MG/DL (ref 0–1.2)
BUN SERPL-MCNC: 12 MG/DL (ref 8–23)
BUN/CREAT SERPL: 11.1 (ref 7–25)
CALCIUM SPEC-SCNC: 9.9 MG/DL (ref 8.6–10.5)
CHLORIDE SERPL-SCNC: 101 MMOL/L (ref 98–107)
CO2 SERPL-SCNC: 25.5 MMOL/L (ref 22–29)
CREAT SERPL-MCNC: 1.08 MG/DL (ref 0.76–1.27)
DEPRECATED RDW RBC AUTO: 49.5 FL (ref 37–54)
EOSINOPHIL # BLD AUTO: 0.07 10*3/MM3 (ref 0–0.4)
EOSINOPHIL NFR BLD AUTO: 1.1 % (ref 0.3–6.2)
ERYTHROCYTE [DISTWIDTH] IN BLOOD BY AUTOMATED COUNT: 12.1 % (ref 12.3–15.4)
GFR SERPL CREATININE-BSD FRML MDRD: 70 ML/MIN/1.73
GLOBULIN UR ELPH-MCNC: 3.1 GM/DL
GLUCOSE SERPL-MCNC: 109 MG/DL (ref 65–99)
HCT VFR BLD AUTO: 44 % (ref 37.5–51)
HGB BLD-MCNC: 15.4 G/DL (ref 13–17.7)
IMM GRANULOCYTES # BLD AUTO: 0.03 10*3/MM3 (ref 0–0.05)
IMM GRANULOCYTES NFR BLD AUTO: 0.5 % (ref 0–0.5)
LYMPHOCYTES # BLD AUTO: 1.47 10*3/MM3 (ref 0.7–3.1)
LYMPHOCYTES NFR BLD AUTO: 22.4 % (ref 19.6–45.3)
MCH RBC QN AUTO: 38.1 PG (ref 26.6–33)
MCHC RBC AUTO-ENTMCNC: 35 G/DL (ref 31.5–35.7)
MCV RBC AUTO: 108.9 FL (ref 79–97)
MONOCYTES # BLD AUTO: 0.73 10*3/MM3 (ref 0.1–0.9)
MONOCYTES NFR BLD AUTO: 11.1 % (ref 5–12)
NEUTROPHILS NFR BLD AUTO: 4.16 10*3/MM3 (ref 1.7–7)
NEUTROPHILS NFR BLD AUTO: 63.2 % (ref 42.7–76)
NRBC BLD AUTO-RTO: 0 /100 WBC (ref 0–0.2)
PLAT MORPH BLD: NORMAL
PLATELET # BLD AUTO: 474 10*3/MM3 (ref 140–450)
PMV BLD AUTO: 9.9 FL (ref 6–12)
POTASSIUM SERPL-SCNC: 3.3 MMOL/L (ref 3.5–5.2)
PROT SERPL-MCNC: 7.3 G/DL (ref 6–8.5)
RBC # BLD AUTO: 4.04 10*6/MM3 (ref 4.14–5.8)
SODIUM SERPL-SCNC: 137 MMOL/L (ref 136–145)
SPHEROCYTES BLD QL SMEAR: NORMAL
STOMATOCYTES BLD QL SMEAR: NORMAL
WBC # BLD AUTO: 6.57 10*3/MM3 (ref 3.4–10.8)
WBC MORPH BLD: NORMAL

## 2021-08-02 PROCEDURE — 36415 COLL VENOUS BLD VENIPUNCTURE: CPT

## 2021-08-02 PROCEDURE — 85025 COMPLETE CBC W/AUTO DIFF WBC: CPT | Performed by: INTERNAL MEDICINE

## 2021-08-02 PROCEDURE — 80053 COMPREHEN METABOLIC PANEL: CPT | Performed by: INTERNAL MEDICINE

## 2021-08-02 PROCEDURE — 99214 OFFICE O/P EST MOD 30 MIN: CPT | Performed by: NURSE PRACTITIONER

## 2021-08-02 PROCEDURE — 85007 BL SMEAR W/DIFF WBC COUNT: CPT | Performed by: INTERNAL MEDICINE

## 2021-08-02 NOTE — PROGRESS NOTES
Subjective     REASON FOR FOLLOW UP:  1.  Polycythemia vera, polycythemia and thrombocytosis and leukocytosis, EPO level 1.3, Tino 2+    HISTORY OF PRESENT ILLNESS:  The patient is a 60 y.o. year old male who is here for an opinion about the above issue.    History of Present Illness patient is a 60-year-old male with the above-mentioned history who is here today for lab review and evaluation continuing on Hydrea.     He has noticed occasional cough when he lies down at night.  Patient denies any reflux symptoms, although he does have some allergy symptoms.  We discussed adding an antihistamine, but the patient states that it is tolerable at this time.  Otherwise, denies issues with shortness of breath.  He reports normal bowel function.  No fevers or chills.    Interval history:  Patient returns today continuing on Hydrea 1000 mg daily Monday through Friday and 1500 mg on Saturday and Sunday.  He reports he is feeling great with good energy.  He is tolerating Hydrea without difficulty.  He denies nausea.  He is very active and working in the yard.  He denies fatigue.        Hematological oncologic history:  Patient is a 59-year-old gentleman who has been referred by his primary care Castillo Velasco for evaluation of thrombocytosis and polycythemia.  He has lost more than 50 pounds of weight.  In the last 6 months.  Looking back his blood counts have been high starting in 2016.  Initially his platelets were high around 670 and gradually increased in the 900 range.  More recently his hemoglobin has been increasing and his white count has been increasing.  He has not had a DVT or pulmonary embolism.  He has had no issues with itching with hot showers.  Has not had a stroke.  He had pneumonia last year but none recently.  He does have some shortness of breath but no chest pain.  He is exposed to secondhand smoking.    He does not have any nausea vomiting or abdominal pain at present.  He did have gout on several  "locations and was placed on meloxicam.  His renal function is mildly elevated.  He denies any fever or night sweats.    Patient does have a cough productive of yellow sputum but has no fevers.    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, EPO 1.3 low  CT chest negative, CT abdomen pelvis shows splenomegaly    Past Medical History:   Diagnosis Date   • Anxiety    • Arthritis    • History of thrombocytosis    • Hypertension         Past Surgical History:   Procedure Laterality Date   • CHEST SURGERY     • OTHER SURGICAL HISTORY  1985    \"Exploratory\"        Current Outpatient Medications on File Prior to Visit   Medication Sig Dispense Refill   • allopurinol (ZYLOPRIM) 100 MG tablet TAKE ONE TABLET BY MOUTH TWICE A  tablet 0   • amLODIPine-benazepril (LOTREL) 5-40 MG per capsule TAKE ONE CAPSULE BY MOUTH DAILY 90 capsule 2   • aspirin 81 MG chewable tablet Chew 81 mg Daily.     • bisoprolol (ZEBeta) 10 MG tablet TAKE ONE TABLET BY MOUTH DAILY 90 tablet 3   • chlorthalidone (HYGROTEN) 50 MG tablet TAKE ONE TABLET BY MOUTH DAILY 90 tablet 3   • clotrimazole-betamethasone (LOTRISONE) 1-0.05 % cream APPLY TO AFFECTED AREA(S) TWO TIMES A DAY 45 each 3   • hydrALAZINE (APRESOLINE) 50 MG tablet TAKE ONE TABLET BY MOUTH TWICE A  tablet 3   • hydroxyurea (HYDREA) 500 MG capsule 2 tablets Monday through Friday, 3 tablets Saturday and Sunday. 90 capsule 3   • KLOR-CON 20 MEQ CR tablet TAKE 1/2 TABLET BY MOUTH DAILY 15 tablet 2   • ondansetron (ZOFRAN) 4 MG tablet Take 1 tablet by mouth Every 8 (Eight) Hours As Needed for Nausea or Vomiting. 30 tablet 0   • zolpidem (AMBIEN) 10 MG tablet Take 1 tablet by mouth At Night As Needed for Sleep. 90 tablet 0     No current facility-administered medications on file prior to visit.        ALLERGIES:    Allergies   Allergen Reactions   • Cephalexin Itching        Social History     Socioeconomic History   • Marital status: " "     Spouse name: Not on file   • Number of children: Not on file   • Years of education: Not on file   • Highest education level: Not on file   Tobacco Use   • Smoking status: Never Smoker   • Smokeless tobacco: Never Used   Substance and Sexual Activity   • Alcohol use: Yes        Family History   Problem Relation Age of Onset   • Hypertension Father    • Hypertension Other    • Heart disease Other    • Cervical cancer Other       I have reviewed the patient's medical history in detail and updated the computerized patient record.    Review of Systems   Constitutional: Negative for activity change, appetite change, chills, diaphoresis, fatigue, fever and unexpected weight change.   HENT: Negative for hearing loss, mouth sores, nosebleeds, sore throat and trouble swallowing.    Respiratory: Negative for cough, chest tightness, shortness of breath and wheezing.    Cardiovascular: Negative for chest pain, palpitations and leg swelling.   Gastrointestinal: Negative for abdominal distention, abdominal pain, constipation, diarrhea, nausea and vomiting.   Genitourinary: Negative for difficulty urinating, dysuria, frequency, hematuria and urgency.   Musculoskeletal: Negative for joint swelling.        No muscle weakness.   Skin: Negative for rash and wound.   Neurological: Negative for dizziness, seizures, syncope, speech difficulty, weakness, numbness and headaches.   Hematological: Negative for adenopathy. Does not bruise/bleed easily.   Psychiatric/Behavioral: Negative for behavioral problems, confusion, sleep disturbance and suicidal ideas.   All other systems reviewed and are negative.      Objective     Vitals:    08/02/21 1229   BP: 137/89   Pulse: 60   Resp: 17   Temp: 96.9 °F (36.1 °C)   TempSrc: Skin   SpO2: 97%   Weight: 124 kg (272 lb 11.2 oz)   Height: 188 cm (74.02\")   PainSc: 0-No pain     Current Status 8/2/2021   ECOG score 0     Physical examination     CONSTITUTIONAL:  Vital signs reviewed.  " Alert and oriented x3  No distress, looks comfortable.  EYES:  Conjunctiva and lids unremarkable.  Extraocular eye movements intact.  HEENT: No evidence of lymphadenopathy, no thyromegaly  RESPIRATORY:  Normal respiratory effort.  , no rales  or wheezing, clear   CARDIOVASCULAR:  Regular rate and rhythm, no murmur  No significant lower extremity edema.  Abdomen: Soft nontender positive bowel sounds no hepatosplenomegaly  SKIN: No wounds.  No rashes.  MUSCULOSKELETAL/EXTREMITIES: No clubbing or cyanosis.  No apparent unilateral weakness.  NEURO: CN 2-12 appear intact. No focal neurological deficits noted.  PSYCHIATRIC:  Normal judgment and insight.  Normal mood and affect.    RECENT LABS:  Results from last 7 days   Lab Units 08/02/21  1234   WBC 10*3/mm3 6.57   NEUTROS ABS 10*3/mm3 4.16   HEMOGLOBIN g/dL 15.4   HEMATOCRIT % 44.0   PLATELETS 10*3/mm3 474*     Results from last 7 days   Lab Units 08/02/21  1234   SODIUM mmol/L 137   POTASSIUM mmol/L 3.3*   CHLORIDE mmol/L 101   CO2 mmol/L 25.5   BUN mg/dL 12   CREATININE mg/dL 1.08   CALCIUM mg/dL 9.9   ALBUMIN g/dL 4.20   BILIRUBIN mg/dL 0.9   ALK PHOS U/L 69   ALT (SGPT) U/L 23   AST (SGOT) U/L 30   GLUCOSE mg/dL 109*         Patient screened negative for depression based on a PHQ-9 score of 1 on 3/16/2021.         Assessment/Plan     1.  Polycythemia with leukocytosis and thrombocytosis.  He does not have any itching with hot showers.  He does not have any headaches or DVT.  He does have blood counts worsening gradually from 2016.  · 7/8/2020  His platelets 974K.  Hemoglobin is 17.6 with hematocrit of 52.8 and white count of 11.56.  He denies fever night sweats but has significant weight loss greater than 50 pounds in 6 months.  He does have a cough which is productive of yellow sputum and some shortness of breath.  He has history of secondhand smoking exposure.  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  CT chest negative  · Hydroxyurea 500 mg twice daily was initiated on July 16, 2020.  .  · 7/22/2020 Hydrea increased to 1500 mg (1000 mg in am and 500 mg in pm) Monday-Friday and 2000 mg (1000 mg in am and 1000 mg in PM) on Saturdays and Sundays  · 7/29/2020 patient's platelet count today is 821.  White count 7.78, hemoglobin 16.4.   Increased Hydrea at 1500 mg Monday through Friday, and 2000 mg on Saturdays and Sundays.   · In future if hematocrit greater than 48 then he will receive phlebotomy.  · 9/2/2020 hematocrit 44.5.  Currently on Hydrea 1500 mg daily.  WBC 8.74, hemoglobin 15.8, platelets 386.  decreased the Hydrea to 1000 mg Monday through Saturday, and 1500 mg on Sundays.    · October 26, 2020: Platelets dropped to 276.  Hydrea decreased to 1000 mg Monday through Friday, 500 mg Saturday and Sunday.  · 11/25/2020, platelets of 500, continue current dosing of Hydrea.  Currently taking 500 mg twice daily, we discussed taking 1000 mg at night due to ongoing fatigue and nausea, continue 500 mg nightly Saturday and Sunday.  · 1/19/2021 Platelets increasing- Hydrea increased to 1000 mg nightly.  · 3/16/2021 Platelets 547.  Reviewed with Dr. Fleming, we will increase Hydrea to 1500 mg on Sunday, and 1000 mg all other days.  · June 8, 2021: Patient has hemoglobin of 15.4 white count of 6.99 and platelet 371.  His maintain his stability over the last several weeks.  · Does complain of some nausea related to hydroxyurea.  He is taking aspirin a day  · Patient seen 8/2/2021 continuing on Hydrea 1000 mg Monday through Friday and 1500 mg on Saturday and Sunday.  He denies nausea, recent fevers or infections.  His energy level is good and he is working in the yard frequently.  Potassium was slightly low today and he does report working in the yard frequently and sweating a lot.  We will discussed utilizing electrolyte drinks when he is sweating so much to help replenish what  he is losing.  He does continue potassium chloride once daily as well.    2.  Weight loss greater than 50 pounds in last 6 months.  We will also need to make sure there is no evidence of splenomegaly  · Reviewed CT chest abdomen pelvis negative for malignancy  · The patient admits that his weight loss has actually been more intentional recently  · No weight loss but weight gain    3.  Cough productive of yellow sputum and some shortness of breath, patient has history of exposure to secondhand smoking and had pneumonia in the past.  He does not show evidence of fever.  · No complaints of cough today  · Resolved    4.  Polycythemia vera: Monthly phlebotomies for hematocrit greater than or equal to 48%.  Last phlebotomy 7/15/2020.   · 3/16/2021 Hct 46.2%, no phlebotomy indicated.  · 8/2/2020 Hematocrit 44%, no phlebotomy indicated.      Plan  · Continue Hydrea 1000 mg daily Monday through Friday and 1500 mg daily on Saturday and Sunday.  · Continue potassium chloride 20 mEq daily  · Return for review by nurse practitioner in 2 months with repeat CBC and CMP and review by Dr. Fleming in 4 months with CBC and CMP.    AG Leung

## 2021-08-03 ENCOUNTER — MEDICATION THERAPY MANAGEMENT (OUTPATIENT)
Dept: PHARMACY | Facility: HOSPITAL | Age: 61
End: 2021-08-03

## 2021-08-18 ENCOUNTER — MEDICATION THERAPY MANAGEMENT (OUTPATIENT)
Dept: PHARMACY | Facility: HOSPITAL | Age: 61
End: 2021-08-18

## 2021-08-18 NOTE — PROGRESS NOTES
[facility] Telephone Encounter (adherence/compliance) - Hydrea    Patient did not answer--left message. Direct line [phone]    Thanks,  Barrett Liu, Pharmacy Intern

## 2021-08-25 ENCOUNTER — MEDICATION THERAPY MANAGEMENT (OUTPATIENT)
Dept: PHARMACY | Facility: HOSPITAL | Age: 61
End: 2021-08-25

## 2021-08-25 NOTE — PROGRESS NOTES
[facility] telephone follow up- hydrea    Mr. Landis is doing well today. He has some fatigue after his weekend doses of hydrea. He reports no other issues or side effects. Medication administration and adherence seem appropriate; he reports no missed doses. He denies any new medication changes. Mr. Landis has no additional questions or concerns for me today. Pharmacy will continue to follow.     Thanks,   Amanda Morrison, PharmD

## 2021-09-13 DIAGNOSIS — I10 ESSENTIAL HYPERTENSION: Primary | ICD-10-CM

## 2021-09-13 NOTE — TELEPHONE ENCOUNTER
Caller: [facility]. [place] [phone] [place] [phone] FX    Relationship: Pharmacy        Medication needed:   Requested Prescriptions     Pending Prescriptions Disp Refills   • amLODIPine-benazepril (LOTREL) 5-40 MG per capsule 90 capsule 2     Sig: Take 1 capsule by mouth Daily.       When do you need the refill by: 09/14/21    What additional details did the patient provide when requesting the medication: PHARMACY REQUESTING REFILL     Does the patient have less than a 3 day supply:  [] Yes  [x] No    What is the patient's preferred pharmacy: [facility]. [address] [phone] [place] [phone] FX

## 2021-09-14 RX ORDER — AMLODIPINE BESYLATE AND BENAZEPRIL HYDROCHLORIDE 5; 40 MG/1; MG/1
1 CAPSULE ORAL DAILY
Qty: 90 CAPSULE | Refills: 3 | Status: SHIPPED | OUTPATIENT
Start: 2021-09-14 | End: 2022-02-01 | Stop reason: SDUPTHER

## 2021-09-14 NOTE — TELEPHONE ENCOUNTER
Rx Refill Note  Requested Prescriptions     Pending Prescriptions Disp Refills   • amLODIPine-benazepril (LOTREL) 5-40 MG per capsule 90 capsule 3     Sig: Take 1 capsule by mouth Daily.      Last office visit with prescribing clinician: 3/24/2021      Next office visit with prescribing clinician: Visit date not found            Sophy Gallagher MA/LMR  09/14/21, 08:18 EDT

## 2021-09-27 ENCOUNTER — OFFICE VISIT (OUTPATIENT)
Dept: ONCOLOGY | Facility: CLINIC | Age: 61
End: 2021-09-27

## 2021-09-27 ENCOUNTER — LAB (OUTPATIENT)
Dept: OTHER | Facility: HOSPITAL | Age: 61
End: 2021-09-27

## 2021-09-27 VITALS
RESPIRATION RATE: 16 BRPM | SYSTOLIC BLOOD PRESSURE: 124 MMHG | DIASTOLIC BLOOD PRESSURE: 83 MMHG | BODY MASS INDEX: 32.78 KG/M2 | WEIGHT: 255.4 LBS | OXYGEN SATURATION: 96 % | HEIGHT: 74 IN | HEART RATE: 63 BPM | TEMPERATURE: 97.3 F

## 2021-09-27 DIAGNOSIS — D45 POLYCYTHEMIA VERA (HCC): ICD-10-CM

## 2021-09-27 DIAGNOSIS — E87.6 HYPOKALEMIA: ICD-10-CM

## 2021-09-27 DIAGNOSIS — Z79.899 HIGH RISK MEDICATION USE: Primary | ICD-10-CM

## 2021-09-27 LAB
ALBUMIN SERPL-MCNC: 4.2 G/DL (ref 3.5–5.2)
ALBUMIN/GLOB SERPL: 1.4 G/DL
ALP SERPL-CCNC: 66 U/L (ref 39–117)
ALT SERPL W P-5'-P-CCNC: 45 U/L (ref 1–41)
ANION GAP SERPL CALCULATED.3IONS-SCNC: 8.7 MMOL/L (ref 5–15)
AST SERPL-CCNC: 56 U/L (ref 1–40)
BASOPHILS # BLD AUTO: 0.12 10*3/MM3 (ref 0–0.2)
BASOPHILS NFR BLD AUTO: 1.9 % (ref 0–1.5)
BILIRUB SERPL-MCNC: 0.5 MG/DL (ref 0–1.2)
BUN SERPL-MCNC: 20 MG/DL (ref 8–23)
BUN/CREAT SERPL: 16.7 (ref 7–25)
CALCIUM SPEC-SCNC: 10.2 MG/DL (ref 8.6–10.5)
CHLORIDE SERPL-SCNC: 97 MMOL/L (ref 98–107)
CO2 SERPL-SCNC: 26.3 MMOL/L (ref 22–29)
CREAT SERPL-MCNC: 1.2 MG/DL (ref 0.76–1.27)
DEPRECATED RDW RBC AUTO: 47.1 FL (ref 37–54)
EOSINOPHIL # BLD AUTO: 0.18 10*3/MM3 (ref 0–0.4)
EOSINOPHIL NFR BLD AUTO: 2.9 % (ref 0.3–6.2)
ERYTHROCYTE [DISTWIDTH] IN BLOOD BY AUTOMATED COUNT: 12.3 % (ref 12.3–15.4)
GFR SERPL CREATININE-BSD FRML MDRD: 62 ML/MIN/1.73
GLOBULIN UR ELPH-MCNC: 3.1 GM/DL
GLUCOSE SERPL-MCNC: 100 MG/DL (ref 65–99)
HCT VFR BLD AUTO: 40.1 % (ref 37.5–51)
HGB BLD-MCNC: 14 G/DL (ref 13–17.7)
IMM GRANULOCYTES # BLD AUTO: 0.03 10*3/MM3 (ref 0–0.05)
IMM GRANULOCYTES NFR BLD AUTO: 0.5 % (ref 0–0.5)
LYMPHOCYTES # BLD AUTO: 1.64 10*3/MM3 (ref 0.7–3.1)
LYMPHOCYTES NFR BLD AUTO: 26.3 % (ref 19.6–45.3)
MCH RBC QN AUTO: 36.6 PG (ref 26.6–33)
MCHC RBC AUTO-ENTMCNC: 34.9 G/DL (ref 31.5–35.7)
MCV RBC AUTO: 105 FL (ref 79–97)
MONOCYTES # BLD AUTO: 0.65 10*3/MM3 (ref 0.1–0.9)
MONOCYTES NFR BLD AUTO: 10.4 % (ref 5–12)
NEUTROPHILS NFR BLD AUTO: 3.62 10*3/MM3 (ref 1.7–7)
NEUTROPHILS NFR BLD AUTO: 58 % (ref 42.7–76)
NRBC BLD AUTO-RTO: 0 /100 WBC (ref 0–0.2)
PLATELET # BLD AUTO: 498 10*3/MM3 (ref 140–450)
PMV BLD AUTO: 9.7 FL (ref 6–12)
POTASSIUM SERPL-SCNC: 3.3 MMOL/L (ref 3.5–5.2)
PROT SERPL-MCNC: 7.3 G/DL (ref 6–8.5)
RBC # BLD AUTO: 3.82 10*6/MM3 (ref 4.14–5.8)
SODIUM SERPL-SCNC: 132 MMOL/L (ref 136–145)
WBC # BLD AUTO: 6.24 10*3/MM3 (ref 3.4–10.8)

## 2021-09-27 PROCEDURE — 80053 COMPREHEN METABOLIC PANEL: CPT | Performed by: INTERNAL MEDICINE

## 2021-09-27 PROCEDURE — 99214 OFFICE O/P EST MOD 30 MIN: CPT | Performed by: NURSE PRACTITIONER

## 2021-09-27 PROCEDURE — 36415 COLL VENOUS BLD VENIPUNCTURE: CPT

## 2021-09-27 PROCEDURE — 85025 COMPLETE CBC W/AUTO DIFF WBC: CPT | Performed by: INTERNAL MEDICINE

## 2021-09-27 NOTE — PROGRESS NOTES
Subjective     REASON FOR FOLLOW UP:  1.  Polycythemia vera, polycythemia and thrombocytosis and leukocytosis, EPO level 1.3, Tino 2+    HISTORY OF PRESENT ILLNESS:  The patient is a 61 y.o. year old male who is here for an opinion about the above issue.    History of Present Illness Mr. Landis is seen back today in 2 month follow-up, continuing on Hydrea at a dose of 1000 mg M-F and 1500 Sat and Sun. He is tolerating this well, better in fact than initially when he took higher doses. Notes improved fatigue and remains very active.  Reports good appetite.  Denies any change in bowel or bladder function.  Denies new concerns today.      Hematological oncologic history:  Patient is a 59-year-old gentleman who has been referred by his primary care Castillo Velasco for evaluation of thrombocytosis and polycythemia.  He has lost more than 50 pounds of weight.  In the last 6 months.  Looking back his blood counts have been high starting in 2016.  Initially his platelets were high around 670 and gradually increased in the 900 range.  More recently his hemoglobin has been increasing and his white count has been increasing.  He has not had a DVT or pulmonary embolism.  He has had no issues with itching with hot showers.  Has not had a stroke.  He had pneumonia last year but none recently.  He does have some shortness of breath but no chest pain.  He is exposed to secondhand smoking.    He does not have any nausea vomiting or abdominal pain at present.  He did have gout on several locations and was placed on meloxicam.  His renal function is mildly elevated.  He denies any fever or night sweats.    Patient does have a cough productive of yellow sputum but has no fevers.    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 90 capsule 3   • aspirin 81 MG chewable tablet Chew 81 mg Daily.     • bisoprolol (ZEBeta) 10 MG tablet TAKE ONE TABLET BY MOUTH DAILY 90 tablet 3   • chlorthalidone (HYGROTEN) 50 MG tablet TAKE ONE TABLET BY MOUTH DAILY 90 tablet 3   • clotrimazole-betamethasone (LOTRISONE) 1-0.05 % cream APPLY TO AFFECTED AREA(S) TWO TIMES A DAY 45 each 3   • hydrALAZINE (APRESOLINE) 50 MG tablet TAKE ONE TABLET BY MOUTH TWICE A  tablet 3   • hydroxyurea (HYDREA) 500 MG capsule 2 tablets Monday through Friday, 3 tablets Saturday and Sunday. 90 capsule 3   • KLOR-CON 20 MEQ CR tablet TAKE 1/2 TABLET BY MOUTH DAILY 15 tablet 2   • ondansetron (ZOFRAN) 4 MG tablet Take 1 tablet by mouth Every 8 (Eight) Hours As Needed for Nausea or Vomiting. 30 tablet 0   • zolpidem (AMBIEN) 10 MG tablet Take 1 tablet by mouth At Night As Needed for Sleep. 90 tablet 0     No current facility-administered medications on file prior to visit.        ALLERGIES:    Allergies   Allergen Reactions   • Cephalexin Itching        Social History     Socioeconomic History   • Marital status:      Spouse name: Not on file   • Number of children: Not on file   • Years of education: Not on file   • Highest education level: Not on file   Tobacco Use   • Smoking status: Never Smoker   • Smokeless tobacco: Never Used   Substance and Sexual Activity   • Alcohol use: Yes        Family History   Problem Relation Age of Onset   • Hypertension Father    • Hypertension Other    • Heart disease Other    • Cervical cancer Other       I have reviewed " "the patient's medical history in detail and updated the computerized patient record.    Review of Systems   Constitutional: Negative for activity change, appetite change, chills, diaphoresis, fatigue, fever and unexpected weight change.   HENT: Negative for hearing loss, mouth sores, nosebleeds, sore throat and trouble swallowing.    Respiratory: Negative for cough, chest tightness, shortness of breath and wheezing.    Cardiovascular: Negative for chest pain, palpitations and leg swelling.   Gastrointestinal: Negative for abdominal distention, abdominal pain, constipation, diarrhea, nausea and vomiting.   Genitourinary: Negative for difficulty urinating, dysuria, frequency, hematuria and urgency.   Musculoskeletal: Negative for joint swelling.        No muscle weakness.   Skin: Negative for rash and wound.   Neurological: Negative for dizziness, seizures, syncope, speech difficulty, weakness, numbness and headaches.   Hematological: Negative for adenopathy. Does not bruise/bleed easily.   Psychiatric/Behavioral: Negative for behavioral problems, confusion, sleep disturbance and suicidal ideas.   All other systems reviewed and are negative.    Reviewed and updated where appropriate 09/27/21      Objective     Vitals:    09/27/21 1408   BP: 124/83   Pulse: 63   Resp: 16   Temp: 97.3 °F (36.3 °C)   TempSrc: Temporal   SpO2: 96%   Weight: 116 kg (255 lb 6.4 oz)   Height: 188 cm (74.02\")   PainSc: 0-No pain     Current Status 8/2/2021   ECOG score 0     PHYSICAL EXAM:     CONSTITUTIONAL:  Vital signs reviewed.  Alert and oriented x3  No distress, looks comfortable.  EYES:  Conjunctiva and lids unremarkable.  Extraocular eye movements intact.  HEENT: No evidence of lymphadenopathy, no thyromegaly  RESPIRATORY:  Normal respiratory effort.  , no rales  or wheezing, clear   CARDIOVASCULAR:  Regular rate and rhythm, no murmur  No significant lower extremity edema.  ABDOMEN: Soft nontender positive bowel sounds no " hepatosplenomegaly  SKIN: No wounds.  No rashes.  MUSCULOSKELETAL/EXTREMITIES: No clubbing or cyanosis.  No apparent unilateral weakness.  NEURO: CN 2-12 appear intact. No focal neurological deficits noted.  PSYCHIATRIC:  Normal judgment and insight.  Normal mood and affect.    I have reexamined the patient and the results are consistent with the previously documented exam. Nadege Dover, AG     RECENT LABS:  Results from last 7 days   Lab Units 09/27/21  1352   WBC 10*3/mm3 6.24   NEUTROS ABS 10*3/mm3 3.62   HEMOGLOBIN g/dL 14.0   HEMATOCRIT % 40.1   PLATELETS 10*3/mm3 498*     Results from last 7 days   Lab Units 09/27/21  1352   SODIUM mmol/L 132*   POTASSIUM mmol/L 3.3*   CHLORIDE mmol/L 97*   CO2 mmol/L 26.3   BUN mg/dL 20   CREATININE mg/dL 1.20   CALCIUM mg/dL 10.2   ALBUMIN g/dL 4.20   BILIRUBIN mg/dL 0.5   ALK PHOS U/L 66   ALT (SGPT) U/L 45*   AST (SGOT) U/L 56*   GLUCOSE mg/dL 100*         Patient screened negative for depression based on a PHQ-9 score of 1 on 3/16/2021.         Assessment/Plan     1.  Polycythemia with leukocytosis and thrombocytosis.  He does not have any itching with hot showers.  He does not have any headaches or DVT.  He does have blood counts worsening gradually from 2016.  · 7/8/2020  His platelets 974K.  Hemoglobin is 17.6 with hematocrit of 52.8 and white count of 11.56.  He denies fever night sweats but has significant weight loss greater than 50 pounds in 6 months.  He does have a cough which is productive of yellow sputum and some shortness of breath.  He has history of secondhand smoking exposure.  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  CT chest negative  · Hydroxyurea 500 mg twice daily was initiated on July 16, 2020.  .  · 7/22/2020 Hydrea increased to 1500 mg (1000 mg in am and 500 mg in pm) Monday-Friday and 2000 mg (1000 mg in am and 1000 mg in PM) on  Saturdays and Sundays  · 7/29/2020 patient's platelet count today is 821.  White count 7.78, hemoglobin 16.4.   Increased Hydrea at 1500 mg Monday through Friday, and 2000 mg on Saturdays and Sundays.   · In future if hematocrit greater than 48 then he will receive phlebotomy.  · Last phlebotomy 7/15/2020.   · 9/28/2021 patient seen back today in follow-up, continuing on Hydrea at a dose of 1000 mg Monday through Friday and 1500 mg on Saturday and Sunday. Tolerating well. Energy good. Denies any GI side effects. No new concerns. Labs are stable. Continue.    2.  Weight loss greater than 50 pounds at time of initial evaluation.  · CT chest abdomen pelvis negative for malignancy  · Patient admitting that his weight loss was actually been more intentional   · Weight now stable    3. Hypokalemia, mild, chronic.  · 8/2/2021: Potassium 3.3. We discussed utilizing electrolyte drinks when he is working outside/sweating to help replenish what he is losing.  He does continue potassium chloride once daily as well.  · 9/28/2021: Potassium stable at 3.3. Continue Klor-con 20 mEq daily.       Plan  · Continue Hydrea 1000 mg daily Monday through Friday and 1500 mg daily on Saturday and Sunday.  · Continue potassium chloride 20 mEq daily  · Return for review by nurse practitioner in 2 months with repeat CBC and CMP and review by Dr. Fleming in 4 months with CBC and CMP.    This patient is on high risk drug therapy requiring intensive monitoring for toxicity.      AG Shipman

## 2021-09-28 ENCOUNTER — MEDICATION THERAPY MANAGEMENT (OUTPATIENT)
Dept: PHARMACY | Facility: HOSPITAL | Age: 61
End: 2021-09-28

## 2021-09-28 NOTE — PROGRESS NOTES
[facility] Lab Review- [facility]      9/27/2021   WBC 3.40 - 10.80 10*3/mm3 6.24   Neutrophils Absolute 1.70 - 7.00 10*3/mm3 3.62   Hemoglobin 13.0 - 17.7 g/dL 14.0   Hematocrit 37.5 - 51.0 % 40.1   Platelets 140 - 450 10*3/mm3 498 (A)   Creatinine 0.76 - 1.27 mg/dL 1.20   eGFR Non African Am >60 mL/min/1.73 62   BUN 8 - 23 mg/dL 20   Sodium 136 - 145 mmol/L 132 (A)   Potassium 3.5 - 5.2 mmol/L 3.3 (A)   Glucose 65 - 99 mg/dL 100 (A)   Calcium 8.6 - 10.5 mg/dL 10.2   Albumin 3.50 - 5.20 g/dL 4.20   Total Protein 6.0 - 8.5 g/dL 7.3   AST (SGOT) 1 - 40 U/L 56 (A)   ALT (SGPT) 1 - 41 U/L 45 (A)   Alkaline Phosphatase 39 - 117 U/L 66   Total Bilirubin 0.0 - 1.2 mg/dL 0.5     APRN dictation noted. Pharmacy will continue to follow.     Thanks,   Amanda Morrison, PharmD

## 2021-10-01 RX ORDER — POTASSIUM CHLORIDE 20 MEQ/1
10 TABLET, EXTENDED RELEASE ORAL DAILY
Qty: 15 TABLET | Refills: 2 | Status: SHIPPED | OUTPATIENT
Start: 2021-10-01 | End: 2021-12-28 | Stop reason: SDUPTHER

## 2021-10-25 ENCOUNTER — TELEPHONE (OUTPATIENT)
Dept: ONCOLOGY | Facility: CLINIC | Age: 61
End: 2021-10-25

## 2021-10-25 NOTE — TELEPHONE ENCOUNTER
Returned call to Jeff at [facility] to let him know it is okay to refill Allopurinol 100 mg bid per Dr. Fleming. Understanding verbalized.

## 2021-10-25 NOTE — TELEPHONE ENCOUNTER
Caller: [facility]    Relationship: [facility]    Best call back number: [phone]    Which medication are you concerned about: ALLAPERINOL 100MG    What are your concerns: [facility] STATED THEY HAD NEVER FILLED A PRESCRIPTION FOR THIS PATIENT BEFORE AND NEEDED AUTHORIZATION FROM DR FLORES.    PLEASE CALL TO CONFIRM

## 2021-11-12 DIAGNOSIS — D45 POLYCYTHEMIA VERA (HCC): Primary | ICD-10-CM

## 2021-11-12 RX ORDER — HYDROXYUREA 500 MG/1
CAPSULE ORAL
Qty: 192 CAPSULE | Refills: 3 | Status: SHIPPED | OUTPATIENT
Start: 2021-11-12 | End: 2021-12-01 | Stop reason: SDUPTHER

## 2021-11-19 ENCOUNTER — SPECIALTY PHARMACY (OUTPATIENT)
Dept: PHARMACY | Facility: HOSPITAL | Age: 61
End: 2021-11-19

## 2021-12-01 ENCOUNTER — LAB (OUTPATIENT)
Dept: OTHER | Facility: HOSPITAL | Age: 61
End: 2021-12-01

## 2021-12-01 ENCOUNTER — OFFICE VISIT (OUTPATIENT)
Dept: ONCOLOGY | Facility: CLINIC | Age: 61
End: 2021-12-01

## 2021-12-01 VITALS
OXYGEN SATURATION: 98 % | BODY MASS INDEX: 33.01 KG/M2 | HEART RATE: 59 BPM | HEIGHT: 74 IN | DIASTOLIC BLOOD PRESSURE: 72 MMHG | TEMPERATURE: 97.7 F | RESPIRATION RATE: 16 BRPM | SYSTOLIC BLOOD PRESSURE: 112 MMHG | WEIGHT: 257.2 LBS

## 2021-12-01 DIAGNOSIS — D75.1 POLYCYTHEMIA: Primary | ICD-10-CM

## 2021-12-01 DIAGNOSIS — Z79.899 HIGH RISK MEDICATION USE: ICD-10-CM

## 2021-12-01 DIAGNOSIS — D45 POLYCYTHEMIA VERA (HCC): ICD-10-CM

## 2021-12-01 DIAGNOSIS — E87.6 HYPOKALEMIA: ICD-10-CM

## 2021-12-01 LAB
ALBUMIN SERPL-MCNC: 4.2 G/DL (ref 3.5–5.2)
ALBUMIN/GLOB SERPL: 1.3 G/DL
ALP SERPL-CCNC: 75 U/L (ref 39–117)
ALT SERPL W P-5'-P-CCNC: 33 U/L (ref 1–41)
ANION GAP SERPL CALCULATED.3IONS-SCNC: 7 MMOL/L (ref 5–15)
AST SERPL-CCNC: 31 U/L (ref 1–40)
BASOPHILS # BLD AUTO: 0.12 10*3/MM3 (ref 0–0.2)
BASOPHILS NFR BLD AUTO: 1.5 % (ref 0–1.5)
BILIRUB SERPL-MCNC: 0.4 MG/DL (ref 0–1.2)
BUN SERPL-MCNC: 20 MG/DL (ref 8–23)
BUN/CREAT SERPL: 19 (ref 7–25)
CALCIUM SPEC-SCNC: 10.1 MG/DL (ref 8.6–10.5)
CHLORIDE SERPL-SCNC: 96 MMOL/L (ref 98–107)
CO2 SERPL-SCNC: 30 MMOL/L (ref 22–29)
CREAT SERPL-MCNC: 1.05 MG/DL (ref 0.76–1.27)
DEPRECATED RDW RBC AUTO: 53.8 FL (ref 37–54)
EOSINOPHIL # BLD AUTO: 0.14 10*3/MM3 (ref 0–0.4)
EOSINOPHIL NFR BLD AUTO: 1.8 % (ref 0.3–6.2)
ERYTHROCYTE [DISTWIDTH] IN BLOOD BY AUTOMATED COUNT: 13.2 % (ref 12.3–15.4)
GFR SERPL CREATININE-BSD FRML MDRD: 72 ML/MIN/1.73
GLOBULIN UR ELPH-MCNC: 3.2 GM/DL
GLUCOSE SERPL-MCNC: 76 MG/DL (ref 65–99)
HCT VFR BLD AUTO: 39.9 % (ref 37.5–51)
HGB BLD-MCNC: 14 G/DL (ref 13–17.7)
IMM GRANULOCYTES # BLD AUTO: 0.03 10*3/MM3 (ref 0–0.05)
IMM GRANULOCYTES NFR BLD AUTO: 0.4 % (ref 0–0.5)
LYMPHOCYTES # BLD AUTO: 1.79 10*3/MM3 (ref 0.7–3.1)
LYMPHOCYTES NFR BLD AUTO: 22.9 % (ref 19.6–45.3)
MACROCYTES BLD QL SMEAR: NORMAL
MCH RBC QN AUTO: 38.3 PG (ref 26.6–33)
MCHC RBC AUTO-ENTMCNC: 35.1 G/DL (ref 31.5–35.7)
MCV RBC AUTO: 109 FL (ref 79–97)
MONOCYTES # BLD AUTO: 1.08 10*3/MM3 (ref 0.1–0.9)
MONOCYTES NFR BLD AUTO: 13.8 % (ref 5–12)
NEUTROPHILS NFR BLD AUTO: 4.67 10*3/MM3 (ref 1.7–7)
NEUTROPHILS NFR BLD AUTO: 59.6 % (ref 42.7–76)
NRBC BLD AUTO-RTO: 0 /100 WBC (ref 0–0.2)
PLAT MORPH BLD: NORMAL
PLATELET # BLD AUTO: 507 10*3/MM3 (ref 140–450)
PMV BLD AUTO: 9.3 FL (ref 6–12)
POTASSIUM SERPL-SCNC: 3.5 MMOL/L (ref 3.5–5.2)
PROT SERPL-MCNC: 7.4 G/DL (ref 6–8.5)
RBC # BLD AUTO: 3.66 10*6/MM3 (ref 4.14–5.8)
SODIUM SERPL-SCNC: 133 MMOL/L (ref 136–145)
WBC MORPH BLD: NORMAL
WBC NRBC COR # BLD: 7.83 10*3/MM3 (ref 3.4–10.8)

## 2021-12-01 PROCEDURE — 80053 COMPREHEN METABOLIC PANEL: CPT | Performed by: INTERNAL MEDICINE

## 2021-12-01 PROCEDURE — 36415 COLL VENOUS BLD VENIPUNCTURE: CPT

## 2021-12-01 PROCEDURE — 85025 COMPLETE CBC W/AUTO DIFF WBC: CPT | Performed by: INTERNAL MEDICINE

## 2021-12-01 PROCEDURE — 99214 OFFICE O/P EST MOD 30 MIN: CPT | Performed by: INTERNAL MEDICINE

## 2021-12-01 PROCEDURE — 85007 BL SMEAR W/DIFF WBC COUNT: CPT | Performed by: INTERNAL MEDICINE

## 2021-12-01 RX ORDER — HYDROXYUREA 500 MG/1
CAPSULE ORAL
Qty: 90 CAPSULE | Refills: 3 | Status: SHIPPED | OUTPATIENT
Start: 2021-12-01 | End: 2022-01-06 | Stop reason: SDUPTHER

## 2021-12-01 NOTE — PROGRESS NOTES
Subjective     REASON FOR FOLLOW UP:  1.  Polycythemia vera, polycythemia and thrombocytosis and leukocytosis, EPO level 1.3, Tino 2+    HISTORY OF PRESENT ILLNESS:  The patient is a 61 y.o. year old male who is here for an opinion about the above issue.    History of Present Illness Mr. Landis is 61-year-old gentleman with polycythemia vera currently on hydroxyurea.  He also has mild splenomegaly at 15.5 cm x 13.5 cm.  However he does not have any symptoms of fullness when he eats.  He has been asymptomatic.  His JAK2 mutation is positive and he is taking hydroxyurea 2 pills daily Monday through Friday and three p.o. on weekends.  His platelets are increased today to 507.  Hence we will need to likely increase the dose of hydroxyurea.            Hematological oncologic history:  Patient is a 59-year-old gentleman who has been referred by his primary care Castillo Velasco for evaluation of thrombocytosis and polycythemia.  He has lost more than 50 pounds of weight.  In the last 6 months.  Looking back his blood counts have been high starting in 2016.  Initially his platelets were high around 670 and gradually increased in the 900 range.  More recently his hemoglobin has been increasing and his white count has been increasing.  He has not had a DVT or pulmonary embolism.  He has had no issues with itching with hot showers.  Has not had a stroke.  He had pneumonia last year but none recently.  He does have some shortness of breath but no chest pain.  He is exposed to secondhand smoking.    He does not have any nausea vomiting or abdominal pain at present.  He did have gout on several locations and was placed on meloxicam.  His renal function is mildly elevated.  He denies any fever or night sweats.    Patient does have a cough productive of yellow sputum but has no fevers.    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, EPO 1.3 low  CT chest negative, CT abdomen pelvis shows  "splenomegaly    Past Medical History:   Diagnosis Date   • Anxiety    • Arthritis    • History of thrombocytosis    • Hypertension         Past Surgical History:   Procedure Laterality Date   • CHEST SURGERY     • OTHER SURGICAL HISTORY  1985    \"Exploratory\"        Current Outpatient Medications on File Prior to Visit   Medication Sig Dispense Refill   • allopurinol (ZYLOPRIM) 100 MG tablet TAKE ONE TABLET BY MOUTH TWICE A  tablet 0   • amLODIPine-benazepril (LOTREL) 5-40 MG per capsule Take 1 capsule by mouth Daily. 90 capsule 3   • aspirin 81 MG chewable tablet Chew 81 mg Daily.     • bisoprolol (ZEBeta) 10 MG tablet TAKE ONE TABLET BY MOUTH DAILY 90 tablet 3   • chlorthalidone (HYGROTEN) 50 MG tablet TAKE ONE TABLET BY MOUTH DAILY 90 tablet 3   • clotrimazole-betamethasone (LOTRISONE) 1-0.05 % cream APPLY TO AFFECTED AREA(S) TWO TIMES A DAY 45 each 3   • hydrALAZINE (APRESOLINE) 50 MG tablet TAKE ONE TABLET BY MOUTH TWICE A  tablet 3   • ondansetron (ZOFRAN) 4 MG tablet Take 1 tablet by mouth Every 8 (Eight) Hours As Needed for Nausea or Vomiting. 30 tablet 0   • potassium chloride (KLOR-CON) 20 MEQ CR tablet Take 0.5 tablets by mouth Daily. 15 tablet 2   • zolpidem (AMBIEN) 10 MG tablet Take 1 tablet by mouth At Night As Needed for Sleep. 90 tablet 0   • [DISCONTINUED] hydroxyurea (HYDREA) 500 MG capsule 2 tablets Monday through Friday, 3 tablets Saturday and Sunday. 192 capsule 3     No current facility-administered medications on file prior to visit.        ALLERGIES:    Allergies   Allergen Reactions   • Cephalexin Itching        Social History     Socioeconomic History   • Marital status:    Tobacco Use   • Smoking status: Never Smoker   • Smokeless tobacco: Never Used   Substance and Sexual Activity   • Alcohol use: Yes        Family History   Problem Relation Age of Onset   • Hypertension Father    • Hypertension Other    • Heart disease Other    • Cervical cancer Other       I have " "reviewed the patient's medical history in detail and updated the computerized patient record.    Review of Systems   Constitutional: Negative for activity change, appetite change, chills, diaphoresis, fatigue, fever and unexpected weight change.   HENT: Negative for hearing loss, mouth sores, nosebleeds, sore throat and trouble swallowing.    Respiratory: Negative for cough, chest tightness, shortness of breath and wheezing.    Cardiovascular: Negative for chest pain, palpitations and leg swelling.   Gastrointestinal: Negative for abdominal distention, abdominal pain, constipation, diarrhea, nausea and vomiting.   Genitourinary: Negative for difficulty urinating, dysuria, frequency, hematuria and urgency.   Musculoskeletal: Negative for joint swelling.        No muscle weakness.   Skin: Negative for rash and wound.   Neurological: Negative for dizziness, seizures, syncope, speech difficulty, weakness, numbness and headaches.   Hematological: Negative for adenopathy. Does not bruise/bleed easily.   Psychiatric/Behavioral: Negative for behavioral problems, confusion, sleep disturbance and suicidal ideas.   All other systems reviewed and are negative.    Reviewed and updated where appropriate 12/01/21      Objective     Vitals:    12/01/21 1348   BP: 112/72   Pulse: 59   Resp: 16   Temp: 97.7 °F (36.5 °C)   TempSrc: Temporal   SpO2: 98%   Weight: 117 kg (257 lb 3.2 oz)   Height: 188 cm (74.02\")   PainSc: 0-No pain     Current Status 12/1/2021   ECOG score 0     PHYSICAL EXAM:      CONSTITUTIONAL:  Vital signs reviewed.  No distress, looks comfortable.  EYES:  Conjunctivae and lids unremarkable.  PERRLA  EARS,NOSE,MOUTH,THROAT:  Ears and nose appear unremarkable.  Lips, teeth, gums appear unremarkable.  RESPIRATORY:  Normal respiratory effort.  Lungs clear to auscultation bilaterally.  CARDIOVASCULAR:  Normal S1, S2.  No murmurs rubs or gallops.  No significant lower extremity edema.  GASTROINTESTINAL: Abdomen appears " unremarkable.  Nontender.  No hepatomegaly.  No splenomegaly.  LYMPHATIC:  No cervical, supraclavicular, axillary lymphadenopathy.  SKIN:  Warm.  No rashes.  PSYCHIATRIC:  Normal judgment and insight.  Normal mood and affect.  RECENT LABS:  Results from last 7 days   Lab Units 12/01/21  1344   WBC 10*3/mm3 7.83   NEUTROS ABS 10*3/mm3 4.67   HEMOGLOBIN g/dL 14.0   HEMATOCRIT % 39.9   PLATELETS 10*3/mm3 507*     Results from last 7 days   Lab Units 12/01/21  1344   SODIUM mmol/L 133*   POTASSIUM mmol/L 3.5   CHLORIDE mmol/L 96*   CO2 mmol/L 30.0*   BUN mg/dL 20   CREATININE mg/dL 1.05   CALCIUM mg/dL 10.1   ALBUMIN g/dL 4.20   BILIRUBIN mg/dL 0.4   ALK PHOS U/L 75   ALT (SGPT) U/L 33   AST (SGOT) U/L 31   GLUCOSE mg/dL 76         Patient screened negative for depression based on a PHQ-9 score of 1 on 3/16/2021.         Assessment/Plan     1.  Polycythemia with leukocytosis and thrombocytosis.  He does not have any itching with hot showers.  He does not have any headaches or DVT.  He does have blood counts worsening gradually from 2016.  · 7/8/2020  His platelets 974K.  Hemoglobin is 17.6 with hematocrit of 52.8 and white count of 11.56.  He denies fever night sweats but has significant weight loss greater than 50 pounds in 6 months.  He does have a cough which is productive of yellow sputum and some shortness of breath.  He has history of secondhand smoking exposure.  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  CT chest negative  · Hydroxyurea 500 mg twice daily was initiated on July 16, 2020.  .  · 7/22/2020 Hydrea increased to 1500 mg (1000 mg in am and 500 mg in pm) Monday-Friday and 2000 mg (1000 mg in am and 1000 mg in PM) on Saturdays and Sundays  · 7/29/2020 patient's platelet count today is 821.  White count 7.78, hemoglobin 16.4.   Increased Hydrea at 1500 mg Monday through Friday, and 2000 mg on Saturdays  and Sundays.   · In future if hematocrit greater than 48 then he will receive phlebotomy.  · Last phlebotomy 7/15/2020.   · November 30, 2021: Platelets of 507.  Will increase hydroxyurea    2.  Weight loss greater than 50 pounds at time of initial evaluation.  · CT chest abdomen pelvis negative for malignancy  · Patient admitting that his weight loss was actually been more intentional   · Weight now stable    3. Hypokalemia, mild, chronic.  · 8/2/2021: Potassium 3.3. We discussed utilizing electrolyte drinks when he is working outside/sweating to help replenish what he is losing.  He does continue potassium chloride once daily as well.  · 9/28/2021: Potassium stable at 3.3. Continue Klor-con 20 mEq daily.       Plan  · We will change dose of hydroxyurea to 1000 mg Monday two through Thursday and 1005 mg from Friday to Sunday  · He has chronic mild hypokalemia, potassium is 3.5 borderline normal.  And calcium is normal    Splenomegaly stable  Follow-up in 3 months with nurse practitioner and in 6 months with me with labs    Monitoring high risk medication    Eliana Fleming MD

## 2021-12-08 ENCOUNTER — SPECIALTY PHARMACY (OUTPATIENT)
Dept: PHARMACY | Facility: HOSPITAL | Age: 61
End: 2021-12-08

## 2021-12-10 ENCOUNTER — SPECIALTY PHARMACY (OUTPATIENT)
Dept: PHARMACY | Facility: HOSPITAL | Age: 61
End: 2021-12-10

## 2021-12-14 ENCOUNTER — SPECIALTY PHARMACY (OUTPATIENT)
Dept: PHARMACY | Facility: HOSPITAL | Age: 61
End: 2021-12-14

## 2021-12-23 ENCOUNTER — SPECIALTY PHARMACY (OUTPATIENT)
Dept: PHARMACY | Facility: HOSPITAL | Age: 61
End: 2021-12-23

## 2021-12-28 ENCOUNTER — DOCUMENTATION (OUTPATIENT)
Dept: PHARMACY | Facility: HOSPITAL | Age: 61
End: 2021-12-28

## 2021-12-28 RX ORDER — POTASSIUM CHLORIDE 1500 MG/1
20 TABLET, FILM COATED, EXTENDED RELEASE ORAL DAILY
Qty: 30 TABLET | Refills: 2 | Status: SHIPPED | OUTPATIENT
Start: 2021-12-28 | End: 2021-12-28

## 2021-12-28 RX ORDER — POTASSIUM CHLORIDE 20 MEQ/1
20 TABLET, EXTENDED RELEASE ORAL DAILY
Qty: 30 TABLET | Refills: 2 | Status: SHIPPED | OUTPATIENT
Start: 2021-12-28 | End: 2021-12-28

## 2021-12-28 RX ORDER — POTASSIUM CHLORIDE 1500 MG/1
20 TABLET, FILM COATED, EXTENDED RELEASE ORAL DAILY
Qty: 30 TABLET | Refills: 2 | Status: SHIPPED | OUTPATIENT
Start: 2021-12-28 | End: 2021-12-28 | Stop reason: SDUPTHER

## 2021-12-28 RX ORDER — POTASSIUM CHLORIDE 20 MEQ/1
20 TABLET, EXTENDED RELEASE ORAL DAILY
Qty: 30 TABLET | Refills: 2 | Status: SHIPPED | OUTPATIENT
Start: 2021-12-28 | End: 2022-05-20 | Stop reason: SDUPTHER

## 2022-01-06 DIAGNOSIS — D45 POLYCYTHEMIA VERA: ICD-10-CM

## 2022-01-06 RX ORDER — HYDROXYUREA 500 MG/1
CAPSULE ORAL
Qty: 204 CAPSULE | Refills: 1 | Status: SHIPPED | OUTPATIENT
Start: 2022-01-06 | End: 2022-06-24 | Stop reason: SDUPTHER

## 2022-01-06 RX ORDER — HYDROXYUREA 500 MG/1
CAPSULE ORAL
Qty: 204 CAPSULE | Refills: 1 | Status: SHIPPED | OUTPATIENT
Start: 2022-01-06 | End: 2022-01-06 | Stop reason: SDUPTHER

## 2022-01-21 DIAGNOSIS — E79.0 ELEVATED URIC ACID IN BLOOD: ICD-10-CM

## 2022-01-21 DIAGNOSIS — D45 POLYCYTHEMIA VERA: ICD-10-CM

## 2022-01-21 DIAGNOSIS — D75.839 THROMBOCYTOSIS: ICD-10-CM

## 2022-01-21 RX ORDER — ALLOPURINOL 100 MG/1
100 TABLET ORAL 2 TIMES DAILY
Qty: 180 TABLET | Refills: 0 | Status: SHIPPED | OUTPATIENT
Start: 2022-01-21 | End: 2022-04-20

## 2022-02-01 ENCOUNTER — OFFICE VISIT (OUTPATIENT)
Dept: FAMILY MEDICINE CLINIC | Facility: CLINIC | Age: 62
End: 2022-02-01

## 2022-02-01 VITALS
WEIGHT: 285 LBS | TEMPERATURE: 97.5 F | BODY MASS INDEX: 36.57 KG/M2 | HEIGHT: 74 IN | SYSTOLIC BLOOD PRESSURE: 136 MMHG | DIASTOLIC BLOOD PRESSURE: 82 MMHG | OXYGEN SATURATION: 98 % | HEART RATE: 68 BPM

## 2022-02-01 DIAGNOSIS — G47.00 INSOMNIA, UNSPECIFIED TYPE: Primary | ICD-10-CM

## 2022-02-01 DIAGNOSIS — Z79.899 HIGH RISK MEDICATION USE: ICD-10-CM

## 2022-02-01 DIAGNOSIS — I10 ESSENTIAL HYPERTENSION: ICD-10-CM

## 2022-02-01 DIAGNOSIS — M25.562 CHRONIC PAIN OF LEFT KNEE: ICD-10-CM

## 2022-02-01 DIAGNOSIS — G89.29 CHRONIC PAIN OF LEFT KNEE: ICD-10-CM

## 2022-02-01 DIAGNOSIS — Z12.5 SPECIAL SCREENING FOR MALIGNANT NEOPLASM OF PROSTATE: ICD-10-CM

## 2022-02-01 DIAGNOSIS — Z13.6 SCREENING FOR ISCHEMIC HEART DISEASE: ICD-10-CM

## 2022-02-01 PROCEDURE — 99214 OFFICE O/P EST MOD 30 MIN: CPT | Performed by: NURSE PRACTITIONER

## 2022-02-01 PROCEDURE — 20610 DRAIN/INJ JOINT/BURSA W/O US: CPT | Performed by: NURSE PRACTITIONER

## 2022-02-01 RX ORDER — CHLORTHALIDONE 50 MG/1
50 TABLET ORAL DAILY
Qty: 90 TABLET | Refills: 3 | Status: SHIPPED | OUTPATIENT
Start: 2022-02-01 | End: 2023-02-13 | Stop reason: SDUPTHER

## 2022-02-01 RX ORDER — ZOLPIDEM TARTRATE 10 MG/1
10 TABLET ORAL NIGHTLY PRN
Qty: 90 TABLET | Refills: 0 | Status: SHIPPED | OUTPATIENT
Start: 2022-02-01 | End: 2022-05-19 | Stop reason: SDUPTHER

## 2022-02-01 RX ORDER — AMLODIPINE BESYLATE AND BENAZEPRIL HYDROCHLORIDE 5; 40 MG/1; MG/1
1 CAPSULE ORAL DAILY
Qty: 90 CAPSULE | Refills: 3 | Status: SHIPPED | OUTPATIENT
Start: 2022-02-01 | End: 2023-02-13 | Stop reason: SDUPTHER

## 2022-02-01 RX ORDER — TRIAMCINOLONE ACETONIDE 40 MG/ML
40 INJECTION, SUSPENSION INTRA-ARTICULAR; INTRAMUSCULAR
Status: COMPLETED | OUTPATIENT
Start: 2022-02-01 | End: 2022-02-01

## 2022-02-01 RX ORDER — HYDRALAZINE HYDROCHLORIDE 50 MG/1
50 TABLET, FILM COATED ORAL 2 TIMES DAILY
Qty: 180 TABLET | Refills: 3 | Status: SHIPPED | OUTPATIENT
Start: 2022-02-01 | End: 2023-02-13 | Stop reason: SDUPTHER

## 2022-02-01 RX ORDER — BISOPROLOL FUMARATE 10 MG/1
10 TABLET, FILM COATED ORAL DAILY
Qty: 90 TABLET | Refills: 3 | Status: SHIPPED | OUTPATIENT
Start: 2022-02-01 | End: 2023-02-13 | Stop reason: SDUPTHER

## 2022-02-01 RX ADMIN — TRIAMCINOLONE ACETONIDE 40 MG: 40 INJECTION, SUSPENSION INTRA-ARTICULAR; INTRAMUSCULAR at 13:08

## 2022-02-01 NOTE — PROGRESS NOTES
"Chief Complaint  Knee Pain (Patient is wanting a cortisone injection in left knee and he is needing medications refilled ) and Insomnia (Patient is needing ambien refilled needs drug screen )    Subjective          Phani Landis presents to [facility]  History of Present Illness  HTN-patient is currently on amlodipine/Benzapril 5/40 mg, bisoprolol 10 mg, hydralazine 50 mg, and chlorthalidone 50 mg daily as directed and side effects.  Blood pressures well controlled in the office today.    INSOMNIA-patient is on Ambien 10 mg on a as needed basis.  Has not had prescription filled in over a year but still has been using it on a as needed basis at home.    Chronic left knee pain-patient requesting a cortisone injection has not had one since March 2020.  Objective   Vital Signs:   /82   Pulse 68   Temp 97.5 °F (36.4 °C)   Ht 188 cm (74.02\")   Wt 129 kg (285 lb)   SpO2 98%   BMI 36.58 kg/m²     Physical Exam  Vitals reviewed.   Constitutional:       General: He is not in acute distress.     Appearance: He is well-developed.   HENT:      Head: Normocephalic.   Cardiovascular:      Rate and Rhythm: Normal rate and regular rhythm.      Heart sounds: Normal heart sounds.   Pulmonary:      Effort: Pulmonary effort is normal.      Breath sounds: Normal breath sounds.   Neurological:      Mental Status: He is alert and oriented to person, place, and time.      Gait: Gait normal.   Psychiatric:         Behavior: Behavior normal.         Thought Content: Thought content normal.         Judgment: Judgment normal.        Result Review :   The following data was reviewed by: AG Matt on 02/01/2022:  CMP    CMP 8/2/21 9/27/21 12/1/21   Glucose 109 (A) 100 (A) 76   BUN 12 20 20   Creatinine 1.08 1.20 1.05   eGFR Non African Am 70 62 72   Sodium 137 132 (A) 133 (A)   Potassium 3.3 (A) 3.3 (A) 3.5   Chloride 101 97 (A) 96 (A)   Calcium 9.9 10.2 10.1   Albumin 4.20 4.20 4.20   Total " Bilirubin 0.9 0.5 0.4   Alkaline Phosphatase 69 66 75   AST (SGOT) 30 56 (A) 31   ALT (SGPT) 23 45 (A) 33   (A) Abnormal value            CBC w/diff    CBC w/Diff 8/2/21 9/27/21 12/1/21   WBC 6.57 6.24 7.83   RBC 4.04 (A) 3.82 (A) 3.66 (A)   Hemoglobin 15.4 14.0 14.0   Hematocrit 44.0 40.1 39.9   .9 (A) 105.0 (A) 109.0 (A)   MCH 38.1 (A) 36.6 (A) 38.3 (A)   MCHC 35.0 34.9 35.1   RDW 12.1 (A) 12.3 13.2   Platelets 474 (A) 498 (A) 507 (A)   Neutrophil Rel % 63.2 58.0 59.6   Immature Granulocyte Rel % 0.5 0.5 0.4   Lymphocyte Rel % 22.4 26.3 22.9   Monocyte Rel % 11.1 10.4 13.8 (A)   Eosinophil Rel % 1.1 2.9 1.8   Basophil Rel % 1.7 (A) 1.9 (A) 1.5   (A) Abnormal value                       Arthrocentesis    Date/Time: 2/1/2022 1:08 PM  Performed by: Castillo Velasco APRN  Authorized by: Castillo Velasco APRN   Consent: Verbal consent obtained.  Risks and benefits: risks, benefits and alternatives were discussed  Consent given by: patient  Patient understanding: patient states understanding of the procedure being performed  Patient consent: the patient's understanding of the procedure matches consent given  Procedure consent: procedure consent matches procedure scheduled  Relevant documents: relevant documents present and verified  Patient identity confirmed: verbally with patient  Indications: pain   Body area: knee  Joint: left knee  Local anesthesia used: no    Anesthesia:  Local anesthesia used: no    Sedation:  Patient sedated: no    Needle size: 20 G  Ultrasound guidance: no  Approach: lateral  Patient tolerance: patient tolerated the procedure well with no immediate complications            Assessment and Plan    Diagnoses and all orders for this visit:    1. Insomnia, unspecified type (Primary)  -     zolpidem (AMBIEN) 10 MG tablet; Take 1 tablet by mouth At Night As Needed for Sleep.  Dispense: 90 tablet; Refill: 0    2. Essential hypertension  -     bisoprolol (ZEBeta) 10 MG tablet; Take 1 tablet by mouth  Daily.  Dispense: 90 tablet; Refill: 3  -     chlorthalidone (HYGROTEN) 50 MG tablet; Take 1 tablet by mouth Daily.  Dispense: 90 tablet; Refill: 3  -     amLODIPine-benazepril (LOTREL) 5-40 MG per capsule; Take 1 capsule by mouth Daily.  Dispense: 90 capsule; Refill: 3  -     hydrALAZINE (APRESOLINE) 50 MG tablet; Take 1 tablet by mouth 2 (Two) Times a Day.  Dispense: 180 tablet; Refill: 3    3. High risk medication use  -     ToxASSURE Select 13 (MW) - Urine, Clean Catch    4. Screening for ischemic heart disease  -     Lipid Panel With LDL / HDL Ratio    5. Special screening for malignant neoplasm of prostate  -     PSA Screen    6. Chronic pain of left knee  -     Arthrocentesis        Follow Up   Return in about 6 months (around 8/1/2022) for Recheck.  Patient was given instructions and counseling regarding his condition or for health maintenance advice. Please see specific information pulled into the AVS if appropriate.     Continue same with medications.  Follow-up after labs.  Return to the office in 6 months    Mask and googles worn

## 2022-02-06 LAB
CHOLEST SERPL-MCNC: 169 MG/DL (ref 100–199)
DRUGS UR: NORMAL
HDLC SERPL-MCNC: 58 MG/DL
LDLC SERPL CALC-MCNC: 94 MG/DL (ref 0–99)
LDLC/HDLC SERPL: 1.6 RATIO (ref 0–3.6)
PSA SERPL-MCNC: 0.7 NG/ML (ref 0–4)
TRIGL SERPL-MCNC: 94 MG/DL (ref 0–149)
VLDLC SERPL CALC-MCNC: 17 MG/DL (ref 5–40)

## 2022-02-15 ENCOUNTER — TELEPHONE (OUTPATIENT)
Dept: ONCOLOGY | Facility: OTHER | Age: 62
End: 2022-02-15

## 2022-02-21 NOTE — PROGRESS NOTES
Subjective     REASON FOR FOLLOW UP:  1.  Polycythemia vera, polycythemia and thrombocytosis and leukocytosis, EPO level 1.3, Tino 2+    HISTORY OF PRESENT ILLNESS:  The patient is a 61 y.o. year old male who is here for an opinion about the above issue.    History of Present Illness Mr. Landis is 61-year-old gentleman with polycythemia vera currently on hydroxyurea.  He is currently taking Hydrea 1500 mg on Monday, Wednesday, and Friday and 1000 mg all other days. He feels slightly more fatigued on the days he takes 1500 mg, but otherwise is feeling well.  He is eating and drinking adequately.  His bowels are moving regularly.  He denies fever or chills.  He denies nausea or vomiting.  He denies headache, chest pain, dizziness, or vision changes.  He has no new concerns today.    Hematological oncologic history:  Patient is a 59-year-old gentleman who has been referred by his primary care Castillo Velasco for evaluation of thrombocytosis and polycythemia.  He has lost more than 50 pounds of weight.  In the last 6 months.  Looking back his blood counts have been high starting in 2016.  Initially his platelets were high around 670 and gradually increased in the 900 range.  More recently his hemoglobin has been increasing and his white count has been increasing.  He has not had a DVT or pulmonary embolism.  He has had no issues with itching with hot showers.  Has not had a stroke.  He had pneumonia last year but none recently.  He does have some shortness of breath but no chest pain.  He is exposed to secondhand smoking.    He does not have any nausea vomiting or abdominal pain at present.  He did have gout on several locations and was placed on meloxicam.  His renal function is mildly elevated.  He denies any fever or night sweats.    Patient does have a cough productive of yellow sputum but has no fevers.    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, EPO 1.3 low  CT chest  "negative, CT abdomen pelvis shows splenomegaly    Past Medical History:   Diagnosis Date   • Anxiety    • Arthritis    • History of thrombocytosis    • Hypertension         Past Surgical History:   Procedure Laterality Date   • CHEST SURGERY     • OTHER SURGICAL HISTORY  1985    \"Exploratory\"        Current Outpatient Medications on File Prior to Visit   Medication Sig Dispense Refill   • allopurinol (ZYLOPRIM) 100 MG tablet Take 1 tablet by mouth 2 (Two) Times a Day. 180 tablet 0   • amLODIPine-benazepril (LOTREL) 5-40 MG per capsule Take 1 capsule by mouth Daily. 90 capsule 3   • aspirin 81 MG chewable tablet Chew 81 mg Daily.     • bisoprolol (ZEBeta) 10 MG tablet Take 1 tablet by mouth Daily. 90 tablet 3   • chlorthalidone (HYGROTEN) 50 MG tablet Take 1 tablet by mouth Daily. 90 tablet 3   • clotrimazole-betamethasone (LOTRISONE) 1-0.05 % cream APPLY TO AFFECTED AREA(S) TWO TIMES A DAY 45 each 3   • hydrALAZINE (APRESOLINE) 50 MG tablet Take 1 tablet by mouth 2 (Two) Times a Day. 180 tablet 3   • hydroxyurea (HYDREA) 500 MG capsule Take 2 caps (1000 mg)  po daily Monday through Thursday and 3 caps ( 1500 mg)  po Friday, Saturday and Sunday 204 capsule 1   • ondansetron (ZOFRAN) 4 MG tablet Take 1 tablet by mouth Every 8 (Eight) Hours As Needed for Nausea or Vomiting. 30 tablet 0   • potassium chloride (K-DUR,KLOR-CON) 20 MEQ CR tablet Take 1 tablet by mouth Daily. 30 tablet 2   • zolpidem (AMBIEN) 10 MG tablet Take 1 tablet by mouth At Night As Needed for Sleep. 90 tablet 0     No current facility-administered medications on file prior to visit.        ALLERGIES:    Allergies   Allergen Reactions   • Cephalexin Itching        Social History     Socioeconomic History   • Marital status:    Tobacco Use   • Smoking status: Never Smoker   • Smokeless tobacco: Never Used   Substance and Sexual Activity   • Alcohol use: Yes        Family History   Problem Relation Age of Onset   • Hypertension Father    • " "Hypertension Other    • Heart disease Other    • Cervical cancer Other       I have reviewed the patient's medical history in detail and updated the computerized patient record.    Review of Systems   Constitutional: Negative for activity change, appetite change, chills, diaphoresis, fatigue, fever and unexpected weight change.   HENT: Negative for hearing loss, mouth sores, nosebleeds, sore throat and trouble swallowing.    Respiratory: Negative for cough, chest tightness, shortness of breath and wheezing.    Cardiovascular: Negative for chest pain, palpitations and leg swelling.   Gastrointestinal: Negative for abdominal distention, abdominal pain, constipation, diarrhea, nausea and vomiting.   Genitourinary: Negative for difficulty urinating, dysuria, frequency, hematuria and urgency.   Musculoskeletal: Negative for joint swelling.        No muscle weakness.   Skin: Negative for rash and wound.   Neurological: Negative for dizziness, seizures, syncope, speech difficulty, weakness, numbness and headaches.   Hematological: Negative for adenopathy. Does not bruise/bleed easily.   Psychiatric/Behavioral: Negative for behavioral problems, confusion, sleep disturbance and suicidal ideas.   All other systems reviewed and are negative.    Reviewed and updated where appropriate 02/23/22      Objective     Vitals:    02/23/22 1300   BP: 133/86   Pulse: 61   Resp: 18   Temp: 97.3 °F (36.3 °C)   TempSrc: Temporal   SpO2: 97%   Weight: 130 kg (287 lb 8 oz)   Height: 188 cm (74.02\")   PainSc: 0-No pain     Current Status 2/23/2022   ECOG score 0     PHYSICAL EXAM:  CONSTITUTIONAL:  Vital signs reviewed.  No distress, looks comfortable.  EYES:  Conjunctivae and lids unremarkable.  PERRLA  EARS,NOSE,MOUTH,THROAT:  Ears and nose appear unremarkable.  Lips, teeth, gums appear unremarkable.  RESPIRATORY:  Normal respiratory effort.  Lungs clear to auscultation bilaterally.  CARDIOVASCULAR:  Normal S1, S2.  No murmurs rubs or " gallops.  No significant lower extremity edema.  GASTROINTESTINAL: Abdomen appears unremarkable.  Nontender.  No hepatomegaly.  No splenomegaly.  LYMPHATIC:  No cervical, supraclavicular, axillary lymphadenopathy.  SKIN:  Warm.  No rashes.  PSYCHIATRIC:  Normal judgment and insight.  Normal mood and affect.  RECENT LABS:  Results from last 7 days   Lab Units 02/23/22  1248   WBC 10*3/mm3 8.11   NEUTROS ABS 10*3/mm3 4.92   HEMOGLOBIN g/dL 15.3   HEMATOCRIT % 44.7   PLATELETS 10*3/mm3 575*             Patient screened negative for depression based on a PHQ-9 score of 0 on 2/23/2022.     Assessment/Plan     1.  Polycythemia with leukocytosis and thrombocytosis.  He does not have any itching with hot showers.  He does not have any headaches or DVT.  He does have blood counts worsening gradually from 2016.  · 7/8/2020  His platelets 974K.  Hemoglobin is 17.6 with hematocrit of 52.8 and white count of 11.56.  He denies fever night sweats but has significant weight loss greater than 50 pounds in 6 months.  He does have a cough which is productive of yellow sputum and some shortness of breath.  He has history of secondhand smoking exposure.  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  CT chest negative  · Hydroxyurea 500 mg twice daily was initiated on July 16, 2020.  .  · 7/22/2020 Hydrea increased to 1500 mg (1000 mg in am and 500 mg in pm) Monday-Friday and 2000 mg (1000 mg in am and 1000 mg in PM) on Saturdays and Sundays  · 7/29/2020 patient's platelet count today is 821.  White count 7.78, hemoglobin 16.4.   Increased Hydrea at 1500 mg Monday through Friday, and 2000 mg on Saturdays and Sundays.   · In future if hematocrit greater than 48 then he will receive phlebotomy.  · Last phlebotomy 7/15/2020.   · 2/23/2022 patient continues on Hydrea 1500 mg on Monday, Wednesday, and Friday, 1500 mg all other days.  He is  tolerating Hydrea well aside from some fatigue on the days he takes 1500 mg.  He also continues to receive phlebotomy for hematocrit greater than 48, hematocrit today 44.7 so patient will not receive phlebotomy.  Platelets today have increased to 575, so we will increase Hydrea to 1500 mg four times a week, 1000 mg all other days.  Because we are increasing dose of Hydrea, patient will return in 1 month for CBC with RN review to monitor counts.  Discussed with the patient who is agreeable.    2.  Weight loss greater than 50 pounds at time of initial evaluation.  · CT chest abdomen pelvis negative for malignancy  · Patient admitting that his weight loss was actually been more intentional   · Weight now stable    3. Hypokalemia, mild, chronic.  · 8/2/2021: Potassium 3.3. We discussed utilizing electrolyte drinks when he is working outside/sweating to help replenish what he is losing.  He does continue potassium chloride once daily as well.  · 9/28/2021: Potassium stable at 3.3. Continue Klor-con 20 mEq daily.  · 2/23/2022 potassium today 4.0, patient will continue on potassium 20 mEq daily.     Plan:   · Increase Hydrea to 1500 mg 4 days a week, 1000 mg all other days.  · Continue phlebotomy for hematocrit greater than 48, hematocrit today 44.7, patient will not receive phlebotomy today.  · Continue potassium chloride 20 mEq daily.  · Return in 1 month for CBC with RN review.  · Return in 3 months for MD follow-up.    The patient is on high risk medication that requires close monitoring for toxicity.  The patient was discussed with Dr. Fleming who agrees with the above-mentioned plan.    AG Headley  02/23/22

## 2022-02-23 ENCOUNTER — OFFICE VISIT (OUTPATIENT)
Dept: ONCOLOGY | Facility: CLINIC | Age: 62
End: 2022-02-23

## 2022-02-23 ENCOUNTER — LAB (OUTPATIENT)
Dept: OTHER | Facility: HOSPITAL | Age: 62
End: 2022-02-23

## 2022-02-23 VITALS
TEMPERATURE: 97.3 F | WEIGHT: 287.5 LBS | DIASTOLIC BLOOD PRESSURE: 86 MMHG | BODY MASS INDEX: 36.9 KG/M2 | SYSTOLIC BLOOD PRESSURE: 133 MMHG | HEART RATE: 61 BPM | RESPIRATION RATE: 18 BRPM | OXYGEN SATURATION: 97 % | HEIGHT: 74 IN

## 2022-02-23 DIAGNOSIS — Z79.899 HIGH RISK MEDICATION USE: ICD-10-CM

## 2022-02-23 DIAGNOSIS — D75.839 THROMBOCYTOSIS: ICD-10-CM

## 2022-02-23 DIAGNOSIS — D45 POLYCYTHEMIA VERA: ICD-10-CM

## 2022-02-23 DIAGNOSIS — D45 POLYCYTHEMIA VERA: Primary | ICD-10-CM

## 2022-02-23 LAB
ALBUMIN SERPL-MCNC: 4.7 G/DL (ref 3.5–5.2)
ALBUMIN/GLOB SERPL: 1.6 G/DL
ALP SERPL-CCNC: 80 U/L (ref 39–117)
ALT SERPL W P-5'-P-CCNC: 31 U/L (ref 1–41)
ANION GAP SERPL CALCULATED.3IONS-SCNC: 11.3 MMOL/L (ref 5–15)
AST SERPL-CCNC: 32 U/L (ref 1–40)
BASOPHILS # BLD AUTO: 0.11 10*3/MM3 (ref 0–0.2)
BASOPHILS NFR BLD AUTO: 1.4 % (ref 0–1.5)
BILIRUB SERPL-MCNC: 0.7 MG/DL (ref 0–1.2)
BUN SERPL-MCNC: 26 MG/DL (ref 8–23)
BUN/CREAT SERPL: 23.2 (ref 7–25)
CALCIUM SPEC-SCNC: 10.6 MG/DL (ref 8.6–10.5)
CHLORIDE SERPL-SCNC: 94 MMOL/L (ref 98–107)
CO2 SERPL-SCNC: 28.7 MMOL/L (ref 22–29)
CREAT SERPL-MCNC: 1.12 MG/DL (ref 0.76–1.27)
DEPRECATED RDW RBC AUTO: 47 FL (ref 37–54)
EOSINOPHIL # BLD AUTO: 0.13 10*3/MM3 (ref 0–0.4)
EOSINOPHIL NFR BLD AUTO: 1.6 % (ref 0.3–6.2)
ERYTHROCYTE [DISTWIDTH] IN BLOOD BY AUTOMATED COUNT: 11.9 % (ref 12.3–15.4)
GFR SERPL CREATININE-BSD FRML MDRD: 67 ML/MIN/1.73
GLOBULIN UR ELPH-MCNC: 3 GM/DL
GLUCOSE SERPL-MCNC: 101 MG/DL (ref 65–99)
HCT VFR BLD AUTO: 44.7 % (ref 37.5–51)
HGB BLD-MCNC: 15.3 G/DL (ref 13–17.7)
IMM GRANULOCYTES # BLD AUTO: 0.06 10*3/MM3 (ref 0–0.05)
IMM GRANULOCYTES NFR BLD AUTO: 0.7 % (ref 0–0.5)
LYMPHOCYTES # BLD AUTO: 1.94 10*3/MM3 (ref 0.7–3.1)
LYMPHOCYTES NFR BLD AUTO: 23.9 % (ref 19.6–45.3)
MCH RBC QN AUTO: 36.8 PG (ref 26.6–33)
MCHC RBC AUTO-ENTMCNC: 34.2 G/DL (ref 31.5–35.7)
MCV RBC AUTO: 107.5 FL (ref 79–97)
MONOCYTES # BLD AUTO: 0.95 10*3/MM3 (ref 0.1–0.9)
MONOCYTES NFR BLD AUTO: 11.7 % (ref 5–12)
NEUTROPHILS NFR BLD AUTO: 4.92 10*3/MM3 (ref 1.7–7)
NEUTROPHILS NFR BLD AUTO: 60.7 % (ref 42.7–76)
NRBC BLD AUTO-RTO: 0 /100 WBC (ref 0–0.2)
PLAT MORPH BLD: NORMAL
PLATELET # BLD AUTO: 575 10*3/MM3 (ref 140–450)
PMV BLD AUTO: 9.2 FL (ref 6–12)
POTASSIUM SERPL-SCNC: 4 MMOL/L (ref 3.5–5.2)
PROT SERPL-MCNC: 7.7 G/DL (ref 6–8.5)
RBC # BLD AUTO: 4.16 10*6/MM3 (ref 4.14–5.8)
SODIUM SERPL-SCNC: 134 MMOL/L (ref 136–145)
STOMATOCYTES BLD QL SMEAR: NORMAL
WBC MORPH BLD: NORMAL
WBC NRBC COR # BLD: 8.11 10*3/MM3 (ref 3.4–10.8)

## 2022-02-23 PROCEDURE — 36415 COLL VENOUS BLD VENIPUNCTURE: CPT

## 2022-02-23 PROCEDURE — 80053 COMPREHEN METABOLIC PANEL: CPT | Performed by: INTERNAL MEDICINE

## 2022-02-23 PROCEDURE — 85025 COMPLETE CBC W/AUTO DIFF WBC: CPT | Performed by: INTERNAL MEDICINE

## 2022-02-23 PROCEDURE — 85007 BL SMEAR W/DIFF WBC COUNT: CPT | Performed by: INTERNAL MEDICINE

## 2022-02-23 PROCEDURE — 99214 OFFICE O/P EST MOD 30 MIN: CPT | Performed by: NURSE PRACTITIONER

## 2022-03-04 ENCOUNTER — SPECIALTY PHARMACY (OUTPATIENT)
Dept: PHARMACY | Facility: HOSPITAL | Age: 62
End: 2022-03-04

## 2022-03-04 NOTE — PROGRESS NOTES
"[facility] Encounter-Re: Adherence and side effects (Hydrea)    Today's encounter was conducted telephonically    Medication:  Hydrea 1500 mg 4 times weekly and 1000 mg po 3 times weekly  - Reason for outreach: Routine medication check-in .  - Administration: Patient is taking every day at the same time .  - Missed doses: Patient reports missing 0 doses in the last 30 days.  - Self-administration: Patient demonstrates ability to self-administer medication. No barriers to adherence identified.   - Diagnosis/Indication: Polycythemia vera. Progress toward achieving therapeutic goals reviewed.   - Patient denies side effects, aside from fatigue, which is manageable and not bothersome to patient. Advised patient to alert office if this changes.    - Medication availability/affordability: Patient has had no issues obtaining medication from pharmacy.   - Questions/concerns about medications: none       Completed medication reconciliation today to assess for drug interactions.   Reviewed allergies, medical history, labs, quality of life( he reports fatigue is \"starting to catch up with him\" ), and medication history with patient.   Patient denies starting or stopping any medications.  Assessed medication list for interactions, no significant drug interactions noted. He has not had COVID 19 vaccines and is not interested in obtaining them.  Advised pt to call the clinic if any new medications are started so we can assess for drug-drug interactions.     All questions addressed. Patient had no additional concerns for MTM office.     Ev Yun RPH  3/4/2022  12:01 EST   "

## 2022-03-04 NOTE — PROGRESS NOTES
Specialty Note ( Hydrea)    Placed a call for toxicity and adherence check.  No answer.   direct line [phone].

## 2022-03-17 RX ORDER — SODIUM CHLORIDE 9 MG/ML
500 INJECTION, SOLUTION INTRAVENOUS ONCE
OUTPATIENT
Start: 2022-03-23

## 2022-03-23 ENCOUNTER — TELEPHONE (OUTPATIENT)
Dept: ONCOLOGY | Facility: CLINIC | Age: 62
End: 2022-03-23

## 2022-03-23 ENCOUNTER — LAB (OUTPATIENT)
Dept: OTHER | Facility: HOSPITAL | Age: 62
End: 2022-03-23

## 2022-03-23 ENCOUNTER — CLINICAL SUPPORT (OUTPATIENT)
Dept: ONCOLOGY | Facility: HOSPITAL | Age: 62
End: 2022-03-23

## 2022-03-23 VITALS
OXYGEN SATURATION: 96 % | DIASTOLIC BLOOD PRESSURE: 86 MMHG | WEIGHT: 293 LBS | SYSTOLIC BLOOD PRESSURE: 129 MMHG | RESPIRATION RATE: 18 BRPM | HEART RATE: 63 BPM | TEMPERATURE: 98.5 F | BODY MASS INDEX: 37.6 KG/M2 | HEIGHT: 74 IN

## 2022-03-23 DIAGNOSIS — D75.1 POLYCYTHEMIA: ICD-10-CM

## 2022-03-23 DIAGNOSIS — D75.1 POLYCYTHEMIA: Primary | ICD-10-CM

## 2022-03-23 DIAGNOSIS — D45 POLYCYTHEMIA VERA: ICD-10-CM

## 2022-03-23 LAB
ALBUMIN SERPL-MCNC: 4.4 G/DL (ref 3.5–5.2)
ALBUMIN/GLOB SERPL: 1.6 G/DL
ALP SERPL-CCNC: 75 U/L (ref 39–117)
ALT SERPL W P-5'-P-CCNC: 27 U/L (ref 1–41)
ANION GAP SERPL CALCULATED.3IONS-SCNC: 9.9 MMOL/L (ref 5–15)
AST SERPL-CCNC: 34 U/L (ref 1–40)
BASOPHILS # BLD AUTO: 0.11 10*3/MM3 (ref 0–0.2)
BASOPHILS NFR BLD AUTO: 1.4 % (ref 0–1.5)
BILIRUB SERPL-MCNC: 0.7 MG/DL (ref 0–1.2)
BUN SERPL-MCNC: 19 MG/DL (ref 8–23)
BUN/CREAT SERPL: 18.1 (ref 7–25)
CALCIUM SPEC-SCNC: 9.3 MG/DL (ref 8.6–10.5)
CHLORIDE SERPL-SCNC: 92 MMOL/L (ref 98–107)
CO2 SERPL-SCNC: 26.1 MMOL/L (ref 22–29)
CREAT SERPL-MCNC: 1.05 MG/DL (ref 0.76–1.27)
DEPRECATED RDW RBC AUTO: 46.5 FL (ref 37–54)
EGFRCR SERPLBLD CKD-EPI 2021: 80.8 ML/MIN/1.73
EOSINOPHIL # BLD AUTO: 0.13 10*3/MM3 (ref 0–0.4)
EOSINOPHIL NFR BLD AUTO: 1.7 % (ref 0.3–6.2)
ERYTHROCYTE [DISTWIDTH] IN BLOOD BY AUTOMATED COUNT: 12.1 % (ref 12.3–15.4)
FERRITIN SERPL-MCNC: 108.9 NG/ML (ref 30–400)
GLOBULIN UR ELPH-MCNC: 2.7 GM/DL
GLUCOSE SERPL-MCNC: 103 MG/DL (ref 65–99)
HCT VFR BLD AUTO: 40.6 % (ref 37.5–51)
HGB BLD-MCNC: 14.3 G/DL (ref 13–17.7)
IMM GRANULOCYTES # BLD AUTO: 0.05 10*3/MM3 (ref 0–0.05)
IMM GRANULOCYTES NFR BLD AUTO: 0.7 % (ref 0–0.5)
LYMPHOCYTES # BLD AUTO: 1.82 10*3/MM3 (ref 0.7–3.1)
LYMPHOCYTES NFR BLD AUTO: 23.9 % (ref 19.6–45.3)
MCH RBC QN AUTO: 36.7 PG (ref 26.6–33)
MCHC RBC AUTO-ENTMCNC: 35.2 G/DL (ref 31.5–35.7)
MCV RBC AUTO: 104.1 FL (ref 79–97)
MONOCYTES # BLD AUTO: 1 10*3/MM3 (ref 0.1–0.9)
MONOCYTES NFR BLD AUTO: 13.2 % (ref 5–12)
NEUTROPHILS NFR BLD AUTO: 4.49 10*3/MM3 (ref 1.7–7)
NEUTROPHILS NFR BLD AUTO: 59.1 % (ref 42.7–76)
NRBC BLD AUTO-RTO: 0 /100 WBC (ref 0–0.2)
PLATELET # BLD AUTO: 503 10*3/MM3 (ref 140–450)
PMV BLD AUTO: 8.8 FL (ref 6–12)
POTASSIUM SERPL-SCNC: 3.6 MMOL/L (ref 3.5–5.2)
PROT SERPL-MCNC: 7.1 G/DL (ref 6–8.5)
RBC # BLD AUTO: 3.9 10*6/MM3 (ref 4.14–5.8)
SODIUM SERPL-SCNC: 128 MMOL/L (ref 136–145)
WBC NRBC COR # BLD: 7.6 10*3/MM3 (ref 3.4–10.8)

## 2022-03-23 PROCEDURE — 85025 COMPLETE CBC W/AUTO DIFF WBC: CPT | Performed by: INTERNAL MEDICINE

## 2022-03-23 PROCEDURE — G0463 HOSPITAL OUTPT CLINIC VISIT: HCPCS

## 2022-03-23 PROCEDURE — 82728 ASSAY OF FERRITIN: CPT | Performed by: INTERNAL MEDICINE

## 2022-03-23 PROCEDURE — 36415 COLL VENOUS BLD VENIPUNCTURE: CPT

## 2022-03-23 PROCEDURE — 80053 COMPREHEN METABOLIC PANEL: CPT | Performed by: INTERNAL MEDICINE

## 2022-03-23 NOTE — TELEPHONE ENCOUNTER
Caller: Phani Landis    Relationship: Self    Best call back number: [phone]    What medications are you currently taking:   Current Outpatient Medications on File Prior to Visit   Medication Sig Dispense Refill   • allopurinol (ZYLOPRIM) 100 MG tablet Take 1 tablet by mouth 2 (Two) Times a Day. 180 tablet 0   • amLODIPine-benazepril (LOTREL) 5-40 MG per capsule Take 1 capsule by mouth Daily. 90 capsule 3   • aspirin 81 MG chewable tablet Chew 81 mg Daily.     • bisoprolol (ZEBeta) 10 MG tablet Take 1 tablet by mouth Daily. 90 tablet 3   • chlorthalidone (HYGROTEN) 50 MG tablet Take 1 tablet by mouth Daily. 90 tablet 3   • clotrimazole-betamethasone (LOTRISONE) 1-0.05 % cream APPLY TO AFFECTED AREA(S) TWO TIMES A DAY 45 each 3   • hydrALAZINE (APRESOLINE) 50 MG tablet Take 1 tablet by mouth 2 (Two) Times a Day. 180 tablet 3   • hydroxyurea (HYDREA) 500 MG capsule Take 2 caps (1000 mg)  po daily Monday through Thursday and 3 caps ( 1500 mg)  po Friday, Saturday and Sunday 204 capsule 1   • ondansetron (ZOFRAN) 4 MG tablet Take 1 tablet by mouth Every 8 (Eight) Hours As Needed for Nausea or Vomiting. 30 tablet 0   • potassium chloride (K-DUR,KLOR-CON) 20 MEQ CR tablet Take 1 tablet by mouth Daily. 30 tablet 2   • zolpidem (AMBIEN) 10 MG tablet Take 1 tablet by mouth At Night As Needed for Sleep. 90 tablet 0     No current facility-administered medications on file prior to visit.          Which medication are you concerned about: HYDROXUREA    Who prescribed you this medication: BHUPALAM    What are your concerns: DOES PATIENT NEED TO MAINTAIN CURRENT DOSE PER 3/23/22 LABS

## 2022-03-23 NOTE — NURSING NOTE
Pt is here for lab with RN review.  CBC reviewed with pt, counts are stable for this pt at this time. Pt has no complaints. Platelets have declined to 503. On CMP sodium lower at 128. Pt stated he had been trying to increase his dietary sodium intake. Pt unable to stay until labs could be reviewed with Dr. Fleming. Told him I would call him with her instructions. Copy of labs given to pt and f/u appt reviewed. Pt vu and discharged ambulatory.    Reviewed CBC and CMP with Dr. Fleming and per Dr. Fleming maintain current dose of Hydrea which is 1500mg 4 days a week and 1000mg 3 days a week and make sure he is taking 81mg ASA. Also have pt follow up with his PCP with regard to low sodium. No changes needed to his appointment schedule. Next appointment is June 1, 2022.    Called and spoke with pt at 1410. Told him to keep the same dose per Dr. Fleming. Pt did verify that he is taking his aspirin daily. Pt stated he will follow up with his primary care with regard to sodium level. Pt is instructed to call the office with any concerns or new symptoms prior to next visit. He vu.

## 2022-04-20 DIAGNOSIS — D45 POLYCYTHEMIA VERA: ICD-10-CM

## 2022-04-20 DIAGNOSIS — D75.839 THROMBOCYTOSIS: ICD-10-CM

## 2022-04-20 DIAGNOSIS — E79.0 ELEVATED URIC ACID IN BLOOD: ICD-10-CM

## 2022-04-20 RX ORDER — ALLOPURINOL 100 MG/1
TABLET ORAL
Qty: 180 TABLET | Refills: 0 | Status: SHIPPED | OUTPATIENT
Start: 2022-04-20 | End: 2022-07-22 | Stop reason: SDUPTHER

## 2022-05-05 ENCOUNTER — TELEPHONE (OUTPATIENT)
Dept: FAMILY MEDICINE CLINIC | Facility: CLINIC | Age: 62
End: 2022-05-05

## 2022-05-05 NOTE — TELEPHONE ENCOUNTER
**OK HUB TO READ**    Called pt to cancel appt due to provider being unavailable.  Could not reach pt, left VM to call back.     Pt is welcome to call in June to est care with a new provider if they choose.

## 2022-05-16 DIAGNOSIS — G47.00 INSOMNIA, UNSPECIFIED TYPE: ICD-10-CM

## 2022-05-17 DIAGNOSIS — G47.00 INSOMNIA, UNSPECIFIED TYPE: ICD-10-CM

## 2022-05-17 RX ORDER — ZOLPIDEM TARTRATE 10 MG/1
10 TABLET ORAL NIGHTLY PRN
Qty: 90 TABLET | Refills: 0 | Status: CANCELLED | OUTPATIENT
Start: 2022-05-17

## 2022-05-19 DIAGNOSIS — G47.00 INSOMNIA, UNSPECIFIED TYPE: ICD-10-CM

## 2022-05-19 RX ORDER — ZOLPIDEM TARTRATE 10 MG/1
10 TABLET ORAL NIGHTLY PRN
Qty: 90 TABLET | Refills: 0 | OUTPATIENT
Start: 2022-05-19

## 2022-05-19 RX ORDER — ZOLPIDEM TARTRATE 10 MG/1
10 TABLET ORAL NIGHTLY PRN
Qty: 90 TABLET | Refills: 0 | Status: SHIPPED | OUTPATIENT
Start: 2022-05-19 | End: 2023-02-13 | Stop reason: SINTOL

## 2022-05-20 RX ORDER — POTASSIUM CHLORIDE 20 MEQ/1
20 TABLET, EXTENDED RELEASE ORAL DAILY
Qty: 30 TABLET | Refills: 2 | Status: SHIPPED | OUTPATIENT
Start: 2022-05-20 | End: 2022-11-30

## 2022-06-01 ENCOUNTER — OFFICE VISIT (OUTPATIENT)
Dept: FAMILY MEDICINE CLINIC | Facility: CLINIC | Age: 62
End: 2022-06-01

## 2022-06-01 ENCOUNTER — APPOINTMENT (OUTPATIENT)
Dept: OTHER | Facility: HOSPITAL | Age: 62
End: 2022-06-01

## 2022-06-01 VITALS
WEIGHT: 288 LBS | DIASTOLIC BLOOD PRESSURE: 86 MMHG | SYSTOLIC BLOOD PRESSURE: 128 MMHG | HEIGHT: 74 IN | BODY MASS INDEX: 36.96 KG/M2 | HEART RATE: 63 BPM | OXYGEN SATURATION: 99 % | TEMPERATURE: 98.6 F

## 2022-06-01 DIAGNOSIS — G89.29 CHRONIC PAIN OF LEFT KNEE: ICD-10-CM

## 2022-06-01 DIAGNOSIS — M25.562 CHRONIC PAIN OF LEFT KNEE: ICD-10-CM

## 2022-06-01 DIAGNOSIS — M79.672 PAIN OF LEFT HEEL: ICD-10-CM

## 2022-06-01 DIAGNOSIS — Z87.39 HISTORY OF GOUT: ICD-10-CM

## 2022-06-01 DIAGNOSIS — H66.92 LEFT OTITIS MEDIA, UNSPECIFIED OTITIS MEDIA TYPE: Primary | ICD-10-CM

## 2022-06-01 PROCEDURE — 99213 OFFICE O/P EST LOW 20 MIN: CPT | Performed by: NURSE PRACTITIONER

## 2022-06-01 RX ORDER — AMOXICILLIN AND CLAVULANATE POTASSIUM 875; 125 MG/1; MG/1
1 TABLET, FILM COATED ORAL 2 TIMES DAILY
Qty: 10 TABLET | Refills: 0 | Status: SHIPPED | OUTPATIENT
Start: 2022-06-01 | End: 2022-06-06

## 2022-06-01 NOTE — PROGRESS NOTES
"Chief Complaint  Knee Pain (Chronic L knee pain), Foot Pain (NKI, L heel pain - pt believes it is a bone spur - painful when walking, worse in am, tender to the touch. HX gout. ), and Earache (Left)    Masks/face shield/appropriate PPE were worn for the entirety of the visit by the patient, MA, and provider.     Subjective        Phani Landis presents to [facility]  History of Present Illness  Phani Landis is a 61 y.o. male who presents to the clinic today with complaints of chronic left knee pain, left heel pain, and left ear pain.    · Chronic left knee pain: Patient was receiving steroid injections in his left knee every 3 months by his previous PCP.  Patient states he had seen an orthopedic doctor in the past at [facility] and was told his \"cartilage has run out\" in his knee.  Patient's knee hurts worse with turning.  He states his knee does not give out.  His knee will sometimes swell on the outer aspect.  He denies issues with going up and down stairs.  In the past, fluid has had to be drawn out of his knee.   · Left heel pain: Patient states this pain has been going on for a month.  Patient's pain is worse at night or first in the morning.  It is worse when he is on his feet for long periods of time.  Sitting makes his pain better.  Patient did take aspirin for his pain yesterday.  Patient spent yesterday off his feet and his pain is much improved today.  · Left ear pain: Patient's left ear pain started 2 days ago.  He denies runny nose or congestion.  He does feel acute swollen lymph nodes on the left side of his neck.  He denies fever.  He has been putting cotton in his ear at night.      Review of Systems   Constitutional: Negative.    HENT: Negative.    Eyes: Negative.    Respiratory: Negative.    Cardiovascular: Negative.    Gastrointestinal: Negative.    Endocrine: Negative.    Genitourinary: Negative.    Musculoskeletal: Positive for arthralgias.   Skin: " "Negative.    Allergic/Immunologic: Negative.    Neurological: Negative.    Hematological: Negative.    Psychiatric/Behavioral: Negative.        Objective   Vital Signs:  /86   Pulse 63   Temp 98.6 °F (37 °C)   Ht 188 cm (74.02\")   Wt 131 kg (288 lb)   SpO2 99%   BMI 36.96 kg/m²     BMI has not been calculated during today's encounter.       Physical Exam  Vitals reviewed.   Constitutional:       General: He is not in acute distress.     Appearance: Normal appearance. He is not ill-appearing, toxic-appearing or diaphoretic.   HENT:      Head: Normocephalic and atraumatic.      Left Ear: A middle ear effusion is present. Tympanic membrane is injected and scarred. Tympanic membrane is not perforated, erythematous or retracted.      Mouth/Throat:      Dentition: Abnormal dentition. Dental caries present.   Eyes:      General: No scleral icterus.        Right eye: No discharge.         Left eye: No discharge.      Extraocular Movements: Extraocular movements intact.   Musculoskeletal:      Right lower leg: No edema.      Left lower leg: No edema.   Lymphadenopathy:      Cervical: Cervical adenopathy present.   Neurological:      General: No focal deficit present.      Mental Status: He is alert and oriented to person, place, and time.      Motor: No weakness.      Gait: Gait normal.   Psychiatric:         Mood and Affect: Mood normal.         Behavior: Behavior normal.        Result Review :                Assessment and Plan   Diagnoses and all orders for this visit:    1. Left otitis media, unspecified otitis media type (Primary)  -     amoxicillin-clavulanate (Augmentin) 875-125 MG per tablet; Take 1 tablet by mouth 2 (Two) Times a Day for 5 days.  Dispense: 10 tablet; Refill: 0    2. Chronic pain of left knee  -     Ambulatory Referral to Orthopedic Surgery    3. History of gout  -     Uric acid; Future    4. Pain of left heel  -     Ambulatory Referral to Orthopedic Surgery      Patient presented with " left ear pain and left cervical lymph node swelling.  Patient has abnormal dentition and has had teeth that have been breaking off.  I do not see any acute abscess today, but did recommend patient follow-up with a dentist.  Patient does appear to have a left inner ear infection today and will be treated with Augmentin.  Patient is allergic to cephalexin with itching, but patient also is unsure about this allergy as he had started hydroxyurea at the same time.  Patient states he has tolerated penicillins well in the past.  Patient advised to call for any signs of an adverse reaction.  He is on hydroxyurea, which can increase risk of infection.  Patient has follow-up with oncologist in 2 weeks.    Based on review of patient's chart and past orthopedic surgeon's note, patient was diagnosed with advanced medial compartment arthritis of the left knee in August 2019.  Patient has been referred back to this specialty for treatment and management.  Patient does complain of left heel pain.  There does not appear to be any acute findings today or any swelling or decreased range of motion.  Patient was encouraged to use heel inserts to his shoes and to use ice as needed.  Patient was advised to call if symptoms do not improve.  Patient's foot does not appear gouty in nature, but patient is due for uric acid level check as he is on allopurinol.  Patient will have labs done by his oncologist on 6/15/2022 and uric acid be performed at that time.           Follow Up   Return if symptoms worsen or fail to improve.  Patient was given instructions and counseling regarding his condition or for health maintenance advice. Please see specific information pulled into the AVS if appropriate.     Electronically signed by AG Caba, 06/01/22, 5:13 PM EDT.

## 2022-06-13 NOTE — PROGRESS NOTES
Subjective     REASON FOR FOLLOW UP:  1.  Polycythemia vera, polycythemia and thrombocytosis and leukocytosis, EPO level 1.3, Tino 2+    HISTORY OF PRESENT ILLNESS:  The patient is a 61 y.o. year old male who is here for an opinion about the above issue.    History of Present Illness   Phani Landis is a 61 y.o. male with the above mentioned history. He continues on hydroxyurea for polycythemia vera. He is currently taking 1500 mg four days a week and 1000 mg all other days. He reports feeling well today. He continues to eat and drink adequately. He fevers or chills. He has no new concerns today.     Hematological oncologic history:  Patient is a 59-year-old gentleman who has been referred by his primary care Castillo Velasco for evaluation of thrombocytosis and polycythemia.  He has lost more than 50 pounds of weight.  In the last 6 months.  Looking back his blood counts have been high starting in 2016.  Initially his platelets were high around 670 and gradually increased in the 900 range.  More recently his hemoglobin has been increasing and his white count has been increasing.  He has not had a DVT or pulmonary embolism.  He has had no issues with itching with hot showers.  Has not had a stroke.  He had pneumonia last year but none recently.  He does have some shortness of breath but no chest pain.  He is exposed to secondhand smoking.    He does not have any nausea vomiting or abdominal pain at present.  He did have gout on several locations and was placed on meloxicam.  His renal function is mildly elevated.  He denies any fever or night sweats.    Patient does have a cough productive of yellow sputum but has no fevers.    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, EPO 1.3 low  CT chest negative, CT abdomen pelvis shows splenomegaly    Past Medical History:   Diagnosis Date   • Anxiety    • Arthritis    • Cancer (HCC) June 2020    Polycythemia vera   • History of  "thrombocytosis    • Hypertension         Past Surgical History:   Procedure Laterality Date   • CHEST SURGERY     • OTHER SURGICAL HISTORY  1985    \"Exploratory\"        Current Outpatient Medications on File Prior to Visit   Medication Sig Dispense Refill   • allopurinol (ZYLOPRIM) 100 MG tablet TAKE ONE TABLET BY MOUTH TWICE A  tablet 0   • amLODIPine-benazepril (LOTREL) 5-40 MG per capsule Take 1 capsule by mouth Daily. 90 capsule 3   • aspirin 81 MG chewable tablet Chew 81 mg Daily.     • bisoprolol (ZEBeta) 10 MG tablet Take 1 tablet by mouth Daily. 90 tablet 3   • chlorthalidone (HYGROTEN) 50 MG tablet Take 1 tablet by mouth Daily. 90 tablet 3   • hydrALAZINE (APRESOLINE) 50 MG tablet Take 1 tablet by mouth 2 (Two) Times a Day. 180 tablet 3   • hydroxyurea (HYDREA) 500 MG capsule Take 2 caps (1000 mg)  po daily Monday through Thursday and 3 caps ( 1500 mg)  po Friday, Saturday and Sunday 204 capsule 1   • ondansetron (ZOFRAN) 4 MG tablet Take 1 tablet by mouth Every 8 (Eight) Hours As Needed for Nausea or Vomiting. 30 tablet 0   • potassium chloride (K-DUR,KLOR-CON) 20 MEQ CR tablet Take 1 tablet by mouth Daily. 30 tablet 2   • zolpidem (AMBIEN) 10 MG tablet Take 1 tablet by mouth At Night As Needed for Sleep. 90 tablet 0     No current facility-administered medications on file prior to visit.        ALLERGIES:    Allergies   Allergen Reactions   • Cephalexin Itching        Social History     Socioeconomic History   • Marital status:    Tobacco Use   • Smoking status: Never Smoker   • Smokeless tobacco: Never Used   Vaping Use   • Vaping Use: Never used   Substance and Sexual Activity   • Alcohol use: Yes     Comment: Occasional drinker   • Drug use: Never   • Sexual activity: Yes     Partners: Female        Family History   Problem Relation Age of Onset   • Hypertension Father    • Hypertension Other    • Heart disease Other    • Cervical cancer Other       I have reviewed the patient's medical " "history in detail and updated the computerized patient record.    Review of Systems   Constitutional: Negative for activity change, appetite change, chills, diaphoresis, fatigue, fever and unexpected weight change.   HENT: Negative for hearing loss, mouth sores, nosebleeds, sore throat and trouble swallowing.    Respiratory: Negative for cough, chest tightness, shortness of breath and wheezing.    Cardiovascular: Negative for chest pain, palpitations and leg swelling.   Gastrointestinal: Negative for abdominal distention, abdominal pain, constipation, diarrhea, nausea and vomiting.   Genitourinary: Negative for difficulty urinating, dysuria, frequency, hematuria and urgency.   Musculoskeletal: Negative for joint swelling.        No muscle weakness.   Skin: Negative for rash and wound.   Neurological: Negative for dizziness, seizures, syncope, speech difficulty, weakness, numbness and headaches.   Hematological: Negative for adenopathy. Does not bruise/bleed easily.   Psychiatric/Behavioral: Negative for behavioral problems, confusion, sleep disturbance and suicidal ideas.   All other systems reviewed and are negative.    Reviewed and updated where appropriate 06/15/22      Objective     Vitals:    06/15/22 1312   BP: 159/81   Pulse: 66   Resp: 18   Temp: 97.1 °F (36.2 °C)   TempSrc: Temporal   SpO2: 93%   Weight: 130 kg (286 lb 8 oz)   Height: 188 cm (74.02\")   PainSc: 0-No pain     Current Status 6/15/2022   ECOG score 0     PHYSICAL EXAM:  CONSTITUTIONAL:  Vital signs reviewed.  No distress, looks comfortable.  EYES:  Conjunctivae and lids unremarkable.  PERRLA  EARS,NOSE,MOUTH,THROAT:  Ears and nose appear unremarkable.  Lips, teeth, gums appear unremarkable.  RESPIRATORY:  Normal respiratory effort.  Lungs clear to auscultation bilaterally.  CARDIOVASCULAR:  Normal S1, S2.  No murmurs rubs or gallops.  No significant lower extremity edema.  GASTROINTESTINAL: Abdomen appears unremarkable.  Nontender.  No " hepatomegaly.  No splenomegaly.  LYMPHATIC:  No cervical, supraclavicular, axillary lymphadenopathy.  SKIN:  Warm.  No rashes.    RECENT LABS:  Results from last 7 days   Lab Units 06/15/22  1307   WBC 10*3/mm3 8.39   NEUTROS ABS 10*3/mm3 5.43   HEMOGLOBIN g/dL 14.4   HEMATOCRIT % 42.4   PLATELETS 10*3/mm3 573*     Results from last 7 days   Lab Units 06/15/22  1307   SODIUM mmol/L 139   POTASSIUM mmol/L 3.8   CHLORIDE mmol/L 101   CO2 mmol/L 29.6*   BUN mg/dL 19   CREATININE mg/dL 1.18   CALCIUM mg/dL 9.7   ALBUMIN g/dL 4.40   BILIRUBIN mg/dL 0.6   ALK PHOS U/L 62   ALT (SGPT) U/L 34   AST (SGOT) U/L 34   GLUCOSE mg/dL 110*   FERRITIN ng/mL 104.70         Patient screened negative for depression based on a PHQ-9 score of  on .     Assessment & Plan     1.  Polycythemia with leukocytosis and thrombocytosis.  He does not have any itching with hot showers.  He does not have any headaches or DVT.  He does have blood counts worsening gradually from 2016.  · 7/8/2020  His platelets 974K.  Hemoglobin is 17.6 with hematocrit of 52.8 and white count of 11.56.  He denies fever night sweats but has significant weight loss greater than 50 pounds in 6 months.  He does have a cough which is productive of yellow sputum and some shortness of breath.  He has history of secondhand smoking exposure.  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  CT chest negative  · Hydroxyurea 500 mg twice daily was initiated on July 16, 2020.  .  · 7/22/2020 Hydrea increased to 1500 mg (1000 mg in am and 500 mg in pm) Monday-Friday and 2000 mg (1000 mg in am and 1000 mg in PM) on Saturdays and Sundays  · 7/29/2020 patient's platelet count today is 821.  White count 7.78, hemoglobin 16.4.   Increased Hydrea at 1500 mg Monday through Friday, and 2000 mg on Saturdays and Sundays.   · In future if hematocrit greater than 48 then he will receive  phlebotomy.  · Last phlebotomy 7/15/2020.   · 2/23/2022 patient continues on Hydrea 1500 mg on Monday, Wednesday, and Friday, 1500 mg all other days.  He is tolerating Hydrea well aside from some fatigue on the days he takes 1500 mg.  He also continues to receive phlebotomy for hematocrit greater than 48, hematocrit today 44.7 so patient will not receive phlebotomy.  Platelets today have increased to 575, so we will increase Hydrea to 1500 mg four times a week, 1000 mg all other days.  Because we are increasing dose of Hydrea, patient will return in 1 month for CBC with RN review to monitor counts.  Discussed with the patient who is agreeable.  · 6/15/2022: Patient continues on Hydrea 1500 mg 4 days a week and 1000 mg all other days.  Platelets increased to 573,000.  Discussed with Dr. Fleming today plan to increase Hydrea to 1500 mg 5 days a week and 1000 mg 2 days a week.  He will return in 1 month for CBC with RN review to monitor counts.  Patient agreeable     2.  Weight loss greater than 50 pounds at time of initial evaluation.  · CT chest abdomen pelvis negative for malignancy  · Patient admitting that his weight loss was actually been more intentional   · Weight now stable    3. Hypokalemia, mild, chronic.  · 8/2/2021: Potassium 3.3. We discussed utilizing electrolyte drinks when he is working outside/sweating to help replenish what he is losing.  He does continue potassium chloride once daily as well.  · 9/28/2021: Potassium stable at 3.3. Continue Klor-con 20 mEq daily.  · 2/23/2022 potassium today 4.0, patient will continue on potassium 20 mEq daily.  · 6/15/2022: Currently taking 10 mEq daily, potassium 3.8 today     Plan:   · Increase Hydrea to 1500 mg 5 days a week and 1000 mg all other days  · Continue phlebotomy for hematocrit greater than 48, hematocrit today 42, patient will not receive phlebotomy today.  · Continue potassium chloride 10 mEq daily.  · Return in 1 month for CBC with RN review to  monitor counts  · Return in 3 months for MD follow-up.    Patient on high risk medication requiring close monitoring for toxicity    AG Shetty  06/15/22

## 2022-06-15 ENCOUNTER — LAB (OUTPATIENT)
Dept: OTHER | Facility: HOSPITAL | Age: 62
End: 2022-06-15

## 2022-06-15 ENCOUNTER — OFFICE VISIT (OUTPATIENT)
Dept: ONCOLOGY | Facility: CLINIC | Age: 62
End: 2022-06-15

## 2022-06-15 VITALS
TEMPERATURE: 97.1 F | DIASTOLIC BLOOD PRESSURE: 81 MMHG | WEIGHT: 286.5 LBS | BODY MASS INDEX: 36.77 KG/M2 | SYSTOLIC BLOOD PRESSURE: 159 MMHG | HEART RATE: 66 BPM | RESPIRATION RATE: 18 BRPM | OXYGEN SATURATION: 93 % | HEIGHT: 74 IN

## 2022-06-15 DIAGNOSIS — D75.839 THROMBOCYTOSIS: ICD-10-CM

## 2022-06-15 DIAGNOSIS — D45 POLYCYTHEMIA VERA: Primary | ICD-10-CM

## 2022-06-15 DIAGNOSIS — D75.1 POLYCYTHEMIA: ICD-10-CM

## 2022-06-15 LAB
ALBUMIN SERPL-MCNC: 4.4 G/DL (ref 3.5–5.2)
ALBUMIN/GLOB SERPL: 1.5 G/DL
ALP SERPL-CCNC: 62 U/L (ref 39–117)
ALT SERPL W P-5'-P-CCNC: 34 U/L (ref 1–41)
ANION GAP SERPL CALCULATED.3IONS-SCNC: 8.4 MMOL/L (ref 5–15)
AST SERPL-CCNC: 34 U/L (ref 1–40)
BASOPHILS # BLD AUTO: 0.12 10*3/MM3 (ref 0–0.2)
BASOPHILS NFR BLD AUTO: 1.4 % (ref 0–1.5)
BILIRUB SERPL-MCNC: 0.6 MG/DL (ref 0–1.2)
BUN SERPL-MCNC: 19 MG/DL (ref 8–23)
BUN/CREAT SERPL: 16.1 (ref 7–25)
CALCIUM SPEC-SCNC: 9.7 MG/DL (ref 8.6–10.5)
CHLORIDE SERPL-SCNC: 101 MMOL/L (ref 98–107)
CO2 SERPL-SCNC: 29.6 MMOL/L (ref 22–29)
CREAT SERPL-MCNC: 1.18 MG/DL (ref 0.76–1.27)
DEPRECATED RDW RBC AUTO: 48.3 FL (ref 37–54)
EGFRCR SERPLBLD CKD-EPI 2021: 70.2 ML/MIN/1.73
EOSINOPHIL # BLD AUTO: 0.14 10*3/MM3 (ref 0–0.4)
EOSINOPHIL NFR BLD AUTO: 1.7 % (ref 0.3–6.2)
ERYTHROCYTE [DISTWIDTH] IN BLOOD BY AUTOMATED COUNT: 12.3 % (ref 12.3–15.4)
FERRITIN SERPL-MCNC: 104.7 NG/ML (ref 30–400)
GLOBULIN UR ELPH-MCNC: 3 GM/DL
GLUCOSE SERPL-MCNC: 110 MG/DL (ref 65–99)
HCT VFR BLD AUTO: 42.4 % (ref 37.5–51)
HGB BLD-MCNC: 14.4 G/DL (ref 13–17.7)
IMM GRANULOCYTES # BLD AUTO: 0.04 10*3/MM3 (ref 0–0.05)
IMM GRANULOCYTES NFR BLD AUTO: 0.5 % (ref 0–0.5)
LYMPHOCYTES # BLD AUTO: 1.79 10*3/MM3 (ref 0.7–3.1)
LYMPHOCYTES NFR BLD AUTO: 21.3 % (ref 19.6–45.3)
MCH RBC QN AUTO: 36.1 PG (ref 26.6–33)
MCHC RBC AUTO-ENTMCNC: 34 G/DL (ref 31.5–35.7)
MCV RBC AUTO: 106.3 FL (ref 79–97)
MONOCYTES # BLD AUTO: 0.87 10*3/MM3 (ref 0.1–0.9)
MONOCYTES NFR BLD AUTO: 10.4 % (ref 5–12)
NEUTROPHILS NFR BLD AUTO: 5.43 10*3/MM3 (ref 1.7–7)
NEUTROPHILS NFR BLD AUTO: 64.7 % (ref 42.7–76)
NRBC BLD AUTO-RTO: 0 /100 WBC (ref 0–0.2)
PLAT MORPH BLD: NORMAL
PLATELET # BLD AUTO: 573 10*3/MM3 (ref 140–450)
PMV BLD AUTO: 8.9 FL (ref 6–12)
POTASSIUM SERPL-SCNC: 3.8 MMOL/L (ref 3.5–5.2)
PROT SERPL-MCNC: 7.4 G/DL (ref 6–8.5)
RBC # BLD AUTO: 3.99 10*6/MM3 (ref 4.14–5.8)
SODIUM SERPL-SCNC: 139 MMOL/L (ref 136–145)
TARGETS BLD QL SMEAR: NORMAL
URATE SERPL-MCNC: 5 MG/DL (ref 3.4–7)
WBC MORPH BLD: NORMAL
WBC NRBC COR # BLD: 8.39 10*3/MM3 (ref 3.4–10.8)

## 2022-06-15 PROCEDURE — 82728 ASSAY OF FERRITIN: CPT | Performed by: INTERNAL MEDICINE

## 2022-06-15 PROCEDURE — 84550 ASSAY OF BLOOD/URIC ACID: CPT

## 2022-06-15 PROCEDURE — 85025 COMPLETE CBC W/AUTO DIFF WBC: CPT | Performed by: INTERNAL MEDICINE

## 2022-06-15 PROCEDURE — 80053 COMPREHEN METABOLIC PANEL: CPT | Performed by: INTERNAL MEDICINE

## 2022-06-15 PROCEDURE — 36415 COLL VENOUS BLD VENIPUNCTURE: CPT

## 2022-06-15 PROCEDURE — 85007 BL SMEAR W/DIFF WBC COUNT: CPT | Performed by: INTERNAL MEDICINE

## 2022-06-15 PROCEDURE — 99214 OFFICE O/P EST MOD 30 MIN: CPT

## 2022-06-24 ENCOUNTER — SPECIALTY PHARMACY (OUTPATIENT)
Dept: PHARMACY | Facility: HOSPITAL | Age: 62
End: 2022-06-24

## 2022-06-24 DIAGNOSIS — D45 POLYCYTHEMIA VERA: ICD-10-CM

## 2022-06-24 RX ORDER — HYDROXYUREA 500 MG/1
CAPSULE ORAL
Qty: 228 CAPSULE | Refills: 1 | Status: SHIPPED | OUTPATIENT
Start: 2022-06-24 | End: 2022-09-07 | Stop reason: SDUPTHER

## 2022-06-24 NOTE — PROGRESS NOTES
Refill requested from pharmacy. Per last chart note, patient to  Increase Hydrea to 1500 mg 5 days a week and 1000 mg all other days. Will route to MD for cosignature.

## 2022-07-13 ENCOUNTER — LAB (OUTPATIENT)
Dept: OTHER | Facility: HOSPITAL | Age: 62
End: 2022-07-13

## 2022-07-13 ENCOUNTER — CLINICAL SUPPORT (OUTPATIENT)
Dept: ONCOLOGY | Facility: HOSPITAL | Age: 62
End: 2022-07-13

## 2022-07-13 DIAGNOSIS — D75.1 POLYCYTHEMIA: ICD-10-CM

## 2022-07-13 LAB
BASOPHILS # BLD AUTO: 0.14 10*3/MM3 (ref 0–0.2)
BASOPHILS NFR BLD AUTO: 1.6 % (ref 0–1.5)
DEPRECATED RDW RBC AUTO: 51.4 FL (ref 37–54)
EOSINOPHIL # BLD AUTO: 0.12 10*3/MM3 (ref 0–0.4)
EOSINOPHIL NFR BLD AUTO: 1.4 % (ref 0.3–6.2)
ERYTHROCYTE [DISTWIDTH] IN BLOOD BY AUTOMATED COUNT: 13.1 % (ref 12.3–15.4)
FERRITIN SERPL-MCNC: 154 NG/ML (ref 30–400)
HCT VFR BLD AUTO: 41.8 % (ref 37.5–51)
HGB BLD-MCNC: 14.6 G/DL (ref 13–17.7)
IMM GRANULOCYTES # BLD AUTO: 0.04 10*3/MM3 (ref 0–0.05)
IMM GRANULOCYTES NFR BLD AUTO: 0.5 % (ref 0–0.5)
LYMPHOCYTES # BLD AUTO: 2.19 10*3/MM3 (ref 0.7–3.1)
LYMPHOCYTES NFR BLD AUTO: 24.8 % (ref 19.6–45.3)
MCH RBC QN AUTO: 37.2 PG (ref 26.6–33)
MCHC RBC AUTO-ENTMCNC: 34.9 G/DL (ref 31.5–35.7)
MCV RBC AUTO: 106.4 FL (ref 79–97)
MONOCYTES # BLD AUTO: 1.04 10*3/MM3 (ref 0.1–0.9)
MONOCYTES NFR BLD AUTO: 11.8 % (ref 5–12)
NEUTROPHILS NFR BLD AUTO: 5.3 10*3/MM3 (ref 1.7–7)
NEUTROPHILS NFR BLD AUTO: 59.9 % (ref 42.7–76)
NRBC BLD AUTO-RTO: 0 /100 WBC (ref 0–0.2)
PLAT MORPH BLD: NORMAL
PLATELET # BLD AUTO: 536 10*3/MM3 (ref 140–450)
PMV BLD AUTO: 9.4 FL (ref 6–12)
RBC # BLD AUTO: 3.93 10*6/MM3 (ref 4.14–5.8)
RBC MORPH BLD: NORMAL
WBC MORPH BLD: NORMAL
WBC NRBC COR # BLD: 8.83 10*3/MM3 (ref 3.4–10.8)

## 2022-07-13 PROCEDURE — 82728 ASSAY OF FERRITIN: CPT | Performed by: INTERNAL MEDICINE

## 2022-07-13 PROCEDURE — G0463 HOSPITAL OUTPT CLINIC VISIT: HCPCS

## 2022-07-13 PROCEDURE — 36415 COLL VENOUS BLD VENIPUNCTURE: CPT

## 2022-07-13 PROCEDURE — 85025 COMPLETE CBC W/AUTO DIFF WBC: CPT | Performed by: INTERNAL MEDICINE

## 2022-07-13 PROCEDURE — 85007 BL SMEAR W/DIFF WBC COUNT: CPT | Performed by: INTERNAL MEDICINE

## 2022-07-13 NOTE — NURSING NOTE
CBC results reviewed with pt. Labs stable for pt. He confirmed he has been taking hydrea 1500mg 5 days a week and 1000mg the other 2 days. Pt denies any new concerns or complaints today. Reviewed with Carmen LUONG. OK for pt to continue current dose. Will f/u with MD in two months. Pt informed and v/u.     Lab Results   Component Value Date    WBC 8.83 07/13/2022    HGB 14.6 07/13/2022    HCT 41.8 07/13/2022    .4 (H) 07/13/2022     (H) 07/13/2022

## 2022-07-15 ENCOUNTER — SPECIALTY PHARMACY (OUTPATIENT)
Dept: PHARMACY | Facility: HOSPITAL | Age: 62
End: 2022-07-15

## 2022-07-15 NOTE — PROGRESS NOTES
Specialty Note ( Hydrea)    Labs reviewed        7/13/2022   WBC 3.40 - 10.80 10*3/mm3 8.83   Neutrophils Absolute 1.70 - 7.00 10*3/mm3 5.30   Hemoglobin 13.0 - 17.7 g/dL 14.6   Hematocrit 37.5 - 51.0 % 41.8   Platelets 140 - 450 10*3/mm3 536 (A)     Continues on Hydrea 1500 mg po 5 days weekly and 1000 mg po 2 days weekly

## 2022-07-22 DIAGNOSIS — D75.839 THROMBOCYTOSIS: ICD-10-CM

## 2022-07-22 DIAGNOSIS — E79.0 ELEVATED URIC ACID IN BLOOD: ICD-10-CM

## 2022-07-22 DIAGNOSIS — D45 POLYCYTHEMIA VERA: ICD-10-CM

## 2022-07-22 RX ORDER — ALLOPURINOL 100 MG/1
100 TABLET ORAL 2 TIMES DAILY
Qty: 180 TABLET | Refills: 0 | Status: SHIPPED | OUTPATIENT
Start: 2022-07-22 | End: 2022-10-20 | Stop reason: SDUPTHER

## 2022-08-25 ENCOUNTER — SPECIALTY PHARMACY (OUTPATIENT)
Dept: PHARMACY | Facility: HOSPITAL | Age: 62
End: 2022-08-25

## 2022-08-25 NOTE — PROGRESS NOTES
Specialty Note ( Hydrea)    Call placed for adherence and toxicity check.  No answer.   direct line [phone].

## 2022-09-02 ENCOUNTER — SPECIALTY PHARMACY (OUTPATIENT)
Dept: PHARMACY | Facility: HOSPITAL | Age: 62
End: 2022-09-02

## 2022-09-02 NOTE — PROGRESS NOTES
"MTM Encounter-Re: Adherence and side effects (Hydrea)    Today's encounter was conducted via telephone.    Medication:  Hydrea 1500 mg po daily 5 days per week, 1000 mg po daily two days per week.  - Reason for outreach: Routine medication check-in .  - Administration: Patient is taking every day at the same time .  - Missed doses: Patient reports missing 0 doses in the last 30 days.  - Self-administration: Patient demonstrates ability to self-administer medication. No barriers to adherence identified.   - Diagnosis/Indication: Polycythemia Vera. Progress toward achieving therapeutic goals reviewed.   - Patient is experiencing side effects fatigue.    - Medication availability/affordability: Patient has had no issues obtaining medication from pharmacy.   - Questions/concerns about medications: none       Completed medication reconciliation today to assess for drug interactions.   Reviewed allergies, medical history, labs, quality of life( he reports fatigue, but is \"happy to be kicking\", and medication history with patient.   Patient denies starting or stopping any medications.  Assessed medication list for interactions, no significant drug interactions noted.   Advised pt to call the clinic if any new medications are started so we can assess for drug-drug interactions. ( per up to date)    All questions addressed. Patient had no additional concerns for MTM office.     Ev Yun RPH  9/2/2022  13:34 EDT   "

## 2022-09-07 ENCOUNTER — LAB (OUTPATIENT)
Dept: OTHER | Facility: HOSPITAL | Age: 62
End: 2022-09-07

## 2022-09-07 ENCOUNTER — OFFICE VISIT (OUTPATIENT)
Dept: ONCOLOGY | Facility: CLINIC | Age: 62
End: 2022-09-07

## 2022-09-07 VITALS
TEMPERATURE: 97.1 F | RESPIRATION RATE: 18 BRPM | OXYGEN SATURATION: 96 % | HEART RATE: 59 BPM | HEIGHT: 74 IN | WEIGHT: 292.4 LBS | DIASTOLIC BLOOD PRESSURE: 88 MMHG | SYSTOLIC BLOOD PRESSURE: 135 MMHG | BODY MASS INDEX: 37.53 KG/M2

## 2022-09-07 DIAGNOSIS — D45 POLYCYTHEMIA VERA: ICD-10-CM

## 2022-09-07 DIAGNOSIS — D75.839 THROMBOCYTOSIS: ICD-10-CM

## 2022-09-07 DIAGNOSIS — D75.1 POLYCYTHEMIA: Primary | ICD-10-CM

## 2022-09-07 DIAGNOSIS — D75.1 POLYCYTHEMIA: ICD-10-CM

## 2022-09-07 LAB
ALBUMIN SERPL-MCNC: 4.7 G/DL (ref 3.5–5.2)
ALBUMIN/GLOB SERPL: 1.3 G/DL
ALP SERPL-CCNC: 83 U/L (ref 39–117)
ALT SERPL W P-5'-P-CCNC: 30 U/L (ref 1–41)
ANION GAP SERPL CALCULATED.3IONS-SCNC: 10.7 MMOL/L (ref 5–15)
AST SERPL-CCNC: 34 U/L (ref 1–40)
BASOPHILS # BLD AUTO: 0.13 10*3/MM3 (ref 0–0.2)
BASOPHILS NFR BLD AUTO: 1.5 % (ref 0–1.5)
BILIRUB SERPL-MCNC: 0.8 MG/DL (ref 0–1.2)
BUN SERPL-MCNC: 14 MG/DL (ref 8–23)
BUN/CREAT SERPL: 13 (ref 7–25)
CALCIUM SPEC-SCNC: 9.8 MG/DL (ref 8.6–10.5)
CHLORIDE SERPL-SCNC: 95 MMOL/L (ref 98–107)
CLUMPED PLATELETS: PRESENT
CO2 SERPL-SCNC: 30.3 MMOL/L (ref 22–29)
CREAT SERPL-MCNC: 1.08 MG/DL (ref 0.76–1.27)
DEPRECATED RDW RBC AUTO: 52 FL (ref 37–54)
EGFRCR SERPLBLD CKD-EPI 2021: 78.1 ML/MIN/1.73
EOSINOPHIL # BLD AUTO: 0.1 10*3/MM3 (ref 0–0.4)
EOSINOPHIL NFR BLD AUTO: 1.2 % (ref 0.3–6.2)
ERYTHROCYTE [DISTWIDTH] IN BLOOD BY AUTOMATED COUNT: 13 % (ref 12.3–15.4)
FERRITIN SERPL-MCNC: 148.8 NG/ML (ref 30–400)
GLOBULIN UR ELPH-MCNC: 3.6 GM/DL
GLUCOSE SERPL-MCNC: 101 MG/DL (ref 65–99)
HCT VFR BLD AUTO: 44.8 % (ref 37.5–51)
HGB BLD-MCNC: 15.9 G/DL (ref 13–17.7)
IMM GRANULOCYTES # BLD AUTO: 0.06 10*3/MM3 (ref 0–0.05)
IMM GRANULOCYTES NFR BLD AUTO: 0.7 % (ref 0–0.5)
LYMPHOCYTES # BLD AUTO: 1.94 10*3/MM3 (ref 0.7–3.1)
LYMPHOCYTES NFR BLD AUTO: 23 % (ref 19.6–45.3)
MCH RBC QN AUTO: 38.6 PG (ref 26.6–33)
MCHC RBC AUTO-ENTMCNC: 35.5 G/DL (ref 31.5–35.7)
MCV RBC AUTO: 108.7 FL (ref 79–97)
MONOCYTES # BLD AUTO: 1.01 10*3/MM3 (ref 0.1–0.9)
MONOCYTES NFR BLD AUTO: 12 % (ref 5–12)
NEUTROPHILS NFR BLD AUTO: 5.19 10*3/MM3 (ref 1.7–7)
NEUTROPHILS NFR BLD AUTO: 61.6 % (ref 42.7–76)
NRBC BLD AUTO-RTO: 0 /100 WBC (ref 0–0.2)
PLATELET # BLD AUTO: 497 10*3/MM3 (ref 140–450)
PMV BLD AUTO: 9.4 FL (ref 6–12)
POTASSIUM SERPL-SCNC: 3.5 MMOL/L (ref 3.5–5.2)
PROT SERPL-MCNC: 8.3 G/DL (ref 6–8.5)
RBC # BLD AUTO: 4.12 10*6/MM3 (ref 4.14–5.8)
SODIUM SERPL-SCNC: 136 MMOL/L (ref 136–145)
STOMATOCYTES BLD QL SMEAR: NORMAL
WBC MORPH BLD: NORMAL
WBC NRBC COR # BLD: 8.43 10*3/MM3 (ref 3.4–10.8)

## 2022-09-07 PROCEDURE — 85007 BL SMEAR W/DIFF WBC COUNT: CPT | Performed by: INTERNAL MEDICINE

## 2022-09-07 PROCEDURE — 80053 COMPREHEN METABOLIC PANEL: CPT | Performed by: INTERNAL MEDICINE

## 2022-09-07 PROCEDURE — 85025 COMPLETE CBC W/AUTO DIFF WBC: CPT | Performed by: INTERNAL MEDICINE

## 2022-09-07 PROCEDURE — 99214 OFFICE O/P EST MOD 30 MIN: CPT | Performed by: INTERNAL MEDICINE

## 2022-09-07 PROCEDURE — 82728 ASSAY OF FERRITIN: CPT | Performed by: INTERNAL MEDICINE

## 2022-09-07 PROCEDURE — 36415 COLL VENOUS BLD VENIPUNCTURE: CPT

## 2022-09-07 RX ORDER — POTASSIUM CHLORIDE 750 MG/1
10 TABLET, FILM COATED, EXTENDED RELEASE ORAL DAILY
Qty: 90 TABLET | Refills: 1 | Status: SHIPPED | OUTPATIENT
Start: 2022-09-07 | End: 2022-11-30 | Stop reason: SDUPTHER

## 2022-09-07 RX ORDER — HYDROXYUREA 500 MG/1
CAPSULE ORAL
Qty: 228 CAPSULE | Refills: 1 | Status: SHIPPED | OUTPATIENT
Start: 2022-09-07 | End: 2022-12-19 | Stop reason: SDUPTHER

## 2022-09-07 NOTE — PROGRESS NOTES
Subjective     REASON FOR FOLLOW UP:  1.  Polycythemia vera, polycythemia and thrombocytosis and leukocytosis, EPO level 1.3, Tino 2+    HISTORY OF PRESENT ILLNESS:  The patient is a 61 y.o. year old male who is here for an opinion about the above issue.    History of Present Illness   Phani Landis is a 61 y.o. male with the above mentioned history. He continues on hydroxyurea for polycythemia vera. He is currently taking 1500 mg four days a week and 1000 mg all other days. He reports feeling well today.     He does take aspirin a day.  He is doing well with Hydrea.  He has some skin lesions on the right lower extremity but he missed his appointment [facility].  I discussed with him to get into [facility].      Hematological oncologic history:  Patient is a 59-year-old gentleman who has been referred by his primary care Cast[facility] Velasco for evaluation of thrombocytosis and polycythemia.  He has lost more than 50 pounds of weight.  In the last 6 months.  Looking back his blood counts have been high starting in 2016.  Initially his platelets were high around 670 and gradually increased in the 900 range.  More recently his hemoglobin has been increasing and his white count has been increasing.  He has not had a DVT or pulmonary embolism.  He has had no issues with itching with hot showers.  Has not had a stroke.  He had pneumonia last year but none recently.  He does have some shortness of breath but no chest pain.  He is exposed to secondhand smoking.    He does not have any nausea vomiting or abdominal pain at present.  He did have gout on several locations and was placed on meloxicam.  His renal function is mildly elevated.  He denies any fever or night sweats.    Patient does have a cough productive of yellow sputum but has no fevers.    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, EPO 1.3 low  CT chest negative, CT abdomen pelvis shows  "splenomegaly    Past Medical History:   Diagnosis Date   • Anxiety    • Arthritis    • Cancer (HCC) June 2020    Polycythemia vera   • History of thrombocytosis    • Hypertension         Past Surgical History:   Procedure Laterality Date   • CHEST SURGERY     • OTHER SURGICAL HISTORY  1985    \"Exploratory\"        Current Outpatient Medications on File Prior to Visit   Medication Sig Dispense Refill   • allopurinol (ZYLOPRIM) 100 MG tablet Take 1 tablet by mouth 2 (Two) Times a Day. 180 tablet 0   • amLODIPine-benazepril (LOTREL) 5-40 MG per capsule Take 1 capsule by mouth Daily. 90 capsule 3   • aspirin 81 MG chewable tablet Chew 81 mg Daily.     • bisoprolol (ZEBeta) 10 MG tablet Take 1 tablet by mouth Daily. 90 tablet 3   • chlorthalidone (HYGROTEN) 50 MG tablet Take 1 tablet by mouth Daily. 90 tablet 3   • hydrALAZINE (APRESOLINE) 50 MG tablet Take 1 tablet by mouth 2 (Two) Times a Day. 180 tablet 3   • ondansetron (ZOFRAN) 4 MG tablet Take 1 tablet by mouth Every 8 (Eight) Hours As Needed for Nausea or Vomiting. 30 tablet 0   • potassium chloride (K-DUR,KLOR-CON) 20 MEQ CR tablet Take 1 tablet by mouth Daily. 30 tablet 2   • zolpidem (AMBIEN) 10 MG tablet Take 1 tablet by mouth At Night As Needed for Sleep. 90 tablet 0   • [DISCONTINUED] hydroxyurea (HYDREA) 500 MG capsule Take 3 capsules (1500 mg) by mouth daily for 5 days out of the week. Take two capsules (1000 mg) daily on the other two days of the week. 228 capsule 1     No current facility-administered medications on file prior to visit.        ALLERGIES:    Allergies   Allergen Reactions   • Cephalexin Itching        Social History     Socioeconomic History   • Marital status:    Tobacco Use   • Smoking status: Never Smoker   • Smokeless tobacco: Never Used   Vaping Use   • Vaping Use: Never used   Substance and Sexual Activity   • Alcohol use: Yes     Comment: Occasional drinker   • Drug use: Never   • Sexual activity: Yes     Partners: Female    " "    Family History   Problem Relation Age of Onset   • Hypertension Father    • Hypertension Other    • Heart disease Other    • Cervical cancer Other       I have reviewed the patient's medical history in detail and updated the computerized patient record.    Review of Systems   Constitutional: Positive for fatigue. Negative for activity change, appetite change, chills, diaphoresis, fever and unexpected weight change.   HENT: Negative for hearing loss, mouth sores, nosebleeds, sore throat, trouble swallowing and voice change.    Eyes: Negative for visual disturbance.   Respiratory: Negative for cough, chest tightness, shortness of breath and wheezing.    Cardiovascular: Negative for chest pain, palpitations and leg swelling.   Gastrointestinal: Negative for abdominal distention, abdominal pain, blood in stool, constipation, diarrhea, nausea and vomiting.   Genitourinary: Negative for difficulty urinating, dysuria, frequency, hematuria and urgency.   Musculoskeletal: Negative for back pain and joint swelling.        No muscle weakness.   Skin: Negative for rash and wound.   Neurological: Negative for dizziness, seizures, syncope, speech difficulty, weakness, numbness and headaches.   Hematological: Negative for adenopathy. Does not bruise/bleed easily.   Psychiatric/Behavioral: Negative for behavioral problems, confusion, dysphoric mood, sleep disturbance and suicidal ideas. The patient is not nervous/anxious.    All other systems reviewed and are negative.    Reviewed and updated where appropriate 09/07/22  I have reviewed and confirmed the accuracy of the ROS as documented by the MA/LPN/RN Eliana Fleming MD        Objective     Vitals:    09/07/22 1303   BP: 135/88   Pulse: 59   Resp: 18   Temp: 97.1 °F (36.2 °C)   TempSrc: Temporal   SpO2: 96%   Weight: 133 kg (292 lb 6.4 oz)   Height: 188 cm (74.02\")   PainSc: 0-No pain     Current Status 9/7/2022   ECOG score 0     PHYSICAL EXAM:  CONSTITUTIONAL:  Vital signs " reviewed.  No distress, looks comfortable.  EYES:  Conjunctivae and lids unremarkable.  PERRLA  EARS,NOSE,MOUTH,THROAT:  Ears and nose appear unremarkable.  Lips, teeth, gums appear unremarkable.  RESPIRATORY:  Normal respiratory effort.  Lungs clear to auscultation bilaterally.  CARDIOVASCULAR:  Normal S1, S2.  No murmurs rubs or gallops.  No significant lower extremity edema.  GASTROINTESTINAL: Abdomen appears unremarkable.  Nontender.  No hepatomegaly.  No splenomegaly.  LYMPHATIC:  No cervical, supraclavicular, axillary lymphadenopathy.  SKIN:  Warm.  No rashes.  I have reexamined the patient and the results are consistent with the previously documented exam. Eliana Fleming MD   RECENT LABS:  Results from last 7 days   Lab Units 09/07/22  1228   WBC 10*3/mm3 8.43   NEUTROS ABS 10*3/mm3 5.19   HEMOGLOBIN g/dL 15.9   HEMATOCRIT % 44.8   PLATELETS 10*3/mm3 497*     Results from last 7 days   Lab Units 09/07/22  1228   SODIUM mmol/L 136   POTASSIUM mmol/L 3.5   CHLORIDE mmol/L 95*   CO2 mmol/L 30.3*   BUN mg/dL 14   CREATININE mg/dL 1.08   CALCIUM mg/dL 9.8   ALBUMIN g/dL 4.70   BILIRUBIN mg/dL 0.8   ALK PHOS U/L 83   ALT (SGPT) U/L 30   AST (SGOT) U/L 34   GLUCOSE mg/dL 101*   FERRITIN ng/mL 148.80         Patient screened negative for depression based on a PHQ-9 score of  on .     Assessment & Plan     1.  Polycythemia with leukocytosis and thrombocytosis.  He does not have any itching with hot showers.  He does not have any headaches or DVT.  He does have blood counts worsening gradually from 2016.  · 7/8/2020  His platelets 974K.  Hemoglobin is 17.6 with hematocrit of 52.8 and white count of 11.56.  He denies fever night sweats but has significant weight loss greater than 50 pounds in 6 months.  He does have a cough which is productive of yellow sputum and some shortness of breath.  He has history of secondhand smoking exposure.  · Peripheral blood for BCR able negative  · Peripheral blood for Tino 2 mutation  positive  · EPO level 1.3  · CT abdomen pelvis with splenomegaly.  CT chest negative  · Hydroxyurea 500 mg twice daily was initiated on July 16, 2020.  .  · 7/22/2020 Hydrea increased to 1500 mg (1000 mg in am and 500 mg in pm) Monday-Friday and 2000 mg (1000 mg in am and 1000 mg in PM) on Saturdays and Sundays  · 7/29/2020 patient's platelet count today is 821.  White count 7.78, hemoglobin 16.4.   Increased Hydrea at 1500 mg Monday through Friday, and 2000 mg on Saturdays and Sundays.   · In future if hematocrit greater than 48 then he will receive phlebotomy.  · Last phlebotomy 7/15/2020.   · 2/23/2022 patient continues on Hydrea 1500 mg on Monday, Wednesday, and Friday, 1500 mg all other days.  He is tolerating Hydrea well aside from some fatigue on the days he takes 1500 mg.  He also continues to receive phlebotomy for hematocrit greater than 48, hematocrit today 44.7 so patient will not receive phlebotomy.  Platelets today have increased to 575, so we will increase Hydrea to 1500 mg four times a week, 1000 mg all other days.  Because we are increasing dose of Hydrea, patient will return in 1 month for CBC with RN review to monitor counts.  Discussed with the patient who is agreeable.  · 6/15/2022: Patient continues on Hydrea 1500 mg 4 days a week and 1000 mg all other days.  Platelets increased to 573,000.  Discussed with Dr. Fleming today plan to increase Hydrea to 1500 mg 5 days a week and 1000 mg 2 days a week.  He will return in 1 month for CBC with RN review to monitor counts.  Patient agreeable   · September 7, 2022: Tolerating Hydrea very well.  His platelets are down to 497.  He continues with aspirin.  Given hematocrit of 44 we will hold off phlebotomy    2.  Weight loss greater than 50 pounds at time of initial evaluation.  · CT chest abdomen pelvis negative for malignancy  · Patient admitting that his weight loss was actually been more intentional   · Weight now stable    3. Hypokalemia, mild,  chronic.  · 8/2/2021: Potassium 3.3. We discussed utilizing electrolyte drinks when he is working outside/sweating to help replenish what he is losing.  He does continue potassium chloride once daily as well.  · 9/28/2021: Potassium stable at 3.3. Continue Klor-con 20 mEq daily.  · 2/23/2022 potassium today 4.0, patient will continue on potassium 20 mEq daily.  · 6/15/2022: Currently taking 10 mEq daily, potassium 3.8 today    5.  History of knee surgery.  Patient is to go for knee surgery in 3 weeks  · Discussed with Dr. Chad Oliveros and he will start her on 2.5 mg p.o. twice daily of Eliquis 24 hours after surgery.  On the    6.  Skin lesions on the lower extremity with delayed healing, [facility] with [facility]       Plan:   · Increase Hydrea to 1500 mg 5 days a week and 1000 mg all other days  · Continue phlebotomy for hematocrit greater than 48, hematocrit today 42, patient will not receive phlebotomy today.  · Continue potassium chloride 10 mEq daily.    · [facility]    Monitoring high risk medication    Eliana Fleming MD  09/07/22

## 2022-09-08 ENCOUNTER — SPECIALTY PHARMACY (OUTPATIENT)
Dept: PHARMACY | Facility: HOSPITAL | Age: 62
End: 2022-09-08

## 2022-09-08 NOTE — PROGRESS NOTES
Specialty Note ( Hydrea)      Labs reviewed        9/7/2022   WBC 3.40 - 10.80 10*3/mm3 8.43   Neutrophils Absolute 1.70 - 7.00 10*3/mm3 5.19   Hemoglobin 13.0 - 17.7 g/dL 15.9   Hematocrit 37.5 - 51.0 % 44.8   Platelets 140 - 450 10*3/mm3 497 (A)   Creatinine 0.76 - 1.27 mg/dL 1.08   BUN 8 - 23 mg/dL 14   Sodium 136 - 145 mmol/L 136   Potassium 3.5 - 5.2 mmol/L 3.5   Glucose 65 - 99 mg/dL 101 (A)   Calcium 8.6 - 10.5 mg/dL 9.8   Albumin 3.50 - 5.20 g/dL 4.70   Total Protein 6.0 - 8.5 g/dL 8.3   AST (SGOT) 1 - 40 U/L 34   ALT (SGPT) 1 - 41 U/L 30   Alkaline Phosphatase 39 - 117 U/L 83   Total Bilirubin 0.0 - 1.2 mg/dL 0.8   Ferritin 30.00 - 400.00 ng/mL 148.80   eGFR >60.0 mL/min/1.73 78.1  National Kidney Foundation and American Society of Nephrology (ASN) Task Force recommended calculation based on the Chronic Kidney Disease Epidemiology Collaboration (CKD-EPI) equation refit without adjustment for race.     Dr Fleming's dictation is noted    · September 7, 2022: Tolerating Hydrea very well.  His platelets are down to 497.  He continues with aspirin.  Given hematocrit of 44 we will hold off phlebotomy      Continues Hydrea 1500 mg po daily for 5 days weekly and 1000 mg po daily on the other 2 days of the week.

## 2022-10-20 DIAGNOSIS — D75.839 THROMBOCYTOSIS: ICD-10-CM

## 2022-10-20 DIAGNOSIS — D45 POLYCYTHEMIA VERA: ICD-10-CM

## 2022-10-20 DIAGNOSIS — E79.0 ELEVATED URIC ACID IN BLOOD: ICD-10-CM

## 2022-10-21 RX ORDER — ALLOPURINOL 100 MG/1
100 TABLET ORAL 2 TIMES DAILY
Qty: 180 TABLET | Refills: 0 | Status: SHIPPED | OUTPATIENT
Start: 2022-10-21 | End: 2023-01-19 | Stop reason: SDUPTHER

## 2022-11-30 ENCOUNTER — LAB (OUTPATIENT)
Dept: OTHER | Facility: HOSPITAL | Age: 62
End: 2022-11-30

## 2022-11-30 ENCOUNTER — OFFICE VISIT (OUTPATIENT)
Dept: ONCOLOGY | Facility: CLINIC | Age: 62
End: 2022-11-30

## 2022-11-30 VITALS
DIASTOLIC BLOOD PRESSURE: 79 MMHG | BODY MASS INDEX: 37.65 KG/M2 | HEIGHT: 74 IN | SYSTOLIC BLOOD PRESSURE: 118 MMHG | RESPIRATION RATE: 18 BRPM | TEMPERATURE: 97.1 F | WEIGHT: 293.4 LBS | HEART RATE: 57 BPM | OXYGEN SATURATION: 91 %

## 2022-11-30 DIAGNOSIS — E87.6 HYPOKALEMIA: ICD-10-CM

## 2022-11-30 DIAGNOSIS — Z79.899 HIGH RISK MEDICATION USE: ICD-10-CM

## 2022-11-30 DIAGNOSIS — D45 POLYCYTHEMIA VERA: Primary | ICD-10-CM

## 2022-11-30 DIAGNOSIS — D75.1 POLYCYTHEMIA: ICD-10-CM

## 2022-11-30 DIAGNOSIS — D75.839 THROMBOCYTOSIS: ICD-10-CM

## 2022-11-30 DIAGNOSIS — D45 POLYCYTHEMIA VERA: ICD-10-CM

## 2022-11-30 LAB
ALBUMIN SERPL-MCNC: 4.5 G/DL (ref 3.5–5.2)
ALBUMIN/GLOB SERPL: 1.4 G/DL
ALP SERPL-CCNC: 71 U/L (ref 39–117)
ALT SERPL W P-5'-P-CCNC: 41 U/L (ref 1–41)
ANION GAP SERPL CALCULATED.3IONS-SCNC: 10.3 MMOL/L (ref 5–15)
AST SERPL-CCNC: 37 U/L (ref 1–40)
BASOPHILS # BLD AUTO: 0.12 10*3/MM3 (ref 0–0.2)
BASOPHILS NFR BLD AUTO: 1.6 % (ref 0–1.5)
BILIRUB SERPL-MCNC: 0.7 MG/DL (ref 0–1.2)
BUN SERPL-MCNC: 15 MG/DL (ref 8–23)
BUN/CREAT SERPL: 13.3 (ref 7–25)
CALCIUM SPEC-SCNC: 9.7 MG/DL (ref 8.6–10.5)
CHLORIDE SERPL-SCNC: 97 MMOL/L (ref 98–107)
CO2 SERPL-SCNC: 30.7 MMOL/L (ref 22–29)
CREAT SERPL-MCNC: 1.13 MG/DL (ref 0.76–1.27)
DEPRECATED RDW RBC AUTO: 48.4 FL (ref 37–54)
EGFRCR SERPLBLD CKD-EPI 2021: 73.5 ML/MIN/1.73
EOSINOPHIL # BLD AUTO: 0.12 10*3/MM3 (ref 0–0.4)
EOSINOPHIL NFR BLD AUTO: 1.6 % (ref 0.3–6.2)
ERYTHROCYTE [DISTWIDTH] IN BLOOD BY AUTOMATED COUNT: 12 % (ref 12.3–15.4)
FERRITIN SERPL-MCNC: 159.4 NG/ML (ref 30–400)
GLOBULIN UR ELPH-MCNC: 3.2 GM/DL
GLUCOSE SERPL-MCNC: 97 MG/DL (ref 65–99)
HCT VFR BLD AUTO: 43.8 % (ref 37.5–51)
HGB BLD-MCNC: 15.2 G/DL (ref 13–17.7)
IMM GRANULOCYTES # BLD AUTO: 0.09 10*3/MM3 (ref 0–0.05)
IMM GRANULOCYTES NFR BLD AUTO: 1.2 % (ref 0–0.5)
LYMPHOCYTES # BLD AUTO: 1.81 10*3/MM3 (ref 0.7–3.1)
LYMPHOCYTES NFR BLD AUTO: 23.8 % (ref 19.6–45.3)
MCH RBC QN AUTO: 37.4 PG (ref 26.6–33)
MCHC RBC AUTO-ENTMCNC: 34.7 G/DL (ref 31.5–35.7)
MCV RBC AUTO: 107.9 FL (ref 79–97)
MONOCYTES # BLD AUTO: 0.87 10*3/MM3 (ref 0.1–0.9)
MONOCYTES NFR BLD AUTO: 11.4 % (ref 5–12)
NEUTROPHILS NFR BLD AUTO: 4.61 10*3/MM3 (ref 1.7–7)
NEUTROPHILS NFR BLD AUTO: 60.4 % (ref 42.7–76)
NRBC BLD AUTO-RTO: 0 /100 WBC (ref 0–0.2)
PLAT MORPH BLD: NORMAL
PLATELET # BLD AUTO: 496 10*3/MM3 (ref 140–450)
PMV BLD AUTO: 9.5 FL (ref 6–12)
POTASSIUM SERPL-SCNC: 3.2 MMOL/L (ref 3.5–5.2)
PROT SERPL-MCNC: 7.7 G/DL (ref 6–8.5)
RBC # BLD AUTO: 4.06 10*6/MM3 (ref 4.14–5.8)
RBC MORPH BLD: NORMAL
SODIUM SERPL-SCNC: 138 MMOL/L (ref 136–145)
WBC MORPH BLD: NORMAL
WBC NRBC COR # BLD: 7.62 10*3/MM3 (ref 3.4–10.8)

## 2022-11-30 PROCEDURE — 80053 COMPREHEN METABOLIC PANEL: CPT | Performed by: INTERNAL MEDICINE

## 2022-11-30 PROCEDURE — 85025 COMPLETE CBC W/AUTO DIFF WBC: CPT | Performed by: INTERNAL MEDICINE

## 2022-11-30 PROCEDURE — 82728 ASSAY OF FERRITIN: CPT | Performed by: INTERNAL MEDICINE

## 2022-11-30 PROCEDURE — 99214 OFFICE O/P EST MOD 30 MIN: CPT | Performed by: NURSE PRACTITIONER

## 2022-11-30 PROCEDURE — 85007 BL SMEAR W/DIFF WBC COUNT: CPT | Performed by: INTERNAL MEDICINE

## 2022-11-30 PROCEDURE — 36415 COLL VENOUS BLD VENIPUNCTURE: CPT

## 2022-11-30 RX ORDER — POTASSIUM CHLORIDE 750 MG/1
10 TABLET, FILM COATED, EXTENDED RELEASE ORAL 2 TIMES DAILY
Qty: 180 TABLET | Refills: 1 | Status: SHIPPED | OUTPATIENT
Start: 2022-11-30

## 2022-11-30 NOTE — PROGRESS NOTES
Subjective     REASON FOR FOLLOW UP:  1.  Polycythemia vera, polycythemia and thrombocytosis and leukocytosis, EPO level 1.3, Tino 2+    HISTORY OF PRESENT ILLNESS:  The patient is a 62 y.o. year old male who is here for an opinion about the above issue.    History of Present Illness   Phani Landis is a 62 y.o. male with the above-mentioned history who is here today for lab review and follow-up continuing on Hydrea 1500 mg 5 days a week and 1000 mg 2 days a week.  He states he typically takes the 1000 mg dose on the weekends.  He is tolerating this well without any significant side effects.      Patient was previously referred to dermatology due to some issues with skin lesions on his lower extremity that were delayed healing however the patient states that he did not end up keeping that appointment as the lesions have improved.  He has been using vitamin E oil.        Hematological oncologic history:  Patient is a 59-year-old gentleman who has been referred by his primary care [facility] for evaluation of thrombocytosis and polycythemia.  He has lost more than 50 pounds of weight.  In the last 6 months.  Looking back his blood counts have been high starting in 2016.  Initially his platelets were high around 670 and gradually increased in the 900 range.  More recently his hemoglobin has been increasing and his white count has been increasing.  He has not had a DVT or pulmonary embolism.  He has had no issues with itching with hot showers.  Has not had a stroke.  He had pneumonia last year but none recently.  He does have some shortness of breath but no chest pain.  He is exposed to secondhand smoking.    He does not have any nausea vomiting or abdominal pain at present.  He did have gout on several locations and was placed on meloxicam.  His renal function is mildly elevated.  He denies any fever or night sweats.    Patient does have a cough productive of yellow sputum but has no fevers.    Peripheral blood for  "BCR C able not detected  Peripheral blood for Tino 2 mutation positive, EPO 1.3 low  CT chest negative, CT abdomen pelvis shows splenomegaly    Past Medical History:   Diagnosis Date   • Anxiety    • Arthritis    • Cancer (HCC) June 2020    Polycythemia vera   • History of thrombocytosis    • Hypertension         Past Surgical History:   Procedure Laterality Date   • CHEST SURGERY     • OTHER SURGICAL HISTORY  1985    \"Exploratory\"        Current Outpatient Medications on File Prior to Visit   Medication Sig Dispense Refill   • allopurinol (ZYLOPRIM) 100 MG tablet Take 1 tablet by mouth 2 (Two) Times a Day. 180 tablet 0   • amLODIPine-benazepril (LOTREL) 5-40 MG per capsule Take 1 capsule by mouth Daily. 90 capsule 3   • aspirin 81 MG chewable tablet Chew 81 mg Daily.     • bisoprolol (ZEBeta) 10 MG tablet Take 1 tablet by mouth Daily. 90 tablet 3   • chlorthalidone (HYGROTEN) 50 MG tablet Take 1 tablet by mouth Daily. 90 tablet 3   • hydrALAZINE (APRESOLINE) 50 MG tablet Take 1 tablet by mouth 2 (Two) Times a Day. 180 tablet 3   • hydroxyurea (HYDREA) 500 MG capsule Take 3 capsules (1500 mg) by mouth daily for 5 days out of the week. Take two capsules (1000 mg) daily on the other two days of the week. 228 capsule 1   • ondansetron (ZOFRAN) 4 MG tablet Take 1 tablet by mouth Every 8 (Eight) Hours As Needed for Nausea or Vomiting. 30 tablet 0   • zolpidem (AMBIEN) 10 MG tablet Take 1 tablet by mouth At Night As Needed for Sleep. 90 tablet 0   • [DISCONTINUED] potassium chloride (K-DUR,KLOR-CON) 20 MEQ CR tablet Take 1 tablet by mouth Daily. 30 tablet 2   • [DISCONTINUED] potassium chloride 10 MEQ CR tablet Take 1 tablet by mouth Daily. 90 tablet 1     No current facility-administered medications on file prior to visit.        ALLERGIES:    Allergies   Allergen Reactions   • Cephalexin Itching        Social History     Socioeconomic History   • Marital status:    Tobacco Use   • Smoking status: Never   • " "Smokeless tobacco: Never   Vaping Use   • Vaping Use: Never used   Substance and Sexual Activity   • Alcohol use: Yes     Comment: Occasional drinker   • Drug use: Never   • Sexual activity: Yes     Partners: Female        Family History   Problem Relation Age of Onset   • Hypertension Father    • Hypertension Other    • Heart disease Other    • Cervical cancer Other       I have reviewed the patient's medical history in detail and updated the computerized patient record.    Review of Systems  ROS as per HPI      Objective     Vitals:    11/30/22 1255   BP: 118/79   Pulse: 57   Resp: 18   Temp: 97.1 °F (36.2 °C)   TempSrc: Temporal   SpO2: 91%   Weight: 133 kg (293 lb 6.4 oz)   Height: 188 cm (74.02\")   PainSc: 0-No pain     Current Status 11/30/2022   ECOG score 0     Physical Exam  Vitals and nursing note reviewed.   Constitutional:       Appearance: Normal appearance. He is well-developed.   HENT:      Head: Normocephalic and atraumatic.      Nose: Nose normal.   Eyes:      Pupils: Pupils are equal, round, and reactive to light.   Cardiovascular:      Rate and Rhythm: Normal rate and regular rhythm.      Heart sounds: Normal heart sounds.   Pulmonary:      Effort: Pulmonary effort is normal. No respiratory distress.      Breath sounds: Normal breath sounds. No wheezing, rhonchi or rales.   Musculoskeletal:         General: Normal range of motion.      Cervical back: Normal range of motion and neck supple.   Skin:     General: Skin is warm and dry.   Neurological:      Mental Status: He is alert and oriented to person, place, and time.   Psychiatric:         Behavior: Behavior normal.         RECENT LABS:  Results from last 7 days   Lab Units 11/30/22  1233   WBC 10*3/mm3 7.62   NEUTROS ABS 10*3/mm3 4.61   HEMOGLOBIN g/dL 15.2   HEMATOCRIT % 43.8   PLATELETS 10*3/mm3 496*     Results from last 7 days   Lab Units 11/30/22  1233   SODIUM mmol/L 138   POTASSIUM mmol/L 3.2*   CHLORIDE mmol/L 97*   CO2 mmol/L 30.7* "   BUN mg/dL 15   CREATININE mg/dL 1.13   CALCIUM mg/dL 9.7   ALBUMIN g/dL 4.50   BILIRUBIN mg/dL 0.7   ALK PHOS U/L 71   ALT (SGPT) U/L 41   AST (SGOT) U/L 37   GLUCOSE mg/dL 97   FERRITIN ng/mL 159.40           Assessment & Plan     1.  Polycythemia with leukocytosis and thrombocytosis.  He does not have any itching with hot showers.  He does not have any headaches or DVT.  He does have blood counts worsening gradually from 2016.  · 7/8/2020  His platelets 974K.  Hemoglobin is 17.6 with hematocrit of 52.8 and white count of 11.56.  He denies fever night sweats but has significant weight loss greater than 50 pounds in 6 months.  He does have a cough which is productive of yellow sputum and some shortness of breath.  He has history of secondhand smoking exposure.  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  CT chest negative  · Hydroxyurea 500 mg twice daily was initiated on July 16, 2020.  .  · 7/22/2020 Hydrea increased to 1500 mg (1000 mg in am and 500 mg in pm) Monday-Friday and 2000 mg (1000 mg in am and 1000 mg in PM) on Saturdays and Sundays  · 7/29/2020 patient's platelet count today is 821.  White count 7.78, hemoglobin 16.4.   Increased Hydrea at 1500 mg Monday through Friday, and 2000 mg on Saturdays and Sundays.   · In future if hematocrit greater than 48 then he will receive phlebotomy.  · Last phlebotomy 7/15/2020.   · 2/23/2022 patient continues on Hydrea 1500 mg on Monday, Wednesday, and Friday, 1500 mg all other days.  He is tolerating Hydrea well aside from some fatigue on the days he takes 1500 mg.  He also continues to receive phlebotomy for hematocrit greater than 48, hematocrit today 44.7 so patient will not receive phlebotomy.  Platelets today have increased to 575, so we will increase Hydrea to 1500 mg four times a week, 1000 mg all other days.  Because we are increasing dose of Hydrea,  patient will return in 1 month for CBC with RN review to monitor counts.  Discussed with the patient who is agreeable.  · 6/15/2022: Patient continues on Hydrea 1500 mg 4 days a week and 1000 mg all other days.  Platelets increased to 573,000.  Discussed with Dr. Fleming today plan to increase Hydrea to 1500 mg 5 days a week and 1000 mg 2 days a week.  He will return in 1 month for CBC with RN review to monitor counts.  Patient agreeable   · September 7, 2022: Tolerating Hydrea very well.  His platelets are down to 497.  He continues with aspirin.  Given hematocrit of 44 we will hold off phlebotomy  · 11/30/2022 continuing on Hydrea 1500 mg 5 days a week and 1000 mg 2 days a week tolerating well.  Platelet count stable at 496,000.  Hematocrit today is 43.8, no need for phlebotomy.    2.  Weight loss greater than 50 pounds at time of initial evaluation.  · CT chest abdomen pelvis negative for malignancy  · Patient admitting that his weight loss was actually been more intentional   · Weight now stable    3. Hypokalemia, mild, chronic.  · 8/2/2021: Potassium 3.3. We discussed utilizing electrolyte drinks when he is working outside/sweating to help replenish what he is losing.  He does continue potassium chloride once daily as well.  · 9/28/2021: Potassium stable at 3.3. Continue Klor-con 20 mEq daily.  · 2/23/2022 potassium today 4.0, patient will continue on potassium 20 mEq daily.  · 6/15/2022: Currently taking 10 mEq daily, potassium 3.8.  · 11/30/2022 potassium 3.2, he will increase his potassium to 20 M EQ daily.    5.  History of knee surgery.  Patient is to go for knee surgery in 3 weeks  · Discussed with Dr. Chad Oliveros and he will start him on 2.5 mg p.o. twice daily of Eliquis 24 hours after surgery.      6.  Skin lesions on the lower extremity with delayed healing, [facility]  · 11/30/2022 patient states that he did not see dermatologist as a skin lesions began to heal.  He  continues to use vitamin D on this area.       Plan:   · Continue Hydrea 1500 mg 5 days a week and 1000 mg 2 days a week.  · Increase potassium to 20 M EQ daily.  · No need for phlebotomy today as hematocrit is 43.8 (patient gets phlebotomy for hematocrit greater than 48).    · Continue ASA 81 mg daily.  · Return in 3 months for follow-up with Dr. Fleming as scheduled with repeat labs.  · Call/return sooner should he develop any new concerns or problems.      Patient is on a high risk medication requiring close monitoring for toxicity.    Roselyn Ireland, APRN  11/30/22

## 2022-12-06 ENCOUNTER — SPECIALTY PHARMACY (OUTPATIENT)
Dept: PHARMACY | Facility: HOSPITAL | Age: 62
End: 2022-12-06

## 2022-12-06 NOTE — PROGRESS NOTES
Specialty Note ( hydrea)      Labs reviewed        11/30/2022   WBC 3.40 - 10.80 10*3/mm3 7.62   Neutrophils Absolute 1.70 - 7.00 10*3/mm3 4.61   Hemoglobin 13.0 - 17.7 g/dL 15.2   Hematocrit 37.5 - 51.0 % 43.8   Platelets 140 - 450 10*3/mm3 496 (A)   Creatinine 0.76 - 1.27 mg/dL 1.13   BUN 8 - 23 mg/dL 15   Sodium 136 - 145 mmol/L 138   Potassium 3.5 - 5.2 mmol/L 3.2 (A)   Glucose 65 - 99 mg/dL 97   Calcium 8.6 - 10.5 mg/dL 9.7   Albumin 3.50 - 5.20 g/dL 4.50   Total Protein 6.0 - 8.5 g/dL 7.7   AST (SGOT) 1 - 40 U/L 37   ALT (SGPT) 1 - 41 U/L 41   Alkaline Phosphatase 39 - 117 U/L 71   Total Bilirubin 0.0 - 1.2 mg/dL 0.7   Ferritin 30.00 - 400.00 ng/mL 159.40   eGFR >60.0 mL/min/1.73 73.5  National Kidney Foundation and American Society of Nephrology (ASN) Task Force recommended calculation based on the Chronic Kidney Disease Epidemiology Collaboration (CKD-EPI) equation refit without adjustment for race.     APRN dictation is noted    Continues Hydrea 1500 mg po daily for 5 days per week and 1000 mg two days per week- tolerating well.

## 2022-12-19 DIAGNOSIS — D45 POLYCYTHEMIA VERA: ICD-10-CM

## 2022-12-19 RX ORDER — HYDROXYUREA 500 MG/1
CAPSULE ORAL
Qty: 228 CAPSULE | Refills: 1 | Status: SHIPPED | OUTPATIENT
Start: 2022-12-19

## 2023-01-19 DIAGNOSIS — D75.839 THROMBOCYTOSIS: ICD-10-CM

## 2023-01-19 DIAGNOSIS — D45 POLYCYTHEMIA VERA: ICD-10-CM

## 2023-01-19 DIAGNOSIS — E79.0 ELEVATED URIC ACID IN BLOOD: ICD-10-CM

## 2023-01-19 RX ORDER — ALLOPURINOL 100 MG/1
100 TABLET ORAL 2 TIMES DAILY
Qty: 180 TABLET | Refills: 0 | Status: SHIPPED | OUTPATIENT
Start: 2023-01-19

## 2023-02-13 ENCOUNTER — OFFICE VISIT (OUTPATIENT)
Dept: FAMILY MEDICINE CLINIC | Facility: CLINIC | Age: 63
End: 2023-02-13
Payer: COMMERCIAL

## 2023-02-13 VITALS
TEMPERATURE: 98.2 F | DIASTOLIC BLOOD PRESSURE: 88 MMHG | HEART RATE: 62 BPM | BODY MASS INDEX: 39.1 KG/M2 | WEIGHT: 304.7 LBS | HEIGHT: 74 IN | OXYGEN SATURATION: 97 % | SYSTOLIC BLOOD PRESSURE: 130 MMHG

## 2023-02-13 DIAGNOSIS — Z12.5 SCREENING FOR PROSTATE CANCER: ICD-10-CM

## 2023-02-13 DIAGNOSIS — I10 ESSENTIAL HYPERTENSION: Primary | ICD-10-CM

## 2023-02-13 DIAGNOSIS — E66.01 CLASS 2 SEVERE OBESITY WITH SERIOUS COMORBIDITY AND BODY MASS INDEX (BMI) OF 39.0 TO 39.9 IN ADULT, UNSPECIFIED OBESITY TYPE: ICD-10-CM

## 2023-02-13 DIAGNOSIS — D45 POLYCYTHEMIA VERA: ICD-10-CM

## 2023-02-13 PROBLEM — E66.9 OBESITY: Status: ACTIVE | Noted: 2023-02-13

## 2023-02-13 PROCEDURE — 99214 OFFICE O/P EST MOD 30 MIN: CPT | Performed by: NURSE PRACTITIONER

## 2023-02-13 RX ORDER — CHLORTHALIDONE 50 MG/1
50 TABLET ORAL DAILY
Qty: 90 TABLET | Refills: 1 | Status: SHIPPED | OUTPATIENT
Start: 2023-02-13

## 2023-02-13 RX ORDER — BISOPROLOL FUMARATE 10 MG/1
10 TABLET, FILM COATED ORAL DAILY
Qty: 90 TABLET | Refills: 1 | Status: SHIPPED | OUTPATIENT
Start: 2023-02-13

## 2023-02-13 RX ORDER — AMLODIPINE BESYLATE AND BENAZEPRIL HYDROCHLORIDE 5; 40 MG/1; MG/1
1 CAPSULE ORAL DAILY
Qty: 90 CAPSULE | Refills: 1 | Status: SHIPPED | OUTPATIENT
Start: 2023-02-13

## 2023-02-13 RX ORDER — HYDRALAZINE HYDROCHLORIDE 50 MG/1
50 TABLET, FILM COATED ORAL 2 TIMES DAILY
Qty: 180 TABLET | Refills: 1 | Status: SHIPPED | OUTPATIENT
Start: 2023-02-13

## 2023-02-13 NOTE — ASSESSMENT & PLAN NOTE
Currently stable, following with hematology/oncology.  Handout on polycythemia vera provided, see AVS.

## 2023-02-13 NOTE — PROGRESS NOTES
"Chief Complaint  Hypertension and Establish Care    Subjective          Phani Landis, 62 y.o. male presents to [facility]  History of Present Illness   Patient presents today as a new patient to establish care.  His previous provider was AG Matt.    Hypertension: He is currently on amlodipine-benazepril 5-40 mg daily, bisoprolol 10 mg daily, chlorthalidone 50 mg daily, hydralazine 50 mg twice daily.  He states he usually checks his blood pressure at home but has not been checking it lately.  His blood pressure is noted to be elevated today but he states he was nervous about finding the location of the new provider.  Blood pressure was rechecked in office before he left and was 130/88.    He has polycythemia vera.  He is stable on allopurinol and Hydrea.  He follows with hematology oncology Dr. Tom MD.    PHQ-2 Depression Screening  Little interest or pleasure in doing things? 0-->not at all   Feeling down, depressed, or hopeless? 0-->not at all   PHQ-2 Total Score 0        Tobacco Use: Low Risk    • Smoking Tobacco Use: Never   • Smokeless Tobacco Use: Never   • Passive Exposure: Not on file      Objective   Vital Signs:   /88 (BP Location: Right arm, Patient Position: Sitting)   Pulse 62   Temp 98.2 °F (36.8 °C)   Ht 188 cm (74.02\")   Wt (!) 138 kg (304 lb 11.2 oz)   SpO2 97%   BMI 39.10 kg/m²       Current Outpatient Medications:   •  allopurinol (ZYLOPRIM) 100 MG tablet, Take 1 tablet by mouth 2 (Two) Times a Day., Disp: 180 tablet, Rfl: 0  •  amLODIPine-benazepril (LOTREL) 5-40 MG per capsule, Take 1 capsule by mouth Daily., Disp: 90 capsule, Rfl: 1  •  aspirin 81 MG chewable tablet, Chew 81 mg Daily., Disp: , Rfl:   •  bisoprolol (ZEBeta) 10 MG tablet, Take 1 tablet by mouth Daily., Disp: 90 tablet, Rfl: 1  •  chlorthalidone (HYGROTEN) 50 MG tablet, Take 1 tablet by mouth Daily., Disp: 90 tablet, Rfl: 1  •  hydrALAZINE (APRESOLINE) 50 MG tablet, " Take 1 tablet by mouth 2 (Two) Times a Day., Disp: 180 tablet, Rfl: 1  •  hydroxyurea (HYDREA) 500 MG capsule, Take 3 capsules (1500 mg) by mouth daily for 5 days out of the week. Take two capsules (1000 mg) daily on the other two days of the week., Disp: 228 capsule, Rfl: 1  •  potassium chloride 10 MEQ CR tablet, Take 1 tablet by mouth 2 (Two) Times a Day., Disp: 180 tablet, Rfl: 1   Past Medical History:   Diagnosis Date   • Anxiety    • Arthritis    • Cancer (HCC) June 2020    Polycythemia vera   • Hypertension       Physical Exam  Vitals reviewed.   Constitutional:       Appearance: Normal appearance. He is well-developed. He is obese.   Neck:      Thyroid: No thyroid mass, thyromegaly or thyroid tenderness.   Cardiovascular:      Rate and Rhythm: Normal rate and regular rhythm.      Heart sounds: No murmur heard.    No friction rub. No gallop.   Pulmonary:      Effort: Pulmonary effort is normal.      Breath sounds: Normal breath sounds. No wheezing or rhonchi.   Lymphadenopathy:      Cervical: No cervical adenopathy.   Skin:     General: Skin is warm and dry.   Neurological:      Mental Status: He is alert and oriented to person, place, and time.      Cranial Nerves: No cranial nerve deficit.   Psychiatric:         Mood and Affect: Mood and affect normal.         Behavior: Behavior normal.         Thought Content: Thought content normal. Thought content does not include homicidal or suicidal ideation.         Judgment: Judgment normal.        Result Review :     Common labs    Common Labs 7/13/22 9/7/22 9/7/22 11/30/22 11/30/22     1228 1228 1233 1233   Glucose   101 (A)  97   BUN   14  15   Creatinine   1.08  1.13   Sodium   136  138   Potassium   3.5  3.2 (A)   Chloride   95 (A)  97 (A)   Calcium   9.8  9.7   Albumin   4.70  4.50   Total Bilirubin   0.8  0.7   Alkaline Phosphatase   83  71   AST (SGOT)   34  37   ALT (SGPT)   30  41   WBC 8.83 8.43  7.62    Hemoglobin 14.6 15.9  15.2    Hematocrit 41.8 44.8   43.8    Platelets 536 (A) 497 (A)  496 (A)    (A) Abnormal value                      Assessment and Plan    Diagnoses and all orders for this visit:    1. Essential hypertension (Primary)  Assessment & Plan:  Hypertension is improving with treatment.  Continue current treatment regimen.  Continue current medications.  Ambulatory blood pressure monitoring.  I advised him to monitor his blood pressure at home and to notify me if it is elevated.  Blood pressure will be reassessed In 6 months.  Handout on hypertension/monitoring blood pressure at home provided, see AVS.    Orders:  -     bisoprolol (ZEBeta) 10 MG tablet; Take 1 tablet by mouth Daily.  Dispense: 90 tablet; Refill: 1  -     hydrALAZINE (APRESOLINE) 50 MG tablet; Take 1 tablet by mouth 2 (Two) Times a Day.  Dispense: 180 tablet; Refill: 1  -     amLODIPine-benazepril (LOTREL) 5-40 MG per capsule; Take 1 capsule by mouth Daily.  Dispense: 90 capsule; Refill: 1  -     chlorthalidone (HYGROTEN) 50 MG tablet; Take 1 tablet by mouth Daily.  Dispense: 90 tablet; Refill: 1  -     Lipid Panel; Future  -     TSH Rfx On Abnormal To Free T4; Future    2. Polycythemia vera (HCC)  Assessment & Plan:  Currently stable, following with hematology/oncology.  Handout on polycythemia vera provided, see AVS.      3. Screening for prostate cancer  -     PSA Screen; Future    4. Class 2 severe obesity with serious comorbidity and body mass index (BMI) of 39.0 to 39.9 in adult, unspecified obesity type (HCC)  Assessment & Plan:  Patient's (Body mass index is 39.1 kg/m².) indicates that they are morbidly/severely obese (BMI > 40 or > 35 with obesity - related health condition) with health conditions that include hypertension . Weight is newly identified. BMI  is above average; BMI management plan is completed. Handout on obesity provided, see AVS.      Patient is scheduled to get lab work-up in a few weeks.  I have added on fasting lipid panel, thyroid level and PSA to get done  when he gets his lab work-up for hematology/oncology.    Follow Up   Return in about 6 months (around 8/13/2023) for Next scheduled follow up HTN.  Patient was given instructions and counseling regarding his condition or for health maintenance advice. Please see specific information pulled into the AVS if appropriate.     Parts of this note are electronic transcriptions/translations of spoken language to printed text using the Dragon Dictation system.      Roselyn Haywood, AG  02/13/2023

## 2023-02-13 NOTE — ASSESSMENT & PLAN NOTE
Hypertension is improving with treatment.  Continue current treatment regimen.  Continue current medications.  Ambulatory blood pressure monitoring.  I advised him to monitor his blood pressure at home and to notify me if it is elevated.  Blood pressure will be reassessed In 6 months.  Handout on hypertension/monitoring blood pressure at home provided, see AVS.

## 2023-02-13 NOTE — ASSESSMENT & PLAN NOTE
Patient's (Body mass index is 39.1 kg/m².) indicates that they are morbidly/severely obese (BMI > 40 or > 35 with obesity - related health condition) with health conditions that include hypertension . Weight is newly identified. BMI  is above average; BMI management plan is completed. Handout on obesity provided, see AVS.

## 2023-02-17 ENCOUNTER — PATIENT ROUNDING (BHMG ONLY) (OUTPATIENT)
Dept: FAMILY MEDICINE CLINIC | Facility: CLINIC | Age: 63
End: 2023-02-17
Payer: COMMERCIAL

## 2023-02-21 ENCOUNTER — TELEPHONE (OUTPATIENT)
Dept: ORTHOPEDIC SURGERY | Facility: CLINIC | Age: 63
End: 2023-02-21

## 2023-02-21 NOTE — TELEPHONE ENCOUNTER
Caller: PATIENT      Relationship to patient: PATIENT     Best call back number: [phone]    Chief complaint: LEFT KNEE PAIN    Type of visit: INJECTION    Requested date: ASAP

## 2023-02-23 ENCOUNTER — SPECIALTY PHARMACY (OUTPATIENT)
Dept: PHARMACY | Facility: HOSPITAL | Age: 63
End: 2023-02-23
Payer: COMMERCIAL

## 2023-02-23 NOTE — PROGRESS NOTES
Specialty Pharmacy Patient Management Program  Oncology 6-Month Clinical Assessment       Phani Landis is a 62 y.o. male with thrombocytosis was called today to assess adherence and side effects.    Regimen: Hydrea 1500 mg po daily for 5 days each week and 1000 mg on the other 2 days of the week    Reason for Outreach: Routine medication check-in .       Problem list reviewed by Ev Yun RPH on 2/23/2023 at 11:43 AM  Medicines reviewed by Ev Yun RPH on 2/23/2023 at 11:43 AM  Allergies reviewed by Ev Yun RPH on 2/23/2023 at 11:43 AM     Goals     • Specialty Pharmacy General Goal      Phani will develop techniques for management of fatigue so that he can continue with Hydrea           Medication Assessment:  • Medication Assessment  o Follow Up Clinical Assessment  o Medication(s) assessed: Hydrea  o Therapeutic appropriateness: Appropriate  o Medication tolerability: Tolerating with no to minimal ADRs  o Medication plan: Continue therapy with normal follow-up  o Quality of Life Improvement Scale: A good deal better  o Comments on Quality of Life: No issues to report today  o Administration: as prescribed .   o Patient can self administer medications: yes  o Medication Follow-Up Plan: Next clinical assessment  • Lab Review: The labs listed below have been reviewed. No dose adjustments are needed for the oral specialty medication(s) based on the labs.    Lab Results   Component Value Date    GLUCOSE 97 11/30/2022    CALCIUM 9.7 11/30/2022     11/30/2022    K 3.2 (L) 11/30/2022    CO2 30.7 (H) 11/30/2022    CL 97 (L) 11/30/2022    BUN 15 11/30/2022    CREATININE 1.13 11/30/2022    EGFRIFAFRI 70 06/22/2020    EGFRIFNONA 67 02/23/2022    BCR 13.3 11/30/2022    ANIONGAP 10.3 11/30/2022     Lab Results   Component Value Date    WBC 7.62 11/30/2022    RBC 4.06 (L) 11/30/2022    HGB 15.2 11/30/2022    HCT 43.8 11/30/2022    .9 (H) 11/30/2022    MCH 37.4 (H) 11/30/2022    MCHC 34.7  11/30/2022    RDW 12.0 (L) 11/30/2022    RDWSD 48.4 11/30/2022    MPV 9.5 11/30/2022     (H) 11/30/2022    NEUTRORELPCT 60.4 11/30/2022    LYMPHORELPCT 23.8 11/30/2022    MONORELPCT 11.4 11/30/2022    EOSRELPCT 1.6 11/30/2022    BASORELPCT 1.6 (H) 11/30/2022    AUTOIGPER 1.2 (H) 11/30/2022    NEUTROABS 4.61 11/30/2022    LYMPHSABS 1.81 11/30/2022    MONOSABS 0.87 11/30/2022    EOSABS 0.12 11/30/2022    BASOSABS 0.12 11/30/2022    AUTOIGNUM 0.09 (H) 11/30/2022    NRBC 0.0 11/30/2022     • Drug-drug interactions  o Completed medication reconciliation today to assess for drug interactions. Patient denies starting or stopping any medications.    o Assessed medication list for interactions, no significant drug interactions noted.   o Advised patient to call the clinic if any new medications are started so we can assess for drug-drug interactions.  • Drug-food interactions discussed: none of concern  • Vaccines are coordinated by the patient's oncologist and primary care provider.    Allergies  Known allergies and reactions were discussed with the patient. The patient's chart has been reviewed for allergy information and updated as necessary.   Allergies   Allergen Reactions   • Cephalexin Itching        Hospitalizations and Urgent Care Visits Since Last Assessment:  • Unplanned hospitalizations or inpatient admissions: no  • ED Visits: no  • Urgent Office Visits: no    Adherence Assessment:  Adherence Questions  Medication(s) assessed: Hydrea  On average, how many doses/injections does the patient miss per month?: 0  What are the identified reasons for non-adherence or missed doses? : no problems identfied  What is the estimated medication adherence level?: %  Based on the patient/caregiver response and refill history, does this patient require an MTP to track adherence improvements?: no    Quality of Life Assessment:  Quality of Life Assessment  Quality of Life Improvement Scale: A good deal  better  Comments on Quality of Life: No issues to report today  -- Quality of Life: 9/10    Financial Assessment:  Medication availability/affordability: Patient has had no issues obtaining medication from pharmacy.      All questions addressed and patient had no additional concerns. Patient has pharmacy contact information.    Name/Credentials Ev Yun RPh, CARLOS    Telephone encounter    2/23/2023  11:44 EST

## 2023-03-01 ENCOUNTER — LAB (OUTPATIENT)
Dept: OTHER | Facility: HOSPITAL | Age: 63
End: 2023-03-01
Payer: COMMERCIAL

## 2023-03-01 ENCOUNTER — OFFICE VISIT (OUTPATIENT)
Dept: ONCOLOGY | Facility: CLINIC | Age: 63
End: 2023-03-01
Payer: COMMERCIAL

## 2023-03-01 ENCOUNTER — OFFICE VISIT (OUTPATIENT)
Dept: ORTHOPEDIC SURGERY | Facility: CLINIC | Age: 63
End: 2023-03-01
Payer: COMMERCIAL

## 2023-03-01 VITALS
WEIGHT: 294 LBS | DIASTOLIC BLOOD PRESSURE: 85 MMHG | BODY MASS INDEX: 37.73 KG/M2 | HEIGHT: 74 IN | OXYGEN SATURATION: 97 % | RESPIRATION RATE: 16 BRPM | SYSTOLIC BLOOD PRESSURE: 129 MMHG | HEART RATE: 63 BPM | TEMPERATURE: 98.6 F

## 2023-03-01 VITALS — HEIGHT: 74 IN | BODY MASS INDEX: 37.47 KG/M2 | WEIGHT: 292 LBS | TEMPERATURE: 97.1 F

## 2023-03-01 DIAGNOSIS — M17.12 ARTHRITIS OF LEFT KNEE: ICD-10-CM

## 2023-03-01 DIAGNOSIS — D45 POLYCYTHEMIA VERA: ICD-10-CM

## 2023-03-01 DIAGNOSIS — D75.839 THROMBOCYTOSIS: ICD-10-CM

## 2023-03-01 DIAGNOSIS — Z12.5 SCREENING FOR PROSTATE CANCER: ICD-10-CM

## 2023-03-01 DIAGNOSIS — R52 PAIN: Primary | ICD-10-CM

## 2023-03-01 DIAGNOSIS — I10 ESSENTIAL HYPERTENSION: ICD-10-CM

## 2023-03-01 DIAGNOSIS — Z79.899 HIGH RISK MEDICATION USE: ICD-10-CM

## 2023-03-01 DIAGNOSIS — D75.1 POLYCYTHEMIA: Primary | ICD-10-CM

## 2023-03-01 DIAGNOSIS — D75.1 POLYCYTHEMIA: ICD-10-CM

## 2023-03-01 LAB
ALBUMIN SERPL-MCNC: 4.7 G/DL (ref 3.5–5.2)
ALBUMIN/GLOB SERPL: 1.5 G/DL
ALP SERPL-CCNC: 69 U/L (ref 39–117)
ALT SERPL W P-5'-P-CCNC: 37 U/L (ref 1–41)
ANION GAP SERPL CALCULATED.3IONS-SCNC: 10.1 MMOL/L (ref 5–15)
AST SERPL-CCNC: 37 U/L (ref 1–40)
BASOPHILS # BLD AUTO: 0.14 10*3/MM3 (ref 0–0.2)
BASOPHILS NFR BLD AUTO: 2 % (ref 0–1.5)
BILIRUB SERPL-MCNC: 0.7 MG/DL (ref 0–1.2)
BUN SERPL-MCNC: 18 MG/DL (ref 8–23)
BUN/CREAT SERPL: 17.5 (ref 7–25)
CALCIUM SPEC-SCNC: 9.7 MG/DL (ref 8.6–10.5)
CHLORIDE SERPL-SCNC: 96 MMOL/L (ref 98–107)
CHOLEST SERPL-MCNC: 124 MG/DL (ref 0–200)
CO2 SERPL-SCNC: 28.9 MMOL/L (ref 22–29)
CREAT SERPL-MCNC: 1.03 MG/DL (ref 0.76–1.27)
DEPRECATED RDW RBC AUTO: 48.4 FL (ref 37–54)
EGFRCR SERPLBLD CKD-EPI 2021: 82.1 ML/MIN/1.73
EOSINOPHIL # BLD AUTO: 0.09 10*3/MM3 (ref 0–0.4)
EOSINOPHIL NFR BLD AUTO: 1.3 % (ref 0.3–6.2)
ERYTHROCYTE [DISTWIDTH] IN BLOOD BY AUTOMATED COUNT: 12.2 % (ref 12.3–15.4)
FERRITIN SERPL-MCNC: 193.9 NG/ML (ref 30–400)
GLOBULIN UR ELPH-MCNC: 3.1 GM/DL
GLUCOSE SERPL-MCNC: 93 MG/DL (ref 65–99)
HCT VFR BLD AUTO: 43 % (ref 37.5–51)
HDLC SERPL-MCNC: 46 MG/DL (ref 40–60)
HGB BLD-MCNC: 15.2 G/DL (ref 13–17.7)
IMM GRANULOCYTES # BLD AUTO: 0.02 10*3/MM3 (ref 0–0.05)
IMM GRANULOCYTES NFR BLD AUTO: 0.3 % (ref 0–0.5)
LDLC SERPL CALC-MCNC: 65 MG/DL (ref 0–100)
LDLC/HDLC SERPL: 1.43 {RATIO}
LYMPHOCYTES # BLD AUTO: 1.66 10*3/MM3 (ref 0.7–3.1)
LYMPHOCYTES NFR BLD AUTO: 23.5 % (ref 19.6–45.3)
MCH RBC QN AUTO: 37.8 PG (ref 26.6–33)
MCHC RBC AUTO-ENTMCNC: 35.3 G/DL (ref 31.5–35.7)
MCV RBC AUTO: 107 FL (ref 79–97)
MONOCYTES # BLD AUTO: 0.86 10*3/MM3 (ref 0.1–0.9)
MONOCYTES NFR BLD AUTO: 12.2 % (ref 5–12)
NEUTROPHILS NFR BLD AUTO: 4.3 10*3/MM3 (ref 1.7–7)
NEUTROPHILS NFR BLD AUTO: 60.7 % (ref 42.7–76)
NRBC BLD AUTO-RTO: 0 /100 WBC (ref 0–0.2)
PLAT MORPH BLD: NORMAL
PLATELET # BLD AUTO: 488 10*3/MM3 (ref 140–450)
PMV BLD AUTO: 9.8 FL (ref 6–12)
POTASSIUM SERPL-SCNC: 3.7 MMOL/L (ref 3.5–5.2)
PROT SERPL-MCNC: 7.8 G/DL (ref 6–8.5)
PSA SERPL-MCNC: 1.02 NG/ML (ref 0–4)
RBC # BLD AUTO: 4.02 10*6/MM3 (ref 4.14–5.8)
SODIUM SERPL-SCNC: 135 MMOL/L (ref 136–145)
SPHEROCYTES BLD QL SMEAR: NORMAL
STOMATOCYTES BLD QL SMEAR: NORMAL
TRIGL SERPL-MCNC: 60 MG/DL (ref 0–150)
TSH SERPL DL<=0.05 MIU/L-ACNC: 2.28 UIU/ML (ref 0.27–4.2)
VLDLC SERPL-MCNC: 13 MG/DL (ref 5–40)
WBC MORPH BLD: NORMAL
WBC NRBC COR # BLD: 7.07 10*3/MM3 (ref 3.4–10.8)

## 2023-03-01 PROCEDURE — 80061 LIPID PANEL: CPT | Performed by: NURSE PRACTITIONER

## 2023-03-01 PROCEDURE — G0103 PSA SCREENING: HCPCS | Performed by: NURSE PRACTITIONER

## 2023-03-01 PROCEDURE — 85007 BL SMEAR W/DIFF WBC COUNT: CPT | Performed by: INTERNAL MEDICINE

## 2023-03-01 PROCEDURE — 82728 ASSAY OF FERRITIN: CPT | Performed by: INTERNAL MEDICINE

## 2023-03-01 PROCEDURE — 36415 COLL VENOUS BLD VENIPUNCTURE: CPT

## 2023-03-01 PROCEDURE — 73562 X-RAY EXAM OF KNEE 3: CPT | Performed by: ORTHOPAEDIC SURGERY

## 2023-03-01 PROCEDURE — 99203 OFFICE O/P NEW LOW 30 MIN: CPT | Performed by: ORTHOPAEDIC SURGERY

## 2023-03-01 PROCEDURE — 80050 GENERAL HEALTH PANEL: CPT | Performed by: INTERNAL MEDICINE

## 2023-03-01 PROCEDURE — 99214 OFFICE O/P EST MOD 30 MIN: CPT | Performed by: INTERNAL MEDICINE

## 2023-03-01 PROCEDURE — 20610 DRAIN/INJ JOINT/BURSA W/O US: CPT | Performed by: ORTHOPAEDIC SURGERY

## 2023-03-01 RX ORDER — METHYLPREDNISOLONE ACETATE 80 MG/ML
80 INJECTION, SUSPENSION INTRA-ARTICULAR; INTRALESIONAL; INTRAMUSCULAR; SOFT TISSUE
Status: COMPLETED | OUTPATIENT
Start: 2023-03-01 | End: 2023-03-01

## 2023-03-01 RX ORDER — LIDOCAINE HYDROCHLORIDE 10 MG/ML
2 INJECTION, SOLUTION EPIDURAL; INFILTRATION; INTRACAUDAL; PERINEURAL
Status: COMPLETED | OUTPATIENT
Start: 2023-03-01 | End: 2023-03-01

## 2023-03-01 RX ORDER — BETAMETHASONE DIPROPIONATE 0.5 MG/G
CREAM TOPICAL
COMMUNITY
Start: 2023-02-15

## 2023-03-01 RX ORDER — DOXYCYCLINE HYCLATE 100 MG/1
CAPSULE ORAL
COMMUNITY
Start: 2023-02-15

## 2023-03-01 RX ADMIN — LIDOCAINE HYDROCHLORIDE 2 ML: 10 INJECTION, SOLUTION EPIDURAL; INFILTRATION; INTRACAUDAL; PERINEURAL at 14:47

## 2023-03-01 RX ADMIN — METHYLPREDNISOLONE ACETATE 80 MG: 80 INJECTION, SUSPENSION INTRA-ARTICULAR; INTRALESIONAL; INTRAMUSCULAR; SOFT TISSUE at 14:47

## 2023-03-01 NOTE — PROGRESS NOTES
"Patient Name: Phani Landis   YOB: 1960  Referring Primary Care Physician: Roselyn Haywood APRN  BMI: Body mass index is 37.47 kg/m².    Chief Complaint:    Chief Complaint   Patient presents with   • Left Knee - Initial Evaluation        HPI:     Phani Landis is a 62 y.o. male who presents today for evaluation of   Chief Complaint   Patient presents with   • Left Knee - Initial Evaluation   .  Raj is seen today complaining of left knee pain.  I saw him about 3-1/2 years ago.  He says his primary care doctor has given him a few shots in his knee but none since about 2001.  He says is starting to \"build up\" and it bothers him he is taken some Tylenol as he cannot take anti-inflammatories and he is wearing a compression sleeve.  He says he did physical therapy a long time ago still has his straps etc. and offered encouraged him to do home exercise program or we could send him for new therapy.      Subjective   Medications:   Home Medications:  Current Outpatient Medications on File Prior to Visit   Medication Sig   • allopurinol (ZYLOPRIM) 100 MG tablet Take 1 tablet by mouth 2 (Two) Times a Day.   • amLODIPine-benazepril (LOTREL) 5-40 MG per capsule Take 1 capsule by mouth Daily.   • aspirin 81 MG chewable tablet Chew 1 tablet Daily.   • betamethasone, augmented, (DIPROLENE) 0.05 % cream    • bisoprolol (ZEBeta) 10 MG tablet Take 1 tablet by mouth Daily.   • chlorthalidone (HYGROTEN) 50 MG tablet Take 1 tablet by mouth Daily.   • doxycycline (VIBRAMYCIN) 100 MG capsule    • hydrALAZINE (APRESOLINE) 50 MG tablet Take 1 tablet by mouth 2 (Two) Times a Day.   • hydroxyurea (HYDREA) 500 MG capsule Take 3 capsules (1500 mg) by mouth daily for 5 days out of the week. Take two capsules (1000 mg) daily on the other two days of the week.   • mupirocin (BACTROBAN) 2 % ointment    • potassium chloride 10 MEQ CR tablet Take 1 tablet by mouth 2 (Two) Times a Day.     No current facility-administered " "medications on file prior to visit.     Current Medications:  Scheduled Meds:  Continuous Infusions:No current facility-administered medications for this visit.    PRN Meds:.    I have reviewed the patient's medical history in detail and updated the computerized patient record.  Review and summarization of old records includes:    Past Medical History:   Diagnosis Date   • Anxiety    • Arthritis    • Cancer (HCC) June 2020    Polycythemia vera   • Hypertension         Past Surgical History:   Procedure Laterality Date   • CHEST SURGERY     • OTHER SURGICAL HISTORY  1985    \"Exploratory\"        Social History     Occupational History   • Occupation:      Employer: RETIRED   Tobacco Use   • Smoking status: Never   • Smokeless tobacco: Never   Vaping Use   • Vaping Use: Never used   Substance and Sexual Activity   • Alcohol use: Yes     Comment: Occasional drinker   • Drug use: Never   • Sexual activity: Yes     Partners: Female      Social History     Social History Narrative   • Not on file        Family History   Problem Relation Age of Onset   • Hypertension Father    • Hypertension Other    • Heart disease Other    • Cervical cancer Other        ROS: 14 point review of systems was performed and all other systems were reviewed and are negative except for documented findings in HPI and today's encounter.     Allergies:   Allergies   Allergen Reactions   • Cephalexin Itching     Constitutional:  Denies fever, shaking or chills   Eyes:  Denies change in visual acuity   HENT:  Denies nasal congestion or sore throat   Respiratory:  Denies cough or shortness of breath   Cardiovascular:  Denies chest pain or severe LE edema   GI:  Denies abdominal pain, nausea, vomiting, bloody stools or diarrhea   Musculoskeletal:  Numbness, tingling, pain, or loss of motor function only as noted above in history of present illness.  : Denies painful urination or hematuria  Integument:  Denies rash, lesion or ulceration " "  Neurologic:  Denies headache or focal weakness  Endocrine:  Denies lymphadenopathy  Psych:  Denies confusion or change in mental status   Hem:  Denies active bleeding    OBJECTIVE:  Physical Exam: 62 y.o. male  Wt Readings from Last 3 Encounters:   03/01/23 132 kg (292 lb)   03/01/23 133 kg (294 lb)   02/13/23 (!) 138 kg (304 lb 11.2 oz)     Ht Readings from Last 1 Encounters:   03/01/23 188 cm (74.02\")     Body mass index is 37.47 kg/m².  Vitals:    03/01/23 1431   Temp: 97.1 °F (36.2 °C)     Vital signs reviewed.     General Appearance:    Alert, cooperative, in no acute distress                  Eyes: conjunctiva clear  ENT: external ears and nose atraumatic  CV: no peripheral edema  Resp: normal respiratory effort  Skin: no rashes or wounds; normal turgor  Psych: mood and affect appropriate  Lymph: no nodes appreciated  Neuro: gross sensation intact  Vascular:  Palpable peripheral pulse in noted extremity  Musculoskeletal Extremities: Crepitation synovitis swelling with some tenderness along the medial and posterior medial joint line he has pseudolaxity hips little stiff but noncontributory walks with a slight start up limp    Radiology:   AP lateral 40 degree PA x-ray left knee taken the office today with comparison views taken for pain show advanced varus arthritis of his knee but with tricompartmental evidence of osteophytes and subchondral sclerosis        Assessment:     ICD-10-CM ICD-9-CM   1. Pain  R52 780.96        MDM/Plan:   The diagnosis(es), natural history, pathophysiology and treatment for diagnosis(es) were discussed. Opportunity given and questions answered.  Biomechanics of pertinent body areas discussed.  When appropriate, the use of ambulatory aids discussed.    Biomechanics of pertinent body areas discussed.  When appropriate, the use of ambulatory aids discussed.  BMI:  The concept of BMI body mass index and its importance and implications discussed.    Inflammation/pain control; with " cold, heat, elevation and/or liniments discussed as appropriate  Cortisone Injection. See procedure note.  HOME EXERCISE/PT program encouraged  MEDICAL RECORDS reviewed from other provider(s) for past and current medical history pertinent to this complaint.      3/1/2023    Dictated utilizing Dragon dictation        Large Joint Arthrocentesis: L knee  Date/Time: 3/1/2023 2:47 PM  Consent given by: patient  Site marked: site marked  Timeout: Immediately prior to procedure a time out was called to verify the correct patient, procedure, equipment, support staff and site/side marked as required   Supporting Documentation  Indications: pain   Procedure Details  Location: knee - L knee  Preparation: Patient was prepped and draped in the usual sterile fashion  Needle gauge: 21.  Approach: anterolateral  Medications administered: 80 mg methylPREDNISolone acetate 80 MG/ML; 2 mL lidocaine PF 1% 1 %  Patient tolerance: patient tolerated the procedure well with no immediate complications

## 2023-03-01 NOTE — PROGRESS NOTES
Subjective     REASON FOR FOLLOW UP:  1.  Polycythemia vera, polycythemia and thrombocytosis and leukocytosis, EPO level 1.3, Tino 2+    HISTORY OF PRESENT ILLNESS:  The patient is a 62 y.o. year old male who is here for an opinion about the above issue.    History of Present Illness   Phani Landis is a 62 y.o. male with the above-mentioned history who is here today for lab review and follow-up continuing on Hydrea 1500 mg 5 days a week and 1000 mg 2 days a week.  He states he typically takes the 1000 mg dose on the weekends.  He is tolerating this well without any significant side effects.      Patient was previously referred to dermatology due to some issues with skin lesions on his lower extremity that were delayed healing however the patient states that he did not end up keeping that appointment as the lesions have improved.  He has been using vitamin E oil.      Interval history: Patient had erythema in the left lower extremity with a small ulceration which is healing very well now.  He is tolerating hydroxyurea otherwise his counts are holding stable    Patient never had a screening colonoscopy.  On discussion with him he completely refuses colonoscopy        Hematological oncologic history:  Patient is a 59-year-old gentleman who has been referred by his primary care Castillo Velasco for evaluation of thrombocytosis and polycythemia.  He has lost more than 50 pounds of weight.  In the last 6 months.  Looking back his blood counts have been high starting in 2016.  Initially his platelets were high around 670 and gradually increased in the 900 range.  More recently his hemoglobin has been increasing and his white count has been increasing.  He has not had a DVT or pulmonary embolism.  He has had no issues with itching with hot showers.  Has not had a stroke.  He had pneumonia last year but none recently.  He does have some shortness of breath but no chest pain.  He is exposed to secondhand smoking.    He does not  "have any nausea vomiting or abdominal pain at present.  He did have gout on several locations and was placed on meloxicam.  His renal function is mildly elevated.  He denies any fever or night sweats.    Patient does have a cough productive of yellow sputum but has no fevers.    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 180 tablet 0   • amLODIPine-benazepril (LOTREL) 5-40 MG per capsule Take 1 capsule by mouth Daily. 90 capsule 1   • aspirin 81 MG chewable tablet Chew 1 tablet Daily.     • betamethasone, augmented, (DIPROLENE) 0.05 % cream      • bisoprolol (ZEBeta) 10 MG tablet Take 1 tablet by mouth Daily. 90 tablet 1   • chlorthalidone (HYGROTEN) 50 MG tablet Take 1 tablet by mouth Daily. 90 tablet 1   • doxycycline (VIBRAMYCIN) 100 MG capsule      • hydrALAZINE (APRESOLINE) 50 MG tablet Take 1 tablet by mouth 2 (Two) Times a Day. 180 tablet 1   • hydroxyurea (HYDREA) 500 MG capsule Take 3 capsules (1500 mg) by mouth daily for 5 days out of the week. Take two capsules (1000 mg) daily on the other two days of the week. 228 capsule 1   • mupirocin (BACTROBAN) 2 % ointment      • potassium chloride 10 MEQ CR tablet Take 1 tablet by mouth 2 (Two) Times a Day. 180 tablet 1     No current facility-administered medications on file prior to visit.        ALLERGIES:    Allergies   Allergen Reactions   • Cephalexin Itching        Social History     Socioeconomic History " "  • Marital status:    Tobacco Use   • Smoking status: Never   • Smokeless tobacco: Never   Vaping Use   • Vaping Use: Never used   Substance and Sexual Activity   • Alcohol use: Yes     Comment: Occasional drinker   • Drug use: Never   • Sexual activity: Yes     Partners: Female        Family History   Problem Relation Age of Onset   • Hypertension Father    • Hypertension Other    • Heart disease Other    • Cervical cancer Other       I have reviewed the patient's medical history in detail and updated the computerized patient record.    Review of Systems  ROS as per HPI  14 point review of system is negative except the erythema in the left lower extremity is improved but the ulceration is present but significantly improved.    Objective     Vitals:    03/01/23 1257   BP: 129/85   Pulse: 63   Resp: 16   Temp: 98.6 °F (37 °C)   TempSrc: Temporal   SpO2: 97%   Weight: 133 kg (294 lb)   Height: 188 cm (74.02\")   PainSc: 0-No pain     Current Status 3/1/2023   ECOG score 0     Physical examination      CONSTITUTIONAL:  Vital signs reviewed.  No distress, looks comfortable.  EYES:  Conjunctivae and lids unremarkable.  PERRLA  EARS,NOSE,MOUTH,THROAT:  Ears and nose appear unremarkable.  Lips, teeth, gums appear unremarkable.  RESPIRATORY:  Normal respiratory effort.  Lungs clear to auscultation bilaterally.   CARDIOVASCULAR:  Normal S1, S2.  No murmurs rubs or gallops.  No significant lower extremity edema.  GASTROINTESTINAL: Abdomen appears unremarkable.  Nontender.  No hepatomegaly.  No splenomegaly.  Left lower extremity erythema on the medial aspect of the left lower leg with a small ulcer which is not present on the medial malleolus but is now healing up  LYMPHATIC:  No cervical, supraclavicular, axillary lymphadenopathy.  SKIN:  Warm.  No rashes.  PSYCHIATRIC:  Normal judgment and insight.  Normal mood and affect.    RECENT LABS:  Results from last 7 days   Lab Units 03/01/23  1240   WBC 10*3/mm3 7.07 "   NEUTROS ABS 10*3/mm3 4.30   HEMOGLOBIN g/dL 15.2   HEMATOCRIT % 43.0   PLATELETS 10*3/mm3 488*     Results from last 7 days   Lab Units 03/01/23  1240   SODIUM mmol/L 135*   POTASSIUM mmol/L 3.7   CHLORIDE mmol/L 96*   CO2 mmol/L 28.9   BUN mg/dL 18   CREATININE mg/dL 1.03   CALCIUM mg/dL 9.7   ALBUMIN g/dL 4.7   BILIRUBIN mg/dL 0.7   ALK PHOS U/L 69   ALT (SGPT) U/L 37   AST (SGOT) U/L 37   GLUCOSE mg/dL 93   FERRITIN ng/mL 193.90           Assessment & Plan     1.  Polycythemia with leukocytosis and thrombocytosis.  He does not have any itching with hot showers.  He does not have any headaches or DVT.  He does have blood counts worsening gradually from 2016.  · 7/8/2020  His platelets 974K.  Hemoglobin is 17.6 with hematocrit of 52.8 and white count of 11.56.  He denies fever night sweats but has significant weight loss greater than 50 pounds in 6 months.  He does have a cough which is productive of yellow sputum and some shortness of breath.  He has history of secondhand smoking exposure.  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  CT chest negative  · Hydroxyurea 500 mg twice daily was initiated on July 16, 2020.  .  · 7/22/2020 Hydrea increased to 1500 mg (1000 mg in am and 500 mg in pm) Monday-Friday and 2000 mg (1000 mg in am and 1000 mg in PM) on Saturdays and Sundays  · 7/29/2020 patient's platelet count today is 821.  White count 7.78, hemoglobin 16.4.   Increased Hydrea at 1500 mg Monday through Friday, and 2000 mg on Saturdays and Sundays.   · In future if hematocrit greater than 48 then he will receive phlebotomy.  · Last phlebotomy 7/15/2020.   · 2/23/2022 patient continues on Hydrea 1500 mg on Monday, Wednesday, and Friday, 1500 mg all other days.  He is tolerating Hydrea well aside from some fatigue on the days he takes 1500 mg.  He also continues to receive phlebotomy for hematocrit greater than  48, hematocrit today 44.7 so patient will not receive phlebotomy.  Platelets today have increased to 575, so we will increase Hydrea to 1500 mg four times a week, 1000 mg all other days.  Because we are increasing dose of Hydrea, patient will return in 1 month for CBC with RN review to monitor counts.  Discussed with the patient who is agreeable.  · 6/15/2022: Patient continues on Hydrea 1500 mg 4 days a week and 1000 mg all other days.  Platelets increased to 573,000.  Discussed with Dr. Fleming today plan to increase Hydrea to 1500 mg 5 days a week and 1000 mg 2 days a week.  He will return in 1 month for CBC with RN review to monitor counts.  Patient agreeable   · September 7, 2022: Tolerating Hydrea very well.  His platelets are down to 497.  He continues with aspirin.  Given hematocrit of 44 we will hold off phlebotomy  · 11/30/2022 continuing on Hydrea 1500 mg 5 days a week and 1000 mg 2 days a week tolerating well.  ·       2.  Weight loss greater than 50 pounds at time of initial evaluation.  · CT chest abdomen pelvis negative for malignancy  · Patient admitting that his weight loss was actually been more intentional   · Weight now stable    3. Hypokalemia, mild, chronic.  · 8/2/2021: Potassium 3.3. We discussed utilizing electrolyte drinks when he is working outside/sweating to help replenish what he is losing.  He does continue potassium chloride once daily as well.  · 9/28/2021: Potassium stable at 3.3. Continue Klor-con 20 mEq daily.  · 2/23/2022 potassium today 4.0, patient will continue on potassium 20 mEq daily.  · 6/15/2022: Currently taking 10 mEq daily, potassium 3.8.  · 11/30/2022 potassium 3.2, he will increase his potassium to 20 M EQ daily.    5.  History of knee surgery.  Patient is to go for knee surgery in 3 weeks  · Discussed with Dr. Chad Oliveros and he will start him on 2.5 mg p.o. twice daily of Eliquis 24 hours after surgery.      6.  Skin lesions on the lower extremity with delayed  healing, [facility]  · 11/30/2022 patient states that he did not see dermatologist as a skin lesions began to heal.  He continues to use vitamin D on this area.  · March 1, 2023: The skin ulceration on the left lower extremity is healing up but not completely healed up  · Unsure if this is just secondary to chronic venous stasis as opposed to hydroxyurea.  However usually the hydroxyurea ulcerations on the medial malleolus and this is more superior       Plan:   · Continue Hydrea 1500 mg 5 days a week and 1000 mg 2 days a week.  · Ulceration and cellulitis in the left lower extremity has improved significantly  · We discussed that likely this is secondary to chronic venous stasis as opposed to hydroxyurea  · Patient states it was a bacterial infection per his dermatologist and he has completed antibiotic treatment and ointments  · Follow-up with me in 3 months with labs      Patient is on a high risk medication requiring close monitoring for toxicity.    Eliana Fleming MD  03/01/23

## 2023-03-03 ENCOUNTER — SPECIALTY PHARMACY (OUTPATIENT)
Dept: PHARMACY | Facility: HOSPITAL | Age: 63
End: 2023-03-03
Payer: COMMERCIAL

## 2023-04-16 DIAGNOSIS — D45 POLYCYTHEMIA VERA: ICD-10-CM

## 2023-04-16 DIAGNOSIS — E79.0 ELEVATED URIC ACID IN BLOOD: ICD-10-CM

## 2023-04-16 DIAGNOSIS — D75.839 THROMBOCYTOSIS: ICD-10-CM

## 2023-04-17 RX ORDER — ALLOPURINOL 100 MG/1
TABLET ORAL
Qty: 180 TABLET | Refills: 0 | Status: SHIPPED | OUTPATIENT
Start: 2023-04-17

## 2023-05-24 ENCOUNTER — OFFICE VISIT (OUTPATIENT)
Dept: ONCOLOGY | Facility: CLINIC | Age: 63
End: 2023-05-24
Payer: COMMERCIAL

## 2023-05-24 ENCOUNTER — LAB (OUTPATIENT)
Dept: OTHER | Facility: HOSPITAL | Age: 63
End: 2023-05-24
Payer: COMMERCIAL

## 2023-05-24 VITALS
SYSTOLIC BLOOD PRESSURE: 145 MMHG | OXYGEN SATURATION: 96 % | DIASTOLIC BLOOD PRESSURE: 90 MMHG | RESPIRATION RATE: 18 BRPM | HEIGHT: 74 IN | TEMPERATURE: 98.2 F | BODY MASS INDEX: 37.09 KG/M2 | HEART RATE: 69 BPM | WEIGHT: 289 LBS

## 2023-05-24 DIAGNOSIS — D75.1 POLYCYTHEMIA: ICD-10-CM

## 2023-05-24 DIAGNOSIS — D75.839 THROMBOCYTOSIS: ICD-10-CM

## 2023-05-24 DIAGNOSIS — D45 POLYCYTHEMIA VERA: Primary | ICD-10-CM

## 2023-05-24 LAB
ALBUMIN SERPL-MCNC: 4.2 G/DL (ref 3.5–5.2)
ALBUMIN/GLOB SERPL: 1.2 G/DL
ALP SERPL-CCNC: 78 U/L (ref 39–117)
ALT SERPL W P-5'-P-CCNC: 33 U/L (ref 1–41)
ANION GAP SERPL CALCULATED.3IONS-SCNC: 8.9 MMOL/L (ref 5–15)
AST SERPL-CCNC: 36 U/L (ref 1–40)
BASOPHILS # BLD AUTO: 0.13 10*3/MM3 (ref 0–0.2)
BASOPHILS NFR BLD AUTO: 1.8 % (ref 0–1.5)
BILIRUB SERPL-MCNC: 0.8 MG/DL (ref 0–1.2)
BUN SERPL-MCNC: 15 MG/DL (ref 8–23)
BUN/CREAT SERPL: 14 (ref 7–25)
CALCIUM SPEC-SCNC: 9.8 MG/DL (ref 8.6–10.5)
CHLORIDE SERPL-SCNC: 99 MMOL/L (ref 98–107)
CO2 SERPL-SCNC: 30.1 MMOL/L (ref 22–29)
CREAT SERPL-MCNC: 1.07 MG/DL (ref 0.76–1.27)
DEPRECATED RDW RBC AUTO: 50 FL (ref 37–54)
EGFRCR SERPLBLD CKD-EPI 2021: 78.5 ML/MIN/1.73
EOSINOPHIL # BLD AUTO: 0.08 10*3/MM3 (ref 0–0.4)
EOSINOPHIL NFR BLD AUTO: 1.1 % (ref 0.3–6.2)
ERYTHROCYTE [DISTWIDTH] IN BLOOD BY AUTOMATED COUNT: 12.2 % (ref 12.3–15.4)
GLOBULIN UR ELPH-MCNC: 3.6 GM/DL
GLUCOSE SERPL-MCNC: 121 MG/DL (ref 65–99)
HCT VFR BLD AUTO: 42.9 % (ref 37.5–51)
HGB BLD-MCNC: 15 G/DL (ref 13–17.7)
IMM GRANULOCYTES # BLD AUTO: 0.03 10*3/MM3 (ref 0–0.05)
IMM GRANULOCYTES NFR BLD AUTO: 0.4 % (ref 0–0.5)
LYMPHOCYTES # BLD AUTO: 1.73 10*3/MM3 (ref 0.7–3.1)
LYMPHOCYTES NFR BLD AUTO: 23.5 % (ref 19.6–45.3)
MCH RBC QN AUTO: 38.5 PG (ref 26.6–33)
MCHC RBC AUTO-ENTMCNC: 35 G/DL (ref 31.5–35.7)
MCV RBC AUTO: 110 FL (ref 79–97)
MONOCYTES # BLD AUTO: 0.79 10*3/MM3 (ref 0.1–0.9)
MONOCYTES NFR BLD AUTO: 10.7 % (ref 5–12)
NEUTROPHILS NFR BLD AUTO: 4.6 10*3/MM3 (ref 1.7–7)
NEUTROPHILS NFR BLD AUTO: 62.5 % (ref 42.7–76)
NRBC BLD AUTO-RTO: 0 /100 WBC (ref 0–0.2)
PLAT MORPH BLD: NORMAL
PLATELET # BLD AUTO: 580 10*3/MM3 (ref 140–450)
PMV BLD AUTO: 9.5 FL (ref 6–12)
POTASSIUM SERPL-SCNC: 4 MMOL/L (ref 3.5–5.2)
PROT SERPL-MCNC: 7.8 G/DL (ref 6–8.5)
RBC # BLD AUTO: 3.9 10*6/MM3 (ref 4.14–5.8)
SODIUM SERPL-SCNC: 138 MMOL/L (ref 136–145)
SPHEROCYTES BLD QL SMEAR: NORMAL
WBC MORPH BLD: NORMAL
WBC NRBC COR # BLD: 7.36 10*3/MM3 (ref 3.4–10.8)

## 2023-05-24 PROCEDURE — 36415 COLL VENOUS BLD VENIPUNCTURE: CPT

## 2023-05-24 PROCEDURE — 85007 BL SMEAR W/DIFF WBC COUNT: CPT | Performed by: INTERNAL MEDICINE

## 2023-05-24 PROCEDURE — 85025 COMPLETE CBC W/AUTO DIFF WBC: CPT | Performed by: INTERNAL MEDICINE

## 2023-05-24 PROCEDURE — 80053 COMPREHEN METABOLIC PANEL: CPT | Performed by: INTERNAL MEDICINE

## 2023-05-24 PROCEDURE — 99214 OFFICE O/P EST MOD 30 MIN: CPT | Performed by: INTERNAL MEDICINE

## 2023-05-24 NOTE — PROGRESS NOTES
Subjective     REASON FOR FOLLOW UP:  1.  Polycythemia vera, polycythemia and thrombocytosis and leukocytosis, EPO level 1.3, Tino 2+    HISTORY OF PRESENT ILLNESS:  The patient is a 62 y.o. year old male who is here for an opinion about the above issue.    History of Present Illness   Phani Landis is a 62 y.o. male with the above-mentioned history who is here today for lab review and follow-up continuing on Hydrea 1500 mg 5 days a week and 1000 mg 2 days a week.  He states he typically takes the 1000 mg dose on the weekends.  He is tolerating this well without any significant side effects.      Patient was previously referred to dermatology due to some issues with skin lesions on his lower extremity that were delayed healing however the patient states that he did not end up keeping that appointment as the lesions have improved.  He has been using vitamin E oil.      Interval history:   Patient is 62-year-old with P vera.  Is currently on hydroxyurea.  There is no further evidence of ulceration on the lower extremity or erythema.  His hemoglobin is 15, platelet of 580 and white count is normal.  We will need to increase the dose of his hydroxyurea.    Hematological oncologic history:  Patient is a 59-year-old gentleman who has been referred by his primary care Castillo Velasco for evaluation of thrombocytosis and polycythemia.  He has lost more than 50 pounds of weight.  In the last 6 months.  Looking back his blood counts have been high starting in 2016.  Initially his platelets were high around 670 and gradually increased in the 900 range.  More recently his hemoglobin has been increasing and his white count has been increasing.  He has not had a DVT or pulmonary embolism.  He has had no issues with itching with hot showers.  Has not had a stroke.  He had pneumonia last year but none recently.  He does have some shortness of breath but no chest pain.  He is exposed to secondhand smoking.    He does not have any  "nausea vomiting or abdominal pain at present.  He did have gout on several locations and was placed on meloxicam.  His renal function is mildly elevated.  He denies any fever or night sweats.    Patient does have a cough productive of yellow sputum but has no fevers.    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 90 capsule 1    aspirin 81 MG chewable tablet Chew 1 tablet Daily.      betamethasone, augmented, (DIPROLENE) 0.05 % cream Apply 1 application topically to the appropriate area as directed As Needed.      bisoprolol (ZEBeta) 10 MG tablet Take 1 tablet by mouth Daily. 90 tablet 1    chlorthalidone (HYGROTEN) 50 MG tablet Take 1 tablet by mouth Daily. 90 tablet 1    hydrALAZINE (APRESOLINE) 50 MG tablet Take 1 tablet by mouth 2 (Two) Times a Day. 180 tablet 1    hydroxyurea (HYDREA) 500 MG capsule Take 3 capsules (1500 mg) by mouth daily for 5 days out of the week. Take two capsules (1000 mg) daily on the other two days of the week. 228 capsule 1    mupirocin (BACTROBAN) 2 % ointment       potassium chloride 10 MEQ CR tablet Take 1 tablet by mouth 2 (Two) Times a Day. 180 tablet 1     No current facility-administered medications on file prior to visit.        ALLERGIES:    Allergies   Allergen Reactions    Cephalexin Itching        Social History     Socioeconomic History    Marital " "status:    Tobacco Use    Smoking status: Never    Smokeless tobacco: Never   Vaping Use    Vaping Use: Never used   Substance and Sexual Activity    Alcohol use: Yes     Comment: Occasional drinker    Drug use: Never    Sexual activity: Yes     Partners: Female        Family History   Problem Relation Age of Onset    Hypertension Father     Hypertension Other     Heart disease Other     Cervical cancer Other       I have reviewed the patient's medical history in detail and updated the computerized patient record.    Review of Systems  ROS as per HPI  14 point review of system is negative except the erythema in the left lower extremity is improved but the ulceration is present but significantly improved.  I have reviewed and confirmed the accuracy of the ROS as documented by the MA/LPN/RN Eliana Fleming MD    Objective     Vitals:    05/24/23 1247   BP: 145/90   Pulse: 69   Resp: 18   Temp: 98.2 °F (36.8 °C)   TempSrc: Temporal   SpO2: 96%   Weight: 131 kg (289 lb)   Height: 188 cm (74.02\")   PainSc: 0-No pain         5/24/2023    12:44 PM   Current Status   ECOG score 0     Physical examination      CONSTITUTIONAL:  Vital signs reviewed.  No distress, looks comfortable.  EYES:  Conjunctivae and lids unremarkable.  PERRLA  EARS,NOSE,MOUTH,THROAT:  Ears and nose appear unremarkable.  Lips, teeth, gums appear unremarkable.  RESPIRATORY:  Normal respiratory effort.  Lungs clear to auscultation bilaterally.   CARDIOVASCULAR:  Normal S1, S2.  No murmurs rubs or gallops.  No significant lower extremity edema.  GASTROINTESTINAL: Abdomen appears unremarkable.  Nontender.  No hepatomegaly.  No splenomegaly.  Left lower extremity erythema on the medial aspect of the left lower leg with a small ulcer which is not present on the medial malleolus but is now healing up  LYMPHATIC:  No cervical, supraclavicular, axillary lymphadenopathy.  SKIN:  Warm.  No rashes.  PSYCHIATRIC:  Normal judgment and insight.  Normal mood " and affect.  I have reexamined the patient and the results are consistent with the previously documented exam. Eliana Fleming MD    RECENT LABS:                Assessment & Plan     1.  Polycythemia with leukocytosis and thrombocytosis.  He does not have any itching with hot showers.  He does not have any headaches or DVT.  He does have blood counts worsening gradually from 2016.  7/8/2020  His platelets 974K.  Hemoglobin is 17.6 with hematocrit of 52.8 and white count of 11.56.  He denies fever night sweats but has significant weight loss greater than 50 pounds in 6 months.  He does have a cough which is productive of yellow sputum and some shortness of breath.  He has history of secondhand smoking exposure.  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  CT chest negative  Hydroxyurea 500 mg twice daily was initiated on July 16, 2020.  .  7/22/2020 Hydrea increased to 1500 mg (1000 mg in am and 500 mg in pm) Monday-Friday and 2000 mg (1000 mg in am and 1000 mg in PM) on Saturdays and Sundays  7/29/2020 patient's platelet count today is 821.  White count 7.78, hemoglobin 16.4.   Increased Hydrea at 1500 mg Monday through Friday, and 2000 mg on Saturdays and Sundays.   In future if hematocrit greater than 48 then he will receive phlebotomy.  Last phlebotomy 7/15/2020.   2/23/2022 patient continues on Hydrea 1500 mg on Monday, Wednesday, and Friday, 1500 mg all other days.  He is tolerating Hydrea well aside from some fatigue on the days he takes 1500 mg.  He also continues to receive phlebotomy for hematocrit greater than 48, hematocrit today 44.7 so patient will not receive phlebotomy.  Platelets today have increased to 575, so we will increase Hydrea to 1500 mg four times a week, 1000 mg all other days.  Because we are increasing dose of Hydrea, patient will return in 1 month for CBC with RN review to monitor counts.   Discussed with the patient who is agreeable.  6/15/2022: Patient continues on Hydrea 1500 mg 4 days a week and 1000 mg all other days.  Platelets increased to 573,000.  Discussed with Dr. Fleming today plan to increase Hydrea to 1500 mg 5 days a week and 1000 mg 2 days a week.  He will return in 1 month for CBC with RN review to monitor counts.  Patient agreeable   September 7, 2022: Tolerating Hydrea very well.  His platelets are down to 497.  He continues with aspirin.  Given hematocrit of 44 we will hold off phlebotomy  11/30/2022 continuing on Hydrea 1500 mg 5 days a week and 1000 mg 2 days a week tolerating well.  May 24, 2023: Patient's hematocrit is 42.  But his platelet is elevated to 580.  We will increase the dose of his hydroxyurea to 1500 mg 6 days a week and 1000 mg 1 day a week    2.  Weight loss greater than 50 pounds at time of initial evaluation.  CT chest abdomen pelvis negative for malignancy  Patient admitting that his weight loss was actually been more intentional   Weight now stable    3. Hypokalemia, mild, chronic.  8/2/2021: Potassium 3.3. We discussed utilizing electrolyte drinks when he is working outside/sweating to help replenish what he is losing.  He does continue potassium chloride once daily as well.  9/28/2021: Potassium stable at 3.3. Continue Klor-con 20 mEq daily.  2/23/2022 potassium today 4.0, patient will continue on potassium 20 mEq daily.  6/15/2022: Currently taking 10 mEq daily, potassium 3.8.  11/30/2022 potassium 3.2, he will increase his potassium to 20 M EQ daily.    5.  History of knee surgery.  Patient is to go for knee surgery in 3 weeks  Discussed with Dr. Chad Oliveros and he will start him on 2.5 mg p.o. twice daily of Eliquis 24 hours after surgery.      6.  Skin lesions on the lower extremity with delayed healing, [facility]  11/30/2022 patient states that he did not see dermatologist as a skin lesions began to heal.  He  continues to use vitamin D on this area.  March 1, 2023: The skin ulceration on the left lower extremity is healing up but not completely healed up  Unsure if this is just secondary to chronic venous stasis as opposed to hydroxyurea.  However usually the hydroxyurea ulcerations on the medial malleolus and this is more superior       Plan:   Continue Hydrea 1500 mg 6 days a week and 1000 mg 1 days a week.  Ulceration and cellulitis in the left lower extremity has completely resolved  We discussed that likely this is secondary to chronic venous stasis as opposed to hydroxyurea which is now resolved  Patient states it was a bacterial infection per his dermatologist and he has completed antibiotic treatment and ointments  Follow-up with me in 3 months with labsand np  Fu with me in 6 months with labs      Patient is on a high risk medication requiring close monitoring for toxicity.    Eliana Fleming MD  5/24/23

## 2023-05-25 ENCOUNTER — SPECIALTY PHARMACY (OUTPATIENT)
Dept: PHARMACY | Facility: HOSPITAL | Age: 63
End: 2023-05-25
Payer: COMMERCIAL

## 2023-05-31 ENCOUNTER — SPECIALTY PHARMACY (OUTPATIENT)
Dept: PHARMACY | Facility: HOSPITAL | Age: 63
End: 2023-05-31

## 2023-06-05 DIAGNOSIS — D45 POLYCYTHEMIA VERA: ICD-10-CM

## 2023-06-06 ENCOUNTER — SPECIALTY PHARMACY (OUTPATIENT)
Dept: PHARMACY | Facility: HOSPITAL | Age: 63
End: 2023-06-06
Payer: COMMERCIAL

## 2023-06-06 RX ORDER — HYDROXYUREA 500 MG/1
CAPSULE ORAL
Qty: 240 CAPSULE | Refills: 1 | Status: SHIPPED | OUTPATIENT
Start: 2023-06-06

## 2023-06-06 NOTE — PROGRESS NOTES
Re: Refills of Oral Specialty Medication - Hydrea (hydroxyurea)    Drug-Drug Interactions: The current medication list was reviewed and there are no relevant drug-drug interactions.  Medication Allergies: The patient has no relevant allergies as it relates to their oral specialty medication  Review of Labs/Dose Adjustments: DOSE CHANGE - I reviewed the most recent note and labs. Due to most recent labs the dose is being increased. I sent refills as described below.    Drug: Hydrea (hydroxyurea)  Strength: 500 mg  Directions: Take 3 tablets by mouth  6 days a week, and 2 tablets the other day  Quantity: 240  Refills: 1  Pharmacy prescription sent to: [facility]    Name/Credentials: Elder Holder, NunoD, BCOP    6/6/2023  08:45 EDT     Completed independent double check on medication order/RX.   Amanda Morrison, NunoD, BCPS

## 2023-06-08 RX ORDER — POTASSIUM CHLORIDE 750 MG/1
TABLET, FILM COATED, EXTENDED RELEASE ORAL
Qty: 180 TABLET | Refills: 1 | Status: SHIPPED | OUTPATIENT
Start: 2023-06-08

## 2023-08-01 ENCOUNTER — SPECIALTY PHARMACY (OUTPATIENT)
Dept: PHARMACY | Facility: HOSPITAL | Age: 63
End: 2023-08-01
Payer: COMMERCIAL

## 2023-08-08 DIAGNOSIS — I10 ESSENTIAL HYPERTENSION: ICD-10-CM

## 2023-08-08 RX ORDER — CHLORTHALIDONE 50 MG/1
TABLET ORAL
Qty: 90 TABLET | Refills: 1 | Status: SHIPPED | OUTPATIENT
Start: 2023-08-08

## 2023-08-08 RX ORDER — BISOPROLOL FUMARATE 10 MG/1
TABLET, FILM COATED ORAL
Qty: 90 TABLET | Refills: 1 | Status: SHIPPED | OUTPATIENT
Start: 2023-08-08

## 2023-08-08 RX ORDER — AMLODIPINE AND BENAZEPRIL HYDROCHLORIDE 5; 40 MG/1; MG/1
CAPSULE ORAL
Qty: 90 CAPSULE | Refills: 1 | Status: SHIPPED | OUTPATIENT
Start: 2023-08-08

## 2023-08-08 RX ORDER — HYDRALAZINE HYDROCHLORIDE 50 MG/1
TABLET, FILM COATED ORAL
Qty: 180 TABLET | Refills: 1 | Status: SHIPPED | OUTPATIENT
Start: 2023-08-08

## 2023-08-14 ENCOUNTER — OFFICE VISIT (OUTPATIENT)
Dept: FAMILY MEDICINE CLINIC | Facility: CLINIC | Age: 63
End: 2023-08-14
Payer: COMMERCIAL

## 2023-08-14 VITALS
HEIGHT: 74 IN | WEIGHT: 280.9 LBS | BODY MASS INDEX: 36.05 KG/M2 | DIASTOLIC BLOOD PRESSURE: 88 MMHG | SYSTOLIC BLOOD PRESSURE: 138 MMHG | OXYGEN SATURATION: 95 % | TEMPERATURE: 98.1 F | HEART RATE: 63 BPM

## 2023-08-14 DIAGNOSIS — I10 ESSENTIAL HYPERTENSION: Primary | ICD-10-CM

## 2023-08-14 DIAGNOSIS — R73.09 ELEVATED GLUCOSE LEVEL: ICD-10-CM

## 2023-08-14 DIAGNOSIS — E66.01 CLASS 2 SEVERE OBESITY WITH SERIOUS COMORBIDITY AND BODY MASS INDEX (BMI) OF 36.0 TO 36.9 IN ADULT, UNSPECIFIED OBESITY TYPE: ICD-10-CM

## 2023-08-14 PROBLEM — H66.91 RIGHT OTITIS MEDIA: Status: RESOLVED | Noted: 2019-10-30 | Resolved: 2023-08-14

## 2023-08-14 PROCEDURE — 99214 OFFICE O/P EST MOD 30 MIN: CPT | Performed by: NURSE PRACTITIONER

## 2023-08-14 RX ORDER — BETAMETHASONE DIPROPIONATE 0.5 MG/G
1 CREAM TOPICAL AS NEEDED
Qty: 30 G | Refills: 0 | Status: SHIPPED | OUTPATIENT
Start: 2023-08-14

## 2023-08-14 NOTE — ASSESSMENT & PLAN NOTE
Patient's (Body mass index is 36.05 kg/mý.) indicates that they are morbidly/severely obese (BMI > 40 or > 35 with obesity - related health condition) with health conditions that include hypertension . Weight is improving with lifestyle modifications. BMI  is above average; BMI management plan is completed. We discussed low calorie, low carb based diet program and increasing exercise.

## 2023-08-14 NOTE — PROGRESS NOTES
"Chief Complaint  Hypertension    Subjective          Phani Landis, 62 y.o. male presents to [facility]  Patient presents today for his 6-month follow-up on hypertension.    He states that he had a prescription for betamethasone in the past for when he gets chigger bites.  He would like to have a refill.    Hypertension: He is currently on amlodipine-benazepril 5-40 mg daily, bisoprolol 10 mg daily, chlorthalidone 50 mg daily, hydralazine 50 mg twice daily.  He is now on potassium 10 mEq twice daily. He is getting labs done at the cancer [facility] this week.     He has polycythemia vera.  He is stable on allopurinol and Hydrea.  He follows with hematology oncology Dr. Tom MD.    He gets lab work routinely from his hematologist.  His blood sugar was noted to be elevated at 121.  His last lipid panel 6 months ago was within normal limits.    Obesity: He states he is working on losing weight.  He has lost 24 pounds in the last 6 months.  He states that he tries to get physical exercise.  He got rid of his riding mower and uses a push mower.  He states he manually chops wood.  He also states he has decreased his sugary drinks.       Tobacco Use: Low Risk     Smoking Tobacco Use: Never    Smokeless Tobacco Use: Never    Passive Exposure: Not on file      Objective   Vital Signs:   /88   Pulse 63   Temp 98.1 øF (36.7 øC)   Ht 188 cm (74.02\")   Wt 127 kg (280 lb 14.4 oz)   SpO2 95%   BMI 36.05 kg/mý       Current Outpatient Medications:     allopurinol (ZYLOPRIM) 100 MG tablet, TAKE ONE TABLET BY MOUTH TWICE A DAY, Disp: 180 tablet, Rfl: 0    amLODIPine-benazepril (LOTREL) 5-40 MG per capsule, TAKE ONE CAPSULE BY MOUTH DAILY, Disp: 90 capsule, Rfl: 1    aspirin 81 MG chewable tablet, Chew 1 tablet Daily., Disp: , Rfl:     betamethasone, augmented, (DIPROLENE) 0.05 % cream, Apply 1 application  topically to the appropriate area as directed As Needed (itching, rash)., " Disp: 30 g, Rfl: 0    bisoprolol (ZEBeta) 10 MG tablet, TAKE ONE TABLET BY MOUTH DAILY, Disp: 90 tablet, Rfl: 1    chlorthalidone (HYGROTEN) 50 MG tablet, TAKE ONE TABLET BY MOUTH DAILY, Disp: 90 tablet, Rfl: 1    hydrALAZINE (APRESOLINE) 50 MG tablet, TAKE ONE TABLET BY MOUTH TWICE A DAY, Disp: 180 tablet, Rfl: 1    hydroxyurea (HYDREA) 500 MG capsule, Take 3 capsules (1500 mg) by mouth daily for 6 days out of the week. Take two capsules (1000 mg) daily on the other day of the week., Disp: 240 capsule, Rfl: 1    mupirocin (BACTROBAN) 2 % ointment, , Disp: , Rfl:     potassium chloride 10 MEQ CR tablet, TAKE ONE TABLET BY MOUTH TWICE A DAY, Disp: 180 tablet, Rfl: 1   Past Medical History:   Diagnosis Date    Anxiety     Arthritis     Cancer June 2020    Polycythemia vera    Hypertension       Physical Exam  Vitals reviewed.   Constitutional:       Appearance: Normal appearance. He is well-developed. He is obese.   Neck:      Thyroid: No thyroid mass, thyromegaly or thyroid tenderness.   Cardiovascular:      Rate and Rhythm: Normal rate and regular rhythm.      Heart sounds: No murmur heard.    No friction rub. No gallop.   Pulmonary:      Effort: Pulmonary effort is normal.      Breath sounds: Normal breath sounds. No wheezing or rhonchi.   Lymphadenopathy:      Cervical: No cervical adenopathy.   Skin:     General: Skin is warm and dry.   Neurological:      Mental Status: He is alert and oriented to person, place, and time.      Cranial Nerves: No cranial nerve deficit.   Psychiatric:         Mood and Affect: Mood and affect normal.         Behavior: Behavior normal.         Thought Content: Thought content normal. Thought content does not include homicidal or suicidal ideation.         Judgment: Judgment normal.      Result Review :   {The following data was reviewed by AG Plunkett    No Images in the past 120 days found..    Common Labs   Common labs          11/30/2022    12:33 3/1/2023    12:40  3/1/2023    12:42 5/24/2023    12:36   Common Labs   Glucose 97  93   121    BUN 15  18   15    Creatinine 1.13  1.03   1.07    Sodium 138  135   138    Potassium 3.2  3.7   4.0    Chloride 97  96   99    Calcium 9.7  9.7   9.8    Albumin 4.50  4.7   4.2    Total Bilirubin 0.7  0.7   0.8    Alkaline Phosphatase 71  69   78    AST (SGOT) 37  37   36    ALT (SGPT) 41  37   33    WBC 7.62  7.07   7.36    Hemoglobin 15.2  15.2   15.0    Hematocrit 43.8  43.0   42.9    Platelets 496  488   580    Total Cholesterol   124     Triglycerides   60     HDL Cholesterol   46     LDL Cholesterol    65     PSA   1.020              Assessment and Plan    Diagnoses and all orders for this visit:    1. Essential hypertension (Primary)  Assessment & Plan:  Hypertension is improving with treatment.  Continue current treatment regimen.  Continue current medications.  Blood pressure will be reassessed at the next regular appointment.      2. Elevated glucose level  Comments:  I will add on an A1c and he will get this drawn when he gets his labs with the cancer care center.  Orders:  -     Hemoglobin A1c; Future    3. Class 2 severe obesity with serious comorbidity and body mass index (BMI) of 36.0 to 36.9 in adult, unspecified obesity type  Assessment & Plan:  Patient's (Body mass index is 36.05 kg/mý.) indicates that they are morbidly/severely obese (BMI > 40 or > 35 with obesity - related health condition) with health conditions that include hypertension . Weight is improving with lifestyle modifications. BMI  is above average; BMI management plan is completed. We discussed low calorie, low carb based diet program and increasing exercise.       Other orders  -     betamethasone, augmented, (DIPROLENE) 0.05 % cream; Apply 1 application  topically to the appropriate area as directed As Needed (itching, rash).  Dispense: 30 g; Refill: 0        Follow Up   Return in about 6 months (around 2/14/2024) for Next scheduled follow up  HTN.  Patient was given instructions and counseling regarding his condition or for health maintenance advice. Please see specific information pulled into the AVS if appropriate.     Parts of this note are electronic transcriptions/translations of spoken language to printed text using the Dragon Dictation system.      Roselyn Haywood, APRN  08/14/2023

## 2023-08-16 ENCOUNTER — LAB (OUTPATIENT)
Dept: OTHER | Facility: HOSPITAL | Age: 63
End: 2023-08-16
Payer: COMMERCIAL

## 2023-08-16 ENCOUNTER — OFFICE VISIT (OUTPATIENT)
Dept: ONCOLOGY | Facility: CLINIC | Age: 63
End: 2023-08-16
Payer: COMMERCIAL

## 2023-08-16 VITALS
OXYGEN SATURATION: 98 % | TEMPERATURE: 98.7 F | RESPIRATION RATE: 16 BRPM | BODY MASS INDEX: 36.05 KG/M2 | HEART RATE: 55 BPM | HEIGHT: 74 IN | WEIGHT: 280.9 LBS | SYSTOLIC BLOOD PRESSURE: 122 MMHG | DIASTOLIC BLOOD PRESSURE: 84 MMHG

## 2023-08-16 DIAGNOSIS — R73.09 ELEVATED GLUCOSE LEVEL: ICD-10-CM

## 2023-08-16 DIAGNOSIS — D45 POLYCYTHEMIA VERA: ICD-10-CM

## 2023-08-16 DIAGNOSIS — R74.8 ABNORMAL LIVER ENZYMES: ICD-10-CM

## 2023-08-16 DIAGNOSIS — D45 POLYCYTHEMIA VERA: Primary | ICD-10-CM

## 2023-08-16 DIAGNOSIS — Z79.899 HIGH RISK MEDICATION USE: ICD-10-CM

## 2023-08-16 LAB
ALBUMIN SERPL-MCNC: 4.6 G/DL (ref 3.5–5.2)
ALBUMIN/GLOB SERPL: 1.6 G/DL
ALP SERPL-CCNC: 72 U/L (ref 39–117)
ALT SERPL W P-5'-P-CCNC: 42 U/L (ref 1–41)
ANION GAP SERPL CALCULATED.3IONS-SCNC: 12.7 MMOL/L (ref 5–15)
AST SERPL-CCNC: 42 U/L (ref 1–40)
BASOPHILS # BLD AUTO: 0.11 10*3/MM3 (ref 0–0.2)
BASOPHILS NFR BLD AUTO: 1.7 % (ref 0–1.5)
BILIRUB SERPL-MCNC: 0.7 MG/DL (ref 0–1.2)
BUN SERPL-MCNC: 19 MG/DL (ref 8–23)
BUN/CREAT SERPL: 21.1 (ref 7–25)
CALCIUM SPEC-SCNC: 9.6 MG/DL (ref 8.6–10.5)
CHLORIDE SERPL-SCNC: 94 MMOL/L (ref 98–107)
CLUMPED PLATELETS: PRESENT
CO2 SERPL-SCNC: 28.3 MMOL/L (ref 22–29)
CREAT SERPL-MCNC: 0.9 MG/DL (ref 0.76–1.27)
DEPRECATED RDW RBC AUTO: 50.7 FL (ref 37–54)
EGFRCR SERPLBLD CKD-EPI 2021: 96.6 ML/MIN/1.73
EOSINOPHIL # BLD AUTO: 0.08 10*3/MM3 (ref 0–0.4)
EOSINOPHIL NFR BLD AUTO: 1.3 % (ref 0.3–6.2)
ERYTHROCYTE [DISTWIDTH] IN BLOOD BY AUTOMATED COUNT: 12.6 % (ref 12.3–15.4)
GLOBULIN UR ELPH-MCNC: 2.9 GM/DL
GLUCOSE SERPL-MCNC: 100 MG/DL (ref 65–99)
HBA1C MFR BLD: 4.9 % (ref 4.8–5.6)
HCT VFR BLD AUTO: 41.9 % (ref 37.5–51)
HGB BLD-MCNC: 14.9 G/DL (ref 13–17.7)
IMM GRANULOCYTES # BLD AUTO: 0.03 10*3/MM3 (ref 0–0.05)
IMM GRANULOCYTES NFR BLD AUTO: 0.5 % (ref 0–0.5)
LYMPHOCYTES # BLD AUTO: 1.41 10*3/MM3 (ref 0.7–3.1)
LYMPHOCYTES NFR BLD AUTO: 22.4 % (ref 19.6–45.3)
MCH RBC QN AUTO: 39 PG (ref 26.6–33)
MCHC RBC AUTO-ENTMCNC: 35.6 G/DL (ref 31.5–35.7)
MCV RBC AUTO: 109.7 FL (ref 79–97)
MONOCYTES # BLD AUTO: 0.86 10*3/MM3 (ref 0.1–0.9)
MONOCYTES NFR BLD AUTO: 13.7 % (ref 5–12)
NEUTROPHILS NFR BLD AUTO: 3.8 10*3/MM3 (ref 1.7–7)
NEUTROPHILS NFR BLD AUTO: 60.4 % (ref 42.7–76)
NRBC BLD AUTO-RTO: 0 /100 WBC (ref 0–0.2)
PLATELET # BLD AUTO: 430 10*3/MM3 (ref 140–450)
PMV BLD AUTO: 9.1 FL (ref 6–12)
POTASSIUM SERPL-SCNC: 3.5 MMOL/L (ref 3.5–5.2)
PROT SERPL-MCNC: 7.5 G/DL (ref 6–8.5)
RBC # BLD AUTO: 3.82 10*6/MM3 (ref 4.14–5.8)
SODIUM SERPL-SCNC: 135 MMOL/L (ref 136–145)
SPHEROCYTES BLD QL SMEAR: NORMAL
STOMATOCYTES BLD QL SMEAR: NORMAL
WBC MORPH BLD: NORMAL
WBC NRBC COR # BLD: 6.29 10*3/MM3 (ref 3.4–10.8)

## 2023-08-16 PROCEDURE — 85025 COMPLETE CBC W/AUTO DIFF WBC: CPT | Performed by: INTERNAL MEDICINE

## 2023-08-16 PROCEDURE — 85007 BL SMEAR W/DIFF WBC COUNT: CPT | Performed by: INTERNAL MEDICINE

## 2023-08-16 PROCEDURE — 80053 COMPREHEN METABOLIC PANEL: CPT | Performed by: INTERNAL MEDICINE

## 2023-08-16 PROCEDURE — 36415 COLL VENOUS BLD VENIPUNCTURE: CPT

## 2023-08-16 PROCEDURE — 83036 HEMOGLOBIN GLYCOSYLATED A1C: CPT | Performed by: NURSE PRACTITIONER

## 2023-08-16 NOTE — PROGRESS NOTES
Subjective     REASON FOR FOLLOW UP:  1.  Polycythemia vera, polycythemia and thrombocytosis and leukocytosis, EPO level 1.3, Tino 2+    HISTORY OF PRESENT ILLNESS:  The patient is a 62 y.o. year old male who is here for an opinion about the above issue.    History of Present Illness   Phani Landis is a 62 y.o. male with the above-mentioned history who is here today for lab review and follow-up continuing on Hydrea 1500 mg 5 days a week and 1000 mg 2 days a week.  He states he typically takes the 1000 mg dose on the weekends.  He is tolerating this well without any significant side effects.      Patient was previously referred to dermatology due to some issues with skin lesions on his lower extremity that were delayed healing however the patient states that he did not end up keeping that appointment as the lesions have improved.  He has been using vitamin E oil.      Interval history:   Patient is 62-year-old with polycythemia vera.  He is currently taking hydroxyurea 1500 mg 6 days a week and 100 mg 1 day a week.  He continues to tolerate treatment well.  He denies any side effects such as fatigue, constipation, diarrhea, nausea, decreased appetite, headache, or skin ulcers.  Hemoglobin today is 14.9 and platelets are 430,000.  White blood cell count remains normal at 6.29.      Hematological oncologic history:  Patient is a 59-year-old gentleman who has been referred by his primary care Castillo Velasco for evaluation of thrombocytosis and polycythemia.  He has lost more than 50 pounds of weight.  In the last 6 months.  Looking back his blood counts have been high starting in 2016.  Initially his platelets were high around 670 and gradually increased in the 900 range.  More recently his hemoglobin has been increasing and his white count has been increasing.  He has not had a DVT or pulmonary embolism.  He has had no issues with itching with hot showers.  Has not had a stroke.  He had pneumonia last year but none  "recently.  He does have some shortness of breath but no chest pain.  He is exposed to secondhand smoking.    He does not have any nausea vomiting or abdominal pain at present.  He did have gout on several locations and was placed on meloxicam.  His renal function is mildly elevated.  He denies any fever or night sweats.    Patient does have a cough productive of yellow sputum but has no fevers.    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, EPO 1.3 low  CT chest negative, CT abdomen pelvis shows splenomegaly    Past Medical History:   Diagnosis Date    Anxiety     Arthritis     Cancer June 2020    Polycythemia vera    Hypertension         Past Surgical History:   Procedure Laterality Date    CHEST SURGERY      OTHER SURGICAL HISTORY  1985    \"Exploratory\"        Current Outpatient Medications on File Prior to Visit   Medication Sig Dispense Refill    allopurinol (ZYLOPRIM) 100 MG tablet TAKE ONE TABLET BY MOUTH TWICE A  tablet 0    amLODIPine-benazepril (LOTREL) 5-40 MG per capsule TAKE ONE CAPSULE BY MOUTH DAILY 90 capsule 1    aspirin 81 MG chewable tablet Chew 1 tablet Daily.      betamethasone, augmented, (DIPROLENE) 0.05 % cream Apply 1 application  topically to the appropriate area as directed As Needed (itching, rash). 30 g 0    bisoprolol (ZEBeta) 10 MG tablet TAKE ONE TABLET BY MOUTH DAILY 90 tablet 1    chlorthalidone (HYGROTEN) 50 MG tablet TAKE ONE TABLET BY MOUTH DAILY 90 tablet 1    hydrALAZINE (APRESOLINE) 50 MG tablet TAKE ONE TABLET BY MOUTH TWICE A  tablet 1    hydroxyurea (HYDREA) 500 MG capsule Take 3 capsules (1500 mg) by mouth daily for 6 days out of the week. Take two capsules (1000 mg) daily on the other day of the week. 240 capsule 1    mupirocin (BACTROBAN) 2 % ointment       potassium chloride 10 MEQ CR tablet TAKE ONE TABLET BY MOUTH TWICE A  tablet 1     No current facility-administered medications on file prior to " "visit.        ALLERGIES:    Allergies   Allergen Reactions    Cephalexin Itching        Social History     Socioeconomic History    Marital status:    Tobacco Use    Smoking status: Never    Smokeless tobacco: Never   Vaping Use    Vaping Use: Never used   Substance and Sexual Activity    Alcohol use: Yes     Comment: Occasional drinker    Drug use: Never    Sexual activity: Yes     Partners: Female        Family History   Problem Relation Age of Onset    Hypertension Father     Hypertension Other     Heart disease Other     Cervical cancer Other       I have reviewed the patient's medical history in detail and updated the computerized patient record.    Review of Systems  ROS as per HPI  14 point review of system is negative except the erythema in the left lower extremity is improved but the ulceration is present but significantly improved.  I have reviewed and confirmed the accuracy of the ROS as documented by the MA/LINDSAY/AG Reeder    Objective     Vitals:    08/16/23 1312   BP: 122/84   Pulse: 55   Resp: 16   Temp: 98.7 øF (37.1 øC)   TempSrc: Temporal   SpO2: 98%   Weight: 127 kg (280 lb 14.4 oz)   Height: 188 cm (74.02\")   PainSc: 0-No pain         8/16/2023     1:11 PM   Current Status   ECOG score 0     Physical examination      CONSTITUTIONAL:  Vital signs reviewed.  No distress, looks comfortable.  EYES:  Conjunctivae and lids unremarkable.  PERRLA  EARS,NOSE,MOUTH,THROAT:  Ears and nose appear unremarkable.  Lips, teeth, gums appear unremarkable.  RESPIRATORY:  Normal respiratory effort.  Lungs clear to auscultation bilaterally.   CARDIOVASCULAR:  Normal S1, S2.  No murmurs rubs or gallops.  No significant lower extremity edema.  GASTROINTESTINAL: Abdomen appears unremarkable.  Nontender.  No hepatomegaly.  No splenomegaly.  Left lower extremity erythema on the medial aspect of the left lower leg with a small ulcer which is not present on the medial malleolus but is now healing " up  LYMPHATIC:  No cervical, supraclavicular, axillary lymphadenopathy.  SKIN:  Warm.  No rashes.  PSYCHIATRIC:  Normal judgment and insight.  Normal mood and affect.  I have reexamined the patient and the results are consistent with the previously documented exam. AG Wang    RECENT LABS:  Results from last 7 days   Lab Units 08/16/23  1255   WBC 10*3/mm3 6.29   NEUTROS ABS 10*3/mm3 3.80   HEMOGLOBIN g/dL 14.9   HEMATOCRIT % 41.9   PLATELETS 10*3/mm3 430     Results from last 7 days   Lab Units 08/16/23  1255   SODIUM mmol/L 135*   POTASSIUM mmol/L 3.5   CHLORIDE mmol/L 94*   CO2 mmol/L 28.3   BUN mg/dL 19   CREATININE mg/dL 0.90   CALCIUM mg/dL 9.6   ALBUMIN g/dL 4.6   BILIRUBIN mg/dL 0.7   ALK PHOS U/L 72   ALT (SGPT) U/L 42*   AST (SGOT) U/L 42*   GLUCOSE mg/dL 100*           Assessment & Plan     1.  Polycythemia with leukocytosis and thrombocytosis.  He does not have any itching with hot showers.  He does not have any headaches or DVT.  He does have blood counts worsening gradually from 2016.  7/8/2020  His platelets 974K.  Hemoglobin is 17.6 with hematocrit of 52.8 and white count of 11.56.  He denies fever night sweats but has significant weight loss greater than 50 pounds in 6 months.  He does have a cough which is productive of yellow sputum and some shortness of breath.  He has history of secondhand smoking exposure.  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  CT chest negative  Hydroxyurea 500 mg twice daily was initiated on July 16, 2020.  .  7/22/2020 Hydrea increased to 1500 mg (1000 mg in am and 500 mg in pm) Monday-Friday and 2000 mg (1000 mg in am and 1000 mg in PM) on Saturdays and Sundays  7/29/2020 patient's platelet count today is 821.  White count 7.78, hemoglobin 16.4.   Increased Hydrea at 1500 mg Monday through Friday, and 2000 mg on Saturdays and Sundays.   In future if  hematocrit greater than 48 then he will receive phlebotomy.  Last phlebotomy 7/15/2020.   2/23/2022 patient continues on Hydrea 1500 mg on Monday, Wednesday, and Friday, 1500 mg all other days.  He is tolerating Hydrea well aside from some fatigue on the days he takes 1500 mg.  He also continues to receive phlebotomy for hematocrit greater than 48, hematocrit today 44.7 so patient will not receive phlebotomy.  Platelets today have increased to 575, so we will increase Hydrea to 1500 mg four times a week, 1000 mg all other days.  Because we are increasing dose of Hydrea, patient will return in 1 month for CBC with RN review to monitor counts.  Discussed with the patient who is agreeable.  6/15/2022: Patient continues on Hydrea 1500 mg 4 days a week and 1000 mg all other days.  Platelets increased to 573,000.  Discussed with Dr. Fleming today plan to increase Hydrea to 1500 mg 5 days a week and 1000 mg 2 days a week.  He will return in 1 month for CBC with RN review to monitor counts.  Patient agreeable   September 7, 2022: Tolerating Hydrea very well.  His platelets are down to 497.  He continues with aspirin.  Given hematocrit of 44 we will hold off phlebotomy  11/30/2022 continuing on Hydrea 1500 mg 5 days a week and 1000 mg 2 days a week tolerating well.  May 24, 2023: Patient's hematocrit is 42.  But his platelet is elevated to 580.  We will increase the dose of his hydroxyurea to 1500 mg 6 days a week and 1000 mg 1 day a week  8/16/2023: Patient's hematocrit is normal at 41.9.  Platelets have normalized to 430,000.  Hemoglobin 14.9 and WBC 6.29.  Hydrea dosing will remain the same at 1500 mg 6 days a week and 100 mg once weekly.  He continues to tolerate this treatment well. Mild elevation was noted with his liver enzymes.  Historically he has had mild elevations when he was taking pain medications.  Today his AST is 42 and ALT is 42.  Total bilirubin is normal at 0.7 and alkaline phosphatase is normal at 72.   Does note that he takes occasional Tylenol for his arthritis.  He denies drinking any alcohol.  We will have him return in 1 month to monitor his labs.    2.  Weight loss greater than 50 pounds at time of initial evaluation.  CT chest abdomen pelvis negative for malignancy  Patient admitting that his weight loss was actually been more intentional   Weight now stable    3. Hypokalemia, mild, chronic.  8/2/2021: Potassium 3.3. We discussed utilizing electrolyte drinks when he is working outside/sweating to help replenish what he is losing.  He does continue potassium chloride once daily as well.  9/28/2021: Potassium stable at 3.3. Continue Klor-con 20 mEq daily.  2/23/2022 potassium today 4.0, patient will continue on potassium 20 mEq daily.  6/15/2022: Currently taking 10 mEq daily, potassium 3.8.  11/30/2022 potassium 3.2, he will increase his potassium to 20 M EQ daily.  8/16/2023: Potassium 3.5.  Patient remains on potassium 20 mill equivalents daily.    5.  History of knee surgery.  Patient is to go for knee surgery in 3 weeks  Discussed with Dr. Chad Oliveros and he will start him on 2.5 mg p.o. twice daily of Eliquis 24 hours after surgery.      6.  Skin lesions on the lower extremity with delayed healing, will consult with [facility]  11/30/2022 patient states that he did not see dermatologist as a skin lesions began to heal.  He continues to use vitamin D on this area.  March 1, 2023: The skin ulceration on the left lower extremity is healing up but not completely healed up  Unsure if this is just secondary to chronic venous stasis as opposed to hydroxyurea.  However usually the hydroxyurea ulcerations on the medial malleolus and this is more superior  8/16/2023: No skin lesions noted today.       Plan:   Continue Hydrea 1500 mg 6 days a week and 1000 mg 1 days a week.  Return in 1 month for follow-up and lab review to closely monitor liver enzymes.CBC, CMP  Return in 3 months as scheduled with  Dr. Fleming for labs and follow-up.  CBC, CMP       Patient is on a high risk medication requiring close monitoring for toxicity.    Patient was discussed with Dr. Pablo today as Dr. Fleming is currently out of office.  He is in agreement with plan of care.    Brit Camejo, APRN  5/24/23

## 2023-09-14 ENCOUNTER — SPECIALTY PHARMACY (OUTPATIENT)
Dept: PHARMACY | Facility: HOSPITAL | Age: 63
End: 2023-09-14
Payer: COMMERCIAL

## 2023-10-03 ENCOUNTER — SPECIALTY PHARMACY (OUTPATIENT)
Dept: PHARMACY | Facility: HOSPITAL | Age: 63
End: 2023-10-03
Payer: COMMERCIAL

## 2023-10-03 NOTE — PROGRESS NOTES
Specialty Note- Hydrea  Called patient for 6 month toxicity check, no answer. Left voicemail to return my call at direct line [phone].

## 2023-10-04 ENCOUNTER — OFFICE VISIT (OUTPATIENT)
Dept: FAMILY MEDICINE CLINIC | Facility: CLINIC | Age: 63
End: 2023-10-04
Payer: COMMERCIAL

## 2023-10-04 VITALS
HEART RATE: 58 BPM | WEIGHT: 280.1 LBS | DIASTOLIC BLOOD PRESSURE: 86 MMHG | BODY MASS INDEX: 35.95 KG/M2 | RESPIRATION RATE: 16 BRPM | SYSTOLIC BLOOD PRESSURE: 128 MMHG | OXYGEN SATURATION: 96 % | HEIGHT: 74 IN | TEMPERATURE: 97.9 F

## 2023-10-04 DIAGNOSIS — L03.115 CELLULITIS OF RIGHT LOWER EXTREMITY: ICD-10-CM

## 2023-10-04 DIAGNOSIS — D45 POLYCYTHEMIA VERA: ICD-10-CM

## 2023-10-04 DIAGNOSIS — I83.019 VENOUS ULCER OF RIGHT LEG: Primary | ICD-10-CM

## 2023-10-04 DIAGNOSIS — L97.919 VENOUS ULCER OF RIGHT LEG: Primary | ICD-10-CM

## 2023-10-04 PROCEDURE — 99214 OFFICE O/P EST MOD 30 MIN: CPT | Performed by: NURSE PRACTITIONER

## 2023-10-04 RX ORDER — FUROSEMIDE 20 MG/1
20 TABLET ORAL DAILY
Qty: 5 TABLET | Refills: 0 | Status: SHIPPED | OUTPATIENT
Start: 2023-10-04 | End: 2023-10-09

## 2023-10-04 RX ORDER — AMOXICILLIN 875 MG/1
875 TABLET, COATED ORAL 2 TIMES DAILY
Qty: 20 TABLET | Refills: 0 | Status: SHIPPED | OUTPATIENT
Start: 2023-10-04

## 2023-10-04 NOTE — PROGRESS NOTES
"Chief Complaint  right leg ulcers states been there for 2 months, Leg Swelling (Left leg , states feels tight and burning and stinging), and Skin Ulcer (Patient is on chemo pill bid currently)    Subjective          Phani Landis presents to [facility]  History of Present Illness  He has a had these area's on the right legs.  He has had swelling on the right ankle and into the legs have had a few days.  He has putting Bactroban and no luck.  He has been soaking in episode salt.  He has been putting peroxide.  He has had the swelling the last 4-5 days.  He can't sleep due to the stinging in the legs.  He takes his chemo pill.  He has been for several years.  He is followed by hematology.      Depression: Not at risk    PHQ-2 Score: 0    and 2/13/2023      He has been to derm.  The area on the left leg did heal after he had the abx.           Allergies  Cephalexin    Social History     Tobacco Use    Smoking status: Never    Smokeless tobacco: Never   Vaping Use    Vaping Use: Never used   Substance Use Topics    Alcohol use: Yes     Comment: Occasional drinker    Drug use: Never       Family History   Problem Relation Age of Onset    Hypertension Father     Hypertension Other     Heart disease Other     Cervical cancer Other         Health Maintenance Due   Topic Date Due    ANNUAL PHYSICAL  Never done          There is no immunization history on file for this patient.    Review of Systems   Constitutional:  Negative for fatigue.   Respiratory:  Negative for cough and shortness of breath.    Cardiovascular:  Negative for chest pain.   Gastrointestinal:  Negative for diarrhea, nausea and vomiting.   Skin:  Positive for color change, rash and skin lesions.      Objective       Vitals:    10/04/23 1309   BP: 128/86   Pulse: 58   Resp: 16   Temp: 97.9 °F (36.6 °C)   SpO2: 96%   Weight: 127 kg (280 lb 1.6 oz)   Height: 188 cm (74\")       Body mass index is 35.96 kg/m².         Physical " Exam  Constitutional:       Appearance: Normal appearance.   HENT:      Head: Normocephalic.   Pulmonary:      Effort: Pulmonary effort is normal.   Musculoskeletal:      Right lower leg: Edema present.      Comments: Shiny leg noted   Skin:     Findings: No bruising.   Neurological:      General: No focal deficit present.      Mental Status: He is alert and oriented to person, place, and time.   Psychiatric:         Mood and Affect: Mood normal.         Behavior: Behavior normal.         Thought Content: Thought content normal.         Judgment: Judgment normal.     I applied silvadene to each area and then applied telfa dressing and ace wrap to both legs.  Pt is aware of how to apply the ointment.  Pt tolerated well.  He just had taken a shower.        Result Review :     The following data was reviewed by: AG Brown on 10/04/2023:    CMP   CMP          3/1/2023    12:40 5/24/2023    12:36 8/16/2023    12:55   CMP   Glucose 93  121  100    BUN 18  15  19    Creatinine 1.03  1.07  0.90    EGFR 82.1  78.5  96.6    Sodium 135  138  135    Potassium 3.7  4.0  3.5    Chloride 96  99  94    Calcium 9.7  9.8  9.6    Total Protein 7.8  7.8  7.5    Albumin 4.7  4.2  4.6    Globulin 3.1  3.6  2.9    Total Bilirubin 0.7  0.8  0.7    Alkaline Phosphatase 69  78  72    AST (SGOT) 37  36  42    ALT (SGPT) 37  33  42    Albumin/Globulin Ratio 1.5  1.2  1.6    BUN/Creatinine Ratio 17.5  14.0  21.1    Anion Gap 10.1  8.9  12.7      CBC   CBC          3/1/2023    12:40 5/24/2023    12:36 8/16/2023    12:55   CBC   WBC 7.07  7.36  6.29    RBC 4.02  3.90  3.82    Hemoglobin 15.2  15.0  14.9    Hematocrit 43.0  42.9  41.9    .0  110.0  109.7    MCH 37.8  38.5  39.0    MCHC 35.3  35.0  35.6    RDW 12.2  12.2  12.6    Platelets 488  580  430                     Assessment and Plan      Diagnoses and all orders for this visit:    1. Venous ulcer of right leg (Primary)  -     CT Angio Abdominal Aorta Bilateral  Iliofem Runoff With & Without Contrast; Future  -     silver sulfadiazine (Silvadene) 1 % cream; Apply 1 application  topically to the appropriate area as directed 2 (Two) Times a Day.  Dispense: 400 g; Refill: 0  -     furosemide (Lasix) 20 MG tablet; Take 1 tablet by mouth Daily for 5 days.  Dispense: 5 tablet; Refill: 0    2. Cellulitis of right lower extremity  -     CT Angio Abdominal Aorta Bilateral Iliofem Runoff With & Without Contrast; Future  -     silver sulfadiazine (Silvadene) 1 % cream; Apply 1 application  topically to the appropriate area as directed 2 (Two) Times a Day.  Dispense: 400 g; Refill: 0  -     amoxicillin (AMOXIL) 875 MG tablet; Take 1 tablet by mouth 2 (Two) Times a Day.  Dispense: 20 tablet; Refill: 0  -     furosemide (Lasix) 20 MG tablet; Take 1 tablet by mouth Daily for 5 days.  Dispense: 5 tablet; Refill: 0    3. Polycythemia vera        I spent 32 minutes caring for Phani on this date of service. This time includes time spent by me in the following activities:preparing for the visit, reviewing tests, obtaining and/or reviewing a separately obtained history, performing a medically appropriate examination and/or evaluation , counseling and educating the patient/family/caregiver, ordering medications, tests, or procedures, and documenting information in the medical record    Follow Up     Return in 2 weeks (on 10/18/2023), or if symptoms worsen or fail to improve.  Discussed that I want to start with abx and lasix--to help the swelling.  I do want to do CT scan to check for blood flow with his hx of cancer. Discussed that he has thicken blood and he knows that.  There is NO pain, redness in any certain spot but the open area.  I did think about doing a VELE though there was no direct redness in the calf/thigh on either legs.  Call with any concerns or questions.  Keep area clean and dry.  I do want him to follow with me or Roselyn for a recheck.  We may need to do wound care or  even a collagen based dressing with saline.  Patient was given instructions and counseling regarding his condition or for health maintenance advice. Please see specific information pulled into the AVS if appropriate.     Parts of this note are electronic transcriptions/translations of spoken language to printed text using the Dragon Dictation system.          Maria Del Rosario Jiang, APRN  10/04/2023

## 2023-10-05 DIAGNOSIS — I83.019 VENOUS ULCER OF RIGHT LEG: ICD-10-CM

## 2023-10-05 DIAGNOSIS — L97.919 VENOUS ULCER OF RIGHT LEG: ICD-10-CM

## 2023-10-05 DIAGNOSIS — L03.115 CELLULITIS OF RIGHT LOWER EXTREMITY: ICD-10-CM

## 2023-10-05 RX ORDER — FUROSEMIDE 20 MG/1
20 TABLET ORAL DAILY
Qty: 5 TABLET | Refills: 0 | OUTPATIENT
Start: 2023-10-05 | End: 2023-10-10

## 2023-10-09 ENCOUNTER — SPECIALTY PHARMACY (OUTPATIENT)
Dept: PHARMACY | Facility: HOSPITAL | Age: 63
End: 2023-10-09
Payer: COMMERCIAL

## 2023-10-15 DIAGNOSIS — D75.839 THROMBOCYTOSIS: ICD-10-CM

## 2023-10-15 DIAGNOSIS — E79.0 ELEVATED URIC ACID IN BLOOD: ICD-10-CM

## 2023-10-15 DIAGNOSIS — D45 POLYCYTHEMIA VERA: ICD-10-CM

## 2023-10-16 RX ORDER — ALLOPURINOL 100 MG/1
100 TABLET ORAL 2 TIMES DAILY
Qty: 180 TABLET | Refills: 0 | Status: SHIPPED | OUTPATIENT
Start: 2023-10-16

## 2023-10-19 ENCOUNTER — OFFICE VISIT (OUTPATIENT)
Dept: FAMILY MEDICINE CLINIC | Facility: CLINIC | Age: 63
End: 2023-10-19
Payer: COMMERCIAL

## 2023-10-19 VITALS
BODY MASS INDEX: 35.91 KG/M2 | SYSTOLIC BLOOD PRESSURE: 130 MMHG | DIASTOLIC BLOOD PRESSURE: 80 MMHG | HEIGHT: 74 IN | HEART RATE: 51 BPM | TEMPERATURE: 97.5 F | WEIGHT: 279.8 LBS | OXYGEN SATURATION: 97 %

## 2023-10-19 DIAGNOSIS — L03.115 CELLULITIS OF RIGHT LOWER EXTREMITY: ICD-10-CM

## 2023-10-19 DIAGNOSIS — L97.919 VENOUS ULCER OF RIGHT LEG: Primary | ICD-10-CM

## 2023-10-19 DIAGNOSIS — I83.019 VENOUS ULCER OF RIGHT LEG: Primary | ICD-10-CM

## 2023-10-19 PROCEDURE — 99213 OFFICE O/P EST LOW 20 MIN: CPT | Performed by: NURSE PRACTITIONER

## 2023-10-19 NOTE — PROGRESS NOTES
"Chief Complaint  Leg Swelling (Follow up ) and Wound Check    Subjective          Phani Landis, 63 y.o. male presents to [facility]  History of Present Illness   Patient presents today for a 2-week follow-up on venous ulcer/cellulitis of the right leg.  He saw AG Brown 2 weeks ago.  He was started on amoxicillin twice daily for 10 days, Silvadene cream to apply daily, and Lasix 20 mg daily for 5 days.  He has finished all the antibiotic.  He states the Silvadene cream tends to burn so he does not leave it on too long.  He started using some Aquaphor.  He states it seems better but he still has some open sores.  He is not diabetic.  His wounds are secondary to his chemo pill Hydrea.    Maria Del Rosario did order the CT angiogram runoff.    Tobacco Use: Low Risk  (10/19/2023)    Patient History     Smoking Tobacco Use: Never     Smokeless Tobacco Use: Never     Passive Exposure: Not on file      Objective   Vital Signs:   /80   Pulse 51   Temp 97.5 °F (36.4 °C)   Ht 188 cm (74\")   Wt 127 kg (279 lb 12.8 oz)   SpO2 97%   BMI 35.92 kg/m²       Current Outpatient Medications:     allopurinol (ZYLOPRIM) 100 MG tablet, Take 1 tablet by mouth 2 (Two) Times a Day., Disp: 180 tablet, Rfl: 0    amLODIPine-benazepril (LOTREL) 5-40 MG per capsule, TAKE ONE CAPSULE BY MOUTH DAILY, Disp: 90 capsule, Rfl: 1    aspirin 81 MG chewable tablet, Chew 1 tablet Daily., Disp: , Rfl:     bisoprolol (ZEBeta) 10 MG tablet, TAKE ONE TABLET BY MOUTH DAILY, Disp: 90 tablet, Rfl: 1    chlorthalidone (HYGROTEN) 50 MG tablet, TAKE ONE TABLET BY MOUTH DAILY, Disp: 90 tablet, Rfl: 1    hydrALAZINE (APRESOLINE) 50 MG tablet, TAKE ONE TABLET BY MOUTH TWICE A DAY, Disp: 180 tablet, Rfl: 1    hydroxyurea (HYDREA) 500 MG capsule, Take 3 capsules (1500 mg) by mouth daily for 6 days out of the week. Take two capsules (1000 mg) daily on the other day of the week., Disp: 240 capsule, Rfl: 1    mupirocin " (BACTROBAN) 2 % ointment, , Disp: , Rfl:     potassium chloride 10 MEQ CR tablet, TAKE ONE TABLET BY MOUTH TWICE A DAY, Disp: 180 tablet, Rfl: 1    silver sulfadiazine (Silvadene) 1 % cream, Apply 1 application  topically to the appropriate area as directed 2 (Two) Times a Day., Disp: 400 g, Rfl: 0    furosemide (Lasix) 20 MG tablet, Take 1 tablet by mouth Daily for 5 days., Disp: 5 tablet, Rfl: 0   Past Medical History:   Diagnosis Date    Anxiety     Arthritis     Cancer June 2020    Polycythemia vera    Hypertension       Physical Exam  Constitutional:       Appearance: Normal appearance.   Cardiovascular:      Rate and Rhythm: Normal rate.   Pulmonary:      Effort: Pulmonary effort is normal.   Neurological:      Mental Status: He is alert and oriented to person, place, and time.   Psychiatric:         Mood and Affect: Mood normal.         Behavior: Behavior normal.      Left lower leg:    Right anterior leg:    Right ankle:      Result Review :   {The following data was reviewed by AG Plunkett                     Assessment and Plan    Diagnoses and all orders for this visit:    1. Venous ulcer of right leg (Primary)  -     Ambulatory Referral to Wound Clinic    2. Cellulitis of right lower extremity  -     Ambulatory Referral to Wound Clinic    I cleansed his wounds with normal saline, applied Telfa dressings on the right leg, wrapped with Kerlix and Ace bandage.  Advised to continue the Silvadene until he sees wound care.  I advised him that his wounds are improved but still concerning.  I am going to refer him to the wound clinic provider for further evaluation and treatment.  Advised him to return to the clinic if he has any new or worsening of symptoms.    Follow Up   Return if symptoms worsen or fail to improve.  Patient was given instructions and counseling regarding his condition or for health maintenance advice. Please see specific information pulled into the AVS if appropriate.     Parts  of this note are electronic transcriptions/translations of spoken language to printed text using the Dragon Dictation system.      Roselyn Haywood, APRN  10/19/2023

## 2023-10-24 ENCOUNTER — OFFICE VISIT (OUTPATIENT)
Dept: WOUND CARE | Facility: HOSPITAL | Age: 63
End: 2023-10-24
Payer: COMMERCIAL

## 2023-10-24 VITALS
SYSTOLIC BLOOD PRESSURE: 128 MMHG | RESPIRATION RATE: 16 BRPM | DIASTOLIC BLOOD PRESSURE: 62 MMHG | HEART RATE: 52 BPM | TEMPERATURE: 97.1 F

## 2023-10-24 DIAGNOSIS — S81.801A OPEN WOUND OF RIGHT LOWER LEG, INITIAL ENCOUNTER: Primary | ICD-10-CM

## 2023-10-24 DIAGNOSIS — D45 POLYCYTHEMIA VERA: ICD-10-CM

## 2023-10-24 PROCEDURE — G0463 HOSPITAL OUTPT CLINIC VISIT: HCPCS | Performed by: PHYSICIAN ASSISTANT

## 2023-10-24 PROCEDURE — 99204 OFFICE O/P NEW MOD 45 MIN: CPT | Performed by: PHYSICIAN ASSISTANT

## 2023-10-24 RX ORDER — TRIAMCINOLONE ACETONIDE 1 MG/G
1 OINTMENT TOPICAL 2 TIMES DAILY
Qty: 80 G | Refills: 0 | Status: SHIPPED | OUTPATIENT
Start: 2023-10-24

## 2023-10-24 NOTE — PROGRESS NOTES
Chief Complaint  Wound Check (Bilateral lower venous ulcers, lt leg nearly healed and open to air)    Subjective      History of Present Illness    Phani Landis  is a 63 y.o. male here today with chronic ulcers to his right lower extremity.  He notes that he has had several ulcers on his left lower extremity as well but they have recently healed.  He notes that he was previously prescribed Silvadene to be applied to wound daily.  He notes that he is unable to tolerate Silvadene due to significant sensitivity to it.  He notes that he is currently applying Aquaphor to his wounds which does give some comfort.  He notes that he does not apply any coverings to his wounds.  He notes that his most inferior wound on his right lower leg is very sensitive to open air and touch.  He notes that he is currently on a chemo medication (hydroxyurea) for polycythemia vera.  He was recently seen by his primary care provider who has ordered a CT angiogram to further evaluate his ulcers.  He notes that he is not a diabetic and is not a smoker.  He was recently prescribed an antibiotic which he feels may have helped his wounds.     Allergies:  Cephalexin      Current Outpatient Medications:     allopurinol (ZYLOPRIM) 100 MG tablet, Take 1 tablet by mouth 2 (Two) Times a Day., Disp: 180 tablet, Rfl: 0    amLODIPine-benazepril (LOTREL) 5-40 MG per capsule, TAKE ONE CAPSULE BY MOUTH DAILY, Disp: 90 capsule, Rfl: 1    aspirin 81 MG chewable tablet, Chew 1 tablet Daily., Disp: , Rfl:     bisoprolol (ZEBeta) 10 MG tablet, TAKE ONE TABLET BY MOUTH DAILY, Disp: 90 tablet, Rfl: 1    chlorthalidone (HYGROTEN) 50 MG tablet, TAKE ONE TABLET BY MOUTH DAILY, Disp: 90 tablet, Rfl: 1    furosemide (Lasix) 20 MG tablet, Take 1 tablet by mouth Daily for 5 days., Disp: 5 tablet, Rfl: 0    hydrALAZINE (APRESOLINE) 50 MG tablet, TAKE ONE TABLET BY MOUTH TWICE A DAY, Disp: 180 tablet, Rfl: 1    hydroxyurea (HYDREA) 500 MG capsule, Take 3 capsules (1500 mg)  "by mouth daily for 6 days out of the week. Take two capsules (1000 mg) daily on the other day of the week., Disp: 240 capsule, Rfl: 1    mupirocin (BACTROBAN) 2 % ointment, , Disp: , Rfl:     potassium chloride 10 MEQ CR tablet, TAKE ONE TABLET BY MOUTH TWICE A DAY, Disp: 180 tablet, Rfl: 1    silver sulfadiazine (Silvadene) 1 % cream, Apply 1 application  topically to the appropriate area as directed 2 (Two) Times a Day., Disp: 400 g, Rfl: 0    triamcinolone (KENALOG) 0.1 % ointment, Apply 1 application  topically to the appropriate area as directed 2 (Two) Times a Day., Disp: 80 g, Rfl: 0    Past Medical History:   Diagnosis Date    Anxiety     Arthritis     Cancer June 2020    Polycythemia vera    Hypertension      Past Surgical History:   Procedure Laterality Date    CHEST SURGERY      OTHER SURGICAL HISTORY  1985    \"Exploratory\"     Social History     Socioeconomic History    Marital status:    Tobacco Use    Smoking status: Never    Smokeless tobacco: Never   Vaping Use    Vaping Use: Never used   Substance and Sexual Activity    Alcohol use: Yes     Comment: Occasional drinker    Drug use: Never    Sexual activity: Yes     Partners: Female     Objective     Vitals:    10/24/23 1428   BP: 128/62   BP Location: Left arm   Patient Position: Sitting   Cuff Size: Adult   Pulse: 52   Resp: 16   Temp: 97.1 °F (36.2 °C)   TempSrc: Temporal   PainSc: 0-No pain     Review of Systems     ROS:  No fevers, chills, sweats, or body aches.     I have reviewed the HPI and ROS as documented by MA/RN. Keyla Bailey PA-C    Physical Exam     NAD  Normocephalic, atraumatic  EOMI, no scleral icterus  No respiratory distress, no cough  AAOx3, pleasant, cooperative      Right lower extremity: Patient has microvascular changes to the skin in his bilateral legs up to his knees.  This is blanchable.  The patient has 3 ulcers to his right lower extremity located on his anterior shin and lateral lower leg area.  There " is moderate to significant slough in the base of these wounds.  There is excoriation/inflammation surrounding the ulcers with upraised skin.  The anterior shin wound is circular in appearance and the superior of the 2 lateral leg wounds is circular in appearance, however the inferior/lateral right lower leg wound is irregular in shape.  The wounds appear to be in different stages of healing.    Patient had strong palpable dorsalis pedis and posterior tibial pulses.  No significant swelling or pitting edema in legs.      See photos for details:    Right lower extremity:                Result Review :  The following data was reviewed by: Keyla Bailey PA-C on 10/24/2023:    Prior notes and images.         Assessment and Plan   Diagnoses and all orders for this visit:    1. Open wound of right lower leg, initial encounter (Primary)    2. Polycythemia vera    Other orders  -     triamcinolone (KENALOG) 0.1 % ointment; Apply 1 application  topically to the appropriate area as directed 2 (Two) Times a Day.  Dispense: 80 g; Refill: 0    The patient has several nonhealing wounds to his right lower leg and microvascular changes.  He is on a chemotherapy medication to treat polycythemia vera.  I do feel that this medication is likely either causing the ulcers to appear or causing the disruption of the wound healing process after a wound occurs on his leg.  We will have patient apply triamcinolone to the surrounding wound areas and apply Polymem without silver to the actual wounds.  Today I have reached out to his oncologist, Dr. Fleming, and discussed the option of an oral steroid to possibly treat the microvascular changes and to heal the right lower leg ulcers.  I also wonder if his polycythemia with leukocytosis and thrombocytosis diagnosis could be causing his ulcers as well.      Patient was given instructions and counseling regarding their condition or for health maintenance advice, as well as the wound care  plan and recommendations. They understand and agree with the plan.  They will follow back up here in the clinic but are instructed to contact us in the interim should they have any new, returning, or worsening symptoms or concerns. Please see specific information pulled into the AVS if appropriate.     Dragon Dictation utilized for chart completion.    Follow Up   Return in about 3 weeks (around 11/14/2023) for Recheck.      Keyla Bailey PA-C

## 2023-10-25 ENCOUNTER — TELEPHONE (OUTPATIENT)
Dept: ONCOLOGY | Facility: CLINIC | Age: 63
End: 2023-10-25
Payer: COMMERCIAL

## 2023-10-25 NOTE — TELEPHONE ENCOUNTER
Call to Mr. Landis to let him know that Dr. Fleming wanted him to be seen by NP on Friday, 10/27, re: leg ulcers that was communicated to her by PCP.  Patient states he is seeing wound specialists for this and is scheduled for tests to check his veins on 11/14/23.  He states that he has to drive 125 miles for appointments.  Let him know this RN would check with Dr. Fleming to let her know, and see if she wants to wait for these tests prior to seeing, as she is scheduled to see him on 11/15/23.    Reviewed with Dr. Fleming and called back to let Mr. Landis know Dr. Fleming will see him on 11/15/23 as previously scheduled. Understanding verbalized.

## 2023-11-10 DIAGNOSIS — D45 POLYCYTHEMIA VERA: ICD-10-CM

## 2023-11-13 RX ORDER — HYDROXYUREA 500 MG/1
CAPSULE ORAL
Refills: 1 | OUTPATIENT
Start: 2023-11-13

## 2023-11-15 ENCOUNTER — HOSPITAL ENCOUNTER (OUTPATIENT)
Dept: ULTRASOUND IMAGING | Facility: HOSPITAL | Age: 63
Discharge: HOME | End: 2023-11-15
Admitting: INTERNAL MEDICINE
Payer: COMMERCIAL

## 2023-11-15 ENCOUNTER — OFFICE VISIT (OUTPATIENT)
Dept: ONCOLOGY | Facility: CLINIC | Age: 63
End: 2023-11-15
Payer: COMMERCIAL

## 2023-11-15 ENCOUNTER — LAB (OUTPATIENT)
Dept: OTHER | Facility: HOSPITAL | Age: 63
End: 2023-11-15
Payer: COMMERCIAL

## 2023-11-15 VITALS
HEIGHT: 74 IN | RESPIRATION RATE: 16 BRPM | WEIGHT: 284.1 LBS | HEART RATE: 63 BPM | BODY MASS INDEX: 36.46 KG/M2 | OXYGEN SATURATION: 97 % | SYSTOLIC BLOOD PRESSURE: 134 MMHG | DIASTOLIC BLOOD PRESSURE: 89 MMHG | TEMPERATURE: 97.7 F

## 2023-11-15 DIAGNOSIS — D45 POLYCYTHEMIA VERA: ICD-10-CM

## 2023-11-15 DIAGNOSIS — D45 POLYCYTHEMIA VERA: Primary | ICD-10-CM

## 2023-11-15 LAB
ALBUMIN SERPL-MCNC: 4.3 G/DL (ref 3.5–5.2)
ALBUMIN/GLOB SERPL: 1.3 G/DL
ALP SERPL-CCNC: 71 U/L (ref 39–117)
ALT SERPL W P-5'-P-CCNC: 31 U/L (ref 1–41)
ANION GAP SERPL CALCULATED.3IONS-SCNC: 6.7 MMOL/L (ref 5–15)
AST SERPL-CCNC: 30 U/L (ref 1–40)
BASOPHILS # BLD AUTO: 0.15 10*3/MM3 (ref 0–0.2)
BASOPHILS NFR BLD AUTO: 1.6 % (ref 0–1.5)
BILIRUB SERPL-MCNC: 0.5 MG/DL (ref 0–1.2)
BUN SERPL-MCNC: 14 MG/DL (ref 8–23)
BUN/CREAT SERPL: 13.9 (ref 7–25)
CALCIUM SPEC-SCNC: 9.9 MG/DL (ref 8.6–10.5)
CHLORIDE SERPL-SCNC: 97 MMOL/L (ref 98–107)
CO2 SERPL-SCNC: 31.3 MMOL/L (ref 22–29)
CREAT SERPL-MCNC: 1.01 MG/DL (ref 0.76–1.27)
DEPRECATED RDW RBC AUTO: 52.5 FL (ref 37–54)
EGFRCR SERPLBLD CKD-EPI 2021: 83.6 ML/MIN/1.73
EOSINOPHIL # BLD AUTO: 0.35 10*3/MM3 (ref 0–0.4)
EOSINOPHIL NFR BLD AUTO: 3.6 % (ref 0.3–6.2)
ERYTHROCYTE [DISTWIDTH] IN BLOOD BY AUTOMATED COUNT: 12.2 % (ref 12.3–15.4)
GLOBULIN UR ELPH-MCNC: 3.3 GM/DL
GLUCOSE SERPL-MCNC: 101 MG/DL (ref 65–99)
HCT VFR BLD AUTO: 42.7 % (ref 37.5–51)
HGB BLD-MCNC: 14.7 G/DL (ref 13–17.7)
IMM GRANULOCYTES # BLD AUTO: 0.05 10*3/MM3 (ref 0–0.05)
IMM GRANULOCYTES NFR BLD AUTO: 0.5 % (ref 0–0.5)
LYMPHOCYTES # BLD AUTO: 1.47 10*3/MM3 (ref 0.7–3.1)
LYMPHOCYTES NFR BLD AUTO: 15.3 % (ref 19.6–45.3)
MACROCYTES BLD QL SMEAR: NORMAL
MCH RBC QN AUTO: 39.4 PG (ref 26.6–33)
MCHC RBC AUTO-ENTMCNC: 34.4 G/DL (ref 31.5–35.7)
MCV RBC AUTO: 114.5 FL (ref 79–97)
MONOCYTES # BLD AUTO: 1.11 10*3/MM3 (ref 0.1–0.9)
MONOCYTES NFR BLD AUTO: 11.6 % (ref 5–12)
NEUTROPHILS NFR BLD AUTO: 6.46 10*3/MM3 (ref 1.7–7)
NEUTROPHILS NFR BLD AUTO: 67.4 % (ref 42.7–76)
NRBC BLD AUTO-RTO: 0 /100 WBC (ref 0–0.2)
PLAT MORPH BLD: NORMAL
PLATELET # BLD AUTO: 518 10*3/MM3 (ref 140–450)
PMV BLD AUTO: 9.4 FL (ref 6–12)
POTASSIUM SERPL-SCNC: 4.2 MMOL/L (ref 3.5–5.2)
PROT SERPL-MCNC: 7.6 G/DL (ref 6–8.5)
RBC # BLD AUTO: 3.73 10*6/MM3 (ref 4.14–5.8)
SODIUM SERPL-SCNC: 135 MMOL/L (ref 136–145)
WBC MORPH BLD: NORMAL
WBC NRBC COR # BLD: 9.59 10*3/MM3 (ref 3.4–10.8)

## 2023-11-15 PROCEDURE — 85007 BL SMEAR W/DIFF WBC COUNT: CPT | Performed by: INTERNAL MEDICINE

## 2023-11-15 PROCEDURE — 36415 COLL VENOUS BLD VENIPUNCTURE: CPT

## 2023-11-15 PROCEDURE — 85025 COMPLETE CBC W/AUTO DIFF WBC: CPT | Performed by: INTERNAL MEDICINE

## 2023-11-15 PROCEDURE — 76700 US EXAM ABDOM COMPLETE: CPT

## 2023-11-15 PROCEDURE — 80053 COMPREHEN METABOLIC PANEL: CPT | Performed by: INTERNAL MEDICINE

## 2023-11-15 NOTE — PROGRESS NOTES
Subjective     REASON FOR FOLLOW UP:  1.  Polycythemia vera, polycythemia and thrombocytosis and leukocytosis, EPO level 1.3, Tino 2+    HISTORY OF PRESENT ILLNESS:  The patient is a 63 y.o. year old male who is here for an opinion about the above issue.    History of Present Illness   Phani Landis is a 63 y.o. male with the above-mentioned history who is here today for lab review and follow-up continuing on Hydrea 1500 mg 5 days a week and 1000 mg 2 days a week.  He states he typically takes the 1000 mg dose on the weekends.  He is tolerating this well without any significant side effects.      Patient was previously referred to dermatology due to some issues with skin lesions on his lower extremity that were delayed healing however the patient states that he did not end up keeping that appointment as the lesions have improved.  He has been using vitamin E oil.      Interval history:   Patient is 62-year-old with polycythemia vera.  He is currently taking hydroxyurea 1500 mg 6 days a week and 100 mg 1 day a week.  He continues to tolerate treatment well.  He denies any side effects such as fatigue, constipation, diarrhea, nausea, decreased appetite, headache, or skin ulcers.  Hemoglobin today is 14.9 and platelets are 430,000.  White blood cell count remains normal at 6.29.      November 15, 2023: Patient has 3 ulcerations on the right lower extremity.  He has been followed by wound care nurse.  I discussed with the wound care nurse and they do believe this is hydroxyurea related ulceration.  He does not smoke and has never smoked in the past.  He had good pulses peripherally.  They do not think this is accounting for underlying venous ulcers but more than likely hydroxyurea associated ulcerations.  We discussed about switching patient from hydroxyurea to Jakafi.  In the past he has had splenomegaly with the spleen being 13.5 cm.    Given that he is not tolerating hydroxyurea it is likely best to switch him to  "Jakafi.  Today have discussed in length the side effects of Jakafi.      Hematological oncologic history:  Patient is a 59-year-old gentleman who has been referred by his primary care Castillo Velasco for evaluation of thrombocytosis and polycythemia.  He has lost more than 50 pounds of weight.  In the last 6 months.  Looking back his blood counts have been high starting in 2016.  Initially his platelets were high around 670 and gradually increased in the 900 range.  More recently his hemoglobin has been increasing and his white count has been increasing.  He has not had a DVT or pulmonary embolism.  He has had no issues with itching with hot showers.  Has not had a stroke.  He had pneumonia last year but none recently.  He does have some shortness of breath but no chest pain.  He is exposed to secondhand smoking.    He does not have any nausea vomiting or abdominal pain at present.  He did have gout on several locations and was placed on meloxicam.  His renal function is mildly elevated.  He denies any fever or night sweats.    Patient does have a cough productive of yellow sputum but has no fevers.    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 180 tablet 0    amLODIPine-benazepril (LOTREL) 5-40 MG per capsule TAKE ONE CAPSULE BY MOUTH DAILY 90 capsule 1    aspirin 81 MG chewable tablet Chew 1 tablet Daily.      bisoprolol (ZEBeta) 10 MG tablet TAKE ONE TABLET BY MOUTH DAILY 90 " tablet 1    chlorthalidone (HYGROTEN) 50 MG tablet TAKE ONE TABLET BY MOUTH DAILY 90 tablet 1    hydrALAZINE (APRESOLINE) 50 MG tablet TAKE ONE TABLET BY MOUTH TWICE A  tablet 1    hydroxyurea (HYDREA) 500 MG capsule Take 3 capsules (1500 mg) by mouth daily for 6 days out of the week. Take two capsules (1000 mg) daily on the other day of the week. 240 capsule 1    mupirocin (BACTROBAN) 2 % ointment       potassium chloride 10 MEQ CR tablet TAKE ONE TABLET BY MOUTH TWICE A  tablet 1    silver sulfadiazine (Silvadene) 1 % cream Apply 1 application  topically to the appropriate area as directed 2 (Two) Times a Day. 400 g 0    triamcinolone (KENALOG) 0.1 % ointment Apply 1 application  topically to the appropriate area as directed 2 (Two) Times a Day. 80 g 0    furosemide (Lasix) 20 MG tablet Take 1 tablet by mouth Daily for 5 days. 5 tablet 0     No current facility-administered medications on file prior to visit.        ALLERGIES:    Allergies   Allergen Reactions    Cephalexin Itching        Social History     Socioeconomic History    Marital status:    Tobacco Use    Smoking status: Never    Smokeless tobacco: Never   Vaping Use    Vaping Use: Never used   Substance and Sexual Activity    Alcohol use: Yes     Comment: Occasional drinker    Drug use: Never    Sexual activity: Yes     Partners: Female        Family History   Problem Relation Age of Onset    Hypertension Father     Hypertension Other     Heart disease Other     Cervical cancer Other       I have reviewed the patient's medical history in detail and updated the computerized patient record.    Review of Systems  ROS as per HPI  14 point review of system is negative except the erythema in the left lower extremity is improved but the ulceration is present but significantly improved.  I have reviewed and confirmed the accuracy of the ROS as documented by the MA/LPN/RN Eliana Fleming MD    Objective     Vitals:    11/15/23 1251   BP:  "134/89   Pulse: 63   Resp: 16   Temp: 97.7 °F (36.5 °C)   TempSrc: Temporal   SpO2: 97%   Weight: 129 kg (284 lb 1.6 oz)   Height: 188 cm (74.02\")   PainSc: 0-No pain         11/15/2023    12:53 PM   Current Status   ECOG score 0     Physical examination      CONSTITUTIONAL:  Vital signs reviewed.  No distress, looks comfortable.  EYES:  Conjunctivae and lids unremarkable.  PERRLA  EARS,NOSE,MOUTH,THROAT:  Ears and nose appear unremarkable.  Lips, teeth, gums appear unremarkable.  RESPIRATORY:  Normal respiratory effort.  Lungs clear to auscultation bilaterally.   CARDIOVASCULAR:  Normal S1, S2.  No murmurs rubs or gallops.  No significant lower extremity edema.  GASTROINTESTINAL: Abdomen appears unremarkable.  Nontender.  No hepatomegaly.  No splenomegaly.  Left lower extremity erythema on the medial aspect of the left lower leg with a small ulcer which is not present on the medial malleolus but is now healing up.  Patient has 3 ulcerations on the right lower extremity which are new and not completely healed up.  He is doing dressing changes through the wound care  LYMPHATIC:  No cervical, supraclavicular, axillary lymphadenopathy.  SKIN:  Warm.  No rashes.  PSYCHIATRIC:  Normal judgment and insight.  Normal mood and affect.  I have reexamined the patient and the results are consistent with the previously documented exam. Eliana Fleming MD    RECENT LABS:  Results from last 7 days   Lab Units 11/15/23  1248   WBC 10*3/mm3 9.59   NEUTROS ABS 10*3/mm3 6.46   HEMOGLOBIN g/dL 14.7   HEMATOCRIT % 42.7   PLATELETS 10*3/mm3 518*                   Assessment & Plan     1.  Polycythemia with leukocytosis and thrombocytosis.  He does not have any itching with hot showers.  He does not have any headaches or DVT.  He does have blood counts worsening gradually from 2016.  7/8/2020  His platelets 974K.  Hemoglobin is 17.6 with hematocrit of 52.8 and white count of 11.56.  He denies fever night sweats but has significant weight " loss greater than 50 pounds in 6 months.  He does have a cough which is productive of yellow sputum and some shortness of breath.  He has history of secondhand smoking exposure.  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  CT chest negative  Hydroxyurea 500 mg twice daily was initiated on July 16, 2020.  .  7/22/2020 Hydrea increased to 1500 mg (1000 mg in am and 500 mg in pm) Monday-Friday and 2000 mg (1000 mg in am and 1000 mg in PM) on Saturdays and Sundays  7/29/2020 patient's platelet count today is 821.  White count 7.78, hemoglobin 16.4.   Increased Hydrea at 1500 mg Monday through Friday, and 2000 mg on Saturdays and Sundays.   In future if hematocrit greater than 48 then he will receive phlebotomy.  Last phlebotomy 7/15/2020.   2/23/2022 patient continues on Hydrea 1500 mg on Monday, Wednesday, and Friday, 1500 mg all other days.  He is tolerating Hydrea well aside from some fatigue on the days he takes 1500 mg.  He also continues to receive phlebotomy for hematocrit greater than 48, hematocrit today 44.7 so patient will not receive phlebotomy.  Platelets today have increased to 575, so we will increase Hydrea to 1500 mg four times a week, 1000 mg all other days.  Because we are increasing dose of Hydrea, patient will return in 1 month for CBC with RN review to monitor counts.  Discussed with the patient who is agreeable.  6/15/2022: Patient continues on Hydrea 1500 mg 4 days a week and 1000 mg all other days.  Platelets increased to 573,000.  Discussed with Dr. Fleming today plan to increase Hydrea to 1500 mg 5 days a week and 1000 mg 2 days a week.  He will return in 1 month for CBC with RN review to monitor counts.  Patient agreeable   September 7, 2022: Tolerating Hydrea very well.  His platelets are down to 497.  He continues with aspirin.  Given hematocrit of 44 we will hold off phlebotomy  11/30/2022  continuing on Hydrea 1500 mg 5 days a week and 1000 mg 2 days a week tolerating well.  May 24, 2023: Patient's hematocrit is 42.  But his platelet is elevated to 580.  We will increase the dose of his hydroxyurea to 1500 mg 6 days a week and 1000 mg 1 day a week  8/16/2023: Patient's hematocrit is normal at 41.9.  Platelets have normalized to 430,000.  Hemoglobin 14.9 and WBC 6.29.  Hydrea dosing will remain the same at 1500 mg 6 days a week and 100 mg once weekly.  He continues to tolerate this treatment well. Mild elevation was noted with his liver enzymes.  Historically he has had mild elevations when he was taking pain medications.  Today his AST is 42 and ALT is 42.  Total bilirubin is normal at 0.7 and alkaline phosphatase is normal at 72.  Does note that he takes occasional Tylenol for his arthritis.  He denies drinking any alcohol.  We will have him return in 1 month to monitor his labs.  November 15, 2023: Patient has new ulcerations in the right lower extremity for the last 2 3 months which are not healing.  On discussion with wound care they felt this is more likely hydroxyurea related rather than venous disease.  He has never been a smoker and so we discussed about switching to Jakafi.  He has had splenomegaly in the past but we will need to obtain ultrasound of the abdomen stat in order to rule out worsening splenomegaly.        2.  Weight loss greater than 50 pounds at time of initial evaluation.  CT chest abdomen pelvis negative for malignancy  Patient admitting that his weight loss was actually been more intentional   Weight now stable    3. Hypokalemia, mild, chronic.  8/2/2021: Potassium 3.3. We discussed utilizing electrolyte drinks when he is working outside/sweating to help replenish what he is losing.  He does continue potassium chloride once daily as well.  9/28/2021: Potassium stable at 3.3. Continue Klor-con 20 mEq daily.  2/23/2022 potassium today 4.0, patient will continue on potassium 20  mEq daily.  6/15/2022: Currently taking 10 mEq daily, potassium 3.8.  11/30/2022 potassium 3.2, he will increase his potassium to 20 M EQ daily.  8/16/2023: Potassium 3.5.  Patient remains on potassium 20 mill equivalents daily.    5.  History of knee surgery.  Patient is to go for knee surgery in 3 weeks  Discussed with Dr. Chad Oliveros and he will start him on 2.5 mg p.o. twice daily of Eliquis 24 hours after surgery.      6.  Skin lesions on the lower extremity with delayed healing, will consult with [facility]  11/30/2022 patient states that he did not see dermatologist as a skin lesions began to heal.  He continues to use vitamin D on this area.  March 1, 2023: The skin ulceration on the left lower extremity is healing up but not completely healed up  Unsure if this is just secondary to chronic venous stasis as opposed to hydroxyurea.  However usually the hydroxyurea ulcerations on the medial malleolus and this is more superior  8/16/2023: No skin lesions noted today.       Plan:   Stop hydroxyurea  as the concern is that the new ulcerations in the lower extremities on the right side is secondary to hydroxyurea  Because she is intolerant to hydroxyurea we will try Jakafi 10 mg p.o. twice daily, pharmacy to get it approved through insurance  He certainly is intolerant to hydroxyurea  Will obtain ultrasound of the abdomen to rule out hepatosplenomegaly  Follow-up in 2 weeks and with nurse practitioner in 4 weeks with me with labs  Pharmacy to educate the patient about Jakafi  Patient is on a high risk medication requiring close monitoring for toxicity.    I spent 40 total minutes, face-to-face, caring for Phani craven. Greater than 50% of this time involved counseling and/or coordination of care as documented within this note.  .    Eliana Fleming MD  5/24/23

## 2023-11-16 ENCOUNTER — OFFICE VISIT (OUTPATIENT)
Dept: WOUND CARE | Facility: HOSPITAL | Age: 63
End: 2023-11-16
Payer: COMMERCIAL

## 2023-11-16 ENCOUNTER — SPECIALTY PHARMACY (OUTPATIENT)
Dept: PHARMACY | Facility: HOSPITAL | Age: 63
End: 2023-11-16
Payer: COMMERCIAL

## 2023-11-16 VITALS
RESPIRATION RATE: 18 BRPM | TEMPERATURE: 96.8 F | HEART RATE: 62 BPM | SYSTOLIC BLOOD PRESSURE: 148 MMHG | DIASTOLIC BLOOD PRESSURE: 100 MMHG

## 2023-11-16 DIAGNOSIS — D45 POLYCYTHEMIA VERA: Primary | ICD-10-CM

## 2023-11-16 DIAGNOSIS — L03.115 CELLULITIS OF RIGHT LOWER EXTREMITY: ICD-10-CM

## 2023-11-16 DIAGNOSIS — D45 POLYCYTHEMIA VERA: ICD-10-CM

## 2023-11-16 DIAGNOSIS — S81.801D OPEN WOUND OF RIGHT LOWER LEG, SUBSEQUENT ENCOUNTER: Primary | ICD-10-CM

## 2023-11-16 PROCEDURE — 87186 SC STD MICRODIL/AGAR DIL: CPT | Performed by: PHYSICIAN ASSISTANT

## 2023-11-16 PROCEDURE — 87077 CULTURE AEROBIC IDENTIFY: CPT | Performed by: PHYSICIAN ASSISTANT

## 2023-11-16 PROCEDURE — 87205 SMEAR GRAM STAIN: CPT | Performed by: PHYSICIAN ASSISTANT

## 2023-11-16 PROCEDURE — 87070 CULTURE OTHR SPECIMN AEROBIC: CPT | Performed by: PHYSICIAN ASSISTANT

## 2023-11-16 PROCEDURE — G0463 HOSPITAL OUTPT CLINIC VISIT: HCPCS | Performed by: PHYSICIAN ASSISTANT

## 2023-11-16 RX ORDER — DOXYCYCLINE HYCLATE 100 MG/1
100 CAPSULE ORAL 2 TIMES DAILY
Qty: 14 CAPSULE | Refills: 0 | Status: SHIPPED | OUTPATIENT
Start: 2023-11-16 | End: 2023-11-23

## 2023-11-16 RX ORDER — PREDNISONE 20 MG/1
TABLET ORAL
Qty: 18 TABLET | Refills: 0 | Status: SHIPPED | OUTPATIENT
Start: 2023-11-16 | End: 2023-11-25

## 2023-11-16 NOTE — PROGRESS NOTES
Oral Chemotherapy - New Referral    Received a referral from Dr. Fleming    Treatment Plan: Jakafi (ruxolitinib)  Start date of treatment planned for: As soon as oral specialty medication is available.  Indication: Polycythemia vera  Relevant past treatments: Hydrea  Is the therapy appropriate based on treatment guidelines and FDA labeling?: yes  Therapeutic Goals: Continue treatment until progression or intolerable toxicity  Patient can self-administer oral medications: Yes    Drug-Drug Interactions: The current medication list was reviewed and there are no relevant drug-drug interactions with the specialty medication.  Medication Allergies: The patient has no relevant allergies as it relates to their oral specialty medication  Review of Labs/Dose Adjustments: The patient's most recent labs were reviewed and all are WNL to start treatment at this dose.     A prescription was released to [facility] for   Drug: Jakafi (ruxolitinib)  Strength: 10 mg  Directions: Take 1 tablet by mouth twice a day  Quantity: 60  Refills: 5    Pharmacy education is not yet scheduled.    Name/Credentials Ev Yun RPh, BCOP    11/16/2023  10:22 EST

## 2023-11-16 NOTE — PROGRESS NOTES
Drug: Jakafi (ruxolitinib)  Strength: 10 mg  Directions: Take 1 tablet by mouth twice a day  Quantity: 60  Refills: 5    Released to pharmacy: [facility]     Completed independent double check on medication order/RX.  Elder Holder, NunoD, BCOP  Clinical Oncology Pharmacist

## 2023-11-16 NOTE — PROGRESS NOTES
Chief Complaint  Wound Check (Follow up for wound to E.  Patient is not diabetic and not on antibiotic at this time.  Does have Polycythemia Vera.)    Subjective      History of Present Illness    Phani Landis  is a 63 y.o. male here today with chronic ulcers to his right lower extremity.  He notes that he has had several ulcers on his left lower extremity as well but they have recently healed.  He notes that he was previously prescribed Silvadene to be applied to wound daily.  He notes that he is unable to tolerate Silvadene due to significant sensitivity to it.  He notes that his most inferior wound on his right lower leg is very sensitive to open air and touch.  He is currently on a chemo medication (hydroxyurea) for polycythemia vera. This could have been causing his wounds. He recently saw his oncologist and is getting switched to jakifi.  He was recently seen by his primary care provider who has ordered a CT angiogram to further evaluate his ulcers.  He notes that he is not a diabetic and is not a smoker.     He notes that his rash on right leg, burning to right leg, and drainage from right leg has worsened since last appointment.  He notes that applying Aquaphor to his wounds does give some comfort. He has been keeping wounds covered. He most recently has been applying triamcinolone 1% to his right leg and polymem without silver to his wounds.   He notes that this may have helped his wounds initially but that they worsened 2 weeks ago. He tries to keep leg elevated when possible.     Allergies:  Cephalexin      Current Outpatient Medications:     allopurinol (ZYLOPRIM) 100 MG tablet, Take 1 tablet by mouth 2 (Two) Times a Day., Disp: 180 tablet, Rfl: 0    amLODIPine-benazepril (LOTREL) 5-40 MG per capsule, TAKE ONE CAPSULE BY MOUTH DAILY, Disp: 90 capsule, Rfl: 1    aspirin 81 MG chewable tablet, Chew 1 tablet Daily., Disp: , Rfl:     bisoprolol (ZEBeta) 10 MG tablet, TAKE ONE TABLET BY MOUTH DAILY, Disp:  "90 tablet, Rfl: 1    chlorthalidone (HYGROTEN) 50 MG tablet, TAKE ONE TABLET BY MOUTH DAILY, Disp: 90 tablet, Rfl: 1    doxycycline (VIBRAMYCIN) 100 MG capsule, Take 1 capsule by mouth 2 (Two) Times a Day for 7 days. Do not take at the same time as any vitamin or mineral supplements., Disp: 14 capsule, Rfl: 0    furosemide (Lasix) 20 MG tablet, Take 1 tablet by mouth Daily for 5 days., Disp: 5 tablet, Rfl: 0    hydrALAZINE (APRESOLINE) 50 MG tablet, TAKE ONE TABLET BY MOUTH TWICE A DAY, Disp: 180 tablet, Rfl: 1    mupirocin (BACTROBAN) 2 % ointment, , Disp: , Rfl:     potassium chloride 10 MEQ CR tablet, TAKE ONE TABLET BY MOUTH TWICE A DAY, Disp: 180 tablet, Rfl: 1    predniSONE (DELTASONE) 20 MG tablet, Take 3 tablets by mouth Daily for 3 days, THEN 2 tablets Daily for 3 days, THEN 1 tablet Daily for 3 days., Disp: 18 tablet, Rfl: 0    ruxolitinib (JAKAFI) 10 MG tablet, Take 1 tablet by mouth 2 (Two) Times a Day. Take by mouth twice daily, 12 hours apart with no regards to food., Disp: 60 tablet, Rfl: 5    silver sulfadiazine (Silvadene) 1 % cream, Apply 1 application  topically to the appropriate area as directed 2 (Two) Times a Day., Disp: 400 g, Rfl: 0    triamcinolone (KENALOG) 0.1 % ointment, Apply 1 application  topically to the appropriate area as directed 2 (Two) Times a Day., Disp: 80 g, Rfl: 0    Past Medical History:   Diagnosis Date    Anxiety     Arthritis     Cancer June 2020    Polycythemia vera    Hypertension      Past Surgical History:   Procedure Laterality Date    CHEST SURGERY      OTHER SURGICAL HISTORY  1985    \"Exploratory\"     Social History     Socioeconomic History    Marital status:    Tobacco Use    Smoking status: Never    Smokeless tobacco: Never   Vaping Use    Vaping Use: Never used   Substance and Sexual Activity    Alcohol use: Yes     Comment: Occasional drinker    Drug use: Never    Sexual activity: Yes     Partners: Female     Objective     Vitals:    11/16/23 1448 "   BP: 148/100   BP Location: Left arm   Patient Position: Sitting   Cuff Size: Adult   Pulse: 62   Resp: 18  Comment: 98% room air   Temp: 96.8 °F (36 °C)   TempSrc: Temporal   PainSc:   6   PainLoc: Leg     Review of Systems     ROS:  No fevers, chills, sweats, or body aches.     I have reviewed the HPI and ROS as documented by MA/RN. Keyla Bailey PA-C    Physical Exam     NAD  Normocephalic, atraumatic  EOMI, no scleral icterus  No respiratory distress, no cough  AAOx3, pleasant, cooperative    Right lower extremity: Patient has microvascular changes to the skin in his bilateral legs up to his knees.  This is blanchable.  The patient has 3 ulcers to his right lower extremity located on his anterior shin and lateral lower leg area.  There is moderate to significant slough in the base of these wounds.  There is increased excoriation/inflammation surrounding the ulcers with upraised skin with increasing clear drainage and redness/cellulitis.  The anterior shin wound is circular in appearance and the superior of the 2 lateral leg wounds is circular in appearance, however the inferior/lateral right lower leg wound is irregular in shape.  The wounds appear to be in different stages of healing. TTP.    Patient had strong palpable dorsalis pedis and posterior tibial pulses.  1+ pitting edema in right leg today.    See photos for details:    Right lower extremity:                Result Review :  The following data was reviewed by: Keyla Bailey PA-C on 11/16/2023:    Prior notes and images.         Assessment and Plan   Diagnoses and all orders for this visit:    1. Open wound of right lower leg, subsequent encounter (Primary)  -     Wound Culture - Wound, Leg, Right    2. Polycythemia vera    3. Cellulitis of right lower extremity    Other orders  -     predniSONE (DELTASONE) 20 MG tablet; Take 3 tablets by mouth Daily for 3 days, THEN 2 tablets Daily for 3 days, THEN 1 tablet Daily for 3 days.   Dispense: 18 tablet; Refill: 0  -     doxycycline (VIBRAMYCIN) 100 MG capsule; Take 1 capsule by mouth 2 (Two) Times a Day for 7 days. Do not take at the same time as any vitamin or mineral supplements.  Dispense: 14 capsule; Refill: 0      The patient has several nonhealing wounds to his right lower leg and microvascular changes.  He is weaning off chemotherapy medication (hydrea) to treat polycythemia vera. He will be starting Jakifi soon.  I do feel that this medication is likely either causing the ulcers to appear or causing the disruption of the wound healing process after a wound occurs on his leg.  Recently I have reached out to his oncologist, Dr. Fleming, and discussed the option of an oral steroid to possibly treat the microvascular changes and to heal the right lower leg ulcers. She notes that a steroid is not contraindicated.  I also wonder if his polycythemia with leukocytosis and thrombocytosis diagnosis could be causing his ulcers as well. Today I will prescribe prednisone and doxycyline for his non-healing wounds to right lower leg. He has increased drainage from wounds today and developing cellulitis. I obtained a wound culture today and will call in another antibiotic as necessary. We will now start having him wrap his legs daily and apply Aquacel Ag to wounds. He should stop applying steroid ointment to leg.  He should continue to prop his legs and elevate when possible. He will followup with us in 2 weeks. He should continue to followup with his oncologist and PCP.       Patient was given instructions and counseling regarding their condition or for health maintenance advice, as well as the wound care plan and recommendations. They understand and agree with the plan.  They will follow back up here in the clinic but are instructed to contact us in the interim should they have any new, returning, or worsening symptoms or concerns. Please see specific information pulled into the AVS if appropriate.      Rosa Dictation utilized for chart completion.    Follow Up   Return in about 2 weeks (around 11/30/2023) for Recheck.      Keyla Bailey PA-C

## 2023-11-16 NOTE — PROGRESS NOTES
The following staff message was forwarded to my attention:      From: Cecilia Griffith RN   Sent: 11/15/2023   1:39 PM EST   To: Eliana Fleming MD; *     Dr. Fleming wants to stop Hydroxyurea and start Jakafi 10 mg BID.     Thank you,   Cecilia       The Prior Authorization for Jakafi is approved from 11/15/2023 to 11/15/2024.     The prescription must be filled with [facility].    The patient can sign up to use his [employer] benefit to reduce his co-pay.    Yudy Howell - Care Coordinator   11/16/2023  10:14 EST

## 2023-11-20 ENCOUNTER — TELEPHONE (OUTPATIENT)
Dept: ONCOLOGY | Facility: CLINIC | Age: 63
End: 2023-11-20
Payer: COMMERCIAL

## 2023-11-20 LAB
BACTERIA SPEC AEROBE CULT: ABNORMAL
GRAM STN SPEC: ABNORMAL

## 2023-11-20 NOTE — TELEPHONE ENCOUNTER
Provider: Dr Fleming  Caller: Phani Landis  Relationship to Patient: Self  Call Back Phone Number: [phone]  Reason for Call: Pt needs to go over appts scheduled for tomorrow. Please return call.

## 2023-11-21 ENCOUNTER — TELEPHONE (OUTPATIENT)
Dept: ONCOLOGY | Facility: CLINIC | Age: 63
End: 2023-11-21
Payer: COMMERCIAL

## 2023-11-21 ENCOUNTER — TELEMEDICINE (OUTPATIENT)
Dept: ONCOLOGY | Facility: CLINIC | Age: 63
End: 2023-11-21
Payer: COMMERCIAL

## 2023-11-21 ENCOUNTER — SPECIALTY PHARMACY (OUTPATIENT)
Dept: ONCOLOGY | Facility: HOSPITAL | Age: 63
End: 2023-11-21
Payer: COMMERCIAL

## 2023-11-21 VITALS — BODY MASS INDEX: 36.45 KG/M2 | WEIGHT: 284 LBS | HEIGHT: 74 IN

## 2023-11-21 DIAGNOSIS — D45 POLYCYTHEMIA VERA: Primary | ICD-10-CM

## 2023-11-21 NOTE — PROGRESS NOTES
Specialty Pharmacy Patient Management Program  Oncology Initial Assessment       Phani Landis is a 63 y.o. male with polycythemia vera seen by an Oncology provider and enrolled in the Oncology Patient Management program offered by [facility].  An initial outreach was conducted, including assessment of therapy appropriateness and specialty medication education for Ruxolitinib ( Jakafi) . The patient was introduced to services offered by [facility], including: regular assessments, refill coordination, curbside pick-up or mail order delivery options, prior authorization maintenance, and financial assistance programs as applicable. The patient was also provided with contact information for the pharmacy team.     Regimen: Jakafi 10 mg po bid    Start date of oral specialty medication: As soon as oral specialty medication is available.    Relevant Past Medical History, Comorbidities, and Vaccines  Relevant medical history and concomitant health conditions were discussed with the patient. The patient's chart has been reviewed for relevant past medical history and comorbid health conditions and updated as necessary.  Vaccines are coordinated by the patient's oncologist and primary care provider.  Past Medical History:   Diagnosis Date    Anxiety     Arthritis     Cancer June 2020    Polycythemia vera    Hypertension      Social History     Socioeconomic History    Marital status:    Tobacco Use    Smoking status: Never    Smokeless tobacco: Never   Vaping Use    Vaping Use: Never used   Substance and Sexual Activity    Alcohol use: Yes     Comment: Occasional drinker    Drug use: Never    Sexual activity: Yes     Partners: Female       Allergies  Known allergies and reactions were discussed with the patient. The patient's chart has been reviewed for allergy information and updated as necessary.   Allergies   Allergen Reactions    Cephalexin Itching        Current  Medication List  This medication list has been reviewed with the patient and evaluated for any interactions or necessary modifications/recommendations, and updated to include all prescription medications, OTC medications, and supplements the patient is currently taking.  This list reflects what is contained in the patient's profile, which has also been marked as reviewed to communicate to other providers it is the most up to date version of the patient's current medication therapy.   Prior to Admission medications    Medication Sig Start Date End Date Taking? Authorizing Provider   allopurinol (ZYLOPRIM) 100 MG tablet Take 1 tablet by mouth 2 (Two) Times a Day. 10/16/23   Eliana Fleming MD   amLODIPine-benazepril (LOTREL) 5-40 MG per capsule TAKE ONE CAPSULE BY MOUTH DAILY 8/8/23   Roselyn Haywood APRN   aspirin 81 MG chewable tablet Chew 1 tablet Daily.    Alex Dahl MD   bisoprolol (ZEBeta) 10 MG tablet TAKE ONE TABLET BY MOUTH DAILY 8/8/23   Roselyn Haywood APRN   chlorthalidone (HYGROTEN) 50 MG tablet TAKE ONE TABLET BY MOUTH DAILY 8/8/23   Roselyn Haywood APRN   doxycycline (VIBRAMYCIN) 100 MG capsule Take 1 capsule by mouth 2 (Two) Times a Day for 7 days. Do not take at the same time as any vitamin or mineral supplements. 11/16/23 11/23/23  Keyla Reyes PA-C   furosemide (Lasix) 20 MG tablet Take 1 tablet by mouth Daily for 5 days. 10/4/23 10/9/23  Maria Del Rosario Jiang APRN   hydrALAZINE (APRESOLINE) 50 MG tablet TAKE ONE TABLET BY MOUTH TWICE A DAY 8/8/23   Roselyn Haywood APRN   mupirocin (BACTROBAN) 2 % ointment  2/25/23   Alex Dahl MD   potassium chloride 10 MEQ CR tablet TAKE ONE TABLET BY MOUTH TWICE A DAY 6/8/23   Roselyn Ireland APRN   predniSONE (DELTASONE) 20 MG tablet Take 3 tablets by mouth Daily for 3 days, THEN 2 tablets Daily for 3 days, THEN 1 tablet Daily for 3 days. 11/16/23 11/25/23  Keyla Reyes PA-C   ruxolitinib  (JAKAFI) 10 MG tablet Take 1 tablet by mouth 2 (Two) Times a Day. Take by mouth twice daily, 12 hours apart with no regards to food.  Patient not taking: Reported on 11/21/2023 11/16/23   Eliana Fleming MD   silver sulfadiazine (Silvadene) 1 % cream Apply 1 application  topically to the appropriate area as directed 2 (Two) Times a Day. 10/4/23   Maria Del Rosario Jiang APRN   triamcinolone (KENALOG) 0.1 % ointment Apply 1 application  topically to the appropriate area as directed 2 (Two) Times a Day. 10/24/23   Keyla Reyes PA-C   hydroxyurea (HYDREA) 500 MG capsule Take 3 capsules (1500 mg) by mouth daily for 6 days out of the week. Take two capsules (1000 mg) daily on the other day of the week. 6/6/23 11/15/23  Eliana Fleming MD       Drug Interactions  Reviewed concomitant medications, allergies, labs, comorbidities/medical history, quality of life( he developed leg ulcers while taking Hydrea), and immunization history.   Drug-drug interactions noted and discussed during education: no significant drug interactions noted. . Reminded the patient to let us know before making any changes or starting any new prescription or OTC medications so we can first assess drug interactions.  Drug-food interactions noted and discussed during education. Patient was instructed to avoid  none of concern    Recommended Medications Assessment  Bone Health (such as calcium/vitamin D, bisphosphonate, RANKL inhibitor) - Not Indicated   VTE prophylaxis - Not Indicated   Prophylactic antimicrobials - Not Indicated   Relevant Laboratory Values  Lab Results   Component Value Date    GLUCOSE 101 (H) 11/15/2023    CALCIUM 9.9 11/15/2023     (L) 11/15/2023    K 4.2 11/15/2023    CO2 31.3 (H) 11/15/2023    CL 97 (L) 11/15/2023    BUN 14 11/15/2023    CREATININE 1.01 11/15/2023    EGFRIFAFRI 70 06/22/2020    EGFRIFNONA 67 02/23/2022    BCR 13.9 11/15/2023    ANIONGAP 6.7 11/15/2023     Lab Results   Component Value Date    WBC  9.59 11/15/2023    RBC 3.73 (L) 11/15/2023    HGB 14.7 11/15/2023    HCT 42.7 11/15/2023    .5 (H) 11/15/2023    MCH 39.4 (H) 11/15/2023    MCHC 34.4 11/15/2023    RDW 12.2 (L) 11/15/2023    RDWSD 52.5 11/15/2023    MPV 9.4 11/15/2023     (H) 11/15/2023    NEUTRORELPCT 67.4 11/15/2023    LYMPHORELPCT 15.3 (L) 11/15/2023    MONORELPCT 11.6 11/15/2023    EOSRELPCT 3.6 11/15/2023    BASORELPCT 1.6 (H) 11/15/2023    AUTOIGPER 0.5 11/15/2023    NEUTROABS 6.46 11/15/2023    LYMPHSABS 1.47 11/15/2023    MONOSABS 1.11 (H) 11/15/2023    EOSABS 0.35 11/15/2023    BASOSABS 0.15 11/15/2023    AUTOIGNUM 0.05 11/15/2023    NRBC 0.0 11/15/2023       The above labs have been reviewed. No dose adjustments are needed for the oral specialty medication(s) based on the labs.    Initial Education Provided for Specialty Medication  The patient has been provided with the following education. All questions and concerns have been addressed prior to the patient receiving the medication, and the patient has verbalized understanding of the education and any materials provided.  Additional patient education shall be provided and documented upon request by the patient, provider or payer.      Provided patient with:   Education sheets about the medication, 24-hour clinic phone number and my contact information and instructions to call should additional questions arise.     Medication Education Sheets Provided: (select all that apply)  Oral Specialty Medication: Jakafi (ruxolitinib)    TOPICS COMMENTS   Storage and Handling of Oral Specialty Medication Store in the original container, in a dry location out of direct sunlight, and out of reach of children or pets. and Store at room temperature.  Discussed safe handling and what to do with any unused medication.   Administration of Oral Specialty Medication Take with or without food at the same time(s) each day.   Adherence to Oral Specialty Regimen and Handling Missed Doses Patient is  likely to have good treatment adherence; reinforced the importance of adherence. Reviewed how to address missed doses and encouraged the patient to let us know of any missed doses.   Anemia: role of RBC, cause, s/s, ways to manage, role of transfusion Reviewed the role of RBC and the use of transfusions if hemoglobin decreases too much.  Patient to notify us if he experiences shortness of breath, dizziness, or palpitations.  Also let patient know that he could feel more tired than usual and to try to stay active, but rest if he needs to.    Thrombocytopenia: role of platelet, cause, s/s, ways to prevent bleeding, things to avoid, when to seek help Reviewed the role of platelets in blood clotting and when to call clinic (bloody nose that bleeds for 5 minutes despite pressure, a cut that won't stop bleeding despite pressure, gums that bleed excessively with brushing or flossing). Recommended using an electric razor, soft bristle toothbrush, and blowing your nose gently.    Neutropenia: role of WBC, cause, infection precautions, s/s of infection, when to call MD Reviewed the role of WBC, good infection prevention practices, and when to call the clinic (temperature 100.4F, sore throat, burning urination, etc).   Fatigue You may be more tired than usual or have less energy.   Stay as active as possible, but know it is okay to rest as needed.   Try to do some activity every day.  o Plan your activities, and do them at a time of day when you feel a bit more energetic.  o Avoid operating heavy machinery if you feel too tired.   Changes in   electrolytes and other   laboratory values   High cholesterol   level Changes in some laboratory values may occur and should be monitored by a simple blood test.   You may not feel any symptoms if the changes are mild, and they usually are not a sign of a   serious problem.    More severe changes may occur, which can be a sign of a serious problem.   Notify your care provider if you have  any of the following:    Shortness of breath   Chest discomfort   Weakness or fatigue   New aches and pains   Headaches   Dizziness   Swelling in your legs or feet   Red- or brown-colored urine   Nausea/Vomiting: cause, use of antiemetics, dietary changes, when to call MD Emetic risk: Low-Minimal  PRN home meds: Ondansetron  Pharmacy home meds sent to: [facility]    Instructed the patient to take a dose of the PRN medication at the first onset of nausea and if it's not working to call us for additional medications.  Also provided non-drug measures to mitigate nausea.   Organ Toxicities: cause, s/s, need for diagnostic tests, labs, when to notify MD Discussed potential effects on organ systems, monitoring, diagnostic tests, labs, and when to notify the MD. Discussed the signs/symptoms of the following: cardiotoxicity, hepatotoxicity, lung changes, and skin changes.   Survivorship: distress, distress assessment, secondary malignancies, early/late effects, follow-up, social issues, social support Discussed the rare, but possible risk of secondary malignancies months to years after treatment, most commonly non-melanoma skin cancer.   Miscellaneous Financial Issues: He has a copay exceeding $5000 and has been instructed to call [facility] at [phone] to apply his [employer] benefit  Lab Draws:  initially weekly    Blood Clots: Explained the rare, but possible risk of DVT or PE.  Reviewed the signs and symptoms of VTE and stressed the urgency to call 911 immediately.  Ruxolitinib may cause symptom exacerbation if abruptly stopped. If stopping ruxolitinib is appropriate, check with your provider if you should taper down ruxolitinib slowly before stopping.     Infertility and Sexuality:  causes, fertility preservation options, sexuality changes, ways to manage, importance of birth control Oral Oncology Therapy: Reviewed safe sex practices and the importance of minimizing exposure to body fluids while on oral oncology therapy.    Home Care: how to manage bodily fluids Counseled on management of soiled linens and proper flush technique.  Discussed how to manage all the side effects at home and advised when to contact the MD office     Adherence and Self-Administration  Barriers to Patient Adherence and/or Self-Administration: none  Methods for Supporting Patient Adherence and/or Self-Administration:  directed education  Expected duration of therapy: Until disease progression or intolerable toxicity    Goals of Therapy  Patient Goals of Therapy:   Consistently take medications as prescribed  Patient will adhere to medication regimen  Patient will report any medication side effects to healthcare provider  Clinical Goals:    Goals Addressed Today    None       Support patient understanding of medication regimen  Ensure patient knows the pharmacy contact information  Schedule regular follow-up to monitor the treatment serious adverse events  Schedule regular follow-up to confirm medication adherence  Schedule regular follow-up to monitor disease progression or stability    Quality of Life Assessment   The patient's current (pre-therapy) quality of life was discussed with the patient. The QOL segment of this outreach has been reviewed and updated.   Quality of Life Score: 8/10    Reassessment Plan & Follow-Up  Pharmacist to perform regular reassessments no more than (6) months from the previous assessment.  Welcome information and patient satisfaction survey to be sent by retail team with patient's initial fill.  Care Coordinator to set up future refill outreaches, coordinate prescription delivery, and escalate clinical questions to pharmacist.     Additional Plans, Therapy Recommendations or Therapy Problems to Be Addressed: none  CCA and consent will be signed at next office visit     Attestation  I attest the patient was actively involved in and has agreed to the above plan of care. If the prescribed therapy is at any point deemed not  appropriate based on the current or future assessments, a consultation will be initiated with the patient's specialty care provider to determine the best course of action. The revised plan of therapy will be documented along with any required assessments and/or additional patient education provided.     Name/Credentials Ev Yun Rph, CARLOS    Telemedicine attempted- his camera failed and we proceeded telephonically    Date and Time: 11/21/2023  15:36 EST

## 2023-11-21 NOTE — TELEPHONE ENCOUNTER
Received call from Mr. Landis stating he has heard from his insurance company, and his copay for the new medication will be $5000/month.  Patient states he cannot afford that.  Let him know this RN will send a message to appropriate persons in office to look into possible help with the copay.  Let him know he should keep the appointment for education this afternoon.  Patient verbalized understanding.

## 2023-11-21 NOTE — PROGRESS NOTES
TREATMENT  PREPARATION    Phani Landis  [id number]  1960    Chief Complaint: Treatment preparation and needs assessment    History of present illness:  Phani Landis is a 63 y.o. year old male who is here today for treatment preparation and needs assessment.  The patient has been diagnosed with   Encounter Diagnosis   Name Primary?    Polycythemia vera Yes    and is scheduled to begin treatment with:     Oncology History:    Oncology/Hematology History   Polycythemia vera   7/15/2020 Initial Diagnosis    Polycythemia vera (CMS/HCC)     7/16/2020 - 7/16/2020 Chemotherapy    OP POLYCYTHEMIA VERA Hydroxyurea     11/15/2023 -  Chemotherapy    OP POLYCYTHEMIA VERA Ruxolitinib          The current medication list and allergy list were reviewed and reconciled.     Past Medical History, Past Surgical History, Social History, Family History have been reviewed and are without significant changes except as mentioned.    Physical Exam:    There were no vitals filed for this visit.  Vitals:    11/21/23 1438   PainSc:   8   PainLoc: Leg        ECOG score: 0           Limited due to video visit  Physical Exam  Vitals reviewed.   Pulmonary:      Effort: Pulmonary effort is normal.   Neurological:      General: No focal deficit present.      Mental Status: He is alert and oriented to person, place, and time. Mental status is at baseline.   Psychiatric:         Mood and Affect: Mood normal.         Behavior: Behavior normal.         Thought Content: Thought content normal.         Judgment: Judgment normal.           NEEDS ASSESSMENTS    Genetics  The patient's new diagnosis and family history have been reviewed for genetic counseling needs. The patient will not be referred..     Psychosocial and Barriers to care  The patient has completed a PHQ-9 Depression Screening and the Distress Thermometer (DT) today.  PHQ-9 results show PHQ-2 Total Score: 1 PHQ-9 Total Score: PHQ-9 Total Score: 1     The patient scored their distress  today as Distress Level: 0-No distress on a scale of 0-10 with 0 being no distress and 10 being extreme distress. Problems marked by the patient as being an issue for them within the last week include   .      Results were reviewed along with psychosocial resources offered by our cancer center.  Our Supportive Oncology team will be flagged for a score of 4 or above, and a same day call will be made for a score of 9 or 10.  A mental health referral is offered at that time. Patients who score less than 4 have been educated on our support services and can be referred to our  upon request.  The patient will not be referred to our .       Nutrition  The patient has completed the malnutrition screening today. They scored Malnutrition Screening Tool  Have you recently lost weight without trying?  If yes, how much weight have you lost?: 0--> No  Have you been eating poorly because of a decreased appetite?: 0--> No  MST score: 0   with a score of 0-1 meaning not at risk in a score of 2 or greater meaning at risk.  Patients with a score of 3 or higher will be referred to our oncology dietitian for support. Patients beginning at risk treatment regimens or who have dietary concerns will also be referred to our oncology dietitian. The patient will not be referred.    Functional Assessment  Persons who are age 70 or greater will be screened for qualification of a comprehensive geriatric assessment by our survivorship nurse practitioner.  Older adults with cancer face unique challenges. These may include an increased risk of drug reactions, financial burdens, and caregiver stress. The patient scored   . Patients scoring 14 or lower will referred for an older adult functional assessment with the survivorship advanced practice registered nurse to ensure all needed support is provided as patients plan for their treatments. NOT APPLICABLE    Intravenous Access Assessment  The patient and I discussed planned  "intravenous chemo/biotherapy as well as other IV treatments that are often needed throughout the course of treatment. These may include, but are not limited to blood transfusions, antibiotics, and IV hydration. Discussed that depending on selected treatment and vein assessment, patient may require venous access device (VAD) which could include but not limited to a Mediport or PICC line. Risks and benefits of VADs reviewed. The patient will be treated via Oral Treatment.    Reproductive/Sexual Activity   People should avoid becoming pregnant and should not get a partner pregnant while undergoing chemo/biotherapy.  People of childbearing age should use effective contraception during active therapy. The best recommendation for all people is to use a barrier method for a minimum of 1 week after the last infusion of chemo/biotherapy to prevent your partner being exposed to byproducts from treatment medications in bodily fluids. Effective contraception should be discussed with your oncology team to make sure it is safe to take based on your diagnosis. Possible options include oral contraceptives, barrier methods. Chemo/biotherapy can change your ability to reproduce children in the future.  There are options for fertility preservation. The patient will not be referred.    Advanced Care Planning  Advance Care Planning   The patient and I discussed advanced care planning, \"Conversations that Matter\".   This service is offered for development of advance directives with a certified ACP facilitator.  The patient does not have an up-to-date advanced directive. This document is not on file with our office. The patient is not interested in an appointment with one of our facilitators to create or update their advanced directives.    Have you reviewed your Advance Directive and is it valid for this stay?: Not applicable          Smoking cessation  Tobacco Use: Low Risk  (11/21/2023)    Patient History     Smoking Tobacco Use: Never "     Smokeless Tobacco Use: Never     Passive Exposure: Not on file       Patient and I discussed their tobacco use history. Referral will not be made for smoking cessation.      Palliative Care  When appropriate, the patient and I discussed the availability palliative care services and when appropriate Hospice care. Palliative care is not the same as Hospice care which was explained to the patient.NOT APPLICABLE.    Survivorship   When appropriate, we discussed that we will refer the patient to survivorship clinic to discuss next steps following completion of planned treatment.  Reviewed this visit will include assessment of your physical, psychological, functional, and spiritual needs as a survivor and the need at attend this visit when scheduled.    Assessment and Plan:    Diagnoses and all orders for this visit:    1. Polycythemia vera (Primary)      No orders of the defined types were placed in this encounter.    Needs assessment was completed where applicable including genetics, psychosocial needs, barriers to care, VAD evaluation, advanced care planning, survivorship, and palliative care services where indicated. Referrals have been ordered as appropriate based upon evaluation today and patient desires.   Adequate time was given to answer questions.  Patient made aware of their care team members and contact information if they have questions or problems throughout the treatment course.  Discussion held and written information provided describing frequency of office visits and ongoing monitoring throughout the treatment plan.     Reviewed with patient any prescribed medication sent to pharmacy.  Education provided regarding proper storage, safe handling, and proper disposal of unused medication.  Proper handling of body fluids and waste discussed and written information provided.  If appropriate, patient had pretreatment labs drawn today.    Learning assessment completed at initial patient encounter. See  separate flowsheet. Chemo/biotherapy education comprehension assessed at today's visit.    I spent 12 minutes caring for Phani on this date of service. This time includes time spent by me in the following activities: preparing for the visit, reviewing tests, and documenting information in the medical record.     You have chosen to receive care through a telehealth visit.  Do you consent to use a video/audio connection for your medical care today? Yes    Alejandra Richardson, AG   11/21/23

## 2023-11-28 ENCOUNTER — DOCUMENTATION (OUTPATIENT)
Dept: PHARMACY | Facility: HOSPITAL | Age: 63
End: 2023-11-28
Payer: COMMERCIAL

## 2023-11-28 ENCOUNTER — SPECIALTY PHARMACY (OUTPATIENT)
Dept: PHARMACY | Facility: HOSPITAL | Age: 63
End: 2023-11-28
Payer: COMMERCIAL

## 2023-11-28 NOTE — PROGRESS NOTES
Subjective     REASON FOR FOLLOW UP:  1.  Polycythemia vera, polycythemia and thrombocytosis and leukocytosis, EPO level 1.3, Tino 2+    HISTORY OF PRESENT ILLNESS:  The patient is a 63 y.o. year old male who is here for an opinion about the above issue.    History of Present Illness   Phani Landis is a 63 y.o. male with the above-mentioned history who is here today for lab review and follow-up continuing on Hydrea 1500 mg 5 days a week and 1000 mg 2 days a week.  He states he typically takes the 1000 mg dose on the weekends.  He is tolerating this well without any significant side effects.      Patient was previously referred to dermatology due to some issues with skin lesions on his lower extremity that were delayed healing however the patient states that he did not end up keeping that appointment as the lesions have improved.  He has been using vitamin E oil.        Patient is 62-year-old with polycythemia vera.  He is currently taking hydroxyurea 1500 mg 6 days a week and 100 mg 1 day a week.  He continues to tolerate treatment well.  He denies any side effects such as fatigue, constipation, diarrhea, nausea, decreased appetite, headache, or skin ulcers.  Hemoglobin today is 14.9 and platelets are 430,000.  White blood cell count remains normal at 6.29.      November 15, 2023: Patient has 3 ulcerations on the right lower extremity.  He has been followed by wound care nurse.  I discussed with the wound care nurse and they do believe this is hydroxyurea related ulceration.  He does not smoke and has never smoked in the past.  He had good pulses peripherally.  They do not think this is accounting for underlying venous ulcers but more than likely hydroxyurea associated ulcerations.  We discussed about switching patient from hydroxyurea to Jakafi.  In the past he has had splenomegaly with the spleen being 13.5 cm.    Given that he is not tolerating hydroxyurea it is likely best to switch him to Jakafi.  Today have  discussed in length the side effects of Jakafi.    Interval history:  Patient returns to the office today for follow-up in regards to his polycythemia vera.  He was previously on Hydrea however this has been discontinued related to intolerance and lower extremity ulcerations.  He was started on Jakafi 10 mg p.o. twice daily.  He has only been on Jakafi for 3 days now and reports he is tolerating it well without any side effects.  He denies any fatigue, constipation, diarrhea, nausea, decreased appetite, or headaches.  He continues to be followed by the wound care center at [facility] and reports his ulcerations are healing up well.  He did just complete a course of doxycycline and prednisone.  No other concerns noted at this time.      Hematological oncologic history:  Patient is a 59-year-old gentleman who has been referred by his primary care Castillo Velasco for evaluation of thrombocytosis and polycythemia.  He has lost more than 50 pounds of weight.  In the last 6 months.  Looking back his blood counts have been high starting in 2016.  Initially his platelets were high around 670 and gradually increased in the 900 range.  More recently his hemoglobin has been increasing and his white count has been increasing.  He has not had a DVT or pulmonary embolism.  He has had no issues with itching with hot showers.  Has not had a stroke.  He had pneumonia last year but none recently.  He does have some shortness of breath but no chest pain.  He is exposed to secondhand smoking.    He does not have any nausea vomiting or abdominal pain at present.  He did have gout on several locations and was placed on meloxicam.  His renal function is mildly elevated.  He denies any fever or night sweats.    Patient does have a cough productive of yellow sputum but has no fevers.    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 180 tablet 0    amLODIPine-benazepril (LOTREL) 5-40 MG per capsule TAKE ONE CAPSULE BY MOUTH DAILY 90 capsule 1    aspirin 81 MG chewable tablet Chew 1 tablet Daily.      bisoprolol (ZEBeta) 10 MG tablet TAKE ONE TABLET BY MOUTH DAILY 90 tablet 1    chlorthalidone (HYGROTEN) 50 MG tablet TAKE ONE TABLET BY MOUTH DAILY 90 tablet 1    hydrALAZINE (APRESOLINE) 50 MG tablet TAKE ONE TABLET BY MOUTH TWICE A  tablet 1    mupirocin (BACTROBAN) 2 % ointment       potassium chloride 10 MEQ CR tablet TAKE ONE TABLET BY MOUTH TWICE A  tablet 1    ruxolitinib (JAKAFI) 10 MG tablet Take 1 tablet by mouth 2 (Two) Times a Day. Take by mouth twice daily, 12 hours apart with no regards to food. 60 tablet 5    silver sulfadiazine (Silvadene) 1 % cream Apply 1 application  topically to the appropriate area as directed 2 (Two) Times a Day. 400 g 0    triamcinolone (KENALOG) 0.1 % ointment Apply 1 application  topically to the appropriate area as directed 2 (Two) Times a Day. 80 g 0    furosemide (Lasix) 20 MG tablet Take 1 tablet by mouth Daily for 5 days. 5 tablet 0     No current facility-administered medications on file prior to visit.        ALLERGIES:    Allergies   Allergen Reactions    Cephalexin Itching        Social History     Socioeconomic History    Marital status:    Tobacco Use    Smoking status: Never    Smokeless tobacco: Never   Vaping Use    Vaping Use: Never used   Substance and Sexual Activity    Alcohol use: Yes     Comment: Occasional drinker    Drug " "use: Never    Sexual activity: Yes     Partners: Female        Family History   Problem Relation Age of Onset    Hypertension Father     Hypertension Other     Heart disease Other     Cervical cancer Other       I have reviewed the patient's medical history in detail and updated the computerized patient record.    Review of Systems  ROS as per HPI  14 point review of system is negative except the erythema in the left lower extremity is improved but the ulceration is present but significantly improved.  I have reviewed and confirmed the accuracy of the ROS as documented by the MA/LPN/RN AG Wang    Objective     Vitals:    11/29/23 1307   BP: 133/85   Pulse: 63   Resp: 18   Temp: 97.8 °F (36.6 °C)   TempSrc: Temporal   SpO2: 97%   Weight: 129 kg (283 lb 11.2 oz)   Height: 188 cm (74.02\")   PainSc: 0-No pain           11/29/2023     1:08 PM   Current Status   ECOG score 0     Physical examination      CONSTITUTIONAL:  Vital signs reviewed.  No distress, looks comfortable.  EYES:  Conjunctivae and lids unremarkable.  PERRLA  EARS,NOSE,MOUTH,THROAT:  Ears and nose appear unremarkable.  Lips, teeth, gums appear unremarkable.  RESPIRATORY:  Normal respiratory effort.  Lungs clear to auscultation bilaterally.   CARDIOVASCULAR:  Normal S1, S2.  No murmurs rubs or gallops.  No significant lower extremity edema.  GASTROINTESTINAL: Abdomen appears unremarkable.  Nontender.  No hepatomegaly.  No splenomegaly.  LYMPHATIC:  No cervical, supraclavicular, axillary lymphadenopathy.  SKIN:  Warm.  No rashes.  Lower extremity ulcerations wrapped per wound care.  PSYCHIATRIC:  Normal judgment and insight.  Normal mood and affect.  I have reexamined the patient and the results are consistent with the previously documented exam. AG Wang    RECENT LABS:  Results from last 7 days   Lab Units 11/29/23  1256   WBC 10*3/mm3 13.60*   NEUTROS ABS 10*3/mm3 8.22*   HEMOGLOBIN g/dL 14.9   HEMATOCRIT % 43.9 "   PLATELETS 10*3/mm3 669*         Results from last 7 days   Lab Units 11/29/23  1256   SODIUM mmol/L 136   POTASSIUM mmol/L 3.6   CHLORIDE mmol/L 97*   CO2 mmol/L 31.4*   BUN mg/dL 18   CREATININE mg/dL 1.01   CALCIUM mg/dL 9.2   ALBUMIN g/dL 4.2   BILIRUBIN mg/dL 0.3   ALK PHOS U/L 64   ALT (SGPT) U/L 47*   AST (SGOT) U/L 37   GLUCOSE mg/dL 106*             Assessment & Plan     1.  Polycythemia with leukocytosis and thrombocytosis.  He does not have any itching with hot showers.  He does not have any headaches or DVT.  He does have blood counts worsening gradually from 2016.  7/8/2020  His platelets 974K.  Hemoglobin is 17.6 with hematocrit of 52.8 and white count of 11.56.  He denies fever night sweats but has significant weight loss greater than 50 pounds in 6 months.  He does have a cough which is productive of yellow sputum and some shortness of breath.  He has history of secondhand smoking exposure.  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  CT chest negative  Hydroxyurea 500 mg twice daily was initiated on July 16, 2020.  .  7/22/2020 Hydrea increased to 1500 mg (1000 mg in am and 500 mg in pm) Monday-Friday and 2000 mg (1000 mg in am and 1000 mg in PM) on Saturdays and Sundays  7/29/2020 patient's platelet count today is 821.  White count 7.78, hemoglobin 16.4.   Increased Hydrea at 1500 mg Monday through Friday, and 2000 mg on Saturdays and Sundays.   In future if hematocrit greater than 48 then he will receive phlebotomy.  Last phlebotomy 7/15/2020.   2/23/2022 patient continues on Hydrea 1500 mg on Monday, Wednesday, and Friday, 1500 mg all other days.  He is tolerating Hydrea well aside from some fatigue on the days he takes 1500 mg.  He also continues to receive phlebotomy for hematocrit greater than 48, hematocrit today 44.7 so patient will not receive phlebotomy.  Platelets today have increased to 575,  so we will increase Hydrea to 1500 mg four times a week, 1000 mg all other days.  Because we are increasing dose of Hydrea, patient will return in 1 month for CBC with RN review to monitor counts.  Discussed with the patient who is agreeable.  6/15/2022: Patient continues on Hydrea 1500 mg 4 days a week and 1000 mg all other days.  Platelets increased to 573,000.  Discussed with Dr. Fleming today plan to increase Hydrea to 1500 mg 5 days a week and 1000 mg 2 days a week.  He will return in 1 month for CBC with RN review to monitor counts.  Patient agreeable   September 7, 2022: Tolerating Hydrea very well.  His platelets are down to 497.  He continues with aspirin.  Given hematocrit of 44 we will hold off phlebotomy  11/30/2022 continuing on Hydrea 1500 mg 5 days a week and 1000 mg 2 days a week tolerating well.  May 24, 2023: Patient's hematocrit is 42.  But his platelet is elevated to 580.  We will increase the dose of his hydroxyurea to 1500 mg 6 days a week and 1000 mg 1 day a week  8/16/2023: Patient's hematocrit is normal at 41.9.  Platelets have normalized to 430,000.  Hemoglobin 14.9 and WBC 6.29.  Hydrea dosing will remain the same at 1500 mg 6 days a week and 100 mg once weekly.  He continues to tolerate this treatment well. Mild elevation was noted with his liver enzymes.  Historically he has had mild elevations when he was taking pain medications.  Today his AST is 42 and ALT is 42.  Total bilirubin is normal at 0.7 and alkaline phosphatase is normal at 72.  Does note that he takes occasional Tylenol for his arthritis.  He denies drinking any alcohol.  We will have him return in 1 month to monitor his labs.  November 15, 2023: Patient has new ulcerations in the right lower extremity for the last 2 3 months which are not healing.  On discussion with wound care they felt this is more likely hydroxyurea related rather than venous disease.  He has never been a smoker and so we discussed about switching to  Jakafi.  He has had splenomegaly in the past but we will need to obtain ultrasound of the abdomen stat in order to rule out worsening splenomegaly.  11/29/2023: Patient is currently 3 days into Jakafi 10 mg p.o. twice daily.  He is currently tolerating his medication well and denies any side effects.  His lower extremity ulcerations secondary to Hydrea continue to improve and patient is being followed closely by the wound care center at [facility].  Lower extremities are currently dressed today.  Platelet count today is more elevated at 669,000.  Mild leukocytosis with a white blood cell count of 13.60.  Patient did just complete a course of steroids but is most likely the cause.  ALT mildly elevated today at 47.  AST and total bilirubin are normal.  We will have patient repeat labs in 1 week for close monitoring we will follow back up with Dr. Fleming in 2 weeks.      2.  Weight loss greater than 50 pounds at time of initial evaluation.  CT chest abdomen pelvis negative for malignancy  Patient admitting that his weight loss was actually been more intentional   Weight now stable    3. Hypokalemia, mild, chronic.  8/2/2021: Potassium 3.3. We discussed utilizing electrolyte drinks when he is working outside/sweating to help replenish what he is losing.  He does continue potassium chloride once daily as well.  9/28/2021: Potassium stable at 3.3. Continue Klor-con 20 mEq daily.  2/23/2022 potassium today 4.0, patient will continue on potassium 20 mEq daily.  6/15/2022: Currently taking 10 mEq daily, potassium 3.8.  11/30/2022 potassium 3.2, he will increase his potassium to 20 M EQ daily.  8/16/2023: Potassium 3.5.  Patient remains on potassium 20 mill equivalents daily.  11/29/2023: Potassium is normal at 3.6.    5.  History of knee surgery.  Patient is to go for knee surgery in 3 weeks  Discussed with Dr. Chad Oliveros and he will start him on 2.5 mg p.o. twice daily of Eliquis 24 hours after surgery.       6.  Skin lesions on the lower extremity with delayed healing, will consult with [facility]  11/30/2022 patient states that he did not see dermatologist as a skin lesions began to heal.  He continues to use vitamin D on this area.  March 1, 2023: The skin ulceration on the left lower extremity is healing up but not completely healed up  Unsure if this is just secondary to chronic venous stasis as opposed to hydroxyurea.  However usually the hydroxyurea ulcerations on the medial malleolus and this is more superior  8/16/2023: No skin lesions noted today.  11/29/2023: Lower extremity ulcerations are healing well and he continues to be followed by [facility] wound care center.       Plan:   Continue Jakafi 10 mg p.o. twice daily   Repeat CBC and CMP in 1 week at [facility].  Results are to be faxed to Dr. Fleming for review.    Continue to follow with the wound care center   Return in 2 weeks for follow-up with Dr. Fleming and repeat labs.      Patient is on a high risk medication requiring close monitoring for toxicity.    Plan of care reviewed with Dr. Fleming today.      Brit Camejo, APRN  5/24/23

## 2023-11-29 ENCOUNTER — LAB (OUTPATIENT)
Dept: OTHER | Facility: HOSPITAL | Age: 63
End: 2023-11-29
Payer: COMMERCIAL

## 2023-11-29 ENCOUNTER — APPOINTMENT (OUTPATIENT)
Dept: ONCOLOGY | Facility: HOSPITAL | Age: 63
End: 2023-11-29
Payer: COMMERCIAL

## 2023-11-29 ENCOUNTER — OFFICE VISIT (OUTPATIENT)
Dept: ONCOLOGY | Facility: CLINIC | Age: 63
End: 2023-11-29
Payer: COMMERCIAL

## 2023-11-29 VITALS
TEMPERATURE: 97.8 F | DIASTOLIC BLOOD PRESSURE: 85 MMHG | HEIGHT: 74 IN | OXYGEN SATURATION: 97 % | HEART RATE: 63 BPM | SYSTOLIC BLOOD PRESSURE: 133 MMHG | BODY MASS INDEX: 36.41 KG/M2 | RESPIRATION RATE: 18 BRPM | WEIGHT: 283.7 LBS

## 2023-11-29 DIAGNOSIS — D45 POLYCYTHEMIA VERA: ICD-10-CM

## 2023-11-29 DIAGNOSIS — R74.8 ABNORMAL LIVER ENZYMES: ICD-10-CM

## 2023-11-29 DIAGNOSIS — Z79.899 HIGH RISK MEDICATION USE: ICD-10-CM

## 2023-11-29 DIAGNOSIS — D45 POLYCYTHEMIA VERA: Primary | ICD-10-CM

## 2023-11-29 LAB
ALBUMIN SERPL-MCNC: 4.2 G/DL (ref 3.5–5.2)
ALBUMIN/GLOB SERPL: 1.3 G/DL
ALP SERPL-CCNC: 64 U/L (ref 39–117)
ALT SERPL W P-5'-P-CCNC: 47 U/L (ref 1–41)
ANION GAP SERPL CALCULATED.3IONS-SCNC: 7.6 MMOL/L (ref 5–15)
AST SERPL-CCNC: 37 U/L (ref 1–40)
BASOPHILS # BLD AUTO: 0.23 10*3/MM3 (ref 0–0.2)
BASOPHILS NFR BLD AUTO: 1.7 % (ref 0–1.5)
BILIRUB SERPL-MCNC: 0.3 MG/DL (ref 0–1.2)
BUN SERPL-MCNC: 18 MG/DL (ref 8–23)
BUN/CREAT SERPL: 17.8 (ref 7–25)
CALCIUM SPEC-SCNC: 9.2 MG/DL (ref 8.6–10.5)
CHLORIDE SERPL-SCNC: 97 MMOL/L (ref 98–107)
CO2 SERPL-SCNC: 31.4 MMOL/L (ref 22–29)
CREAT SERPL-MCNC: 1.01 MG/DL (ref 0.76–1.27)
DEPRECATED RDW RBC AUTO: 48.3 FL (ref 37–54)
EGFRCR SERPLBLD CKD-EPI 2021: 83.6 ML/MIN/1.73
EOSINOPHIL # BLD AUTO: 0.31 10*3/MM3 (ref 0–0.4)
EOSINOPHIL NFR BLD AUTO: 2.3 % (ref 0.3–6.2)
ERYTHROCYTE [DISTWIDTH] IN BLOOD BY AUTOMATED COUNT: 11.8 % (ref 12.3–15.4)
GLOBULIN UR ELPH-MCNC: 3.2 GM/DL
GLUCOSE SERPL-MCNC: 106 MG/DL (ref 65–99)
HCT VFR BLD AUTO: 43.9 % (ref 37.5–51)
HGB BLD-MCNC: 14.9 G/DL (ref 13–17.7)
IMM GRANULOCYTES # BLD AUTO: 0.26 10*3/MM3 (ref 0–0.05)
IMM GRANULOCYTES NFR BLD AUTO: 1.9 % (ref 0–0.5)
LYMPHOCYTES # BLD AUTO: 3.22 10*3/MM3 (ref 0.7–3.1)
LYMPHOCYTES NFR BLD AUTO: 23.7 % (ref 19.6–45.3)
MCH RBC QN AUTO: 38.1 PG (ref 26.6–33)
MCHC RBC AUTO-ENTMCNC: 33.9 G/DL (ref 31.5–35.7)
MCV RBC AUTO: 112.3 FL (ref 79–97)
MONOCYTES # BLD AUTO: 1.36 10*3/MM3 (ref 0.1–0.9)
MONOCYTES NFR BLD AUTO: 10 % (ref 5–12)
NEUTROPHILS NFR BLD AUTO: 60.4 % (ref 42.7–76)
NEUTROPHILS NFR BLD AUTO: 8.22 10*3/MM3 (ref 1.7–7)
NRBC BLD AUTO-RTO: 0 /100 WBC (ref 0–0.2)
PLAT MORPH BLD: NORMAL
PLATELET # BLD AUTO: 669 10*3/MM3 (ref 140–450)
PMV BLD AUTO: 9.7 FL (ref 6–12)
POTASSIUM SERPL-SCNC: 3.6 MMOL/L (ref 3.5–5.2)
PROT SERPL-MCNC: 7.4 G/DL (ref 6–8.5)
RBC # BLD AUTO: 3.91 10*6/MM3 (ref 4.14–5.8)
SODIUM SERPL-SCNC: 136 MMOL/L (ref 136–145)
STOMATOCYTES BLD QL SMEAR: NORMAL
WBC MORPH BLD: NORMAL
WBC NRBC COR # BLD AUTO: 13.6 10*3/MM3 (ref 3.4–10.8)

## 2023-11-29 PROCEDURE — 85025 COMPLETE CBC W/AUTO DIFF WBC: CPT | Performed by: NURSE PRACTITIONER

## 2023-11-29 PROCEDURE — 85007 BL SMEAR W/DIFF WBC COUNT: CPT | Performed by: NURSE PRACTITIONER

## 2023-11-29 PROCEDURE — 36415 COLL VENOUS BLD VENIPUNCTURE: CPT

## 2023-11-29 PROCEDURE — 80053 COMPREHEN METABOLIC PANEL: CPT | Performed by: NURSE PRACTITIONER

## 2023-11-30 ENCOUNTER — SPECIALTY PHARMACY (OUTPATIENT)
Dept: PHARMACY | Facility: HOSPITAL | Age: 63
End: 2023-11-30
Payer: COMMERCIAL

## 2023-11-30 ENCOUNTER — OFFICE VISIT (OUTPATIENT)
Dept: WOUND CARE | Facility: HOSPITAL | Age: 63
End: 2023-11-30
Payer: COMMERCIAL

## 2023-11-30 VITALS
SYSTOLIC BLOOD PRESSURE: 138 MMHG | TEMPERATURE: 97.8 F | RESPIRATION RATE: 18 BRPM | HEART RATE: 60 BPM | DIASTOLIC BLOOD PRESSURE: 82 MMHG

## 2023-11-30 DIAGNOSIS — D45 POLYCYTHEMIA VERA: ICD-10-CM

## 2023-11-30 DIAGNOSIS — S81.801D OPEN WOUND OF RIGHT LOWER LEG, SUBSEQUENT ENCOUNTER: Primary | ICD-10-CM

## 2023-11-30 PROCEDURE — G0463 HOSPITAL OUTPT CLINIC VISIT: HCPCS | Performed by: PHYSICIAN ASSISTANT

## 2023-11-30 NOTE — PROGRESS NOTES
Chief Complaint  Wound Check (Follow up for wound to E.  Patient is not diabetic.  Patient does have Polycythemia Vera.  Patient has been using aquacel ag and ace wrap to E.)    Subjective      History of Present Illness    Phani Landis  is a 63 y.o. male here today with chronic ulcers to his right lower extremity.  He notes that he has had several ulcers on his left lower extremity as well but they have recently healed.  He notes that he was previously prescribed Silvadene to be applied to wound daily.  He notes that he is unable to tolerate Silvadene due to significant sensitivity to it.  He notes that his most inferior wound on his right lower leg is very sensitive to open air and touch.  He was recently on a chemo medication (hydroxyurea) for polycythemia vera. This could have been causing his wounds. He recently saw his oncologist and was switched to jakifi.    He notes that he is not a diabetic and is not a smoker.     He notes that his rash on right leg, burning to right leg, and drainage from right leg has improved since last appointment. His wounds have also improved.  He notes that applying Aquaphor to his wounds does give some comfort. He has been keeping wounds covered. We recently prescribed prednisone and doxycyline for his non-healing wounds to right lower leg which seemed to improve his symptoms. He continues to wrap his legs daily and apply Aquacel Ag to wounds.  He tries to keep leg elevated when possible.     Allergies:  Cephalexin      Current Outpatient Medications:     allopurinol (ZYLOPRIM) 100 MG tablet, Take 1 tablet by mouth 2 (Two) Times a Day., Disp: 180 tablet, Rfl: 0    amLODIPine-benazepril (LOTREL) 5-40 MG per capsule, TAKE ONE CAPSULE BY MOUTH DAILY, Disp: 90 capsule, Rfl: 1    aspirin 81 MG chewable tablet, Chew 1 tablet Daily., Disp: , Rfl:     bisoprolol (ZEBeta) 10 MG tablet, TAKE ONE TABLET BY MOUTH DAILY, Disp: 90 tablet, Rfl: 1    chlorthalidone (HYGROTEN) 50 MG tablet,  "TAKE ONE TABLET BY MOUTH DAILY, Disp: 90 tablet, Rfl: 1    furosemide (Lasix) 20 MG tablet, Take 1 tablet by mouth Daily for 5 days., Disp: 5 tablet, Rfl: 0    hydrALAZINE (APRESOLINE) 50 MG tablet, TAKE ONE TABLET BY MOUTH TWICE A DAY, Disp: 180 tablet, Rfl: 1    mupirocin (BACTROBAN) 2 % ointment, , Disp: , Rfl:     potassium chloride 10 MEQ CR tablet, TAKE ONE TABLET BY MOUTH TWICE A DAY, Disp: 180 tablet, Rfl: 1    ruxolitinib (JAKAFI) 10 MG tablet, Take 1 tablet by mouth 2 (Two) Times a Day. Take by mouth twice daily, 12 hours apart with no regards to food., Disp: 60 tablet, Rfl: 5    silver sulfadiazine (Silvadene) 1 % cream, Apply 1 application  topically to the appropriate area as directed 2 (Two) Times a Day. (Patient not taking: Reported on 11/30/2023), Disp: 400 g, Rfl: 0    triamcinolone (KENALOG) 0.1 % ointment, Apply 1 application  topically to the appropriate area as directed 2 (Two) Times a Day. (Patient not taking: Reported on 11/30/2023), Disp: 80 g, Rfl: 0    Past Medical History:   Diagnosis Date    Anxiety     Arthritis     Cancer June 2020    Polycythemia vera    Hypertension      Past Surgical History:   Procedure Laterality Date    CHEST SURGERY      OTHER SURGICAL HISTORY  1985    \"Exploratory\"     Social History     Socioeconomic History    Marital status:    Tobacco Use    Smoking status: Never    Smokeless tobacco: Never   Vaping Use    Vaping Use: Never used   Substance and Sexual Activity    Alcohol use: Yes     Comment: Occasional drinker    Drug use: Never    Sexual activity: Yes     Partners: Female     Objective     Vitals:    11/30/23 1508   BP: 138/82   BP Location: Left arm   Patient Position: Sitting   Cuff Size: Adult   Pulse: 60   Resp: 18  Comment: 97% room air   Temp: 97.8 °F (36.6 °C)   TempSrc: Temporal   PainSc: 0-No pain     Review of Systems     ROS:  No fevers, chills, sweats, or body aches.     I have reviewed the HPI and ROS as documented by MA/EAMON Ramires" Zainab Bailey PA-C    Physical Exam     NAD  Normocephalic, atraumatic  EOMI, no scleral icterus  No respiratory distress, no cough  AAOx3, pleasant, cooperative    Right lower extremity: Patient has microvascular changes to the skin in his bilateral legs up to his knees.  This is blanchable.  The patient has 3 ulcers to his right lower extremity located on his anterior shin and lateral lower leg area.  There is moderate to significant slough in the base of these wounds.  Redness and swelling has improved since last appointment.  The anterior shin wound is circular in appearance and the superior of the 2 lateral leg wounds is circular in appearance, however the inferior/lateral right lower leg wound is irregular in shape.  The wounds appear to be in different stages of healing. TTP improved.     Patient had strong palpable dorsalis pedis and posterior tibial pulses.  1+ pitting edema in right leg today.    See photos for details:    Right lower extremity:              Result Review :  The following data was reviewed by: Keyla Bailey PA-C on 11/30/2023:    Prior notes and images.         Assessment and Plan   Diagnoses and all orders for this visit:    1. Open wound of right lower leg, subsequent encounter (Primary)    2. Polycythemia vera      The patient has several nonhealing wounds to his right lower leg and microvascular changes. They are improving.  He recently weaned off chemotherapy medication (hydrea) to treat polycythemia vera. He recently started Jakifi.  Recently I have reached out to his oncologist, Dr. Fleming, and discussed the option of an oral steroid to possibly treat the microvascular changes and to heal the right lower leg ulcers. She notes that a steroid is not contraindicated. We recently prescribed prednisone and doxycyline for his non-healing wounds to right lower leg which seemed to improve his symptoms. We will continue to have him wrap his legs daily and apply Aquacel Ag to  wounds.  He should continue to prop his legs and elevate when possible. He will followup with us in 2 weeks. He should continue to followup with his oncologist and PCP.     Patient was given instructions and counseling regarding their condition or for health maintenance advice, as well as the wound care plan and recommendations. They understand and agree with the plan.  They will follow back up here in the clinic but are instructed to contact us in the interim should they have any new, returning, or worsening symptoms or concerns. Please see specific information pulled into the AVS if appropriate.     Dragon Dictation utilized for chart completion.    Follow Up   Return in about 2 weeks (around 12/14/2023) for Recheck.      Keyla Bailey PA-C

## 2023-12-06 ENCOUNTER — SPECIALTY PHARMACY (OUTPATIENT)
Dept: PHARMACY | Facility: HOSPITAL | Age: 63
End: 2023-12-06
Payer: COMMERCIAL

## 2023-12-06 RX ORDER — POTASSIUM CHLORIDE 750 MG/1
10 TABLET, FILM COATED, EXTENDED RELEASE ORAL 2 TIMES DAILY
Qty: 180 TABLET | Refills: 1 | Status: SHIPPED | OUTPATIENT
Start: 2023-12-06

## 2023-12-12 ENCOUNTER — OFFICE VISIT (OUTPATIENT)
Dept: ONCOLOGY | Facility: CLINIC | Age: 63
End: 2023-12-12
Payer: COMMERCIAL

## 2023-12-12 ENCOUNTER — LAB (OUTPATIENT)
Dept: LAB | Facility: HOSPITAL | Age: 63
End: 2023-12-12
Payer: COMMERCIAL

## 2023-12-12 VITALS
BODY MASS INDEX: 36.19 KG/M2 | HEIGHT: 74 IN | DIASTOLIC BLOOD PRESSURE: 77 MMHG | WEIGHT: 282 LBS | HEART RATE: 57 BPM | SYSTOLIC BLOOD PRESSURE: 118 MMHG | OXYGEN SATURATION: 96 % | RESPIRATION RATE: 18 BRPM | TEMPERATURE: 98.2 F

## 2023-12-12 DIAGNOSIS — D45 POLYCYTHEMIA VERA: Primary | ICD-10-CM

## 2023-12-12 DIAGNOSIS — D45 POLYCYTHEMIA VERA: ICD-10-CM

## 2023-12-12 LAB
ALBUMIN SERPL-MCNC: 4.2 G/DL (ref 3.5–5.2)
ALBUMIN/GLOB SERPL: 1.4 G/DL
ALP SERPL-CCNC: 56 U/L (ref 39–117)
ALT SERPL W P-5'-P-CCNC: 54 U/L (ref 1–41)
ANION GAP SERPL CALCULATED.3IONS-SCNC: 9.1 MMOL/L (ref 5–15)
AST SERPL-CCNC: 43 U/L (ref 1–40)
BASOPHILS # BLD AUTO: 0.1 10*3/MM3 (ref 0–0.2)
BASOPHILS NFR BLD AUTO: 1.2 % (ref 0–1.5)
BILIRUB SERPL-MCNC: 0.5 MG/DL (ref 0–1.2)
BUN SERPL-MCNC: 13 MG/DL (ref 8–23)
BUN/CREAT SERPL: 12.7 (ref 7–25)
CALCIUM SPEC-SCNC: 9.3 MG/DL (ref 8.6–10.5)
CHLORIDE SERPL-SCNC: 95 MMOL/L (ref 98–107)
CO2 SERPL-SCNC: 28.9 MMOL/L (ref 22–29)
CREAT SERPL-MCNC: 1.02 MG/DL (ref 0.76–1.27)
DEPRECATED RDW RBC AUTO: 45.9 FL (ref 37–54)
EGFRCR SERPLBLD CKD-EPI 2021: 82.6 ML/MIN/1.73
EOSINOPHIL # BLD AUTO: 0.21 10*3/MM3 (ref 0–0.4)
EOSINOPHIL NFR BLD AUTO: 2.5 % (ref 0.3–6.2)
ERYTHROCYTE [DISTWIDTH] IN BLOOD BY AUTOMATED COUNT: 11.7 % (ref 12.3–15.4)
GLOBULIN UR ELPH-MCNC: 3 GM/DL
GLUCOSE SERPL-MCNC: 107 MG/DL (ref 65–99)
HCT VFR BLD AUTO: 41.6 % (ref 37.5–51)
HGB BLD-MCNC: 14.8 G/DL (ref 13–17.7)
IMM GRANULOCYTES # BLD AUTO: 0.04 10*3/MM3 (ref 0–0.05)
IMM GRANULOCYTES NFR BLD AUTO: 0.5 % (ref 0–0.5)
LYMPHOCYTES # BLD AUTO: 1.89 10*3/MM3 (ref 0.7–3.1)
LYMPHOCYTES NFR BLD AUTO: 22.9 % (ref 19.6–45.3)
MCH RBC QN AUTO: 37.9 PG (ref 26.6–33)
MCHC RBC AUTO-ENTMCNC: 35.6 G/DL (ref 31.5–35.7)
MCV RBC AUTO: 106.7 FL (ref 79–97)
MONOCYTES # BLD AUTO: 0.77 10*3/MM3 (ref 0.1–0.9)
MONOCYTES NFR BLD AUTO: 9.3 % (ref 5–12)
NEUTROPHILS NFR BLD AUTO: 5.23 10*3/MM3 (ref 1.7–7)
NEUTROPHILS NFR BLD AUTO: 63.6 % (ref 42.7–76)
NRBC BLD AUTO-RTO: 0 /100 WBC (ref 0–0.2)
PLATELET # BLD AUTO: 547 10*3/MM3 (ref 140–450)
PMV BLD AUTO: 9.9 FL (ref 6–12)
POTASSIUM SERPL-SCNC: 3.4 MMOL/L (ref 3.5–5.2)
PROT SERPL-MCNC: 7.2 G/DL (ref 6–8.5)
RBC # BLD AUTO: 3.9 10*6/MM3 (ref 4.14–5.8)
SODIUM SERPL-SCNC: 133 MMOL/L (ref 136–145)
WBC NRBC COR # BLD AUTO: 8.24 10*3/MM3 (ref 3.4–10.8)

## 2023-12-12 PROCEDURE — 80053 COMPREHEN METABOLIC PANEL: CPT

## 2023-12-12 PROCEDURE — 85025 COMPLETE CBC W/AUTO DIFF WBC: CPT

## 2023-12-12 PROCEDURE — 99214 OFFICE O/P EST MOD 30 MIN: CPT | Performed by: INTERNAL MEDICINE

## 2023-12-13 ENCOUNTER — SPECIALTY PHARMACY (OUTPATIENT)
Dept: PHARMACY | Facility: HOSPITAL | Age: 63
End: 2023-12-13
Payer: COMMERCIAL

## 2023-12-13 NOTE — PROGRESS NOTES
Subjective     REASON FOR FOLLOW UP:  1.  Polycythemia vera, polycythemia and thrombocytosis and leukocytosis, EPO level 1.3, Tino 2+    HISTORY OF PRESENT ILLNESS:  The patient is a 63 y.o. year old male who is here for an opinion about the above issue.    History of Present Illness   Phani Landis is a 63 y.o. male with the above-mentioned history who is here today for lab review and follow-up continuing on Hydrea 1500 mg 5 days a week and 1000 mg 2 days a week.  He states he typically takes the 1000 mg dose on the weekends.  He is tolerating this well without any significant side effects.      Patient was previously referred to dermatology due to some issues with skin lesions on his lower extremity that were delayed healing however the patient states that he did not end up keeping that appointment as the lesions have improved.  He has been using vitamin E oil.        Patient is 62-year-old with polycythemia vera.  He is currently taking hydroxyurea 1500 mg 6 days a week and 100 mg 1 day a week.  He continues to tolerate treatment well.  He denies any side effects such as fatigue, constipation, diarrhea, nausea, decreased appetite, headache, or skin ulcers.  Hemoglobin today is 14.9 and platelets are 430,000.  White blood cell count remains normal at 6.29.      November 15, 2023: Patient has 3 ulcerations on the right lower extremity.  He has been followed by wound care nurse.  I discussed with the wound care nurse and they do believe this is hydroxyurea related ulceration.  He does not smoke and has never smoked in the past.  He had good pulses peripherally.  They do not think this is accounting for underlying venous ulcers but more than likely hydroxyurea associated ulcerations.  We discussed about switching patient from hydroxyurea to Jakafi.  In the past he has had splenomegaly with the spleen being 13.5 cm.    Given that he is not tolerating hydroxyurea it is likely best to switch him to Jakafi.  Today have  discussed in length the side effects of Jakafi.    Interval history:  Patient returns to the office today for follow-up in regards to his polycythemia vera.  He was previously on Hydrea however this has been discontinued related to intolerance and lower extremity ulcerations.  He was started on Jakafi 10 mg p.o. twice daily.  He has only been on Jakafi for 3 days now and reports he is tolerating it well without any side effects.  He denies any fatigue, constipation, diarrhea, nausea, decreased appetite, or headaches.  He continues to be followed by the wound care center at [facility] and reports his ulcerations are healing up well.  He did just complete a course of doxycycline and prednisone.  No other concerns noted at this time.    December 12, 2023: Ultrasound of the abdomen showed hepatosplenomegaly.  Spleen is 14 8.8 cm in size.  Currently patient is on Jakafi and appears to be better controlled with platelets improved to 537 from 621.  Patient has made appointment with Dr. Maguire medical oncologist at [facility].  So far tolerating Jakafi very well.  Also the ulcers on the lower extremity right greater than left but they are very much improved since hydroxyurea has been discontinued      Hematological oncologic history:  Patient is a 59-year-old gentleman who has been referred by his primary care Castillo Velasco for evaluation of thrombocytosis and polycythemia.  He has lost more than 50 pounds of weight.  In the last 6 months.  Looking back his blood counts have been high starting in 2016.  Initially his platelets were high around 670 and gradually increased in the 900 range.  More recently his hemoglobin has been increasing and his white count has been increasing.  He has not had a DVT or pulmonary embolism.  He has had no issues with itching with hot showers.  Has not had a stroke.  He had pneumonia last year but none recently.  He does have some shortness of breath but no chest pain.  He is  "exposed to secondhand smoking.    He does not have any nausea vomiting or abdominal pain at present.  He did have gout on several locations and was placed on meloxicam.  His renal function is mildly elevated.  He denies any fever or night sweats.    Patient does have a cough productive of yellow sputum but has no fevers.    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 180 tablet 0    amLODIPine-benazepril (LOTREL) 5-40 MG per capsule TAKE ONE CAPSULE BY MOUTH DAILY 90 capsule 1    aspirin 81 MG chewable tablet Chew 1 tablet Daily.      bisoprolol (ZEBeta) 10 MG tablet TAKE ONE TABLET BY MOUTH DAILY 90 tablet 1    chlorthalidone (HYGROTEN) 50 MG tablet TAKE ONE TABLET BY MOUTH DAILY 90 tablet 1    hydrALAZINE (APRESOLINE) 50 MG tablet TAKE ONE TABLET BY MOUTH TWICE A  tablet 1    mupirocin (BACTROBAN) 2 % ointment       potassium chloride 10 MEQ CR tablet TAKE 1 TABLET BY MOUTH TWICE A  tablet 1    ruxolitinib (JAKAFI) 10 MG tablet Take 1 tablet by mouth 2 (Two) Times a Day. Take by mouth twice daily, 12 hours apart with no regards to food. 60 tablet 5    silver sulfadiazine (Silvadene) 1 % cream Apply 1 application  topically to the appropriate area as directed 2 (Two) Times a Day. 400 g 0    triamcinolone (KENALOG) 0.1 % ointment Apply 1 application  topically to the appropriate area as directed 2 (Two) Times a Day. 80 g 0    [DISCONTINUED] furosemide (Lasix) 20 MG " "tablet Take 1 tablet by mouth Daily for 5 days. 5 tablet 0     No current facility-administered medications on file prior to visit.        ALLERGIES:    Allergies   Allergen Reactions    Cephalexin Itching        Social History     Socioeconomic History    Marital status:    Tobacco Use    Smoking status: Never    Smokeless tobacco: Never   Vaping Use    Vaping Use: Never used   Substance and Sexual Activity    Alcohol use: Yes     Comment: Occasional drinker    Drug use: Never    Sexual activity: Yes     Partners: Female        Family History   Problem Relation Age of Onset    Hypertension Father     Hypertension Other     Heart disease Other     Cervical cancer Other       I have reviewed the patient's medical history in detail and updated the computerized patient record.    Review of Systems  ROS as per HPI  14 point review of system is negative except the erythema in the left lower extremity is improved but the ulceration is present but significantly improved.  I have reviewed and confirmed the accuracy of the ROS as documented by the MA/LPN/RN Eliana Fleming MD    Objective     Vitals:    12/12/23 1115   BP: 118/77   Pulse: 57   Resp: 18   Temp: 98.2 °F (36.8 °C)   TempSrc: Temporal   SpO2: 96%   Weight: 128 kg (282 lb)   Height: 188 cm (74.02\")   PainSc: 0-No pain           12/12/2023    11:05 AM   Current Status   ECOG score 0     Physical examination      CONSTITUTIONAL:  Vital signs reviewed.  No distress, looks comfortable.  EYES:  Conjunctivae and lids unremarkable.  PERRLA  EARS,NOSE,MOUTH,THROAT:  Ears and nose appear unremarkable.  Lips, teeth, gums appear unremarkable.  RESPIRATORY:  Normal respiratory effort.  Lungs clear to auscultation bilaterally.   CARDIOVASCULAR:  Normal S1, S2.  No murmurs rubs or gallops.  No significant lower extremity edema.  GASTROINTESTINAL: Abdomen appears unremarkable.  Nontender.  No hepatomegaly.  No splenomegaly.  LYMPHATIC:  No cervical, supraclavicular, " axillary lymphadenopathy.  SKIN:  Warm.  No rashes.  Lower extremity ulcerations wrapped per wound care.  PSYCHIATRIC:  Normal judgment and insight.  Normal mood and affect.  I have reexamined the patient and the results are consistent with the previously documented exam. Eliana Fleming MD    RECENT LABS:  Results from last 7 days   Lab Units 12/12/23  1107   WBC 10*3/mm3 8.24   NEUTROS ABS 10*3/mm3 5.23   HEMOGLOBIN g/dL 14.8   HEMATOCRIT % 41.6   PLATELETS 10*3/mm3 547*         Results from last 7 days   Lab Units 12/12/23  1107   SODIUM mmol/L 133*   POTASSIUM mmol/L 3.4*   CHLORIDE mmol/L 95*   CO2 mmol/L 28.9   BUN mg/dL 13   CREATININE mg/dL 1.02   CALCIUM mg/dL 9.3   ALBUMIN g/dL 4.2   BILIRUBIN mg/dL 0.5   ALK PHOS U/L 56   ALT (SGPT) U/L 54*   AST (SGOT) U/L 43*   GLUCOSE mg/dL 107*             Assessment & Plan     1.  Polycythemia with leukocytosis and thrombocytosis.  He does not have any itching with hot showers.  He does not have any headaches or DVT.  He does have blood counts worsening gradually from 2016.  7/8/2020  His platelets 974K.  Hemoglobin is 17.6 with hematocrit of 52.8 and white count of 11.56.  He denies fever night sweats but has significant weight loss greater than 50 pounds in 6 months.  He does have a cough which is productive of yellow sputum and some shortness of breath.  He has history of secondhand smoking exposure.  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  CT chest negative  Hydroxyurea 500 mg twice daily was initiated on July 16, 2020.  .  7/22/2020 Hydrea increased to 1500 mg (1000 mg in am and 500 mg in pm) Monday-Friday and 2000 mg (1000 mg in am and 1000 mg in PM) on Saturdays and Sundays  7/29/2020 patient's platelet count today is 821.  White count 7.78, hemoglobin 16.4.   Increased Hydrea at 1500 mg Monday through Friday, and 2000 mg on Saturdays and Sundays.   In future if  hematocrit greater than 48 then he will receive phlebotomy.  Last phlebotomy 7/15/2020.   2/23/2022 patient continues on Hydrea 1500 mg on Monday, Wednesday, and Friday, 1500 mg all other days.  He is tolerating Hydrea well aside from some fatigue on the days he takes 1500 mg.  He also continues to receive phlebotomy for hematocrit greater than 48, hematocrit today 44.7 so patient will not receive phlebotomy.  Platelets today have increased to 575, so we will increase Hydrea to 1500 mg four times a week, 1000 mg all other days.  Because we are increasing dose of Hydrea, patient will return in 1 month for CBC with RN review to monitor counts.  Discussed with the patient who is agreeable.  6/15/2022: Patient continues on Hydrea 1500 mg 4 days a week and 1000 mg all other days.  Platelets increased to 573,000.  Discussed with Dr. Fleming today plan to increase Hydrea to 1500 mg 5 days a week and 1000 mg 2 days a week.  He will return in 1 month for CBC with RN review to monitor counts.  Patient agreeable   September 7, 2022: Tolerating Hydrea very well.  His platelets are down to 497.  He continues with aspirin.  Given hematocrit of 44 we will hold off phlebotomy  11/30/2022 continuing on Hydrea 1500 mg 5 days a week and 1000 mg 2 days a week tolerating well.  May 24, 2023: Patient's hematocrit is 42.  But his platelet is elevated to 580.  We will increase the dose of his hydroxyurea to 1500 mg 6 days a week and 1000 mg 1 day a week  8/16/2023: Patient's hematocrit is normal at 41.9.  Platelets have normalized to 430,000.  Hemoglobin 14.9 and WBC 6.29.  Hydrea dosing will remain the same at 1500 mg 6 days a week and 100 mg once weekly.  He continues to tolerate this treatment well. Mild elevation was noted with his liver enzymes.  Historically he has had mild elevations when he was taking pain medications.  Today his AST is 42 and ALT is 42.  Total bilirubin is normal at 0.7 and alkaline phosphatase is normal at 72.   Does note that he takes occasional Tylenol for his arthritis.  He denies drinking any alcohol.  We will have him return in 1 month to monitor his labs.  November 15, 2023: Patient has new ulcerations in the right lower extremity for the last 2 3 months which are not healing.  On discussion with wound care they felt this is more likely hydroxyurea related rather than venous disease.  He has never been a smoker and so we discussed about switching to Jakafi.  He has had splenomegaly in the past but we will need to obtain ultrasound of the abdomen stat in order to rule out worsening splenomegaly.  11/29/2023: Patient is currently 3 days into Jakafi 10 mg p.o. twice daily.  He is currently tolerating his medication well and denies any side effects.  His lower extremity ulcerations secondary to Hydrea continue to improve and patient is being followed closely by the wound care center at [facility].  Lower extremities are currently dressed today.  Platelet count today is more elevated at 669,000.  Mild leukocytosis with a white blood cell count of 13.60.  Patient did just complete a course of steroids but is most likely the cause.  ALT mildly elevated today at 47.  AST and total bilirubin are normal.  We will have patient repeat labs in 1 week for close monitoring we will follow back up with Dr. Fleming in 2 weeks.      2.  Weight loss greater than 50 pounds at time of initial evaluation.  CT chest abdomen pelvis negative for malignancy  Patient admitting that his weight loss was actually been more intentional   Weight now stable    3. Hypokalemia, mild, chronic.  8/2/2021: Potassium 3.3. We discussed utilizing electrolyte drinks when he is working outside/sweating to help replenish what he is losing.  He does continue potassium chloride once daily as well.  9/28/2021: Potassium stable at 3.3. Continue Klor-con 20 mEq daily.  2/23/2022 potassium today 4.0, patient will continue on potassium 20 mEq  daily.  6/15/2022: Currently taking 10 mEq daily, potassium 3.8.  11/30/2022 potassium 3.2, he will increase his potassium to 20 M EQ daily.  8/16/2023: Potassium 3.5.  Patient remains on potassium 20 mill equivalents daily.  11/29/2023: Potassium is normal at 3.6.    5.  History of knee surgery.  Patient is to go for knee surgery in 3 weeks  Discussed with Dr. Chad Oliveros and he will start him on 2.5 mg p.o. twice daily of Eliquis 24 hours after surgery.      6.  Skin lesions on the lower extremity with delayed healing, will consult with [facility]  11/30/2022 patient states that he did not see dermatologist as a skin lesions began to heal.  He continues to use vitamin D on this area.  March 1, 2023: The skin ulceration on the left lower extremity is healing up but not completely healed up  Unsure if this is just secondary to chronic venous stasis as opposed to hydroxyurea.  However usually the hydroxyurea ulcerations on the medial malleolus and this is more superior  8/16/2023: No skin lesions noted today.  11/29/2023: Lower extremity ulcerations are healing well and he continues to be followed by [facility] wound care center.       Plan:   Continue Jakafi 10 mg p.o. twice daily   Repeat CBC and CMP in 1 week at [facility].  Results are to be faxed to Dr. Fleming for review.    Continue to follow with the wound care center   Return in 2 weeks for follow-up with Dr. Fleming and repeat labs.    No plans on any further follow-up as patient will follow-up with Dr. Carrasco at [facility] in [place] as it is closer to him    Patient is on a high risk medication requiring close monitoring for toxicity.  Patient is tolerating Jakafi.  Liver function test mildly elevated.  Since patient is seeing Dr. Garcia, liver function test can be done at that time      Eliana Fleming MD  5/24/23

## 2023-12-14 ENCOUNTER — TELEPHONE (OUTPATIENT)
Dept: ONCOLOGY | Facility: HOSPITAL | Age: 63
End: 2023-12-14
Payer: COMMERCIAL

## 2023-12-14 ENCOUNTER — OFFICE VISIT (OUTPATIENT)
Dept: WOUND CARE | Facility: HOSPITAL | Age: 63
End: 2023-12-14
Payer: COMMERCIAL

## 2023-12-14 VITALS
TEMPERATURE: 97.1 F | HEART RATE: 59 BPM | SYSTOLIC BLOOD PRESSURE: 122 MMHG | DIASTOLIC BLOOD PRESSURE: 80 MMHG | RESPIRATION RATE: 18 BRPM

## 2023-12-14 DIAGNOSIS — S81.801D OPEN WOUND OF RIGHT LOWER LEG, SUBSEQUENT ENCOUNTER: Primary | ICD-10-CM

## 2023-12-14 DIAGNOSIS — D45 POLYCYTHEMIA VERA: ICD-10-CM

## 2023-12-14 PROCEDURE — G0463 HOSPITAL OUTPT CLINIC VISIT: HCPCS | Performed by: PHYSICIAN ASSISTANT

## 2023-12-14 NOTE — TELEPHONE ENCOUNTER
"    Caller: Phani Landis \"Raj\"    Relationship to patient: Self    Best call back number: [phone]    Chief complaint: SCHEDULING     Type of visit: LAB    Requested date: 1-4-2023    Additional notes:PT STARTED A NEW MEDICATION (JAKAFI) ON 11- AND WILL NEED TO HAVE HIS LABS CHECKED ON 1-4-2024   PLEASE CALL TO ADVISE          "

## 2023-12-14 NOTE — PROGRESS NOTES
Chief Complaint  Wound Check (Follow up for wound to E.  Patient is not diabetic.)    Subjective      History of Present Illness    Phani Landis  is a 63 y.o. male here today with chronic ulcers to his right lower extremity.  He notes that he has had several ulcers on his left lower extremity as well but they have recently healed.  He notes that he was previously prescribed Silvadene to be applied to wound daily.  He notes that he is unable to tolerate Silvadene due to significant sensitivity to it.  He notes that his most inferior wound on his right lower leg is very sensitive to open air and touch.  He was recently on a chemo medication (hydroxyurea) for polycythemia vera. This could have been causing his wounds. He recently saw his oncologist and was switched to jakifi.    He notes that he is not a diabetic and is not a smoker.     He notes that his symptoms have improved since last appointment. His wounds have also improved.  He notes that applying Aquaphor to his wounds does give some comfort. He has been keeping wounds covered. We recently prescribed prednisone and doxycyline for his non-healing wounds to right lower leg which seemed to improve his symptoms. He continues to wrap his legs daily and apply Aquacel Ag to wounds.  He tries to keep leg elevated when possible.     Allergies:  Cephalexin      Current Outpatient Medications:     allopurinol (ZYLOPRIM) 100 MG tablet, Take 1 tablet by mouth 2 (Two) Times a Day., Disp: 180 tablet, Rfl: 0    amLODIPine-benazepril (LOTREL) 5-40 MG per capsule, TAKE ONE CAPSULE BY MOUTH DAILY, Disp: 90 capsule, Rfl: 1    aspirin 81 MG chewable tablet, Chew 1 tablet Daily., Disp: , Rfl:     bisoprolol (ZEBeta) 10 MG tablet, TAKE ONE TABLET BY MOUTH DAILY, Disp: 90 tablet, Rfl: 1    chlorthalidone (HYGROTEN) 50 MG tablet, TAKE ONE TABLET BY MOUTH DAILY, Disp: 90 tablet, Rfl: 1    hydrALAZINE (APRESOLINE) 50 MG tablet, TAKE ONE TABLET BY MOUTH TWICE A DAY, Disp: 180 tablet,  "Rfl: 1    potassium chloride 10 MEQ CR tablet, TAKE 1 TABLET BY MOUTH TWICE A DAY, Disp: 180 tablet, Rfl: 1    ruxolitinib (JAKAFI) 10 MG tablet, Take 1 tablet by mouth 2 (Two) Times a Day. Take by mouth twice daily, 12 hours apart with no regards to food., Disp: 60 tablet, Rfl: 5    triamcinolone (KENALOG) 0.1 % ointment, Apply 1 application  topically to the appropriate area as directed 2 (Two) Times a Day., Disp: 80 g, Rfl: 0    Past Medical History:   Diagnosis Date    Anxiety     Arthritis     Cancer June 2020    Polycythemia vera    Hypertension      Past Surgical History:   Procedure Laterality Date    CHEST SURGERY      OTHER SURGICAL HISTORY  1985    \"Exploratory\"     Social History     Socioeconomic History    Marital status:    Tobacco Use    Smoking status: Never    Smokeless tobacco: Never   Vaping Use    Vaping Use: Never used   Substance and Sexual Activity    Alcohol use: Yes     Comment: Occasional drinker    Drug use: Never    Sexual activity: Yes     Partners: Female     Objective     Vitals:    12/14/23 1418   BP: 122/80   BP Location: Left arm   Patient Position: Sitting   Cuff Size: Adult   Pulse: 59   Resp: 18  Comment: 97% room air   Temp: 97.1 °F (36.2 °C)   TempSrc: Temporal   PainSc: 0-No pain     Review of Systems     ROS:  No fevers, chills, sweats, or body aches.     I have reviewed the HPI and ROS as documented by MA/RN. Keyla Bailey PA-C    Physical Exam     NAD  Normocephalic, atraumatic  EOMI, no scleral icterus  No respiratory distress, no cough  AAOx3, pleasant, cooperative    Right lower extremity: Patient's  microvascular changes to the skin in his bilateral legs up to his knees has improved.  The patient has 3 ulcers to his right lower extremity located on his anterior shin and lateral lower leg area.  There is moderate to significant slough in the base of these wounds.  Redness and swelling has improved since last appointment.  The anterior shin wound is " circular in appearance and the superior of the 2 lateral leg wounds is circular in appearance, however the inferior/lateral right lower leg wound is irregular in shape.  The wounds appear to be in different stages of healing. These wounds have significantly improved in size and health since last appointment. The wounds have less drainage today and are somewhat dry. TTP improved.     Patient had strong palpable dorsalis pedis and posterior tibial pulses.  Edema has improved.     See photos for details:    Right lower extremity:                    Result Review :  The following data was reviewed by: Keyla Bailey PA-C on 12/14/2023:    Prior notes and images.         Assessment and Plan   Diagnoses and all orders for this visit:    1. Open wound of right lower leg, subsequent encounter (Primary)    2. Polycythemia vera      The patient has several healing wounds to his right lower leg. He recently weaned off chemotherapy medication (hydrea) to treat polycythemia vera. He recently started Jakifi.  Recently I have reached out to his oncologist, Dr. Fleming, and discussed the option of an oral steroid to possibly treat the microvascular changes and to heal the right lower leg ulcers. She notes that a steroid is not contraindicated. We recently prescribed prednisone and doxycyline for his non-healing wounds to right lower leg which seemed to improve his symptoms. We will now  have him wrap his legs daily and apply Polymem Ag to wounds.  He should continue to prop his legs and elevate when possible. He will followup with us in 2 weeks. He should continue to followup with his oncologist and PCP.     Patient was given instructions and counseling regarding their condition or for health maintenance advice, as well as the wound care plan and recommendations. They understand and agree with the plan.  They will follow back up here in the clinic but are instructed to contact us in the interim should they have any new,  returning, or worsening symptoms or concerns. Please see specific information pulled into the AVS if appropriate.     Dragon Dictation utilized for chart completion.    Follow Up   Return in about 2 weeks (around 12/28/2023) for Recheck.      Keyla Bailey PA-C

## 2023-12-27 ENCOUNTER — OFFICE VISIT (OUTPATIENT)
Dept: WOUND CARE | Facility: HOSPITAL | Age: 63
End: 2023-12-27
Payer: COMMERCIAL

## 2023-12-27 VITALS
HEART RATE: 68 BPM | DIASTOLIC BLOOD PRESSURE: 82 MMHG | TEMPERATURE: 98.8 F | SYSTOLIC BLOOD PRESSURE: 122 MMHG | RESPIRATION RATE: 18 BRPM

## 2023-12-27 DIAGNOSIS — L03.115 CELLULITIS OF RIGHT LOWER EXTREMITY: ICD-10-CM

## 2023-12-27 DIAGNOSIS — D45 POLYCYTHEMIA VERA: ICD-10-CM

## 2023-12-27 DIAGNOSIS — S81.801D OPEN WOUND OF RIGHT LOWER LEG, SUBSEQUENT ENCOUNTER: Primary | ICD-10-CM

## 2023-12-27 PROCEDURE — G0463 HOSPITAL OUTPT CLINIC VISIT: HCPCS | Performed by: PHYSICIAN ASSISTANT

## 2023-12-27 RX ORDER — PREDNISONE 20 MG/1
TABLET ORAL
Qty: 18 TABLET | Refills: 0 | Status: SHIPPED | OUTPATIENT
Start: 2023-12-27 | End: 2024-01-05

## 2023-12-27 RX ORDER — DOXYCYCLINE HYCLATE 100 MG/1
100 CAPSULE ORAL 2 TIMES DAILY
Qty: 28 CAPSULE | Refills: 0 | Status: SHIPPED | OUTPATIENT
Start: 2023-12-27 | End: 2024-01-10

## 2023-12-27 NOTE — PROGRESS NOTES
Chief Complaint  Wound Check (Wound check to RLE, pt currently treating with PolyMem Ag and states the wound is looking much better. Rhoda day he started applying Silvadene and leaving it SEEMA.  )    Subjective      History of Present Illness    Phani Landis  is a 63 y.o. male here today with chronic ulcers to his right lower extremity.  He notes that he has had several ulcers on his left lower extremity as well but they have recently healed.  He notes that he was previously prescribed Silvadene to be applied to wound daily.    He was recently on a chemo medication (hydroxyurea) for polycythemia vera. This could have been causing his wounds. He recently saw his oncologist and was switched to jakifi.    He notes that he is not a diabetic and is not a smoker.     He notes that his lower leg wounds seem to be improved but that his shin wound seems to have worsened since last appointment and has increased redness. He continues to wrap his legs daily and apply Polymem Ag to wounds. He has been keeping wounds covered.  He tries to keep leg elevated when possible.     Allergies:  Cephalexin      Current Outpatient Medications:     allopurinol (ZYLOPRIM) 100 MG tablet, Take 1 tablet by mouth 2 (Two) Times a Day., Disp: 180 tablet, Rfl: 0    amLODIPine-benazepril (LOTREL) 5-40 MG per capsule, TAKE ONE CAPSULE BY MOUTH DAILY, Disp: 90 capsule, Rfl: 1    aspirin 81 MG chewable tablet, Chew 1 tablet Daily., Disp: , Rfl:     bisoprolol (ZEBeta) 10 MG tablet, TAKE ONE TABLET BY MOUTH DAILY, Disp: 90 tablet, Rfl: 1    chlorthalidone (HYGROTEN) 50 MG tablet, TAKE ONE TABLET BY MOUTH DAILY, Disp: 90 tablet, Rfl: 1    doxycycline (VIBRAMYCIN) 100 MG capsule, Take 1 capsule by mouth 2 (Two) Times a Day for 14 days. Do not take at the same time as any vitamin or mineral supplements., Disp: 28 capsule, Rfl: 0    hydrALAZINE (APRESOLINE) 50 MG tablet, TAKE ONE TABLET BY MOUTH TWICE A DAY, Disp: 180 tablet, Rfl: 1    potassium  "chloride 10 MEQ CR tablet, TAKE 1 TABLET BY MOUTH TWICE A DAY, Disp: 180 tablet, Rfl: 1    predniSONE (DELTASONE) 20 MG tablet, Take 3 tablets by mouth Daily for 3 days, THEN 2 tablets Daily for 3 days, THEN 1 tablet Daily for 3 days., Disp: 18 tablet, Rfl: 0    ruxolitinib (JAKAFI) 10 MG tablet, Take 1 tablet by mouth 2 (Two) Times a Day. Take by mouth twice daily, 12 hours apart with no regards to food., Disp: 60 tablet, Rfl: 5    triamcinolone (KENALOG) 0.1 % ointment, Apply 1 application  topically to the appropriate area as directed 2 (Two) Times a Day., Disp: 80 g, Rfl: 0    Past Medical History:   Diagnosis Date    Anxiety     Arthritis     Cancer June 2020    Polycythemia vera    Hypertension      Past Surgical History:   Procedure Laterality Date    CHEST SURGERY      OTHER SURGICAL HISTORY  1985    \"Exploratory\"     Social History     Socioeconomic History    Marital status:    Tobacco Use    Smoking status: Never    Smokeless tobacco: Never   Vaping Use    Vaping Use: Never used   Substance and Sexual Activity    Alcohol use: Yes     Comment: Occasional drinker    Drug use: Never    Sexual activity: Yes     Partners: Female     Objective     Vitals:    12/27/23 0821   BP: 122/82   BP Location: Left arm   Patient Position: Sitting   Cuff Size: Adult   Pulse: 68   Resp: 18  Comment: 96% RA   Temp: 98.8 °F (37.1 °C)   TempSrc: Temporal   PainSc: 0-No pain     Review of Systems     ROS:  No fevers, chills, sweats, or body aches.     I have reviewed the HPI and ROS as documented by MA/RN. Keyla Bailey PA-C    Physical Exam     NAD  Normocephalic, atraumatic  EOMI, no scleral icterus  No respiratory distress, no cough  AAOx3, pleasant, cooperative    Right lower extremity: Patient's microvascular changes to the skin in his bilateral legs up to his knees has improved.  The patient has 3 ulcers to his right lower extremity located on his anterior shin and lateral lower leg area.  There is " improved slough in the base of these wounds.  Redness and swelling has worsened around shin wound since last appointment. The anterior shin wound is circular in appearance and the superior of the 2 lateral leg wounds is circular in appearance, however the inferior/lateral right lower leg wound is irregular in shape.  The wounds appear to be in different stages of healing. The shin wound is deeper today with some moderate slough in the base. The lower wounds have significantly improved in size and health since last appointment. TTP improved.     Patient had strong palpable dorsalis pedis and posterior tibial pulses.  Edema has improved.     See photos for details:    Right lower extremity:                            Result Review :  The following data was reviewed by: Keyla Bailey PA-C on 12/27/2023:    Prior notes and images.         Assessment and Plan   Diagnoses and all orders for this visit:    1. Open wound of right lower leg, subsequent encounter (Primary)    2. Polycythemia vera    3. Cellulitis of right lower extremity    Other orders  -     doxycycline (VIBRAMYCIN) 100 MG capsule; Take 1 capsule by mouth 2 (Two) Times a Day for 14 days. Do not take at the same time as any vitamin or mineral supplements.  Dispense: 28 capsule; Refill: 0  -     predniSONE (DELTASONE) 20 MG tablet; Take 3 tablets by mouth Daily for 3 days, THEN 2 tablets Daily for 3 days, THEN 1 tablet Daily for 3 days.  Dispense: 18 tablet; Refill: 0    The patient has several healing wounds to his right lower leg. He recently weaned off chemotherapy medication (hydrea) to treat polycythemia vera. He recently started Jakifi.  Recently I have reached out to his oncologist, Dr. Fleming, and discussed the option of an oral steroid to possibly treat the microvascular changes and to heal the right lower leg ulcers. She notes that a steroid is not contraindicated. We recently prescribed prednisone and doxycyline for his non-healing  wounds to right lower leg which seemed to improve his symptoms. Due to the worsening of his shin wound with surrounding cellulitis, we will again prescribe doxycycline and prednisone for patient. We will continue to have him wrap his legs daily and apply Polymem Ag to wounds.  He should continue to prop his legs and elevate when possible. He will followup with us in 2 weeks. He should continue to followup with his oncologist and PCP.     Patient was given instructions and counseling regarding their condition or for health maintenance advice, as well as the wound care plan and recommendations. They understand and agree with the plan.  They will follow back up here in the clinic but are instructed to contact us in the interim should they have any new, returning, or worsening symptoms or concerns. Please see specific information pulled into the AVS if appropriate.     Dragon Dictation utilized for chart completion.    Follow Up   Return in about 2 weeks (around 1/10/2024).      Keyla Bailey PA-C

## 2024-01-04 ENCOUNTER — CONSULT (OUTPATIENT)
Dept: ONCOLOGY | Facility: HOSPITAL | Age: 64
End: 2024-01-04
Payer: COMMERCIAL

## 2024-01-04 ENCOUNTER — LAB (OUTPATIENT)
Dept: ONCOLOGY | Facility: HOSPITAL | Age: 64
End: 2024-01-04
Payer: COMMERCIAL

## 2024-01-04 VITALS
HEIGHT: 74 IN | DIASTOLIC BLOOD PRESSURE: 78 MMHG | WEIGHT: 289.9 LBS | SYSTOLIC BLOOD PRESSURE: 116 MMHG | RESPIRATION RATE: 18 BRPM | OXYGEN SATURATION: 98 % | TEMPERATURE: 97.6 F | BODY MASS INDEX: 37.21 KG/M2 | HEART RATE: 59 BPM

## 2024-01-04 DIAGNOSIS — D75.839 THROMBOCYTOSIS: ICD-10-CM

## 2024-01-04 DIAGNOSIS — D64.9 ANEMIA, UNSPECIFIED TYPE: Primary | ICD-10-CM

## 2024-01-04 DIAGNOSIS — D45 POLYCYTHEMIA VERA: Primary | ICD-10-CM

## 2024-01-04 DIAGNOSIS — R74.8 ABNORMAL LIVER ENZYMES: ICD-10-CM

## 2024-01-04 LAB
ALBUMIN SERPL-MCNC: 4.6 G/DL (ref 3.5–5.2)
ALBUMIN/GLOB SERPL: 1.7 G/DL
ALP SERPL-CCNC: 65 U/L (ref 39–117)
ALT SERPL W P-5'-P-CCNC: 56 U/L (ref 1–41)
ANION GAP SERPL CALCULATED.3IONS-SCNC: 8.7 MMOL/L (ref 5–15)
AST SERPL-CCNC: 34 U/L (ref 1–40)
BASOPHILS # BLD AUTO: 0.09 10*3/MM3 (ref 0–0.2)
BASOPHILS NFR BLD AUTO: 0.8 % (ref 0–1.5)
BILIRUB SERPL-MCNC: 0.5 MG/DL (ref 0–1.2)
BUN SERPL-MCNC: 19 MG/DL (ref 8–23)
BUN/CREAT SERPL: 19.4 (ref 7–25)
CALCIUM SPEC-SCNC: 9.5 MG/DL (ref 8.6–10.5)
CHLORIDE SERPL-SCNC: 93 MMOL/L (ref 98–107)
CO2 SERPL-SCNC: 28.3 MMOL/L (ref 22–29)
CREAT SERPL-MCNC: 0.98 MG/DL (ref 0.76–1.27)
DEPRECATED RDW RBC AUTO: 48.8 FL (ref 37–54)
EGFRCR SERPLBLD CKD-EPI 2021: 86.6 ML/MIN/1.73
EOSINOPHIL # BLD AUTO: 0.04 10*3/MM3 (ref 0–0.4)
EOSINOPHIL NFR BLD AUTO: 0.3 % (ref 0.3–6.2)
ERYTHROCYTE [DISTWIDTH] IN BLOOD BY AUTOMATED COUNT: 12.4 % (ref 12.3–15.4)
GLOBULIN UR ELPH-MCNC: 2.7 GM/DL
GLUCOSE SERPL-MCNC: 100 MG/DL (ref 65–99)
HCT VFR BLD AUTO: 43.5 % (ref 37.5–51)
HGB BLD-MCNC: 15.1 G/DL (ref 13–17.7)
IMM GRANULOCYTES # BLD AUTO: 0.19 10*3/MM3 (ref 0–0.05)
IMM GRANULOCYTES NFR BLD AUTO: 1.6 % (ref 0–0.5)
LDH SERPL-CCNC: 292 U/L (ref 135–225)
LYMPHOCYTES # BLD AUTO: 1.78 10*3/MM3 (ref 0.7–3.1)
LYMPHOCYTES NFR BLD AUTO: 15.3 % (ref 19.6–45.3)
MCH RBC QN AUTO: 36.4 PG (ref 26.6–33)
MCHC RBC AUTO-ENTMCNC: 34.7 G/DL (ref 31.5–35.7)
MCV RBC AUTO: 104.8 FL (ref 79–97)
MONOCYTES # BLD AUTO: 0.99 10*3/MM3 (ref 0.1–0.9)
MONOCYTES NFR BLD AUTO: 8.5 % (ref 5–12)
NEUTROPHILS NFR BLD AUTO: 73.5 % (ref 42.7–76)
NEUTROPHILS NFR BLD AUTO: 8.52 10*3/MM3 (ref 1.7–7)
PLATELET # BLD AUTO: 795 10*3/MM3 (ref 140–450)
PMV BLD AUTO: 10 FL (ref 6–12)
POTASSIUM SERPL-SCNC: 3.7 MMOL/L (ref 3.5–5.2)
PROT SERPL-MCNC: 7.3 G/DL (ref 6–8.5)
RBC # BLD AUTO: 4.15 10*6/MM3 (ref 4.14–5.8)
SODIUM SERPL-SCNC: 130 MMOL/L (ref 136–145)
WBC NRBC COR # BLD AUTO: 11.61 10*3/MM3 (ref 3.4–10.8)

## 2024-01-04 PROCEDURE — G0463 HOSPITAL OUTPT CLINIC VISIT: HCPCS | Performed by: NURSE PRACTITIONER

## 2024-01-04 PROCEDURE — 80053 COMPREHEN METABOLIC PANEL: CPT | Performed by: NURSE PRACTITIONER

## 2024-01-04 PROCEDURE — 36415 COLL VENOUS BLD VENIPUNCTURE: CPT | Performed by: NURSE PRACTITIONER

## 2024-01-04 PROCEDURE — 85025 COMPLETE CBC W/AUTO DIFF WBC: CPT | Performed by: NURSE PRACTITIONER

## 2024-01-04 PROCEDURE — 83615 LACTATE (LD) (LDH) ENZYME: CPT | Performed by: NURSE PRACTITIONER

## 2024-01-04 NOTE — PROGRESS NOTES
Chief Complaint/Reason for Referral:  New patient hematology: polycythemia vera    Eliana Fleming MD Vertrees, Jennifer L, APRN    Records Obtained:  Records of the patients history including those obtained from  [facility] and patient information were reviewed and summarized in detail.    Subjective    History of Present Illness    Mr. Phani Landis is a very pleasant 63 year old  male presenting for new patient evaluation for polycythemia vera. Referral is from Dr. Eliana Fleming, oncology so he could be closer to home.  This was first diagnosed in July of 2020 when he was noted to have increasing thrombocytosis. 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 He tested negative for BCR-abl testing. He has never had a bone marrow biopsy. Reports his platelet count was over 1,200,000 and was given 1 phlebotomy initally and then has not had any since then. Reports about 6 months ago, he began to have weeping wounds to his bilateral legs and is currently following with wound clinic. Reports his legs are almost healed now.     He had been on Hydrea 2000mg daily and his WBC counts and platelet counts were not under good control and were elevated. He was switched to Jakafi 10 mg BID dosing about 6 weeks ago in mid-November 2023. He is receiving free drug from the drug company currently and reports does not need any refills at this time.   Reports he is tolerating medication well. At last lab check his platelet count was coming down from 669 to 547 on 12/12/23. WBC had decreased to 8.24 from 13.60. . CMP showed mildly elevated liver enzymes of AST of 43 and ALT of 54. These had been mildly elevated previously. Abdominal US showed mild splenomegaly of 14.8 cm with last US on 11/15/23. Liver was also noted to be enlarged.     Denies any previous DVT or PE. Other medical history of hypertension, low back pain, obesity, anxiety.     Reports intentional weight loss of 50  pounds. Reports he has hyponatremia and low potassium in the past. 12/12/23 NA level of 133, K level of 3.4.         Oncology/Hematology History   Polycythemia vera   7/15/2020 Initial Diagnosis    Polycythemia vera (CMS/HCC)     7/16/2020 - 7/16/2020 Chemotherapy    OP POLYCYTHEMIA VERA Hydroxyurea     11/15/2023 -  Chemotherapy    OP POLYCYTHEMIA VERA Ruxolitinib          Review of Systems   Constitutional:  Positive for fatigue.   HENT: Negative.     Eyes: Negative.    Respiratory: Negative.     Cardiovascular: Negative.    Gastrointestinal: Negative.    Endocrine: Negative.    Genitourinary: Negative.    Musculoskeletal: Negative.    Skin:  Positive for wound (covered with dressings. Reports wounds are healing.).   Allergic/Immunologic: Negative.    Neurological: Negative.    Hematological: Negative.    Psychiatric/Behavioral: Negative.     All other systems reviewed and are negative.      Current Outpatient Medications on File Prior to Visit   Medication Sig Dispense Refill    allopurinol (ZYLOPRIM) 100 MG tablet Take 1 tablet by mouth 2 (Two) Times a Day. 180 tablet 0    amLODIPine-benazepril (LOTREL) 5-40 MG per capsule TAKE ONE CAPSULE BY MOUTH DAILY 90 capsule 1    aspirin 81 MG chewable tablet Chew 1 tablet Daily.      bisoprolol (ZEBeta) 10 MG tablet TAKE ONE TABLET BY MOUTH DAILY 90 tablet 1    chlorthalidone (HYGROTEN) 50 MG tablet TAKE ONE TABLET BY MOUTH DAILY 90 tablet 1    doxycycline (VIBRAMYCIN) 100 MG capsule Take 1 capsule by mouth 2 (Two) Times a Day for 14 days. Do not take at the same time as any vitamin or mineral supplements. 28 capsule 0    hydrALAZINE (APRESOLINE) 50 MG tablet TAKE ONE TABLET BY MOUTH TWICE A  tablet 1    potassium chloride 10 MEQ CR tablet TAKE 1 TABLET BY MOUTH TWICE A  tablet 1    predniSONE (DELTASONE) 20 MG tablet Take 3 tablets by mouth Daily for 3 days, THEN 2 tablets Daily for 3 days, THEN 1 tablet Daily for 3 days. 18 tablet 0    ruxolitinib  "(JAKAFI) 10 MG tablet Take 1 tablet by mouth 2 (Two) Times a Day. Take by mouth twice daily, 12 hours apart with no regards to food. 60 tablet 5    triamcinolone (KENALOG) 0.1 % ointment Apply 1 application  topically to the appropriate area as directed 2 (Two) Times a Day. 80 g 0     No current facility-administered medications on file prior to visit.       Allergies   Allergen Reactions    Cephalexin Itching     Past Medical History:   Diagnosis Date    Anxiety     Arthritis     Cancer June 2020    Polycythemia vera    Hypertension      Past Surgical History:   Procedure Laterality Date    CHEST SURGERY      OTHER SURGICAL HISTORY  1985    \"Exploratory\"     Social History     Socioeconomic History    Marital status:    Tobacco Use    Smoking status: Never    Smokeless tobacco: Never   Vaping Use    Vaping Use: Never used   Substance and Sexual Activity    Alcohol use: Yes     Comment: Occasional drinker    Drug use: Never    Sexual activity: Yes     Partners: Female     Family History   Problem Relation Age of Onset    Hypertension Father     Hypertension Other     Heart disease Other     Cervical cancer Other        There is no immunization history on file for this patient.    Tobacco Use: Low Risk  (1/4/2024)    Patient History     Smoking Tobacco Use: Never     Smokeless Tobacco Use: Never     Passive Exposure: Not on file       Objective     Physical Exam  Vitals and nursing note reviewed.   Constitutional:       Appearance: Normal appearance. He is obese.   HENT:      Head: Normocephalic.      Nose: Nose normal.      Mouth/Throat:      Mouth: Mucous membranes are moist.   Eyes:      Pupils: Pupils are equal, round, and reactive to light.   Cardiovascular:      Rate and Rhythm: Normal rate and regular rhythm.      Pulses: Normal pulses.      Heart sounds: Normal heart sounds.   Pulmonary:      Effort: Pulmonary effort is normal. No respiratory distress.      Breath sounds: Normal breath sounds. " "  Abdominal:      General: Bowel sounds are normal. There is no distension.      Palpations: Abdomen is soft.   Musculoskeletal:         General: Normal range of motion.      Cervical back: Normal range of motion and neck supple.   Skin:     General: Skin is warm and dry.      Capillary Refill: Capillary refill takes less than 2 seconds.   Neurological:      General: No focal deficit present.      Mental Status: He is alert and oriented to person, place, and time.   Psychiatric:         Mood and Affect: Mood normal.         Behavior: Behavior normal.         Thought Content: Thought content normal.         Judgment: Judgment normal.         Vitals:    01/04/24 1257   BP: 116/78   Pulse: 59   Resp: 18   Temp: 97.6 °F (36.4 °C)   TempSrc: Temporal   SpO2: 98%   Weight: 132 kg (289 lb 14.5 oz)   Height: 188 cm (74.02\")   PainSc: 0-No pain       Wt Readings from Last 3 Encounters:   01/04/24 132 kg (289 lb 14.5 oz)   12/12/23 128 kg (282 lb)   11/29/23 129 kg (283 lb 11.2 oz)      ECOG score: 0         ECOG: (0) Fully Active - Able to Carry On All Pre-disease Performance Without Restriction  Fall Risk Assessment was completed, and patient is at low risk for falls.  PHQ-9 Total Score: 0       The patient is  experiencing fatigue. Fatigue score: 2    PT/OT Functional Screening: PT fx screen : No needs identified  Speech Functional Screening: Speech fx screen : No needs identified  Rehab to be ordered: Rehab to be ordered : No needs identified        Result Review :  The following data was reviewed by: AG Long on 01/04/2024:  Lab Results   Component Value Date    HGB 15.1 01/04/2024    HCT 43.5 01/04/2024    .8 (H) 01/04/2024     (H) 01/04/2024    WBC 11.61 (H) 01/04/2024    NEUTROABS 8.52 (H) 01/04/2024    LYMPHSABS 1.78 01/04/2024    MONOSABS 0.99 (H) 01/04/2024    EOSABS 0.04 01/04/2024    BASOSABS 0.09 01/04/2024     Lab Results   Component Value Date    GLUCOSE 107 (H) 12/12/2023    " "BUN 13 12/12/2023    CREATININE 1.02 12/12/2023     (L) 12/12/2023    K 3.4 (L) 12/12/2023    CL 95 (L) 12/12/2023    CO2 28.9 12/12/2023    CALCIUM 9.3 12/12/2023    PROTEINTOT 7.2 12/12/2023    ALBUMIN 4.2 12/12/2023    BILITOT 0.5 12/12/2023    ALKPHOS 56 12/12/2023    AST 43 (H) 12/12/2023    ALT 54 (H) 12/12/2023     Lab Results   Component Value Date    FERRITIN 193.90 03/01/2023     No results found for: \"IRON\", \"LABIRON\", \"TRANSFERRIN\", \"TIBC\"  Lab Results   Component Value Date    FERRITIN 193.90 03/01/2023     Lab Results   Component Value Date    PSA 1.020 03/01/2023       US Abdomen Complete    Result Date: 11/15/2023   Hepatosplenomegaly, as described.  This report was finalized on 11/15/2023 2:55 PM by Dr. David Finch M.D on Workstation: [id number]             Assessment and Plan:  Diagnoses and all orders for this visit:    1. Polycythemia vera (Primary)  -     CBC & Differential  -     Comprehensive Metabolic Panel  -     Lactate Dehydrogenase  -     CBC & Differential; Future  -     Comprehensive Metabolic Panel; Future  -     Lactate Dehydrogenase; Future    2. Abnormal liver enzymes  -     CBC & Differential  -     Comprehensive Metabolic Panel  -     Lactate Dehydrogenase    3. Thrombocytosis  -     CBC & Differential  -     Comprehensive Metabolic Panel  -     Lactate Dehydrogenase    Polycythemia vera diagnosed in in July of 2020. Initially presented with polycythemia, leukocytosis and thrombocytosis. Reports his platelet count was 1,200,000 initially. He initially required 1 phlebotomy, but none since then. Found to be positive MANUEL 2 V617F mutation, EPO of 1.3. Started on Hydrea and dose was titrated to 2000 mg daily he reports. Splenomegaly noted on CT scan. Had been having difficulty regulating his Hydrea dosing as his platelet count was increasingly elevated. 6 months ago, back in May of 2023,  began to have weeping leg wounds and was sent to wound clinic at [facility]. " Was switched to Jakafi 10 mg BID back in middle of November and so far has tolerated well and his platelet count was down to 547 on 12/12/23 lab work. He was referred him by Dr. Fleming to be closer to home.     He is receiving free drug with the [employer] drug [employer]. Does not need any refills at this time.     Continue Jakafi 10 mg BID.     Labs today with CBC, CMP, and LDH.     Will plan to follow up every 3 months for lab work and monitoring. He will see Dr. Maguire in 3 months.     Leg wounds improving.     Mild transaminitis: noted with splenomegaly and hepatomegaly on abdominal US. Continue to monitor. He had AST / ALT elevation mild increase prior to initiating Jakafi.     Lab work shows platelet count increased to 795, WBC count increased to 11.61 and normal H/H. Discussed with Dr. Maguire and will plan to increase his Jakafi to 10 mg 2 AM and 1 PM. Repeat lab work in 1 month and follow up with Dr. Maguire.       Discussed plan of care with Mr. Jeronimo and he is agreeable to plan of care.     I spent 30 minutes caring for Phani on this date of service. This time includes time spent by me in the following activities:preparing for the visit, reviewing tests, obtaining and/or reviewing a separately obtained history, performing a medically appropriate examination and/or evaluation , counseling and educating the patient/family/caregiver, ordering medications, tests, or procedures, referring and communicating with other health care professionals , documenting information in the medical record, independently interpreting results and communicating that information with the patient/family/caregiver, and care coordination    Patient Follow Up: 3 months with MD.     Patient was given instructions and counseling regarding his condition or for health maintenance advice. Please see specific information pulled into the AVS if appropriate.     Poonam Arceo, APRN    1/4/2024    .tob

## 2024-01-12 ENCOUNTER — TELEPHONE (OUTPATIENT)
Dept: ONCOLOGY | Facility: HOSPITAL | Age: 64
End: 2024-01-12

## 2024-01-14 DIAGNOSIS — D75.839 THROMBOCYTOSIS: ICD-10-CM

## 2024-01-14 DIAGNOSIS — D45 POLYCYTHEMIA VERA: ICD-10-CM

## 2024-01-14 DIAGNOSIS — E79.0 ELEVATED URIC ACID IN BLOOD: ICD-10-CM

## 2024-01-15 ENCOUNTER — TELEPHONE (OUTPATIENT)
Dept: ONCOLOGY | Facility: CLINIC | Age: 64
End: 2024-01-15
Payer: COMMERCIAL

## 2024-01-15 NOTE — TELEPHONE ENCOUNTER
"  Caller: Phani Landis \"Raj\"    Relationship: Self    Best call back number:     [phone]     What is the best time to reach you: ANYTIME    Who are you requesting to speak with (clinical staff, provider,  specific staff member): CLINICAL     What was the call regarding: PT WOULD LIKE TO COME IN TO SEE DR FLORES. THE PT'S PLATELET COUNT HAS JUMPED BACK UP  ON JAN 4TH.     THE PT WAS GOING TO TRANSFER CARE TO [facility], BUT THAT IS NOT GOING TO WORK OUT AND WOULD LIKE TO COME BACK IN TO SEE DR FLORES.     Is it okay if the provider responds through RUNformt: NO - PLEASE CALL THE PT TO ADVISE.           "

## 2024-01-16 RX ORDER — ALLOPURINOL 100 MG/1
100 TABLET ORAL 2 TIMES DAILY
Qty: 180 TABLET | Refills: 0 | Status: SHIPPED | OUTPATIENT
Start: 2024-01-16

## 2024-01-17 ENCOUNTER — LAB (OUTPATIENT)
Dept: OTHER | Facility: HOSPITAL | Age: 64
End: 2024-01-17
Payer: COMMERCIAL

## 2024-01-17 ENCOUNTER — OFFICE VISIT (OUTPATIENT)
Dept: ONCOLOGY | Facility: CLINIC | Age: 64
End: 2024-01-17
Payer: COMMERCIAL

## 2024-01-17 ENCOUNTER — SPECIALTY PHARMACY (OUTPATIENT)
Dept: PHARMACY | Facility: HOSPITAL | Age: 64
End: 2024-01-17
Payer: COMMERCIAL

## 2024-01-17 VITALS
TEMPERATURE: 98.4 F | HEIGHT: 74 IN | DIASTOLIC BLOOD PRESSURE: 88 MMHG | OXYGEN SATURATION: 98 % | WEIGHT: 278.9 LBS | SYSTOLIC BLOOD PRESSURE: 128 MMHG | HEART RATE: 71 BPM | BODY MASS INDEX: 35.79 KG/M2 | RESPIRATION RATE: 18 BRPM

## 2024-01-17 DIAGNOSIS — D45 POLYCYTHEMIA VERA: ICD-10-CM

## 2024-01-17 DIAGNOSIS — D75.839 THROMBOCYTOSIS: Primary | ICD-10-CM

## 2024-01-17 DIAGNOSIS — Z79.899 HIGH RISK MEDICATION USE: ICD-10-CM

## 2024-01-17 DIAGNOSIS — D75.839 THROMBOCYTOSIS: ICD-10-CM

## 2024-01-17 DIAGNOSIS — D64.9 ANEMIA, UNSPECIFIED TYPE: ICD-10-CM

## 2024-01-17 LAB
ALBUMIN SERPL-MCNC: 4.5 G/DL (ref 3.5–5.2)
ALBUMIN/GLOB SERPL: 1.5 G/DL
ALP SERPL-CCNC: 57 U/L (ref 39–117)
ALT SERPL W P-5'-P-CCNC: 69 U/L (ref 1–41)
ANION GAP SERPL CALCULATED.3IONS-SCNC: 11.6 MMOL/L (ref 5–15)
AST SERPL-CCNC: 90 U/L (ref 1–40)
BASOPHILS # BLD AUTO: 0.09 10*3/MM3 (ref 0–0.2)
BASOPHILS NFR BLD AUTO: 1.3 % (ref 0–1.5)
BILIRUB SERPL-MCNC: 0.5 MG/DL (ref 0–1.2)
BUN SERPL-MCNC: 16 MG/DL (ref 8–23)
BUN/CREAT SERPL: 17 (ref 7–25)
CALCIUM SPEC-SCNC: 9.8 MG/DL (ref 8.6–10.5)
CHLORIDE SERPL-SCNC: 88 MMOL/L (ref 98–107)
CO2 SERPL-SCNC: 27.4 MMOL/L (ref 22–29)
CREAT SERPL-MCNC: 0.94 MG/DL (ref 0.76–1.27)
DEPRECATED RDW RBC AUTO: 47.1 FL (ref 37–54)
EGFRCR SERPLBLD CKD-EPI 2021: 91.1 ML/MIN/1.73
EOSINOPHIL # BLD AUTO: 0.1 10*3/MM3 (ref 0–0.4)
EOSINOPHIL NFR BLD AUTO: 1.4 % (ref 0.3–6.2)
ERYTHROCYTE [DISTWIDTH] IN BLOOD BY AUTOMATED COUNT: 13.1 % (ref 12.3–15.4)
GLOBULIN UR ELPH-MCNC: 3 GM/DL
GLUCOSE SERPL-MCNC: 113 MG/DL (ref 65–99)
HCT VFR BLD AUTO: 37.8 % (ref 37.5–51)
HGB BLD-MCNC: 13.3 G/DL (ref 13–17.7)
IMM GRANULOCYTES # BLD AUTO: 0.04 10*3/MM3 (ref 0–0.05)
IMM GRANULOCYTES NFR BLD AUTO: 0.6 % (ref 0–0.5)
LYMPHOCYTES # BLD AUTO: 1.79 10*3/MM3 (ref 0.7–3.1)
LYMPHOCYTES NFR BLD AUTO: 25 % (ref 19.6–45.3)
MCH RBC QN AUTO: 34.6 PG (ref 26.6–33)
MCHC RBC AUTO-ENTMCNC: 35.2 G/DL (ref 31.5–35.7)
MCV RBC AUTO: 98.4 FL (ref 79–97)
MONOCYTES # BLD AUTO: 1 10*3/MM3 (ref 0.1–0.9)
MONOCYTES NFR BLD AUTO: 13.9 % (ref 5–12)
NEUTROPHILS NFR BLD AUTO: 4.15 10*3/MM3 (ref 1.7–7)
NEUTROPHILS NFR BLD AUTO: 57.8 % (ref 42.7–76)
NRBC BLD AUTO-RTO: 0 /100 WBC (ref 0–0.2)
PLATELET # BLD AUTO: 568 10*3/MM3 (ref 140–450)
PMV BLD AUTO: 9.3 FL (ref 6–12)
POTASSIUM SERPL-SCNC: 3.2 MMOL/L (ref 3.5–5.2)
PROT SERPL-MCNC: 7.5 G/DL (ref 6–8.5)
RBC # BLD AUTO: 3.84 10*6/MM3 (ref 4.14–5.8)
SODIUM SERPL-SCNC: 127 MMOL/L (ref 136–145)
WBC NRBC COR # BLD AUTO: 7.17 10*3/MM3 (ref 3.4–10.8)

## 2024-01-17 PROCEDURE — 85025 COMPLETE CBC W/AUTO DIFF WBC: CPT | Performed by: INTERNAL MEDICINE

## 2024-01-17 PROCEDURE — 80053 COMPREHEN METABOLIC PANEL: CPT | Performed by: INTERNAL MEDICINE

## 2024-01-17 PROCEDURE — 36415 COLL VENOUS BLD VENIPUNCTURE: CPT

## 2024-01-17 NOTE — PROGRESS NOTES
Re: Refills of Oral Specialty Medication - Jakafi (ruxolitinib)    Drug-Drug Interactions: The current medication list was reviewed and there are no relevant drug-drug interactions with the specialty medication.  Medication Allergies: The patient has no relevant allergies as it relates to their oral specialty medication  Review of Labs/Dose Adjustments: DOSE CHANGE - I reviewed the most recent note and labs. Due to labs the dose is being increased. I sent refills as described below.    Drug: Jakafi (ruxolitinib)  Strength: 10 mg  Directions: Take 2 tablets by mouth  in the AM and 1 tablet in the PM daily.  Quantity: 90  Refills: 5  Pharmacy prescription sent to: [facility]    Name/Credentials: Elder Holder, NunoD, BCOP  Clinical Oncology Pharmacist    1/17/2024  13:20 EST

## 2024-01-17 NOTE — PROGRESS NOTES
Drug: Jakafi (ruxolitinib)  Strength: 10 mg  Directions: Take 2 tablets by mouth  in the AM and 1 tablet in the PM daily.  Quantity: 90  Refills: 5  Pharmacy prescription sent to: [facility]    Completed independent double check on medication order/RX.  Amanda Morrison, NunoD, BCPS

## 2024-01-17 NOTE — PROGRESS NOTES
Subjective     REASON FOR FOLLOW UP:  1.  Polycythemia vera, polycythemia and thrombocytosis and leukocytosis, EPO level 1.3, Tino 2+, peripheral blood for BCR/C ABL negative    HISTORY OF PRESENT ILLNESS:  The patient is a 63 y.o. year old male who is here for an opinion about the above issue.    History of Present Illness   Phani Landis is a 63 y.o. male with the above-mentioned history who is here today for lab review and follow-up continuing on Hydrea 1500 mg 5 days a week and 1000 mg 2 days a week.  He states he typically takes the 1000 mg dose on the weekends.  He is tolerating this well without any significant side effects.      Patient was previously referred to dermatology due to some issues with skin lesions on his lower extremity that were delayed healing however the patient states that he did not end up keeping that appointment as the lesions have improved.  He has been using vitamin E oil.        Patient is 62-year-old with polycythemia vera.  He is currently taking hydroxyurea 1500 mg 6 days a week and 100 mg 1 day a week.  He continues to tolerate treatment well.  He denies any side effects such as fatigue, constipation, diarrhea, nausea, decreased appetite, headache, or skin ulcers.  Hemoglobin today is 14.9 and platelets are 430,000.  White blood cell count remains normal at 6.29.      November 15, 2023: Patient has 3 ulcerations on the right lower extremity.  He has been followed by wound care nurse.  I discussed with the wound care nurse and they do believe this is hydroxyurea related ulceration.  He does not smoke and has never smoked in the past.  He had good pulses peripherally.  They do not think this is accounting for underlying venous ulcers but more than likely hydroxyurea associated ulcerations.  We discussed about switching patient from hydroxyurea to Jakafi.  In the past he has had splenomegaly with the spleen being 13.5 cm.    Given that he is not tolerating hydroxyurea it is likely  best to switch him to Jakafi.  Today have discussed in length the side effects of Jakafi.    Interval history:  Patient returns to the office today for follow-up in regards to his polycythemia vera.  He was previously on Hydrea however this has been discontinued related to intolerance and lower extremity ulcerations.  He was started on Jakafi 10 mg p.o. twice daily.  He has only been on Jakafi for 3 days now and reports he is tolerating it well without any side effects.  He denies any fatigue, constipation, diarrhea, nausea, decreased appetite, or headaches.  He continues to be followed by the wound care center at [facility] and reports his ulcerations are healing up well.  He did just complete a course of doxycycline and prednisone.  No other concerns noted at this time.    December 12, 2023: Ultrasound of the abdomen showed hepatosplenomegaly.  Spleen is 14 8.8 cm in size.  Currently patient is on Jakafi and appears to be better controlled with platelets improved to 537 from 621.  Patient has made appointment with Dr. Maguire medical oncologist at [facility].  So far tolerating Jakafi very well.  Also the ulcers on the lower extremity right greater than left but they are very much improved since hydroxyurea has been discontinued    January 16, 2024: Patient was seen at [place] and his counts had worsened where his white count was 11.4 and platelet count 795.  They increased the dose of his Jakafi to 20 mg in the morning and 10 mg in the night.  Patient tells me he prefers to come here rather than go to [facility].    We discussed that we should have a baseline bone marrow on him as my concern is whether he is gradually going to moving to myelofibrosis.  He is willing to get the bone marrow done now which she was hesitant in the past.    His liver function tests have gradually worsened but he has been taking Tylenol because of knee pain.  He is supposed to get injections in the knee and since  "there was a delay he took at least 4 Tylenol's daily.  We discussed about trying to avoid Tylenol and consider taking the knee injection.  He does not have any pruritus.  His hemoglobin is stable.  His potassium is low at 3.2 but he is on chlorthalidone and hence we will increase his potassium to 20 mEq in the morning and 10 mg in the night    Patient does not have any pruritus, no symptoms of TIA.      Hematological oncologic history:  Patient is a 59-year-old gentleman who has been referred by his primary care Castillo Velasco for evaluation of thrombocytosis and polycythemia.  He has lost more than 50 pounds of weight.  In the last 6 months.  Looking back his blood counts have been high starting in 2016.  Initially his platelets were high around 670 and gradually increased in the 900 range.  More recently his hemoglobin has been increasing and his white count has been increasing.  He has not had a DVT or pulmonary embolism.  He has had no issues with itching with hot showers.  Has not had a stroke.  He had pneumonia last year but none recently.  He does have some shortness of breath but no chest pain.  He is exposed to secondhand smoking.    He does not have any nausea vomiting or abdominal pain at present.  He did have gout on several locations and was placed on meloxicam.  His renal function is mildly elevated.  He denies any fever or night sweats.    Patient does have a cough productive of yellow sputum but has no fevers.    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, EPO 1.3 low  CT chest negative, CT abdomen pelvis shows splenomegaly    Past Medical History:   Diagnosis Date    Anxiety     Arthritis     Cancer June 2020    Polycythemia vera    Hypertension         Past Surgical History:   Procedure Laterality Date    CHEST SURGERY      OTHER SURGICAL HISTORY  1985    \"Exploratory\"        Current Outpatient Medications on File Prior to Visit   Medication Sig " Dispense Refill    allopurinol (ZYLOPRIM) 100 MG tablet TAKE 1 TABLET BY MOUTH TWICE A  tablet 0    amLODIPine-benazepril (LOTREL) 5-40 MG per capsule TAKE ONE CAPSULE BY MOUTH DAILY 90 capsule 1    aspirin 81 MG chewable tablet Chew 1 tablet Daily.      bisoprolol (ZEBeta) 10 MG tablet TAKE ONE TABLET BY MOUTH DAILY 90 tablet 1    chlorthalidone (HYGROTEN) 50 MG tablet TAKE ONE TABLET BY MOUTH DAILY 90 tablet 1    hydrALAZINE (APRESOLINE) 50 MG tablet TAKE ONE TABLET BY MOUTH TWICE A  tablet 1    potassium chloride 10 MEQ CR tablet TAKE 1 TABLET BY MOUTH TWICE A  tablet 1    [DISCONTINUED] ruxolitinib (JAKAFI) 10 MG tablet Take 1 tablet by mouth 2 (Two) Times a Day. Take by mouth twice daily, 12 hours apart with no regards to food. (Patient taking differently: Take 1 tablet by mouth 3 (Three) Times a Day. Take by mouth twice daily, 12 hours apart with no regards to food.) 60 tablet 5    triamcinolone (KENALOG) 0.1 % ointment Apply 1 application  topically to the appropriate area as directed 2 (Two) Times a Day. 80 g 0     No current facility-administered medications on file prior to visit.        ALLERGIES:    Allergies   Allergen Reactions    Cephalexin Itching        Social History     Socioeconomic History    Marital status:    Tobacco Use    Smoking status: Never    Smokeless tobacco: Never   Vaping Use    Vaping Use: Never used   Substance and Sexual Activity    Alcohol use: Yes     Comment: Occasional drinker    Drug use: Never    Sexual activity: Yes     Partners: Female        Family History   Problem Relation Age of Onset    Hypertension Father     Hypertension Other     Heart disease Other     Cervical cancer Other       I have reviewed the patient's medical history in detail and updated the computerized patient record.    Review of Systems  ROS as per HPI  14 point review of system is negative except the erythema in the left lower extremity is improved but the ulceration is  "present but significantly improved.    Ulceration has healed up on the malleolus medial malleolus  I have reviewed and confirmed the accuracy of the ROS as documented by the MA/LPN/RN Eliana Fleming MD    Objective     Vitals:    01/17/24 1236   BP: 128/88   Pulse: 71   Resp: 18   Temp: 98.4 °F (36.9 °C)   TempSrc: Temporal   SpO2: 98%   Weight: 127 kg (278 lb 14.4 oz)   Height: 188 cm (74.02\")   PainSc: 0-No pain             1/17/2024    12:36 PM   Current Status   ECOG score 0     Physical examination      CONSTITUTIONAL:  Vital signs reviewed.  No distress, looks comfortable.  EYES:  Conjunctivae and lids unremarkable.  PERRLA  EARS,NOSE,MOUTH,THROAT:  Ears and nose appear unremarkable.  Lips, teeth, gums appear unremarkable.  RESPIRATORY:  Normal respiratory effort.  Lungs clear to auscultation bilaterally.   CARDIOVASCULAR:  Normal S1, S2.  No murmurs rubs or gallops.  No significant lower extremity edema.  GASTROINTESTINAL: Abdomen appears unremarkable.  Nontender.  No hepatomegaly.  No splenomegaly.  LYMPHATIC:  No cervical, supraclavicular, axillary lymphadenopathy.  SKIN:  Warm.  No rashes.  Lower extremity ulcerations wrapped per wound care.  PSYCHIATRIC:  Normal judgment and insight.  Normal mood and affect.  I have reexamined the patient and the results are consistent with the previously documented exam. Eliana Fleming MD    RECENT LABS:  Results from last 7 days   Lab Units 01/17/24  1210   WBC 10*3/mm3 7.17   NEUTROS ABS 10*3/mm3 4.15   HEMOGLOBIN g/dL 13.3   HEMATOCRIT % 37.8   PLATELETS 10*3/mm3 568*           Results from last 7 days   Lab Units 01/17/24  1210   SODIUM mmol/L 127*   POTASSIUM mmol/L 3.2*   CHLORIDE mmol/L 88*   CO2 mmol/L 27.4   BUN mg/dL 16   CREATININE mg/dL 0.94   CALCIUM mg/dL 9.8   ALBUMIN g/dL 4.5   BILIRUBIN mg/dL 0.5   ALK PHOS U/L 57   ALT (SGPT) U/L 69*   AST (SGOT) U/L 90*   GLUCOSE mg/dL 113*               Assessment & Plan     1.  Polycythemia with leukocytosis and " thrombocytosis.  He does not have any itching with hot showers.  He does not have any headaches or DVT.  He does have blood counts worsening gradually from 2016.  7/8/2020  His platelets 974K.  Hemoglobin is 17.6 with hematocrit of 52.8 and white count of 11.56.  He denies fever night sweats but has significant weight loss greater than 50 pounds in 6 months.  He does have a cough which is productive of yellow sputum and some shortness of breath.  He has history of secondhand smoking exposure.  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  CT chest negative  Hydroxyurea 500 mg twice daily was initiated on July 16, 2020.  .  7/22/2020 Hydrea increased to 1500 mg (1000 mg in am and 500 mg in pm) Monday-Friday and 2000 mg (1000 mg in am and 1000 mg in PM) on Saturdays and Sundays  7/29/2020 patient's platelet count today is 821.  White count 7.78, hemoglobin 16.4.   Increased Hydrea at 1500 mg Monday through Friday, and 2000 mg on Saturdays and Sundays.   In future if hematocrit greater than 48 then he will receive phlebotomy.  Last phlebotomy 7/15/2020.   2/23/2022 patient continues on Hydrea 1500 mg on Monday, Wednesday, and Friday, 1500 mg all other days.  He is tolerating Hydrea well aside from some fatigue on the days he takes 1500 mg.  He also continues to receive phlebotomy for hematocrit greater than 48, hematocrit today 44.7 so patient will not receive phlebotomy.  Platelets today have increased to 575, so we will increase Hydrea to 1500 mg four times a week, 1000 mg all other days.  Because we are increasing dose of Hydrea, patient will return in 1 month for CBC with RN review to monitor counts.  Discussed with the patient who is agreeable.  6/15/2022: Patient continues on Hydrea 1500 mg 4 days a week and 1000 mg all other days.  Platelets increased to 573,000.  Discussed with Dr. Fleming today plan to increase Hydrea  to 1500 mg 5 days a week and 1000 mg 2 days a week.  He will return in 1 month for CBC with RN review to monitor counts.  Patient agreeable   September 7, 2022: Tolerating Hydrea very well.  His platelets are down to 497.  He continues with aspirin.  Given hematocrit of 44 we will hold off phlebotomy  11/30/2022 continuing on Hydrea 1500 mg 5 days a week and 1000 mg 2 days a week tolerating well.  May 24, 2023: Patient's hematocrit is 42.  But his platelet is elevated to 580.  We will increase the dose of his hydroxyurea to 1500 mg 6 days a week and 1000 mg 1 day a week  8/16/2023: Patient's hematocrit is normal at 41.9.  Platelets have normalized to 430,000.  Hemoglobin 14.9 and WBC 6.29.  Hydrea dosing will remain the same at 1500 mg 6 days a week and 100 mg once weekly.  He continues to tolerate this treatment well. Mild elevation was noted with his liver enzymes.  Historically he has had mild elevations when he was taking pain medications.  Today his AST is 42 and ALT is 42.  Total bilirubin is normal at 0.7 and alkaline phosphatase is normal at 72.  Does note that he takes occasional Tylenol for his arthritis.  He denies drinking any alcohol.  We will have him return in 1 month to monitor his labs.  November 15, 2023: Patient has new ulcerations in the right lower extremity for the last 2 3 months which are not healing.  On discussion with wound care they felt this is more likely hydroxyurea related rather than venous disease.  He has never been a smoker and so we discussed about switching to Jakafi.  He has had splenomegaly in the past but we will need to obtain ultrasound of the abdomen stat in order to rule out worsening splenomegaly.  11/29/2023: Patient is currently 3 days into Jakafi 10 mg p.o. twice daily.  He is currently tolerating his medication well and denies any side effects.  His lower extremity ulcerations secondary to Hydrea continue to improve and patient is being followed closely by the wound  care center at [facility].  Lower extremities are currently dressed today.  Platelet count today is more elevated at 669,000.  Mild leukocytosis with a white blood cell count of 13.60.  Patient did just complete a course of steroids but is most likely the cause.  ALT mildly elevated today at 47.  AST and total bilirubin are normal.  We will have patient repeat labs in 1 week for close monitoring we will follow back up with Dr. Fleming in 2 weeks.  January 17 2024: Patient was seen 2 weeks ago at [facility] by a nurse practitioner of Dr. Garcia the oncologist.  However his platelets had gone up to 275 and his white count was elevated to 11.41.  As a result they had increased his dose on Jakafi to 20 mg in the morning and 10 mg at bedtime.  However today after the increased dose his counts have improved though platelet count is still 578 and white count is normalized.  He prefers to follow-up at [place].  Patient has never had a bone marrow and we discussed about consideration of bone marrow in order to make sure there is no progression to myelofibrosis.  His spleen is enlarged.      2.  Weight loss greater than 50 pounds at time of initial evaluation.  CT chest abdomen pelvis negative for malignancy  Patient admitting that his weight loss was actually been more intentional   Weight now stable    3. Hypokalemia, mild, chronic.  8/2/2021: Potassium 3.3. We discussed utilizing electrolyte drinks when he is working outside/sweating to help replenish what he is losing.  He does continue potassium chloride once daily as well.  9/28/2021: Potassium stable at 3.3. Continue Klor-con 20 mEq daily.  2/23/2022 potassium today 4.0, patient will continue on potassium 20 mEq daily.  6/15/2022: Currently taking 10 mEq daily, potassium 3.8.  11/30/2022 potassium 3.2, he will increase his potassium to 20 M EQ daily.  8/16/2023: Potassium 3.5.  Patient remains on potassium 20 mill equivalents daily.  11/29/2023:  Potassium is normal at 3.6.  January 17, 2024: Patient's potassium is 3.2.  He is on chlorthalidone.  Will need to increase potassium intake    5.  History of knee surgery.  Patient is to go for knee surgery in 3 weeks  Discussed with Dr. Chad Oliveros and he will start him on 2.5 mg p.o. twice daily of Eliquis 24 hours after surgery.      6.  Skin lesions on the lower extremity with delayed healing, will consult with [facility]  11/30/2022 patient states that he did not see dermatologist as a skin lesions began to heal.  He continues to use vitamin D on this area.  March 1, 2023: The skin ulceration on the left lower extremity is healing up but not completely healed up  Unsure if this is just secondary to chronic venous stasis as opposed to hydroxyurea.  However usually the hydroxyurea ulcerations on the medial malleolus and this is more superior  8/16/2023: No skin lesions noted today.  11/29/2023: Lower extremity ulcerations are healing well and he continues to be followed by [facility] wound care center.    7.  Elevated liver function tests with ALT of 69 and AST of 90 likely secondary to Tylenol which she has been taking for a day for the sake of his knee pain    8.  Nausea related to Jakafi, patient takes with food and then he does not have nausea       Plan:   Continue Jakafi 20 mg in the morning and 10 mg at bedtime  CBC has improved today though his platelets are still slightly elevated  In future if his platelets are above normal and white count is mildly elevated then we can increase the dose of Jakafi to 20 mg twice a day  Patient had slight nausea initially when he took Jakafi but now completely resolved as he takes it with food  Change potassium to 20 mg in the morning and 10 mg at bedtime, patient to get prescription through his primary care  Will obtain bone marrow aspiration biopsy  Labs every 2 weeks x 2 months  Follow-up with nurse practitioner in 1 month and follow-up with me  in 2 months.  Asked patient to stop Tylenol given worsening liver function tests      I spent 40 total minutes, face-to-face, caring for Phani today. Greater than 50% of this time involved counseling and/or coordination of care as documented within this note.    Eliana Fleming MD  5/24/23

## 2024-01-18 ENCOUNTER — SPECIALTY PHARMACY (OUTPATIENT)
Dept: PHARMACY | Facility: HOSPITAL | Age: 64
End: 2024-01-18
Payer: COMMERCIAL

## 2024-01-18 ENCOUNTER — TELEPHONE (OUTPATIENT)
Dept: ONCOLOGY | Facility: CLINIC | Age: 64
End: 2024-01-18
Payer: COMMERCIAL

## 2024-01-18 ENCOUNTER — TELEPHONE (OUTPATIENT)
Dept: ONCOLOGY | Facility: HOSPITAL | Age: 64
End: 2024-01-18

## 2024-01-18 DIAGNOSIS — Z79.899 HIGH RISK MEDICATION USE: ICD-10-CM

## 2024-01-18 DIAGNOSIS — E87.1 HYPONATREMIA: ICD-10-CM

## 2024-01-18 DIAGNOSIS — D45 POLYCYTHEMIA VERA: ICD-10-CM

## 2024-01-18 DIAGNOSIS — E87.6 HYPOKALEMIA: Primary | ICD-10-CM

## 2024-01-18 RX ORDER — SODIUM CHLORIDE 1 G/1
1 TABLET ORAL 2 TIMES DAILY
Qty: 60 TABLET | Refills: 2 | Status: SHIPPED | OUTPATIENT
Start: 2024-01-18

## 2024-01-18 NOTE — TELEPHONE ENCOUNTER
Call to Mr. Landis to let him know his sodium level was low on his lab results from the previous day.  Let him know that Dr. Fleming is prescribing salt tablets for him to take twice daily, and wants to refer him to nephrologist.  Asked patient if he wanted to be referred to local nephrologist in his area of [place], and he states he prefers to just come to [place].  Let him know the nephrologist office should contact him for appointment.  Understanding verbalized.

## 2024-01-19 ENCOUNTER — SPECIALTY PHARMACY (OUTPATIENT)
Dept: PHARMACY | Facility: HOSPITAL | Age: 64
End: 2024-01-19
Payer: COMMERCIAL

## 2024-01-22 ENCOUNTER — SPECIALTY PHARMACY (OUTPATIENT)
Dept: PHARMACY | Facility: HOSPITAL | Age: 64
End: 2024-01-22
Payer: COMMERCIAL

## 2024-01-22 DIAGNOSIS — D45 POLYCYTHEMIA VERA: ICD-10-CM

## 2024-01-22 NOTE — PROGRESS NOTES
Re: Refills of Oral Specialty Medication - Jakafi (ruxolitinib)    Drug-Drug Interactions: The current medication list was reviewed and there are no relevant drug-drug interactions with the specialty medication.  Medication Allergies: The patient has no relevant allergies as it relates to their oral specialty medication  Review of Labs/Dose Adjustments: NO DOSE CHANGE - I reviewed the most recent note and labs and the patient will continue without any dose changes.  I sent refills as described below.    Drug: Jakafi (ruxolitinib)  Strength: 20 mg and 10 mg  Directions: Take 1 tablet of the 20 mg in the am and 1 tablet of 10 mg in the pm  Quantity: 30 of each  Refills: 5  Pharmacy prescription sent to: [facility]    Name/Credentials Ev Yun Rph, BCOP    1/22/2024  10:59 EST

## 2024-01-22 NOTE — PROGRESS NOTES
Drug: Jakafi (ruxolitinib)  Strength: 20 mg and 10 mg  Directions: Take 1 tablet of the 20 mg in the am and 1 tablet of 10 mg in the pm  Quantity: 30 of each  Refills: 5  Pharmacy prescription sent to: [facility]    Completed independent double check on medication order/RX.  Elder Holder, Gagandeep, BCOP  Clinical Oncology Pharmacist

## 2024-01-31 ENCOUNTER — SPECIALTY PHARMACY (OUTPATIENT)
Dept: PHARMACY | Facility: HOSPITAL | Age: 64
End: 2024-01-31
Payer: COMMERCIAL

## 2024-01-31 ENCOUNTER — LAB (OUTPATIENT)
Dept: OTHER | Facility: HOSPITAL | Age: 64
End: 2024-01-31
Payer: COMMERCIAL

## 2024-01-31 ENCOUNTER — CLINICAL SUPPORT (OUTPATIENT)
Dept: ONCOLOGY | Facility: HOSPITAL | Age: 64
End: 2024-01-31
Payer: COMMERCIAL

## 2024-01-31 DIAGNOSIS — D45 POLYCYTHEMIA VERA: ICD-10-CM

## 2024-01-31 LAB
ALBUMIN SERPL-MCNC: 4.6 G/DL (ref 3.5–5.2)
ALBUMIN/GLOB SERPL: 1.5 G/DL
ALP SERPL-CCNC: 57 U/L (ref 39–117)
ALT SERPL W P-5'-P-CCNC: 63 U/L (ref 1–41)
ANION GAP SERPL CALCULATED.3IONS-SCNC: 8.7 MMOL/L (ref 5–15)
AST SERPL-CCNC: 64 U/L (ref 1–40)
BASOPHILS # BLD AUTO: 0.11 10*3/MM3 (ref 0–0.2)
BASOPHILS NFR BLD AUTO: 1.2 % (ref 0–1.5)
BILIRUB SERPL-MCNC: 0.4 MG/DL (ref 0–1.2)
BUN SERPL-MCNC: 19 MG/DL (ref 8–23)
BUN/CREAT SERPL: 18.3 (ref 7–25)
CALCIUM SPEC-SCNC: 9.9 MG/DL (ref 8.6–10.5)
CHLORIDE SERPL-SCNC: 96 MMOL/L (ref 98–107)
CO2 SERPL-SCNC: 29.3 MMOL/L (ref 22–29)
CREAT SERPL-MCNC: 1.04 MG/DL (ref 0.76–1.27)
DEPRECATED RDW RBC AUTO: 47.4 FL (ref 37–54)
EGFRCR SERPLBLD CKD-EPI 2021: 80.7 ML/MIN/1.73
EOSINOPHIL # BLD AUTO: 0.07 10*3/MM3 (ref 0–0.4)
EOSINOPHIL NFR BLD AUTO: 0.8 % (ref 0.3–6.2)
ERYTHROCYTE [DISTWIDTH] IN BLOOD BY AUTOMATED COUNT: 13.2 % (ref 12.3–15.4)
GLOBULIN UR ELPH-MCNC: 3 GM/DL
GLUCOSE SERPL-MCNC: 104 MG/DL (ref 65–99)
HCT VFR BLD AUTO: 41.1 % (ref 37.5–51)
HGB BLD-MCNC: 14 G/DL (ref 13–17.7)
IMM GRANULOCYTES # BLD AUTO: 0.09 10*3/MM3 (ref 0–0.05)
IMM GRANULOCYTES NFR BLD AUTO: 1 % (ref 0–0.5)
LYMPHOCYTES # BLD AUTO: 2.77 10*3/MM3 (ref 0.7–3.1)
LYMPHOCYTES NFR BLD AUTO: 30.3 % (ref 19.6–45.3)
MCH RBC QN AUTO: 33.6 PG (ref 26.6–33)
MCHC RBC AUTO-ENTMCNC: 34.1 G/DL (ref 31.5–35.7)
MCV RBC AUTO: 98.6 FL (ref 79–97)
MONOCYTES # BLD AUTO: 1.02 10*3/MM3 (ref 0.1–0.9)
MONOCYTES NFR BLD AUTO: 11.1 % (ref 5–12)
NEUTROPHILS NFR BLD AUTO: 5.09 10*3/MM3 (ref 1.7–7)
NEUTROPHILS NFR BLD AUTO: 55.6 % (ref 42.7–76)
NRBC BLD AUTO-RTO: 0 /100 WBC (ref 0–0.2)
PLATELET # BLD AUTO: 940 10*3/MM3 (ref 140–450)
PMV BLD AUTO: 9.7 FL (ref 6–12)
POTASSIUM SERPL-SCNC: 3.4 MMOL/L (ref 3.5–5.2)
PROT SERPL-MCNC: 7.6 G/DL (ref 6–8.5)
RBC # BLD AUTO: 4.17 10*6/MM3 (ref 4.14–5.8)
SODIUM SERPL-SCNC: 134 MMOL/L (ref 136–145)
WBC NRBC COR # BLD AUTO: 9.15 10*3/MM3 (ref 3.4–10.8)

## 2024-01-31 PROCEDURE — G0463 HOSPITAL OUTPT CLINIC VISIT: HCPCS

## 2024-01-31 PROCEDURE — 36415 COLL VENOUS BLD VENIPUNCTURE: CPT

## 2024-01-31 PROCEDURE — 80053 COMPREHEN METABOLIC PANEL: CPT | Performed by: INTERNAL MEDICINE

## 2024-01-31 PROCEDURE — 85025 COMPLETE CBC W/AUTO DIFF WBC: CPT | Performed by: INTERNAL MEDICINE

## 2024-01-31 NOTE — PROGRESS NOTES
Drug: Jakafi (ruxolitinib)  Strength: 20 mg  Directions: Take 1 tablet by mouth twice a day  Quantity: 60  Refills: 5    Pharmacy prescription sent to: [facility]  Amanda Morrison, NunoD, BCPS

## 2024-01-31 NOTE — NURSING NOTE
Lab Results   Component Value Date    WBC 9.15 2024    HGB 14.0 2024    HCT 41.1 2024    MCV 98.6 (H) 2024     (H) 2024      Lab Results   Component Value Date    GLUCOSE 104 (H) 2024    BUN 19 2024    CREATININE 1.04 2024    EGFR 80.7 2024    BCR 18.3 2024    K 3.4 (L) 2024    CO2 29.3 (H) 2024    CALCIUM 9.9 2024    PROTENTOTREF 7.7 2020    ALBUMIN 4.6 2024    BILITOT 0.4 2024    AST 64 (H) 2024    ALT 63 (H) 2024      Patient is here for lab review with RN. Patient states he has been taking his Jakafi as prescribed (20mg in AM and 10mg in PM) since . Patient states that due to insurance he did not have his pills to take from  through .     Patient voicing no complaints related to Jakafi. Patient states his dog  recently and is wanting to get back on his anxiety medication. Patient instructed to call PCP office ([facility]) today to discuss restarting medication. Patient verbalizes understanding.     CBC/CMP reviewed. Patient states he is taking his potassium as increased and stopped taking tylenol. Potassium improved and liver functions improved. Educated on potassium. Returns in 2 weeks for recheck.     Dr Fleming out of office. Dr Oswald QUINTANILLA #2 at [facility] office. Call out to Dr Akers for further instruction on Jakafi. Patient states he has a 2 hour drive home and agreeable to phone call ([phone]) for further instructions.     Copy of labs given to patient and follow up appointment reviewed. Patient is instructed to call the office with any concerns or new symptoms prior to next visit. Patient verbalized understanding and discharged in stable condition.     S/W Dr Akers and order to instruct patient to increase Jakafi to 20mg twice per day. Message sent to [facility] oral chemo pool to make sure drug is taken care of on back end in regards to insurance and refills. Phone call to  patient at 1437 to update on new drug dosing. Patient verbalizes understanding and states he will start that today.

## 2024-01-31 NOTE — PROGRESS NOTES
Re: Refills of Oral Specialty Medication - Jakafi (ruxolitinib)    Drug-Drug Interactions: The current medication list was reviewed and there are no relevant drug-drug interactions with the specialty medication.  Medication Allergies: The patient has no relevant allergies as it relates to their oral specialty medication  Review of Labs/Dose Adjustments: DOSE CHANGE - I reviewed the most recent note and labs. Due to thrombocytosis the dose is being increased. I sent refills as described below.    Drug: Jakafi (ruxolitinib)  Strength: 20 mg  Directions: Take 1 tablet by mouth twice a day  Quantity: 60  Refills: 5  Pharmacy prescription sent to: [facility]    Name/Credentials: Elder Holder, PharmD, BCOP  Clinical Oncology Pharmacist    1/31/2024  15:55 EST

## 2024-02-01 ENCOUNTER — TELEPHONE (OUTPATIENT)
Dept: ONCOLOGY | Facility: CLINIC | Age: 64
End: 2024-02-01
Payer: COMMERCIAL

## 2024-02-01 ENCOUNTER — OFFICE VISIT (OUTPATIENT)
Dept: FAMILY MEDICINE CLINIC | Facility: CLINIC | Age: 64
End: 2024-02-01
Payer: COMMERCIAL

## 2024-02-01 VITALS
SYSTOLIC BLOOD PRESSURE: 110 MMHG | OXYGEN SATURATION: 96 % | BODY MASS INDEX: 34.47 KG/M2 | HEART RATE: 58 BPM | HEIGHT: 74 IN | TEMPERATURE: 97.8 F | WEIGHT: 268.6 LBS | DIASTOLIC BLOOD PRESSURE: 73 MMHG

## 2024-02-01 DIAGNOSIS — F41.9 ANXIETY: ICD-10-CM

## 2024-02-01 DIAGNOSIS — E87.1 HYPONATREMIA: ICD-10-CM

## 2024-02-01 DIAGNOSIS — F33.1 MODERATE EPISODE OF RECURRENT MAJOR DEPRESSIVE DISORDER: Primary | ICD-10-CM

## 2024-02-01 DIAGNOSIS — I10 ESSENTIAL HYPERTENSION: ICD-10-CM

## 2024-02-01 DIAGNOSIS — E87.6 HYPOKALEMIA: ICD-10-CM

## 2024-02-01 PROBLEM — D45 POLYCYTHEMIA VERA: Status: RESOLVED | Noted: 2020-07-15 | Resolved: 2024-02-01

## 2024-02-01 PROCEDURE — 99214 OFFICE O/P EST MOD 30 MIN: CPT | Performed by: NURSE PRACTITIONER

## 2024-02-01 RX ORDER — BISOPROLOL FUMARATE 10 MG/1
10 TABLET, FILM COATED ORAL DAILY
Qty: 90 TABLET | Refills: 1 | Status: SHIPPED | OUTPATIENT
Start: 2024-02-01

## 2024-02-01 RX ORDER — HYDRALAZINE HYDROCHLORIDE 50 MG/1
50 TABLET, FILM COATED ORAL 2 TIMES DAILY
Qty: 180 TABLET | Refills: 1 | Status: CANCELLED | OUTPATIENT
Start: 2024-02-01

## 2024-02-01 RX ORDER — CHLORTHALIDONE 25 MG/1
25 TABLET ORAL DAILY
Qty: 90 TABLET | Refills: 1 | Status: SHIPPED | OUTPATIENT
Start: 2024-02-01

## 2024-02-01 RX ORDER — AMLODIPINE AND BENAZEPRIL HYDROCHLORIDE 5; 40 MG/1; MG/1
1 CAPSULE ORAL DAILY
Qty: 90 CAPSULE | Refills: 1 | Status: SHIPPED | OUTPATIENT
Start: 2024-02-01

## 2024-02-01 RX ORDER — ALPRAZOLAM 0.5 MG/1
0.5 TABLET ORAL 2 TIMES DAILY PRN
Qty: 30 TABLET | Refills: 0 | Status: SHIPPED | OUTPATIENT
Start: 2024-02-01

## 2024-02-01 RX ORDER — SERTRALINE HYDROCHLORIDE 25 MG/1
25 TABLET, FILM COATED ORAL DAILY
Qty: 30 TABLET | Refills: 2 | Status: SHIPPED | OUTPATIENT
Start: 2024-02-01

## 2024-02-01 NOTE — ASSESSMENT & PLAN NOTE
Patient's depression is recurrent and is moderate without psychosis. Their depression is currently active and the condition is newly identified. This will be reassessed in 3 months. F/U as described:patient was prescribed an antidepressant medicine.  I will start him on Zoloft 25 mg daily.  I discussed drug efficacy and potential side effects.  Advised him to call or follow-up if any new or worsening of symptoms.

## 2024-02-01 NOTE — PROGRESS NOTES
"Chief Complaint  Hypertension and Anxiety    Subjective          Phani Landis, 63 y.o. male presents to [facility]  History of Present Illness   for his 6-month follow-up on hypertension.    He is requesting something for anxiety.  His dog passed away and he lives alone so he is very sad.  He is tearful in the office today.  He states that he has had anxiety/panic attacks in the past and has taken Xanax in the past.  He states he would only take a little piece of the 2 mg dose that they would give him.  He states he did not take it very often but only as needed.  He is requesting some Xanax today.  He is also had depression in the past but has never been treated with antidepression medicine.  He denies any suicidal or homicidal ideation.    Hypertension: He is currently on amlodipine-benazepril 5-40 mg daily, bisoprolol 10 mg daily, chlorthalidone 50 mg daily, hydralazine 50 mg twice daily but he has only been taking hydralazine once daily.  He has had hypokalemia and is now on potassium 10 mEq twice daily.  He also has had hyponatremia and is now on sodium chloride 1 g twice daily.    He has polycythemia vera.  He is stable on allopurinol and Jakafi.  He follows with hematology oncology Dr. Tom MD. He is also on sodium chloride twice daily per hematology.  He did have CBC and CMP drawn yesterday.  He states that his platelet count did increase to 940 and that is because he had been without his medication but they have him back on his medication now.      Tobacco Use: Low Risk  (2/1/2024)    Patient History     Smoking Tobacco Use: Never     Smokeless Tobacco Use: Never     Passive Exposure: Not on file      Objective   Vital Signs:   /73 (BP Location: Left arm, Patient Position: Sitting, Cuff Size: Adult)   Pulse 58   Temp 97.8 °F (36.6 °C)   Ht 188 cm (74.02\")   Wt 122 kg (268 lb 9.6 oz)   SpO2 96%   BMI 34.47 kg/m²       Current Outpatient Medications:     " allopurinol (ZYLOPRIM) 100 MG tablet, TAKE 1 TABLET BY MOUTH TWICE A DAY, Disp: 180 tablet, Rfl: 0    amLODIPine-benazepril (LOTREL) 5-40 MG per capsule, Take 1 capsule by mouth Daily. Indications: High Blood Pressure Disorder, Disp: 90 capsule, Rfl: 1    aspirin 81 MG chewable tablet, Chew 1 tablet Daily., Disp: , Rfl:     bisoprolol (ZEBeta) 10 MG tablet, Take 1 tablet by mouth Daily. Indications: High Blood Pressure Disorder, Disp: 90 tablet, Rfl: 1    chlorthalidone (HYGROTON) 25 MG tablet, Take 1 tablet by mouth Daily. Indications: High Blood Pressure Disorder, Disp: 90 tablet, Rfl: 1    hydrALAZINE (APRESOLINE) 50 MG tablet, TAKE ONE TABLET BY MOUTH TWICE A DAY, Disp: 180 tablet, Rfl: 1    potassium chloride 10 MEQ CR tablet, TAKE 1 TABLET BY MOUTH TWICE A DAY, Disp: 180 tablet, Rfl: 1    ruxolitinib (JAKAFI) 20 MG tablet, Take 1 tablet by mouth 2 (Two) Times a Day., Disp: 60 tablet, Rfl: 5    sodium chloride 1 g tablet, Take 1 tablet by mouth 2 (Two) Times a Day., Disp: 60 tablet, Rfl: 2    ALPRAZolam (Xanax) 0.5 MG tablet, Take 1 tablet by mouth 2 (Two) Times a Day As Needed for Anxiety or Sleep. Indications: Feeling Anxious, Disp: 30 tablet, Rfl: 0    sertraline (Zoloft) 25 MG tablet, Take 1 tablet by mouth Daily. Indications: Generalized Anxiety Disorder, Major Depressive Disorder, Disp: 30 tablet, Rfl: 2   Past Medical History:   Diagnosis Date    Anxiety     Arthritis     Cancer June 2020    Polycythemia vera    Hypertension       Physical Exam  Vitals reviewed.   Constitutional:       Appearance: Normal appearance. He is well-developed. He is obese.   Neck:      Thyroid: No thyroid mass, thyromegaly or thyroid tenderness.   Cardiovascular:      Rate and Rhythm: Normal rate and regular rhythm.      Heart sounds: No murmur heard.     No friction rub. No gallop.   Pulmonary:      Effort: Pulmonary effort is normal.      Breath sounds: Normal breath sounds. No wheezing or rhonchi.   Lymphadenopathy:       Cervical: No cervical adenopathy.   Skin:     General: Skin is warm and dry.   Neurological:      Mental Status: He is alert and oriented to person, place, and time.      Cranial Nerves: No cranial nerve deficit.   Psychiatric:         Mood and Affect: Mood is anxious and depressed. Affect is tearful.         Behavior: Behavior normal.         Thought Content: Thought content normal. Thought content does not include homicidal or suicidal ideation.         Judgment: Judgment normal.        Result Review :   {The following data was reviewed by AG Plunkett    CMP   CMP          1/4/2024    13:34 1/17/2024    12:10 1/31/2024    12:33   CMP   Glucose 100  113  104    BUN 19  16  19    Creatinine 0.98  0.94  1.04    EGFR 86.6  91.1  80.7    Sodium 130  127  134    Potassium 3.7  3.2  3.4    Chloride 93  88  96    Calcium 9.5  9.8  9.9    Total Protein 7.3  7.5  7.6    Albumin 4.6  4.5  4.6    Globulin 2.7  3.0  3.0    Total Bilirubin 0.5  0.5  0.4    Alkaline Phosphatase 65  57  57    AST (SGOT) 34  90  64    ALT (SGPT) 56  69  63    Albumin/Globulin Ratio 1.7  1.5  1.5    BUN/Creatinine Ratio 19.4  17.0  18.3    Anion Gap 8.7  11.6  8.7      CBC   CBC          1/4/2024    13:34 1/17/2024    12:10 1/31/2024    12:33   CBC   WBC 11.61  7.17  9.15    RBC 4.15  3.84  4.17    Hemoglobin 15.1  13.3  14.0    Hematocrit 43.5  37.8  41.1    .8  98.4  98.6    MCH 36.4  34.6  33.6    MCHC 34.7  35.2  34.1    RDW 12.4  13.1  13.2    Platelets 795  568  940      LIPID   Lipid Panel          3/1/2023    12:42   Lipid Panel   Total Cholesterol 124    Triglycerides 60    HDL Cholesterol 46    VLDL Cholesterol 13    LDL Cholesterol  65    LDL/HDL Ratio 1.43      TSH   TSH          3/1/2023    12:42   TSH   TSH 2.280      [id number]   A1C Last 3 Results          8/16/2023    12:55   HGBA1C Last 3 Results   Hemoglobin A1C 4.90      PSA   PSA          3/1/2023    12:42   PSA   PSA 1.020             Assessment and Plan     Diagnoses and all orders for this visit:    1. Moderate episode of recurrent major depressive disorder (Primary)  Assessment & Plan:  Patient's depression is recurrent and is moderate without psychosis. Their depression is currently active and the condition is newly identified. This will be reassessed in 3 months. F/U as described:patient was prescribed an antidepressant medicine.  I will start him on Zoloft 25 mg daily.  I discussed drug efficacy and potential side effects.  Advised him to call or follow-up if any new or worsening of symptoms.    Orders:  -     sertraline (Zoloft) 25 MG tablet; Take 1 tablet by mouth Daily. Indications: Generalized Anxiety Disorder, Major Depressive Disorder  Dispense: 30 tablet; Refill: 2    2. Anxiety  Assessment & Plan:  Due to his situational anxiety, I will prescribe him Xanax but will give him a 0.5 mg tablet, advised he can take half a tablet as needed twice daily.  We discussed that Xanax is a controlled substance, advised to not share medication, advised to safeguard medication to prevent getting lost or stolen, advised not to take more than prescribed.  This is a temporary prescription so no UDS or narcotic contract is needed.  Advised him not to drive while taking this medication.    Orders:  -     ALPRAZolam (Xanax) 0.5 MG tablet; Take 1 tablet by mouth 2 (Two) Times a Day As Needed for Anxiety or Sleep. Indications: Feeling Anxious  Dispense: 30 tablet; Refill: 0    3. Essential hypertension  Assessment & Plan:  Hypertension is improving with treatment.  Medication changes per orders.  We discussed that due to his low potassium and sodium that we need to decrease the chlorthalidone dose to 25 mg but he will need to start taking his hydralazine twice daily.  He will also continue amlodipine-benazepril and bisoprolol as prescribed.  Blood pressure will be reassessed in 3 months.    Orders:  -     amLODIPine-benazepril (LOTREL) 5-40 MG per capsule; Take 1 capsule by  mouth Daily. Indications: High Blood Pressure Disorder  Dispense: 90 capsule; Refill: 1  -     bisoprolol (ZEBeta) 10 MG tablet; Take 1 tablet by mouth Daily. Indications: High Blood Pressure Disorder  Dispense: 90 tablet; Refill: 1  -     chlorthalidone (HYGROTON) 25 MG tablet; Take 1 tablet by mouth Daily. Indications: High Blood Pressure Disorder  Dispense: 90 tablet; Refill: 1    4. Hyponatremia    5. Hypokalemia        Follow Up   Return in about 3 months (around 5/1/2024) for Annual physical and f/u depression/anxiety.  Patient was given instructions and counseling regarding his condition or for health maintenance advice. Please see specific information pulled into the AVS if appropriate.     Parts of this note are electronic transcriptions/translations of spoken language to printed text using the Dragon Dictation system.      Roselyn Haywood, APRN  02/01/2024

## 2024-02-01 NOTE — ASSESSMENT & PLAN NOTE
Due to his situational anxiety, I will prescribe him Xanax but will give him a 0.5 mg tablet, advised he can take half a tablet as needed twice daily.  We discussed that Xanax is a controlled substance, advised to not share medication, advised to safeguard medication to prevent getting lost or stolen, advised not to take more than prescribed.  This is a temporary prescription so no UDS or narcotic contract is needed.  Advised him not to drive while taking this medication.

## 2024-02-01 NOTE — TELEPHONE ENCOUNTER
"  Caller: Phani Landis \"Raj\"    Relationship to patient: Self    Best call back number: [phone]    Chief complaint: PT CALLED,  HIS LAB IS SCHEDULED AT [facility] AND FOLLOW UP IS SCHEDULED AT [facility]     Type of visit: LAB AND FOLLOW UP  AROUND 11    Requested date: BOTH AT [place]      If rescheduling, when is the original appointment: 2-14-24     "

## 2024-02-01 NOTE — ASSESSMENT & PLAN NOTE
Hypertension is improving with treatment.  Medication changes per orders.  We discussed that due to his low potassium and sodium that we need to decrease the chlorthalidone dose to 25 mg but he will need to start taking his hydralazine twice daily.  He will also continue amlodipine-benazepril and bisoprolol as prescribed.  Blood pressure will be reassessed in 3 months.

## 2024-02-07 ENCOUNTER — OFFICE VISIT (OUTPATIENT)
Dept: WOUND CARE | Facility: HOSPITAL | Age: 64
End: 2024-02-07
Payer: COMMERCIAL

## 2024-02-07 VITALS
HEART RATE: 67 BPM | SYSTOLIC BLOOD PRESSURE: 138 MMHG | DIASTOLIC BLOOD PRESSURE: 86 MMHG | RESPIRATION RATE: 18 BRPM | TEMPERATURE: 97.4 F

## 2024-02-07 DIAGNOSIS — S81.801D OPEN WOUND OF RIGHT LOWER LEG, SUBSEQUENT ENCOUNTER: Primary | ICD-10-CM

## 2024-02-07 DIAGNOSIS — D45 POLYCYTHEMIA VERA: ICD-10-CM

## 2024-02-07 DIAGNOSIS — I10 ESSENTIAL HYPERTENSION: ICD-10-CM

## 2024-02-07 PROCEDURE — G0463 HOSPITAL OUTPT CLINIC VISIT: HCPCS | Performed by: PHYSICIAN ASSISTANT

## 2024-02-07 RX ORDER — CHLORTHALIDONE 50 MG/1
50 TABLET ORAL DAILY
Qty: 90 TABLET | Refills: 0 | Status: SHIPPED | OUTPATIENT
Start: 2024-02-07

## 2024-02-07 RX ORDER — DOXYCYCLINE HYCLATE 100 MG/1
100 CAPSULE ORAL 2 TIMES DAILY
Qty: 14 CAPSULE | Refills: 0 | Status: SHIPPED | OUTPATIENT
Start: 2024-02-07 | End: 2024-02-14

## 2024-02-07 RX ORDER — HYDRALAZINE HYDROCHLORIDE 50 MG/1
50 TABLET, FILM COATED ORAL 2 TIMES DAILY
Qty: 180 TABLET | Refills: 0 | Status: SHIPPED | OUTPATIENT
Start: 2024-02-07

## 2024-02-07 NOTE — PROGRESS NOTES
Chief Complaint  Wound Check (Follow up visit for wound to E.  Patient is not diabetic.  )    Subjective      History of Present Illness    Phani Landis  is a 63 y.o. male here today with chronic ulcers to his right lower extremity.  He notes that he has had several ulcers on his left lower extremity as well but they have recently healed.  He notes that he was previously prescribed Silvadene to be applied to wound daily.    He was recently on a chemo medication (hydroxyurea) for polycythemia vera. This could have been causing his wounds. He recently saw his oncologist and was switched to jakifi.    He notes that he is not a diabetic and is not a smoker.     He notes that his lower leg wounds seem to be healed but that his shin wound seems to have worsened since last appointment. He continues to wrap his legs daily and apply Polymem Ag to wounds. He has been keeping wounds covered.  He tries to keep leg elevated when possible.     Allergies:  Cephalexin      Current Outpatient Medications:     allopurinol (ZYLOPRIM) 100 MG tablet, TAKE 1 TABLET BY MOUTH TWICE A DAY, Disp: 180 tablet, Rfl: 0    ALPRAZolam (Xanax) 0.5 MG tablet, Take 1 tablet by mouth 2 (Two) Times a Day As Needed for Anxiety or Sleep. Indications: Feeling Anxious, Disp: 30 tablet, Rfl: 0    amLODIPine-benazepril (LOTREL) 5-40 MG per capsule, Take 1 capsule by mouth Daily. Indications: High Blood Pressure Disorder, Disp: 90 capsule, Rfl: 1    aspirin 81 MG chewable tablet, Chew 1 tablet Daily., Disp: , Rfl:     bisoprolol (ZEBeta) 10 MG tablet, Take 1 tablet by mouth Daily. Indications: High Blood Pressure Disorder, Disp: 90 tablet, Rfl: 1    chlorthalidone (HYGROTEN) 50 MG tablet, TAKE 1 TABLET BY MOUTH DAILY, Disp: 90 tablet, Rfl: 0    chlorthalidone (HYGROTON) 25 MG tablet, Take 1 tablet by mouth Daily. Indications: High Blood Pressure Disorder, Disp: 90 tablet, Rfl: 1    doxycycline (VIBRAMYCIN) 100 MG capsule, Take 1 capsule by mouth 2 (Two)  "Times a Day for 7 days. Do not take at the same time as any vitamin or mineral supplements., Disp: 14 capsule, Rfl: 0    hydrALAZINE (APRESOLINE) 50 MG tablet, TAKE 1 TABLET BY MOUTH TWICE A DAY, Disp: 180 tablet, Rfl: 0    potassium chloride 10 MEQ CR tablet, TAKE 1 TABLET BY MOUTH TWICE A DAY, Disp: 180 tablet, Rfl: 1    ruxolitinib (JAKAFI) 20 MG tablet, Take 1 tablet by mouth 2 (Two) Times a Day., Disp: 60 tablet, Rfl: 5    sertraline (Zoloft) 25 MG tablet, Take 1 tablet by mouth Daily. Indications: Generalized Anxiety Disorder, Major Depressive Disorder, Disp: 30 tablet, Rfl: 2    sodium chloride 1 g tablet, Take 1 tablet by mouth 2 (Two) Times a Day., Disp: 60 tablet, Rfl: 2    Past Medical History:   Diagnosis Date    Anxiety     Arthritis     Cancer June 2020    Polycythemia vera    Hypertension      Past Surgical History:   Procedure Laterality Date    CHEST SURGERY      OTHER SURGICAL HISTORY  1985    \"Exploratory\"     Social History     Socioeconomic History    Marital status:    Tobacco Use    Smoking status: Never    Smokeless tobacco: Never   Vaping Use    Vaping Use: Never used   Substance and Sexual Activity    Alcohol use: Yes     Comment: Occasional drinker    Drug use: Never    Sexual activity: Yes     Partners: Female     Objective     Vitals:    02/07/24 1107   BP: 138/86   BP Location: Left arm   Patient Position: Sitting   Cuff Size: Adult   Pulse: 67   Resp: 18  Comment: 98% room air   Temp: 97.4 °F (36.3 °C)   TempSrc: Temporal   PainSc: 0-No pain     Review of Systems     ROS:  No fevers, chills, sweats, or body aches.     I have reviewed the HPI and ROS as documented by MA/RN. Keyla Bailey PA-C    Physical Exam     NAD  Normocephalic, atraumatic  EOMI, no scleral icterus  No respiratory distress, no cough  AAOx3, pleasant, cooperative    Right lower extremity: Patient's microvascular changes to the skin in his bilateral legs up to his knees has improved.  The 2 ulcers to " his right lower extremity located on lateral lower leg area have now healed.  The anterior shin wound is circular in appearance. The shin wound is deeper today with some moderate slough in the base and appears stalled. There is continued redness around this wound. We applied silver nitrate to wound base today.     Patient had strong palpable dorsalis pedis and posterior tibial pulses.  Edema has improved.     See photos for details:    Right lower extremity:        Result Review :  The following data was reviewed by: Keyla Bailey PA-C on 02/07/2024:    Prior notes and images.         Assessment and Plan   Diagnoses and all orders for this visit:    1. Open wound of right lower leg, subsequent encounter (Primary)  -     C-reactive Protein; Future  -     Sedimentation Rate; Future  -     Prealbumin; Future  -     XR tibia fibula 2 vw right; Future    2. Polycythemia vera    Other orders  -     doxycycline (VIBRAMYCIN) 100 MG capsule; Take 1 capsule by mouth 2 (Two) Times a Day for 7 days. Do not take at the same time as any vitamin or mineral supplements.  Dispense: 14 capsule; Refill: 0      Several of the wounds on the patient's right lower leg have healed. He has a continued wound to his right lower shin which appears deeper today with increased slough.  He recently weaned off chemotherapy medication (hydrea) to treat polycythemia vera. He recently started Jakifi.  We recently prescribed prednisone and doxycyline which seemed to improve his symptoms. Due to the worsening of his shin wound with surrounding cellulitis, we will again prescribe doxycycline again as well as order labs and a tib/fib xray to rule out osteomyelitis.  We will continue to have him wrap his legs daily and now apply silvadene cream daily. He should continue to prop his legs and elevate when possible. He will followup with us in 2 weeks. He should continue to followup with his oncologist and PCP.     Patient was given instructions and  counseling regarding their condition or for health maintenance advice, as well as the wound care plan and recommendations. They understand and agree with the plan.  They will follow back up here in the clinic but are instructed to contact us in the interim should they have any new, returning, or worsening symptoms or concerns. Please see specific information pulled into the AVS if appropriate.     I spent 30 minutes in both face-to-face and nonface-to-face time for patient care today including, but not limited to, review of old records, reviewing old images, history and physical exam, reviewing test results, and documenting.     Dragon Dictation utilized for chart completion.    Follow Up   Return in about 2 weeks (around 2/21/2024) for Recheck.      Keyla Bailey PA-C

## 2024-02-12 ENCOUNTER — SPECIALTY PHARMACY (OUTPATIENT)
Dept: PHARMACY | Facility: HOSPITAL | Age: 64
End: 2024-02-12
Payer: COMMERCIAL

## 2024-02-14 ENCOUNTER — OFFICE VISIT (OUTPATIENT)
Dept: ONCOLOGY | Facility: CLINIC | Age: 64
End: 2024-02-14
Payer: COMMERCIAL

## 2024-02-14 ENCOUNTER — SPECIALTY PHARMACY (OUTPATIENT)
Dept: PHARMACY | Facility: HOSPITAL | Age: 64
End: 2024-02-14
Payer: COMMERCIAL

## 2024-02-14 ENCOUNTER — LAB (OUTPATIENT)
Dept: OTHER | Facility: HOSPITAL | Age: 64
End: 2024-02-14
Payer: COMMERCIAL

## 2024-02-14 VITALS
RESPIRATION RATE: 16 BRPM | WEIGHT: 266.4 LBS | HEART RATE: 60 BPM | OXYGEN SATURATION: 98 % | DIASTOLIC BLOOD PRESSURE: 80 MMHG | BODY MASS INDEX: 34.19 KG/M2 | TEMPERATURE: 97.8 F | HEIGHT: 74 IN | SYSTOLIC BLOOD PRESSURE: 123 MMHG

## 2024-02-14 DIAGNOSIS — D45 POLYCYTHEMIA VERA: Primary | ICD-10-CM

## 2024-02-14 DIAGNOSIS — S81.801D OPEN WOUND OF RIGHT LOWER LEG, SUBSEQUENT ENCOUNTER: ICD-10-CM

## 2024-02-14 DIAGNOSIS — Z79.899 HIGH RISK MEDICATION USE: ICD-10-CM

## 2024-02-14 DIAGNOSIS — D45 POLYCYTHEMIA VERA: ICD-10-CM

## 2024-02-14 LAB
ALBUMIN SERPL-MCNC: 4.5 G/DL (ref 3.5–5.2)
ALBUMIN/GLOB SERPL: 1.6 G/DL
ALP SERPL-CCNC: 61 U/L (ref 39–117)
ALT SERPL W P-5'-P-CCNC: 54 U/L (ref 1–41)
ANION GAP SERPL CALCULATED.3IONS-SCNC: 7.2 MMOL/L (ref 5–15)
AST SERPL-CCNC: 67 U/L (ref 1–40)
BASOPHILS # BLD AUTO: 0.09 10*3/MM3 (ref 0–0.2)
BASOPHILS NFR BLD AUTO: 1.3 % (ref 0–1.5)
BILIRUB SERPL-MCNC: 0.5 MG/DL (ref 0–1.2)
BUN SERPL-MCNC: 12 MG/DL (ref 8–23)
BUN/CREAT SERPL: 12.9 (ref 7–25)
CALCIUM SPEC-SCNC: 10 MG/DL (ref 8.6–10.5)
CHLORIDE SERPL-SCNC: 95 MMOL/L (ref 98–107)
CO2 SERPL-SCNC: 30.8 MMOL/L (ref 22–29)
CREAT SERPL-MCNC: 0.93 MG/DL (ref 0.76–1.27)
CRP SERPL-MCNC: <0.3 MG/DL (ref 0–0.5)
DEPRECATED RDW RBC AUTO: 47.3 FL (ref 37–54)
EGFRCR SERPLBLD CKD-EPI 2021: 92.3 ML/MIN/1.73
EOSINOPHIL # BLD AUTO: 0.04 10*3/MM3 (ref 0–0.4)
EOSINOPHIL NFR BLD AUTO: 0.6 % (ref 0.3–6.2)
ERYTHROCYTE [DISTWIDTH] IN BLOOD BY AUTOMATED COUNT: 13.1 % (ref 12.3–15.4)
ERYTHROCYTE [SEDIMENTATION RATE] IN BLOOD: <1 MM/HR (ref 0–20)
GLOBULIN UR ELPH-MCNC: 2.8 GM/DL
GLUCOSE SERPL-MCNC: 98 MG/DL (ref 65–99)
HCT VFR BLD AUTO: 38.7 % (ref 37.5–51)
HGB BLD-MCNC: 13.1 G/DL (ref 13–17.7)
IMM GRANULOCYTES # BLD AUTO: 0.04 10*3/MM3 (ref 0–0.05)
IMM GRANULOCYTES NFR BLD AUTO: 0.6 % (ref 0–0.5)
LYMPHOCYTES # BLD AUTO: 2.12 10*3/MM3 (ref 0.7–3.1)
LYMPHOCYTES NFR BLD AUTO: 31.7 % (ref 19.6–45.3)
MCH RBC QN AUTO: 33.1 PG (ref 26.6–33)
MCHC RBC AUTO-ENTMCNC: 33.9 G/DL (ref 31.5–35.7)
MCV RBC AUTO: 97.7 FL (ref 79–97)
MONOCYTES # BLD AUTO: 0.71 10*3/MM3 (ref 0.1–0.9)
MONOCYTES NFR BLD AUTO: 10.6 % (ref 5–12)
NEUTROPHILS NFR BLD AUTO: 3.68 10*3/MM3 (ref 1.7–7)
NEUTROPHILS NFR BLD AUTO: 55.2 % (ref 42.7–76)
NRBC BLD AUTO-RTO: 0 /100 WBC (ref 0–0.2)
PLATELET # BLD AUTO: 729 10*3/MM3 (ref 140–450)
PMV BLD AUTO: 9.9 FL (ref 6–12)
POTASSIUM SERPL-SCNC: 4.3 MMOL/L (ref 3.5–5.2)
PREALB SERPL-MCNC: 18.6 MG/DL (ref 20–40)
PROT SERPL-MCNC: 7.3 G/DL (ref 6–8.5)
RBC # BLD AUTO: 3.96 10*6/MM3 (ref 4.14–5.8)
SODIUM SERPL-SCNC: 133 MMOL/L (ref 136–145)
WBC NRBC COR # BLD AUTO: 6.68 10*3/MM3 (ref 3.4–10.8)

## 2024-02-14 PROCEDURE — 85025 COMPLETE CBC W/AUTO DIFF WBC: CPT | Performed by: INTERNAL MEDICINE

## 2024-02-14 PROCEDURE — 84134 ASSAY OF PREALBUMIN: CPT | Performed by: PHYSICIAN ASSISTANT

## 2024-02-14 PROCEDURE — 86140 C-REACTIVE PROTEIN: CPT | Performed by: PHYSICIAN ASSISTANT

## 2024-02-14 PROCEDURE — 85652 RBC SED RATE AUTOMATED: CPT | Performed by: PHYSICIAN ASSISTANT

## 2024-02-14 PROCEDURE — 36415 COLL VENOUS BLD VENIPUNCTURE: CPT

## 2024-02-14 PROCEDURE — 80053 COMPREHEN METABOLIC PANEL: CPT | Performed by: INTERNAL MEDICINE

## 2024-02-15 ENCOUNTER — SPECIALTY PHARMACY (OUTPATIENT)
Dept: PHARMACY | Facility: HOSPITAL | Age: 64
End: 2024-02-15
Payer: COMMERCIAL

## 2024-02-20 ENCOUNTER — OFFICE VISIT (OUTPATIENT)
Dept: WOUND CARE | Facility: HOSPITAL | Age: 64
End: 2024-02-20
Payer: COMMERCIAL

## 2024-02-20 VITALS
TEMPERATURE: 97.4 F | RESPIRATION RATE: 16 BRPM | HEART RATE: 62 BPM | SYSTOLIC BLOOD PRESSURE: 138 MMHG | DIASTOLIC BLOOD PRESSURE: 88 MMHG

## 2024-02-20 DIAGNOSIS — D45 POLYCYTHEMIA VERA: ICD-10-CM

## 2024-02-20 DIAGNOSIS — S81.801D OPEN WOUND OF RIGHT LOWER LEG, SUBSEQUENT ENCOUNTER: Primary | ICD-10-CM

## 2024-02-20 PROCEDURE — G0463 HOSPITAL OUTPT CLINIC VISIT: HCPCS | Performed by: PHYSICIAN ASSISTANT

## 2024-02-20 NOTE — PROGRESS NOTES
Chief Complaint  Wound Check (Follow up visit for RLE wound.  Patient is not diabetic.  Is on Jakafi for Polycythemia vera.)    Subjective      History of Present Illness    Phani Landis  is a 63 y.o. male here today with chronic ulcers to his right lower extremity.  He notes that he has had several ulcers on his left lower extremity as well but they have recently healed.  He notes that he was previously prescribed Silvadene to be applied to wound daily.    He was recently on a chemo medication (hydroxyurea) for polycythemia vera. This could have been causing his wounds. He recently saw his oncologist and was switched to jakifi.    He notes that he is not a diabetic and is not a smoker.     He notes that his lower leg wounds seem to be healed and that his shin wound seems to have improved since last appointment. He continues to wrap his legs daily and applying silvadene to wounds. He has been keeping wounds covered.  He tries to keep leg elevated when possible.  He recently finished doxycycline. He did get the labs and xray that we ordered at last appointment.     Allergies:  Cephalexin      Current Outpatient Medications:     allopurinol (ZYLOPRIM) 100 MG tablet, TAKE 1 TABLET BY MOUTH TWICE A DAY, Disp: 180 tablet, Rfl: 0    ALPRAZolam (Xanax) 0.5 MG tablet, Take 1 tablet by mouth 2 (Two) Times a Day As Needed for Anxiety or Sleep. Indications: Feeling Anxious, Disp: 30 tablet, Rfl: 0    amLODIPine-benazepril (LOTREL) 5-40 MG per capsule, Take 1 capsule by mouth Daily. Indications: High Blood Pressure Disorder, Disp: 90 capsule, Rfl: 1    aspirin 81 MG chewable tablet, Chew 1 tablet Daily., Disp: , Rfl:     bisoprolol (ZEBeta) 10 MG tablet, Take 1 tablet by mouth Daily. Indications: High Blood Pressure Disorder, Disp: 90 tablet, Rfl: 1    chlorthalidone (HYGROTON) 25 MG tablet, Take 1 tablet by mouth Daily. Indications: High Blood Pressure Disorder, Disp: 90 tablet, Rfl: 1    hydrALAZINE (APRESOLINE) 50 MG  "tablet, TAKE 1 TABLET BY MOUTH TWICE A DAY, Disp: 180 tablet, Rfl: 0    potassium chloride 10 MEQ CR tablet, TAKE 1 TABLET BY MOUTH TWICE A DAY, Disp: 180 tablet, Rfl: 1    ruxolitinib (JAKAFI) 20 MG tablet, Take 1 tablet by mouth 2 (Two) Times a Day., Disp: 60 tablet, Rfl: 5    sertraline (Zoloft) 25 MG tablet, Take 1 tablet by mouth Daily. Indications: Generalized Anxiety Disorder, Major Depressive Disorder, Disp: 30 tablet, Rfl: 2    sodium chloride 1 g tablet, Take 1 tablet by mouth 2 (Two) Times a Day., Disp: 60 tablet, Rfl: 2    Past Medical History:   Diagnosis Date    Anxiety     Arthritis     Cancer June 2020    Polycythemia vera    Hypertension      Past Surgical History:   Procedure Laterality Date    CHEST SURGERY      OTHER SURGICAL HISTORY  1985    \"Exploratory\"     Social History     Socioeconomic History    Marital status:    Tobacco Use    Smoking status: Never    Smokeless tobacco: Never   Vaping Use    Vaping Use: Never used   Substance and Sexual Activity    Alcohol use: Yes     Comment: Occasional drinker    Drug use: Never    Sexual activity: Yes     Partners: Female     Objective     Vitals:    02/20/24 1048   BP: 138/88   BP Location: Left arm   Patient Position: Lying   Cuff Size: Adult   Pulse: 62   Resp: 16  Comment: 98% room air   Temp: 97.4 °F (36.3 °C)   TempSrc: Temporal   PainSc: 0-No pain     Review of Systems     ROS:  No fevers, chills, sweats, or body aches.     I have reviewed the HPI and ROS as documented by MA/RN. Keyla Bailey PA-C    Physical Exam     NAD  Normocephalic, atraumatic  EOMI, no scleral icterus  No respiratory distress, no cough  AAOx3, pleasant, cooperative    Right lower extremity: Patient's microvascular changes to the skin in his bilateral legs up to his knees has improved.  The 2 ulcers to his right lower extremity located on lateral lower leg area have now healed.  The anterior shin wound is circular in appearance. The shin wound is more " shallow today with some moderate slough in the base.     Patient had strong palpable dorsalis pedis and posterior tibial pulses.  Edema has improved.     See photos for details:    Right lower extremity:        Result Review :  The following data was reviewed by: Keyla Bailey PA-C on 02/20/2024:    Prior notes and images.  Recent labs showed no signs of osteomyelitis.    Recent xray showed no signs of osteomyelitis.          Assessment and Plan   Diagnoses and all orders for this visit:    1. Open wound of right lower leg, subsequent encounter (Primary)    2. Polycythemia vera    He has a continued wound to his right lower shin which appears improved today with improved slough.  We recently prescribed prednisone and doxycyline which seemed to improve his symptoms.  Recent xray and labs showed no signs of osteomyelitis. Reviewed these with him today. We will continue to have him wrap his legs daily and apply silvadene cream daily. He should continue to prop his legs and elevate when possible. He will followup with us in 3 weeks. He should continue to followup with his oncologist and PCP.     Patient was given instructions and counseling regarding their condition or for health maintenance advice, as well as the wound care plan and recommendations. They understand and agree with the plan.  They will follow back up here in the clinic but are instructed to contact us in the interim should they have any new, returning, or worsening symptoms or concerns. Please see specific information pulled into the AVS if appropriate.       Dragon Dictation utilized for chart completion.    Follow Up   Return in about 3 weeks (around 3/12/2024) for Recheck.      Keyla Bailey PA-C

## 2024-02-28 ENCOUNTER — CLINICAL SUPPORT (OUTPATIENT)
Dept: ONCOLOGY | Facility: HOSPITAL | Age: 64
End: 2024-02-28
Payer: COMMERCIAL

## 2024-02-28 ENCOUNTER — LAB (OUTPATIENT)
Dept: OTHER | Facility: HOSPITAL | Age: 64
End: 2024-02-28
Payer: COMMERCIAL

## 2024-02-28 VITALS — HEIGHT: 74 IN | TEMPERATURE: 97.9 F | BODY MASS INDEX: 34.2 KG/M2 | RESPIRATION RATE: 15 BRPM

## 2024-02-28 DIAGNOSIS — D45 POLYCYTHEMIA VERA: ICD-10-CM

## 2024-02-28 LAB
ALBUMIN SERPL-MCNC: 4.5 G/DL (ref 3.5–5.2)
ALBUMIN/GLOB SERPL: 1.7 G/DL
ALP SERPL-CCNC: 69 U/L (ref 39–117)
ALT SERPL W P-5'-P-CCNC: 52 U/L (ref 1–41)
ANION GAP SERPL CALCULATED.3IONS-SCNC: 8.3 MMOL/L (ref 5–15)
AST SERPL-CCNC: 60 U/L (ref 1–40)
BASOPHILS # BLD AUTO: 0.1 10*3/MM3 (ref 0–0.2)
BASOPHILS NFR BLD AUTO: 1.3 % (ref 0–1.5)
BILIRUB SERPL-MCNC: 0.4 MG/DL (ref 0–1.2)
BUN SERPL-MCNC: 14 MG/DL (ref 8–23)
BUN/CREAT SERPL: 14.6 (ref 7–25)
CALCIUM SPEC-SCNC: 9.7 MG/DL (ref 8.6–10.5)
CHLORIDE SERPL-SCNC: 95 MMOL/L (ref 98–107)
CO2 SERPL-SCNC: 27.7 MMOL/L (ref 22–29)
CREAT SERPL-MCNC: 0.96 MG/DL (ref 0.76–1.27)
DEPRECATED RDW RBC AUTO: 46.4 FL (ref 37–54)
EGFRCR SERPLBLD CKD-EPI 2021: 88.8 ML/MIN/1.73
EOSINOPHIL # BLD AUTO: 0.06 10*3/MM3 (ref 0–0.4)
EOSINOPHIL NFR BLD AUTO: 0.8 % (ref 0.3–6.2)
ERYTHROCYTE [DISTWIDTH] IN BLOOD BY AUTOMATED COUNT: 13.5 % (ref 12.3–15.4)
GLOBULIN UR ELPH-MCNC: 2.7 GM/DL
GLUCOSE SERPL-MCNC: 98 MG/DL (ref 65–99)
HCT VFR BLD AUTO: 37.4 % (ref 37.5–51)
HGB BLD-MCNC: 12.8 G/DL (ref 13–17.7)
IMM GRANULOCYTES # BLD AUTO: 0.08 10*3/MM3 (ref 0–0.05)
IMM GRANULOCYTES NFR BLD AUTO: 1.1 % (ref 0–0.5)
LYMPHOCYTES # BLD AUTO: 2.6 10*3/MM3 (ref 0.7–3.1)
LYMPHOCYTES NFR BLD AUTO: 34.3 % (ref 19.6–45.3)
MCH RBC QN AUTO: 32.2 PG (ref 26.6–33)
MCHC RBC AUTO-ENTMCNC: 34.2 G/DL (ref 31.5–35.7)
MCV RBC AUTO: 94.2 FL (ref 79–97)
MONOCYTES # BLD AUTO: 0.94 10*3/MM3 (ref 0.1–0.9)
MONOCYTES NFR BLD AUTO: 12.4 % (ref 5–12)
NEUTROPHILS NFR BLD AUTO: 3.81 10*3/MM3 (ref 1.7–7)
NEUTROPHILS NFR BLD AUTO: 50.1 % (ref 42.7–76)
NRBC BLD AUTO-RTO: 0 /100 WBC (ref 0–0.2)
PLATELET # BLD AUTO: 620 10*3/MM3 (ref 140–450)
PMV BLD AUTO: 9.7 FL (ref 6–12)
POTASSIUM SERPL-SCNC: 4 MMOL/L (ref 3.5–5.2)
PROT SERPL-MCNC: 7.2 G/DL (ref 6–8.5)
RBC # BLD AUTO: 3.97 10*6/MM3 (ref 4.14–5.8)
SODIUM SERPL-SCNC: 131 MMOL/L (ref 136–145)
WBC NRBC COR # BLD AUTO: 7.59 10*3/MM3 (ref 3.4–10.8)

## 2024-02-28 PROCEDURE — 80053 COMPREHEN METABOLIC PANEL: CPT | Performed by: INTERNAL MEDICINE

## 2024-02-28 PROCEDURE — 36415 COLL VENOUS BLD VENIPUNCTURE: CPT

## 2024-02-28 PROCEDURE — 85025 COMPLETE CBC W/AUTO DIFF WBC: CPT | Performed by: INTERNAL MEDICINE

## 2024-02-28 NOTE — PROGRESS NOTES
Phani Landis is a 63 y.o. male with Polycythemia with leukocytosis and thrombocytosis seen by an Hematology/Oncology provider, Dr. Fleming, and is scheduled with RN Review/Clinical Pharmacy Review offered by [facility]. Patient's visit was held in office today.     Therapy plan  Jakafi 20 mg twice daily     Allergies  The patient's chart has been reviewed for allergy information and updated as necessary.   Allergies   Allergen Reactions    Cephalexin Itching        Current Medication List  Medication Sig Start Date End Date Taking? Authorizing Provider   allopurinol (ZYLOPRIM) 100 MG tablet TAKE 1 TABLET BY MOUTH TWICE A DAY 1/16/24   Eliana Fleming MD   ALPRAZolam (Xanax) 0.5 MG tablet Take 1 tablet by mouth 2 (Two) Times a Day As Needed for Anxiety or Sleep. Indications: Feeling Anxious 2/1/24   Roselyn Haywood APRN   amLODIPine-benazepril (LOTREL) 5-40 MG per capsule Take 1 capsule by mouth Daily. Indications: High Blood Pressure Disorder 2/1/24   Roselyn Haywood APRN   aspirin 81 MG chewable tablet Chew 1 tablet Daily.    Provider, MD Alex   bisoprolol (ZEBeta) 10 MG tablet Take 1 tablet by mouth Daily. Indications: High Blood Pressure Disorder 2/1/24   Roselyn Haywood APRN   chlorthalidone (HYGROTON) 25 MG tablet Take 1 tablet by mouth Daily. Indications: High Blood Pressure Disorder 2/1/24   Roselny Haywood APRN   hydrALAZINE (APRESOLINE) 50 MG tablet TAKE 1 TABLET BY MOUTH TWICE A DAY 2/7/24   Roselyn Haywood APRN   potassium chloride 10 MEQ CR tablet TAKE 2 TABLETS BY MOUTH IN THE MORNING AND 1 TABLET BY MOUTH IN THE EVENING 12/6/23   Artie Chavez MD   ruxolitinib (JAKAFI) 20 MG tablet Take 1 tablet by mouth 2 (Two) Times a Day. 1/31/24   Eliana Fleming MD   sertraline (Zoloft) 25 MG tablet Take 1 tablet by mouth Daily. Indications: Generalized Anxiety Disorder, Major Depressive Disorder 2/1/24   Roselyn Haywood APRN   sodium  chloride 1 g tablet Take 1 tablet by mouth 2 (Two) Times a Day. 1/18/24   Eliana Fleming MD     Relevant Laboratory Values  Lab Results   Component Value Date    GLUCOSE 98 02/28/2024    CALCIUM 9.7 02/28/2024     (L) 02/28/2024    K 4.0 02/28/2024    CO2 27.7 02/28/2024    CL 95 (L) 02/28/2024    BUN 14 02/28/2024    CREATININE 0.96 02/28/2024    EGFRIFAFRI 70 06/22/2020    EGFRIFNONA 67 02/23/2022    BCR 14.6 02/28/2024    ANIONGAP 8.3 02/28/2024     Lab Results   Component Value Date    WBC 7.59 02/28/2024    RBC 3.97 (L) 02/28/2024    HGB 12.8 (L) 02/28/2024    HCT 37.4 (L) 02/28/2024    MCV 94.2 02/28/2024    MCH 32.2 02/28/2024    MCHC 34.2 02/28/2024    RDW 13.5 02/28/2024    RDWSD 46.4 02/28/2024    MPV 9.7 02/28/2024     (H) 02/28/2024    NEUTRORELPCT 50.1 02/28/2024    LYMPHORELPCT 34.3 02/28/2024    MONORELPCT 12.4 (H) 02/28/2024    EOSRELPCT 0.8 02/28/2024    BASORELPCT 1.3 02/28/2024    AUTOIGPER 1.1 (H) 02/28/2024    NEUTROABS 3.81 02/28/2024    LYMPHSABS 2.60 02/28/2024    MONOSABS 0.94 (H) 02/28/2024    EOSABS 0.06 02/28/2024    BASOSABS 0.10 02/28/2024    AUTOIGNUM 0.08 (H) 02/28/2024    NRBC 0.0 02/28/2024     Adverse Effect(s) Assessment  Patient reports undergoing on-going wound care for ulceration on right lower extremity. Patient reports slow improvement in healing.     No new side effects or symptoms reported.     Clinical Review  Patient reports taking potassium to 20 meq in the morning and 10 meq at bedtime. Patient reports he has discontinued taking acetaminophen.     Patient reports adherence to Jakafi 20 mg BID.     Patient states that the scheduled bone marrow aspiration biopsy scheduled for 3/15/24 is unaffordable as his insurance stated the visit would cost ~$1200, possibly due to current contributions to his deductible. Informed Dr. Fleming who approves this can be cancelled at this time and rescheduled in the future. Patient is aware and will continue to communicate  with insurance in the upcoming months to determine coverage amount/options.     Lab results are stable for this patient at this time. The patient's current therapy plan and above labs have been reviewed with Dr. Fleming.     Plan  CBC and Comprehensive Metabolic Panel reviewed. - see above in Laboratory Results  Will instruct Phani Landis to continue Jakafi at current dose.  Follow up in 2 weeks. Next scheduled appointment(s) reviewed with patient.  Patient is instructed to call the office with any concerns or new symptoms prior to next visit.  Verbal and written information provided. Patient expresses understanding and has no further questions at this time.            Mikaela Wynn, PharmD  Clinical Pharmacy Services   [facility]  2/28/2024  13:36 EST

## 2024-03-12 ENCOUNTER — OFFICE VISIT (OUTPATIENT)
Dept: WOUND CARE | Facility: HOSPITAL | Age: 64
End: 2024-03-12
Payer: COMMERCIAL

## 2024-03-12 VITALS
HEART RATE: 58 BPM | TEMPERATURE: 97.4 F | DIASTOLIC BLOOD PRESSURE: 78 MMHG | RESPIRATION RATE: 18 BRPM | SYSTOLIC BLOOD PRESSURE: 124 MMHG

## 2024-03-12 DIAGNOSIS — S81.801D OPEN WOUND OF RIGHT LOWER LEG, SUBSEQUENT ENCOUNTER: Primary | ICD-10-CM

## 2024-03-12 PROCEDURE — G0463 HOSPITAL OUTPT CLINIC VISIT: HCPCS | Performed by: PHYSICIAN ASSISTANT

## 2024-03-12 NOTE — PROGRESS NOTES
Chief Complaint  Wound Check (Follow up visit for wound to E.  Patient is not diabetic.  )    Subjective      History of Present Illness    Phani Landis  is a 63 y.o. male here today with chronic ulcers to his right lower extremity.   He was recently on a chemo medication (hydroxyurea) for polycythemia vera. This could have been causing his wounds. He recently saw his oncologist and was switched to jakifi.    He notes that he is not a diabetic and is not a smoker.     His shin wound seems to have improved since last appointment. He continues to wrap his legs daily and applying silvadene to wounds. He has been keeping wounds covered.  He tries to keep leg elevated when possible.   Allergies:  Cephalexin      Current Outpatient Medications:     allopurinol (ZYLOPRIM) 100 MG tablet, TAKE 1 TABLET BY MOUTH TWICE A DAY, Disp: 180 tablet, Rfl: 0    ALPRAZolam (Xanax) 0.5 MG tablet, Take 1 tablet by mouth 2 (Two) Times a Day As Needed for Anxiety or Sleep. Indications: Feeling Anxious, Disp: 30 tablet, Rfl: 0    amLODIPine-benazepril (LOTREL) 5-40 MG per capsule, Take 1 capsule by mouth Daily. Indications: High Blood Pressure Disorder, Disp: 90 capsule, Rfl: 1    aspirin 81 MG chewable tablet, Chew 1 tablet Daily., Disp: , Rfl:     bisoprolol (ZEBeta) 10 MG tablet, Take 1 tablet by mouth Daily. Indications: High Blood Pressure Disorder, Disp: 90 tablet, Rfl: 1    chlorthalidone (HYGROTON) 25 MG tablet, Take 1 tablet by mouth Daily. Indications: High Blood Pressure Disorder, Disp: 90 tablet, Rfl: 1    hydrALAZINE (APRESOLINE) 50 MG tablet, TAKE 1 TABLET BY MOUTH TWICE A DAY, Disp: 180 tablet, Rfl: 0    potassium chloride 10 MEQ CR tablet, TAKE 1 TABLET BY MOUTH TWICE A DAY, Disp: 180 tablet, Rfl: 1    ruxolitinib (JAKAFI) 20 MG tablet, Take 1 tablet by mouth 2 (Two) Times a Day., Disp: 60 tablet, Rfl: 5    sertraline (Zoloft) 25 MG tablet, Take 1 tablet by mouth Daily. Indications: Generalized Anxiety Disorder, Major  "Depressive Disorder, Disp: 30 tablet, Rfl: 2    sodium chloride 1 g tablet, Take 1 tablet by mouth 2 (Two) Times a Day., Disp: 60 tablet, Rfl: 2    Past Medical History:   Diagnosis Date    Anxiety     Arthritis     Cancer June 2020    Polycythemia vera    Hypertension      Past Surgical History:   Procedure Laterality Date    CHEST SURGERY      OTHER SURGICAL HISTORY  1985    \"Exploratory\"     Social History     Socioeconomic History    Marital status:    Tobacco Use    Smoking status: Never    Smokeless tobacco: Never   Vaping Use    Vaping status: Never Used   Substance and Sexual Activity    Alcohol use: Yes     Comment: Occasional drinker    Drug use: Never    Sexual activity: Yes     Partners: Female     Objective     Vitals:    03/12/24 1057   BP: 124/78   BP Location: Left arm   Patient Position: Sitting   Cuff Size: Adult   Pulse: 58   Resp: 18  Comment: 98% room air.   Temp: 97.4 °F (36.3 °C)   TempSrc: Temporal   PainSc: 0-No pain     Review of Systems     ROS:  No fevers, chills, sweats, or body aches.     I have reviewed the HPI and ROS as documented by MA/RN. Keyla Bailey PA-C    Physical Exam     NAD  Normocephalic, atraumatic  EOMI, no scleral icterus  No respiratory distress, no cough  AAOx3, pleasant, cooperative    Right lower extremity: Patient's microvascular changes to the skin in his bilateral legs up to his knees has improved.  The 2 ulcers to his right lower extremity located on lateral lower leg area have now healed.  The anterior shin wound is circular in appearance. The shin wound is smaller and more shallow today with some moderate slough in the base.     Patient had strong palpable dorsalis pedis and posterior tibial pulses.  Edema has improved.     See photos for details:    Right lower extremity:        Result Review :  The following data was reviewed by: Keyla Bailey PA-C on 03/12/2024:    Prior notes and images.  Recent labs showed no signs of " osteomyelitis.    Recent xray showed no signs of osteomyelitis.          Assessment and Plan   Diagnoses and all orders for this visit:    1. Open wound of right lower leg, subsequent encounter (Primary)      He has a continued wound to his right lower shin which appears improved today with improved slough.  We will continue to have him wrap his legs daily and apply silvadene cream daily. He should continue to prop his legs and elevate when possible. He will followup with us in 3 weeks. He should continue to followup with his oncologist and PCP.     Patient was given instructions and counseling regarding their condition or for health maintenance advice, as well as the wound care plan and recommendations. They understand and agree with the plan.  They will follow back up here in the clinic but are instructed to contact us in the interim should they have any new, returning, or worsening symptoms or concerns. Please see specific information pulled into the AVS if appropriate.       Dragon Dictation utilized for chart completion.    Follow Up   Return in about 3 weeks (around 4/2/2024) for Recheck.      Keyla Bailey PA-C

## 2024-03-13 ENCOUNTER — LAB (OUTPATIENT)
Dept: OTHER | Facility: HOSPITAL | Age: 64
End: 2024-03-13
Payer: COMMERCIAL

## 2024-03-13 ENCOUNTER — CLINICAL SUPPORT (OUTPATIENT)
Dept: ONCOLOGY | Facility: HOSPITAL | Age: 64
End: 2024-03-13
Payer: COMMERCIAL

## 2024-03-13 VITALS
BODY MASS INDEX: 34.24 KG/M2 | HEIGHT: 74 IN | OXYGEN SATURATION: 98 % | WEIGHT: 266.8 LBS | HEART RATE: 49 BPM | TEMPERATURE: 98.1 F | RESPIRATION RATE: 15 BRPM | DIASTOLIC BLOOD PRESSURE: 81 MMHG | SYSTOLIC BLOOD PRESSURE: 134 MMHG

## 2024-03-13 DIAGNOSIS — D45 POLYCYTHEMIA VERA: ICD-10-CM

## 2024-03-13 LAB
ALBUMIN SERPL-MCNC: 4.6 G/DL (ref 3.5–5.2)
ALBUMIN/GLOB SERPL: 1.6 G/DL
ALP SERPL-CCNC: 68 U/L (ref 39–117)
ALT SERPL W P-5'-P-CCNC: 48 U/L (ref 1–41)
ANION GAP SERPL CALCULATED.3IONS-SCNC: 7.9 MMOL/L (ref 5–15)
AST SERPL-CCNC: 41 U/L (ref 1–40)
BASOPHILS # BLD AUTO: 0.1 10*3/MM3 (ref 0–0.2)
BASOPHILS NFR BLD AUTO: 1.3 % (ref 0–1.5)
BILIRUB SERPL-MCNC: 0.5 MG/DL (ref 0–1.2)
BUN SERPL-MCNC: 16 MG/DL (ref 8–23)
BUN/CREAT SERPL: 15.1 (ref 7–25)
CALCIUM SPEC-SCNC: 9.7 MG/DL (ref 8.6–10.5)
CHLORIDE SERPL-SCNC: 98 MMOL/L (ref 98–107)
CO2 SERPL-SCNC: 29.1 MMOL/L (ref 22–29)
CREAT SERPL-MCNC: 1.06 MG/DL (ref 0.76–1.27)
DEPRECATED RDW RBC AUTO: 48.1 FL (ref 37–54)
EGFRCR SERPLBLD CKD-EPI 2021: 78.9 ML/MIN/1.73
EOSINOPHIL # BLD AUTO: 0.08 10*3/MM3 (ref 0–0.4)
EOSINOPHIL NFR BLD AUTO: 1 % (ref 0.3–6.2)
ERYTHROCYTE [DISTWIDTH] IN BLOOD BY AUTOMATED COUNT: 13.8 % (ref 12.3–15.4)
GLOBULIN UR ELPH-MCNC: 2.9 GM/DL
GLUCOSE SERPL-MCNC: 91 MG/DL (ref 65–99)
HCT VFR BLD AUTO: 40.3 % (ref 37.5–51)
HGB BLD-MCNC: 13.6 G/DL (ref 13–17.7)
IMM GRANULOCYTES # BLD AUTO: 0.09 10*3/MM3 (ref 0–0.05)
IMM GRANULOCYTES NFR BLD AUTO: 1.1 % (ref 0–0.5)
LYMPHOCYTES # BLD AUTO: 2.44 10*3/MM3 (ref 0.7–3.1)
LYMPHOCYTES NFR BLD AUTO: 30.5 % (ref 19.6–45.3)
MCH RBC QN AUTO: 32.2 PG (ref 26.6–33)
MCHC RBC AUTO-ENTMCNC: 33.7 G/DL (ref 31.5–35.7)
MCV RBC AUTO: 95.3 FL (ref 79–97)
MONOCYTES # BLD AUTO: 0.69 10*3/MM3 (ref 0.1–0.9)
MONOCYTES NFR BLD AUTO: 8.6 % (ref 5–12)
NEUTROPHILS NFR BLD AUTO: 4.6 10*3/MM3 (ref 1.7–7)
NEUTROPHILS NFR BLD AUTO: 57.5 % (ref 42.7–76)
NRBC BLD AUTO-RTO: 0 /100 WBC (ref 0–0.2)
PLATELET # BLD AUTO: 558 10*3/MM3 (ref 140–450)
PMV BLD AUTO: 9.7 FL (ref 6–12)
POTASSIUM SERPL-SCNC: 3.8 MMOL/L (ref 3.5–5.2)
PROT SERPL-MCNC: 7.5 G/DL (ref 6–8.5)
RBC # BLD AUTO: 4.23 10*6/MM3 (ref 4.14–5.8)
SODIUM SERPL-SCNC: 135 MMOL/L (ref 136–145)
WBC NRBC COR # BLD AUTO: 8 10*3/MM3 (ref 3.4–10.8)

## 2024-03-13 PROCEDURE — 80053 COMPREHEN METABOLIC PANEL: CPT | Performed by: INTERNAL MEDICINE

## 2024-03-13 PROCEDURE — 85025 COMPLETE CBC W/AUTO DIFF WBC: CPT | Performed by: INTERNAL MEDICINE

## 2024-03-13 PROCEDURE — 36415 COLL VENOUS BLD VENIPUNCTURE: CPT

## 2024-03-13 NOTE — PROGRESS NOTES
Phani Landis is a 63 y.o. male with Polycythemia with leukocytosis and thrombocytosis seen by an Hematology/Oncology provider, Dr. Fleming, and is scheduled with RN Review/Clinical Pharmacy Review offered by [facility]. Patient's visit was held in clinic today.     Therapy plan  Jakafi 20 mg twice daily     Relevant Laboratory Values  Lab Results   Component Value Date    GLUCOSE 91 03/13/2024    CALCIUM 9.7 03/13/2024     (L) 03/13/2024    K 3.8 03/13/2024    CO2 29.1 (H) 03/13/2024    CL 98 03/13/2024    BUN 16 03/13/2024    CREATININE 1.06 03/13/2024    EGFRIFAFRI 70 06/22/2020    EGFRIFNONA 67 02/23/2022    BCR 15.1 03/13/2024    ANIONGAP 7.9 03/13/2024     Lab Results   Component Value Date    WBC 8.00 03/13/2024    RBC 4.23 03/13/2024    HGB 13.6 03/13/2024    HCT 40.3 03/13/2024    MCV 95.3 03/13/2024    MCH 32.2 03/13/2024    MCHC 33.7 03/13/2024    RDW 13.8 03/13/2024    RDWSD 48.1 03/13/2024    MPV 9.7 03/13/2024     (H) 03/13/2024    NEUTRORELPCT 57.5 03/13/2024    LYMPHORELPCT 30.5 03/13/2024    MONORELPCT 8.6 03/13/2024    EOSRELPCT 1.0 03/13/2024    BASORELPCT 1.3 03/13/2024    AUTOIGPER 1.1 (H) 03/13/2024    NEUTROABS 4.60 03/13/2024    LYMPHSABS 2.44 03/13/2024    MONOSABS 0.69 03/13/2024    EOSABS 0.08 03/13/2024    BASOSABS 0.10 03/13/2024    AUTOIGNUM 0.09 (H) 03/13/2024    NRBC 0.0 03/13/2024     Adverse Effect(s) Assessment  Patient reports undergoing on-going wound care for ulceration on right lower extremity. Patient reports noticeable improvement in healing and has appropriate follow-up scheduled with wound care.     Patient has no complaints and feels well overall today. Patient denies any signs/symptoms of infection. No side effects or symptoms reported.     Clinical Review  Patient reports adherence to potassium to 20 meq in the morning and 10 meq at bedtime. Patient also reports taking sodium chloride 1 g twice daily. Patient reports he has  discontinued taking acetaminophen and has not needed any PRN pain medications.     Patient reports adherence to Jakafi 20 mg BID.     Platelets 558,000 from 620,000 (2/28/24)  Creatinine 1.06 from 0.96 (2/28/24); Creatinine clearance 122 mL/min based on actual body weight  AST 41 from 60 (2/28/24)  ALT 48 from 52 (2/28/24)  Potassium 3.8 from 4.0 (2/28/24)  Sodium 135 from 131 (2/28/24)  Hgb 13.6; ANC 4,600; WBC 8,000    Patient previously stated that the scheduled bone marrow aspiration biopsy scheduled for 3/15/24 was unaffordable as his insurance stated the visit would cost ~$1200, possibly due to current contributions to his deductible, as they also informed him the procedure would have been fully covered late in the previous year. Informed Dr. Fleming who approved this can be cancelled at this time and rescheduled in the future. Patient is aware and will continue to communicate with insurance in the upcoming months to determine coverage amount/options.     Lab results are stable for this patient at this time. The patient's current therapy plan and above labs have been reviewed with Dr. Fleming.     Plan  CBC and Comprehensive Metabolic Panel reviewed - see above in Laboratory Results  Will instruct Phani Landis to continue Jakafi at current dose.  Follow up in 2 weeks with Dr. Fleming. Next scheduled appointment(s) reviewed with patient.  Patient is instructed to call the office with any concerns or new symptoms prior to next visit.  Verbal and written information provided. Patient expresses understanding and has no further questions at this time.            Mikaela Wynn, PharmD  Clinical Pharmacy Services   [facility]  3/13/2024  12:38 EDT

## 2024-03-28 ENCOUNTER — SPECIALTY PHARMACY (OUTPATIENT)
Dept: PHARMACY | Facility: HOSPITAL | Age: 64
End: 2024-03-28
Payer: COMMERCIAL

## 2024-04-10 ENCOUNTER — OFFICE VISIT (OUTPATIENT)
Dept: WOUND CARE | Facility: HOSPITAL | Age: 64
End: 2024-04-10
Payer: COMMERCIAL

## 2024-04-10 VITALS
TEMPERATURE: 97.2 F | DIASTOLIC BLOOD PRESSURE: 94 MMHG | RESPIRATION RATE: 18 BRPM | HEART RATE: 74 BPM | SYSTOLIC BLOOD PRESSURE: 154 MMHG

## 2024-04-10 DIAGNOSIS — S81.801D OPEN WOUND OF RIGHT LOWER LEG, SUBSEQUENT ENCOUNTER: Primary | ICD-10-CM

## 2024-04-10 DIAGNOSIS — D45 POLYCYTHEMIA VERA: ICD-10-CM

## 2024-04-10 PROCEDURE — G0463 HOSPITAL OUTPT CLINIC VISIT: HCPCS | Performed by: PHYSICIAN ASSISTANT

## 2024-04-10 NOTE — PROGRESS NOTES
Chief Complaint  Wound Check (Follow up visit for wound to [facility].  Patient is not diabetic.  )    Subjective      History of Present Illness    Phani Landis  is a 63 y.o. male here today with chronic ulcers to his right lower extremity.   He was recently on a chemo medication (hydroxyurea) for polycythemia vera. This was likely causing his wounds. His oncologist switched him to jakifi and his wounds have been improving since.    He notes that he is not a diabetic and is not a smoker.     His shin wound has significantly improved since last appointment. He is happy with wound progress. He continues to wrap his legs daily and applying silvadene to wounds. He has been keeping wounds covered.  He tries to keep leg elevated when possible.     Allergies:  Cephalexin      Current Outpatient Medications:     allopurinol (ZYLOPRIM) 100 MG tablet, TAKE 1 TABLET BY MOUTH TWICE A DAY, Disp: 180 tablet, Rfl: 0    ALPRAZolam (Xanax) 0.5 MG tablet, Take 1 tablet by mouth 2 (Two) Times a Day As Needed for Anxiety or Sleep. Indications: Feeling Anxious, Disp: 30 tablet, Rfl: 0    amLODIPine-benazepril (LOTREL) 5-40 MG per capsule, Take 1 capsule by mouth Daily. Indications: High Blood Pressure Disorder, Disp: 90 capsule, Rfl: 1    aspirin 81 MG chewable tablet, Chew 1 tablet Daily., Disp: , Rfl:     bisoprolol (ZEBeta) 10 MG tablet, Take 1 tablet by mouth Daily. Indications: High Blood Pressure Disorder, Disp: 90 tablet, Rfl: 1    chlorthalidone (HYGROTON) 25 MG tablet, Take 1 tablet by mouth Daily. Indications: High Blood Pressure Disorder, Disp: 90 tablet, Rfl: 1    hydrALAZINE (APRESOLINE) 50 MG tablet, TAKE 1 TABLET BY MOUTH TWICE A DAY, Disp: 180 tablet, Rfl: 0    potassium chloride 10 MEQ CR tablet, TAKE 1 TABLET BY MOUTH TWICE A DAY, Disp: 180 tablet, Rfl: 1    ruxolitinib (JAKAFI) 20 MG tablet, Take 1 tablet by mouth 2 (Two) Times a Day., Disp: 60 tablet, Rfl: 5    sertraline (Zoloft) 25 MG tablet, Take 1 tablet by mouth  "Daily. Indications: Generalized Anxiety Disorder, Major Depressive Disorder, Disp: 30 tablet, Rfl: 2    sodium chloride 1 g tablet, Take 1 tablet by mouth 2 (Two) Times a Day., Disp: 60 tablet, Rfl: 2    Past Medical History:   Diagnosis Date    Anxiety     Arthritis     Cancer June 2020    Polycythemia vera    Hypertension      Past Surgical History:   Procedure Laterality Date    CHEST SURGERY      OTHER SURGICAL HISTORY  1985    \"Exploratory\"     Social History     Socioeconomic History    Marital status:    Tobacco Use    Smoking status: Never    Smokeless tobacco: Never   Vaping Use    Vaping status: Never Used   Substance and Sexual Activity    Alcohol use: Yes     Comment: Occasional drinker    Drug use: Never    Sexual activity: Yes     Partners: Female     Objective     Vitals:    04/10/24 1126   BP: 154/94   BP Location: Left arm   Patient Position: Sitting   Cuff Size: Adult   Pulse: 74   Resp: 18  Comment: 98% room air   Temp: 97.2 °F (36.2 °C)   TempSrc: Temporal   PainSc: 0-No pain     Review of Systems     ROS:  No fevers, chills, sweats, or body aches.     I have reviewed the HPI and ROS as documented by MA/RN. Keyla Bailey PA-C    Physical Exam     NAD  Normocephalic, atraumatic  EOMI, no scleral icterus  No respiratory distress, no cough  AAOx3, pleasant, cooperative    Right lower extremity: Patient's microvascular changes to the skin in his bilateral legs up to his knees has improved.  The 2 ulcers to his right lower extremity located on lateral lower leg are healed.  The anterior shin wound is circular in appearance. The shin wound is much smaller and more shallow today with some moderate slough in the base. No signs of infection.    Patient had strong palpable dorsalis pedis and posterior tibial pulses.  Edema has improved.     See photos for details:    Right lower extremity:        Result Review :  The following data was reviewed by: Keyla Bailey PA-C on " 04/10/2024:    Prior notes and images.  Recent labs showed no signs of osteomyelitis.    Recent xray showed no signs of osteomyelitis.          Assessment and Plan   Diagnoses and all orders for this visit:    1. Open wound of right lower leg, subsequent encounter (Primary)    2. Polycythemia vera      His wound to his right lower shin is improved today and almost healed.   We will continue to have him wrap his legs daily and apply silvadene cream daily. He should continue to prop his legs and elevate when possible. He will followup with us in 4weeks. He should continue to followup with his oncologist and PCP.     Patient was given instructions and counseling regarding their condition or for health maintenance advice, as well as the wound care plan and recommendations. They understand and agree with the plan.  They will follow back up here in the clinic but are instructed to contact us in the interim should they have any new, returning, or worsening symptoms or concerns. Please see specific information pulled into the AVS if appropriate.     Dragon Dictation utilized for chart completion.    Follow Up   Return in about 4 weeks (around 5/8/2024) for Recheck.      Keyla Bailey PA-C

## 2024-04-13 DIAGNOSIS — D75.839 THROMBOCYTOSIS: ICD-10-CM

## 2024-04-13 DIAGNOSIS — D45 POLYCYTHEMIA VERA: ICD-10-CM

## 2024-04-13 DIAGNOSIS — E79.0 ELEVATED URIC ACID IN BLOOD: ICD-10-CM

## 2024-04-16 RX ORDER — SODIUM CHLORIDE 1 G/1
1 TABLET ORAL 2 TIMES DAILY
Qty: 60 TABLET | Refills: 2 | Status: SHIPPED | OUTPATIENT
Start: 2024-04-16

## 2024-04-16 RX ORDER — ALLOPURINOL 100 MG/1
100 TABLET ORAL 2 TIMES DAILY
Qty: 180 TABLET | Refills: 0 | Status: SHIPPED | OUTPATIENT
Start: 2024-04-16

## 2024-04-17 ENCOUNTER — LAB (OUTPATIENT)
Dept: OTHER | Facility: HOSPITAL | Age: 64
End: 2024-04-17
Payer: COMMERCIAL

## 2024-04-17 ENCOUNTER — OFFICE VISIT (OUTPATIENT)
Dept: ONCOLOGY | Facility: CLINIC | Age: 64
End: 2024-04-17
Payer: COMMERCIAL

## 2024-04-17 VITALS
HEIGHT: 74 IN | WEIGHT: 251.6 LBS | HEART RATE: 52 BPM | SYSTOLIC BLOOD PRESSURE: 125 MMHG | RESPIRATION RATE: 18 BRPM | BODY MASS INDEX: 32.29 KG/M2 | OXYGEN SATURATION: 99 % | TEMPERATURE: 98.4 F | DIASTOLIC BLOOD PRESSURE: 85 MMHG

## 2024-04-17 DIAGNOSIS — D45 POLYCYTHEMIA VERA: Primary | ICD-10-CM

## 2024-04-17 DIAGNOSIS — Z79.899 HIGH RISK MEDICATION USE: ICD-10-CM

## 2024-04-17 DIAGNOSIS — D45 POLYCYTHEMIA VERA: ICD-10-CM

## 2024-04-17 LAB
ALBUMIN SERPL-MCNC: 4.2 G/DL (ref 3.5–5.2)
ALBUMIN/GLOB SERPL: 1.6 G/DL
ALP SERPL-CCNC: 69 U/L (ref 39–117)
ALT SERPL W P-5'-P-CCNC: 44 U/L (ref 1–41)
ANION GAP SERPL CALCULATED.3IONS-SCNC: 8 MMOL/L (ref 5–15)
AST SERPL-CCNC: 70 U/L (ref 1–40)
BASOPHILS # BLD AUTO: 0.08 10*3/MM3 (ref 0–0.2)
BASOPHILS NFR BLD AUTO: 1.3 % (ref 0–1.5)
BILIRUB SERPL-MCNC: 0.3 MG/DL (ref 0–1.2)
BUN SERPL-MCNC: 19 MG/DL (ref 8–23)
BUN/CREAT SERPL: 18.3 (ref 7–25)
CALCIUM SPEC-SCNC: 9.5 MG/DL (ref 8.6–10.5)
CHLORIDE SERPL-SCNC: 100 MMOL/L (ref 98–107)
CO2 SERPL-SCNC: 27 MMOL/L (ref 22–29)
CREAT SERPL-MCNC: 1.04 MG/DL (ref 0.76–1.27)
DEPRECATED RDW RBC AUTO: 48.3 FL (ref 37–54)
EGFRCR SERPLBLD CKD-EPI 2021: 80.7 ML/MIN/1.73
EOSINOPHIL # BLD AUTO: 0.06 10*3/MM3 (ref 0–0.4)
EOSINOPHIL NFR BLD AUTO: 0.9 % (ref 0.3–6.2)
ERYTHROCYTE [DISTWIDTH] IN BLOOD BY AUTOMATED COUNT: 14.2 % (ref 12.3–15.4)
GLOBULIN UR ELPH-MCNC: 2.6 GM/DL
GLUCOSE SERPL-MCNC: 103 MG/DL (ref 65–99)
HCT VFR BLD AUTO: 34.7 % (ref 37.5–51)
HGB BLD-MCNC: 11.8 G/DL (ref 13–17.7)
IMM GRANULOCYTES # BLD AUTO: 0.07 10*3/MM3 (ref 0–0.05)
IMM GRANULOCYTES NFR BLD AUTO: 1.1 % (ref 0–0.5)
LYMPHOCYTES # BLD AUTO: 1.89 10*3/MM3 (ref 0.7–3.1)
LYMPHOCYTES NFR BLD AUTO: 29.6 % (ref 19.6–45.3)
MCH RBC QN AUTO: 31.6 PG (ref 26.6–33)
MCHC RBC AUTO-ENTMCNC: 34 G/DL (ref 31.5–35.7)
MCV RBC AUTO: 93 FL (ref 79–97)
MONOCYTES # BLD AUTO: 0.79 10*3/MM3 (ref 0.1–0.9)
MONOCYTES NFR BLD AUTO: 12.4 % (ref 5–12)
NEUTROPHILS NFR BLD AUTO: 3.5 10*3/MM3 (ref 1.7–7)
NEUTROPHILS NFR BLD AUTO: 54.7 % (ref 42.7–76)
NRBC BLD AUTO-RTO: 0 /100 WBC (ref 0–0.2)
PLATELET # BLD AUTO: 468 10*3/MM3 (ref 140–450)
PMV BLD AUTO: 10 FL (ref 6–12)
POTASSIUM SERPL-SCNC: 3.6 MMOL/L (ref 3.5–5.2)
PROT SERPL-MCNC: 6.8 G/DL (ref 6–8.5)
RBC # BLD AUTO: 3.73 10*6/MM3 (ref 4.14–5.8)
SODIUM SERPL-SCNC: 135 MMOL/L (ref 136–145)
WBC NRBC COR # BLD AUTO: 6.39 10*3/MM3 (ref 3.4–10.8)

## 2024-04-17 PROCEDURE — 80053 COMPREHEN METABOLIC PANEL: CPT | Performed by: INTERNAL MEDICINE

## 2024-04-17 PROCEDURE — 99214 OFFICE O/P EST MOD 30 MIN: CPT | Performed by: INTERNAL MEDICINE

## 2024-04-17 PROCEDURE — 85025 COMPLETE CBC W/AUTO DIFF WBC: CPT | Performed by: INTERNAL MEDICINE

## 2024-04-17 NOTE — PROGRESS NOTES
Subjective     REASON FOR FOLLOW UP:  1.  Polycythemia vera, polycythemia and thrombocytosis and leukocytosis, EPO level 1.3, Tino 2+, peripheral blood for BCR/C ABL negative    HISTORY OF PRESENT ILLNESS:  The patient is a 63 y.o. year old male who is here for an opinion about the above issue.    History of Present Illness   Phani Landis is a 63 y.o. with the above-mentioned history who returns today 2/14/2024 for lab review and evaluation continuing on Jakafi 20 mg twice daily.  His dose was increased when he was seen on 1/31/2024 and his platelet count had increased to 940,000.  Patient states that there was a brief period of time where he had actually run out of his medication before he was able to increase his dose.  He is tolerating this quite well.  He denies any significant issues with side effects from this treatment.  He does continue to follow-up with wound care regarding the lesions on his lower extremities, which are doing quite well.    April 17, 2024: Patient is doing well today without any complaints.  He has lost weight because he is working hard in the form.  He is blood counts today are stable.  He is currently taking Jakafi.  His ultrasound had shown hepatosplenomegaly the spleen being 14.8 cm in the liver being 17 cm.  At some point in October when he has already met his deductible we can obtain a CT scan to check on whether there is a reduction in the size of the spleen.  Patient has some mild fatigue hemoglobin is 11.7 but platelets are down to 495 which is good for him      Hematological oncologic history:  Patient is a 59-year-old gentleman who has been referred by his primary care Castillo Velasco for evaluation of thrombocytosis and polycythemia.  He has lost more than 50 pounds of weight.  In the last 6 months.  Looking back his blood counts have been high starting in 2016.  Initially his platelets were high around 670 and gradually increased in the 900 range.  More recently his hemoglobin  "has been increasing and his white count has been increasing.  He has not had a DVT or pulmonary embolism.  He has had no issues with itching with hot showers.  Has not had a stroke.  He had pneumonia last year but none recently.  He does have some shortness of breath but no chest pain.  He is exposed to secondhand smoking.    He does not have any nausea vomiting or abdominal pain at present.  He did have gout on several locations and was placed on meloxicam.  His renal function is mildly elevated.  He denies any fever or night sweats.    Patient does have a cough productive of yellow sputum but has no fevers.    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, EPO 1.3 low  CT chest negative, CT abdomen pelvis shows splenomegaly    Past Medical History:   Diagnosis Date    Anxiety     Arthritis     Cancer June 2020    Polycythemia vera    Hypertension         Past Surgical History:   Procedure Laterality Date    CHEST SURGERY      OTHER SURGICAL HISTORY  1985    \"Exploratory\"        Current Outpatient Medications on File Prior to Visit   Medication Sig Dispense Refill    allopurinol (ZYLOPRIM) 100 MG tablet TAKE 1 TABLET BY MOUTH TWICE A  tablet 0    ALPRAZolam (Xanax) 0.5 MG tablet Take 1 tablet by mouth 2 (Two) Times a Day As Needed for Anxiety or Sleep. Indications: Feeling Anxious 30 tablet 0    amLODIPine-benazepril (LOTREL) 5-40 MG per capsule Take 1 capsule by mouth Daily. Indications: High Blood Pressure Disorder 90 capsule 1    aspirin 81 MG chewable tablet Chew 1 tablet Daily.      bisoprolol (ZEBeta) 10 MG tablet Take 1 tablet by mouth Daily. Indications: High Blood Pressure Disorder 90 tablet 1    chlorthalidone (HYGROTON) 25 MG tablet Take 1 tablet by mouth Daily. Indications: High Blood Pressure Disorder 90 tablet 1    hydrALAZINE (APRESOLINE) 50 MG tablet TAKE 1 TABLET BY MOUTH TWICE A  tablet 0    potassium chloride 10 MEQ CR tablet TAKE 1 " "TABLET BY MOUTH TWICE A DAY (Patient taking differently: Take 1 tablet by mouth 2 (Two) Times a Day. 2 tablets in the morning and 1 in the evening) 180 tablet 1    ruxolitinib (JAKAFI) 20 MG tablet Take 1 tablet by mouth 2 (Two) Times a Day. 60 tablet 5    sodium chloride 1 g tablet TAKE 1 TABLET BY MOUTH TWO TIMES A DAY 60 tablet 2    sertraline (Zoloft) 25 MG tablet Take 1 tablet by mouth Daily. Indications: Generalized Anxiety Disorder, Major Depressive Disorder 30 tablet 2     No current facility-administered medications on file prior to visit.        ALLERGIES:    Allergies   Allergen Reactions    Cephalexin Itching        Social History     Socioeconomic History    Marital status:    Tobacco Use    Smoking status: Never    Smokeless tobacco: Never   Vaping Use    Vaping status: Never Used   Substance and Sexual Activity    Alcohol use: Yes     Comment: Occasional drinker    Drug use: Never    Sexual activity: Yes     Partners: Female        Family History   Problem Relation Age of Onset    Hypertension Father     Hypertension Other     Heart disease Other     Cervical cancer Other       I have reviewed the patient's medical history in detail and updated the computerized patient record.    Review of Systems  ROS as per HPI      Objective     Vitals:    04/17/24 1319   BP: 125/85   Pulse: 52   Resp: 18   Temp: 98.4 °F (36.9 °C)   TempSrc: Infrared   SpO2: 99%   Weight: 114 kg (251 lb 9.6 oz)   Height: 188 cm (74\")   PainSc: 0-No pain               4/17/2024     1:22 PM   Current Status   ECOG score 5     Physical Exam  Vitals reviewed.   Constitutional:       General: He is not in acute distress.     Appearance: Normal appearance. He is well-developed.   HENT:      Head: Normocephalic and atraumatic.   Eyes:      Pupils: Pupils are equal, round, and reactive to light.   Cardiovascular:      Rate and Rhythm: Normal rate and regular rhythm.      Heart sounds: Normal heart sounds. No murmur " heard.  Pulmonary:      Effort: Pulmonary effort is normal. No respiratory distress.      Breath sounds: Normal breath sounds. No wheezing, rhonchi or rales.   Abdominal:      General: Bowel sounds are normal. There is no distension.      Palpations: Abdomen is soft.   Musculoskeletal:         General: No swelling. Normal range of motion.      Cervical back: Normal range of motion.   Skin:     General: Skin is warm and dry.      Findings: No rash.      Comments: Scattered ecchymosis on the upper extremities bilaterally.  Bandaged area on his shin of the right lower extremity.  Dressing clean dry and intact.   Neurological:      Mental Status: He is alert and oriented to person, place, and time.         RECENT LABS:  Results from last 7 days   Lab Units 04/17/24  1244   WBC 10*3/mm3 6.39   NEUTROS ABS 10*3/mm3 3.50   HEMOGLOBIN g/dL 11.8*   HEMATOCRIT % 34.7*   PLATELETS 10*3/mm3 468*             Results from last 7 days   Lab Units 04/17/24  1244   SODIUM mmol/L 135*   POTASSIUM mmol/L 3.6   CHLORIDE mmol/L 100   CO2 mmol/L 27.0   BUN mg/dL 19   CREATININE mg/dL 1.04   CALCIUM mg/dL 9.5   ALBUMIN g/dL 4.2   BILIRUBIN mg/dL 0.3   ALK PHOS U/L 69   ALT (SGPT) U/L 44*   AST (SGOT) U/L 70*   GLUCOSE mg/dL 103*                 Assessment & Plan     1.  Polycythemia with leukocytosis and thrombocytosis.  He does not have any itching with hot showers.  He does not have any headaches or DVT.  He does have blood counts worsening gradually from 2016.  7/8/2020  His platelets 974K.  Hemoglobin is 17.6 with hematocrit of 52.8 and white count of 11.56.  He denies fever night sweats but has significant weight loss greater than 50 pounds in 6 months.  He does have a cough which is productive of yellow sputum and some shortness of breath.  He has history of secondhand smoking exposure.  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  CT chest  negative  Hydroxyurea 500 mg twice daily was initiated on July 16, 2020.  .  7/22/2020 Hydrea increased to 1500 mg (1000 mg in am and 500 mg in pm) Monday-Friday and 2000 mg (1000 mg in am and 1000 mg in PM) on Saturdays and Sundays  7/29/2020 patient's platelet count today is 821.  White count 7.78, hemoglobin 16.4.   Increased Hydrea at 1500 mg Monday through Friday, and 2000 mg on Saturdays and Sundays.   In future if hematocrit greater than 48 then he will receive phlebotomy.  Last phlebotomy 7/15/2020.   2/23/2022 patient continues on Hydrea 1500 mg on Monday, Wednesday, and Friday, 1500 mg all other days.  He is tolerating Hydrea well aside from some fatigue on the days he takes 1500 mg.  He also continues to receive phlebotomy for hematocrit greater than 48, hematocrit today 44.7 so patient will not receive phlebotomy.  Platelets today have increased to 575, so we will increase Hydrea to 1500 mg four times a week, 1000 mg all other days.  Because we are increasing dose of Hydrea, patient will return in 1 month for CBC with RN review to monitor counts.  Discussed with the patient who is agreeable.  6/15/2022: Patient continues on Hydrea 1500 mg 4 days a week and 1000 mg all other days.  Platelets increased to 573,000.  Discussed with Dr. Fleming today plan to increase Hydrea to 1500 mg 5 days a week and 1000 mg 2 days a week.  He will return in 1 month for CBC with RN review to monitor counts.  Patient agreeable   September 7, 2022: Tolerating Hydrea very well.  His platelets are down to 497.  He continues with aspirin.  Given hematocrit of 44 we will hold off phlebotomy  11/30/2022 continuing on Hydrea 1500 mg 5 days a week and 1000 mg 2 days a week tolerating well.  May 24, 2023: Patient's hematocrit is 42.  But his platelet is elevated to 580.  We will increase the dose of his hydroxyurea to 1500 mg 6 days a week and 1000 mg 1 day a week  8/16/2023: Patient's hematocrit is normal at 41.9.  Platelets have  normalized to 430,000.  Hemoglobin 14.9 and WBC 6.29.  Hydrea dosing will remain the same at 1500 mg 6 days a week and 100 mg once weekly.  He continues to tolerate this treatment well. Mild elevation was noted with his liver enzymes.  Historically he has had mild elevations when he was taking pain medications.  Today his AST is 42 and ALT is 42.  Total bilirubin is normal at 0.7 and alkaline phosphatase is normal at 72.  Does note that he takes occasional Tylenol for his arthritis.  He denies drinking any alcohol.  We will have him return in 1 month to monitor his labs.  November 15, 2023: Patient has new ulcerations in the right lower extremity for the last 2 3 months which are not healing.  On discussion with wound care they felt this is more likely hydroxyurea related rather than venous disease.  He has never been a smoker and so we discussed about switching to Jakafi.  He has had splenomegaly in the past but we will need to obtain ultrasound of the abdomen stat in order to rule out worsening splenomegaly.  11/29/2023: Patient is currently 3 days into Jakafi 10 mg p.o. twice daily.  He is currently tolerating his medication well and denies any side effects.  His lower extremity ulcerations secondary to Hydrea continue to improve and patient is being followed closely by the wound care center at [facility].  Lower extremities are currently dressed today.  Platelet count today is more elevated at 669,000.  Mild leukocytosis with a white blood cell count of 13.60.  Patient did just complete a course of steroids but is most likely the cause.  ALT mildly elevated today at 47.  AST and total bilirubin are normal.  We will have patient repeat labs in 1 week for close monitoring we will follow back up with Dr. Fleming in 2 weeks.  January 17 2024: Patient was seen 2 weeks ago at [facility] by a nurse practitioner of Dr. Garcia the oncologist.  However his platelets had gone up to 275 and his white count  was elevated to 11.41.  As a result they had increased his dose on Jakafi to 20 mg in the morning and 10 mg at bedtime.  However today after the increased dose his counts have improved though platelet count is still 578 and white count is normalized.  He prefers to follow-up at [facility].  Patient has never had a bone marrow and we discussed about consideration of bone marrow in order to make sure there is no progression to myelofibrosis.  His spleen is enlarged.  1/31/2024 platelet increased to 940,000.  Jakafi was increased to 20 mg twice daily.  2/14/2024 platelet count 729,000, hemoglobin 13.1, WBC 6.68.  Patient will currently continue on Jakafi 20 mg twice daily.  April 17, 2024: Blood counts are more stable.  His hemoglobin is down to 11.79 but his white count and platelet count are good..  Will repeat check CT scan at some point in January 2024's results show evidence of any decrease in splenomegaly.  He is tolerating Jakafi 20 mg p.o. twice daily      2.  Weight loss greater than 50 pounds at time of initial evaluation.  CT chest abdomen pelvis negative for malignancy  Patient admitting that his weight loss was actually been more intentional   Weight now stable    3. Hypokalemia, mild, chronic.  8/2/2021: Potassium 3.3. We discussed utilizing electrolyte drinks when he is working outside/sweating to help replenish what he is losing.  He does continue potassium chloride once daily as well.  9/28/2021: Potassium stable at 3.3. Continue Klor-con 20 mEq daily.  2/23/2022 potassium today 4.0, patient will continue on potassium 20 mEq daily.  6/15/2022: Currently taking 10 mEq daily, potassium 3.8.  11/30/2022 potassium 3.2, he will increase his potassium to 20 M EQ daily.  8/16/2023: Potassium 3.5.  Patient remains on potassium 20 mill equivalents daily.  11/29/2023: Potassium is normal at 3.6.  January 17, 2024: Patient's potassium is 3.2.  He is on chlorthalidone.  Will need to increase potassium  intake  2/14/2024 potassium 4.3.  Monitor    5.  History of knee surgery.  Patient is to go for knee surgery in 3 weeks  Discussed with Dr. Chad Oliveros and he will start him on 2.5 mg p.o. twice daily of Eliquis 24 hours after surgery.      6.  Skin lesions on the lower extremity with delayed healing, will consult with [facility]  11/30/2022 patient states that he did not see dermatologist as a skin lesions began to heal.  He continues to use vitamin D on this area.  March 1, 2023: The skin ulceration on the left lower extremity is healing up but not completely healed up  Unsure if this is just secondary to chronic venous stasis as opposed to hydroxyurea.  However usually the hydroxyurea ulcerations on the medial malleolus and this is more superior  8/16/2023: No skin lesions noted today.  11/29/2023: Lower extremity ulcerations are healing well and he continues to be followed by [facility] wound care center.  2/14/2024 continues to follow closely with wound care every 2 weeks.  Lower extremity lesions are healing well.    7.  Elevated liver function tests with ALT of 69 and AST of 90 likely secondary to Tylenol which she has been taking for a day for the sake of his knee pain  2/14/2024 AST 67, ALT 54, alkaline phosphatase 61, total bilirubin 0.5.  Monitor.    8.  Nausea related to Jakafi, patient takes with food and then he does not have nausea  Nausea has resolved.       Plan:   Continue Jakafi 20 mg twice daily.  Continue  Follow-up in 1 month and 2 months for labs  Follow-up with me in 3 months with labs.  If he is stable we can slowly space him out        Eliana Fleming MD

## 2024-04-26 ENCOUNTER — SPECIALTY PHARMACY (OUTPATIENT)
Dept: PHARMACY | Facility: HOSPITAL | Age: 64
End: 2024-04-26
Payer: COMMERCIAL

## 2024-04-26 RX ORDER — POTASSIUM CHLORIDE 750 MG/1
TABLET, FILM COATED, EXTENDED RELEASE ORAL
Qty: 90 TABLET | Refills: 5 | Status: SHIPPED | OUTPATIENT
Start: 2024-04-26

## 2024-04-26 NOTE — PROGRESS NOTES
Patient called [facility] office requesting updated prescription for Potassium Chloride.  States he was instructed to increase potassium following January office visit, but prescription was not updated accordingly.  This has caused him to be short on supply and request refills sooner.  New prescription sent to patient's preferred pharmacy and provider for cosignature.     Elder Holder, PharmD, BCOP  Clinical Oncology Pharmacist

## 2024-05-02 ENCOUNTER — OFFICE VISIT (OUTPATIENT)
Dept: FAMILY MEDICINE CLINIC | Facility: CLINIC | Age: 64
End: 2024-05-02
Payer: COMMERCIAL

## 2024-05-02 VITALS
SYSTOLIC BLOOD PRESSURE: 124 MMHG | TEMPERATURE: 97.8 F | OXYGEN SATURATION: 98 % | HEART RATE: 68 BPM | HEIGHT: 74 IN | DIASTOLIC BLOOD PRESSURE: 87 MMHG | BODY MASS INDEX: 32.03 KG/M2 | WEIGHT: 249.6 LBS

## 2024-05-02 DIAGNOSIS — Z00.00 ANNUAL PHYSICAL EXAM: Primary | ICD-10-CM

## 2024-05-02 DIAGNOSIS — Z11.59 NEED FOR HEPATITIS C SCREENING TEST: ICD-10-CM

## 2024-05-02 DIAGNOSIS — R44.3 HALLUCINATIONS: ICD-10-CM

## 2024-05-02 DIAGNOSIS — Z12.5 SCREENING FOR PROSTATE CANCER: ICD-10-CM

## 2024-05-02 DIAGNOSIS — F33.1 MODERATE EPISODE OF RECURRENT MAJOR DEPRESSIVE DISORDER: ICD-10-CM

## 2024-05-02 DIAGNOSIS — I10 ESSENTIAL HYPERTENSION: ICD-10-CM

## 2024-05-02 PROCEDURE — 99214 OFFICE O/P EST MOD 30 MIN: CPT | Performed by: NURSE PRACTITIONER

## 2024-05-05 DIAGNOSIS — I10 ESSENTIAL HYPERTENSION: ICD-10-CM

## 2024-05-06 RX ORDER — HYDRALAZINE HYDROCHLORIDE 50 MG/1
50 TABLET, FILM COATED ORAL 2 TIMES DAILY
Qty: 180 TABLET | Refills: 0 | Status: SHIPPED | OUTPATIENT
Start: 2024-05-06

## 2024-05-08 ENCOUNTER — OFFICE VISIT (OUTPATIENT)
Dept: WOUND CARE | Facility: HOSPITAL | Age: 64
End: 2024-05-08
Payer: COMMERCIAL

## 2024-05-08 VITALS
HEART RATE: 64 BPM | SYSTOLIC BLOOD PRESSURE: 148 MMHG | DIASTOLIC BLOOD PRESSURE: 82 MMHG | RESPIRATION RATE: 18 BRPM | TEMPERATURE: 97.5 F

## 2024-05-08 DIAGNOSIS — S81.801D OPEN WOUND OF RIGHT LOWER LEG, SUBSEQUENT ENCOUNTER: Primary | ICD-10-CM

## 2024-05-08 DIAGNOSIS — D45 POLYCYTHEMIA VERA: ICD-10-CM

## 2024-05-08 PROCEDURE — G0463 HOSPITAL OUTPT CLINIC VISIT: HCPCS | Performed by: PHYSICIAN ASSISTANT

## 2024-05-08 PROCEDURE — 99214 OFFICE O/P EST MOD 30 MIN: CPT | Performed by: PHYSICIAN ASSISTANT

## 2024-05-10 ENCOUNTER — SPECIALTY PHARMACY (OUTPATIENT)
Dept: PHARMACY | Facility: HOSPITAL | Age: 64
End: 2024-05-10
Payer: COMMERCIAL

## 2024-05-15 ENCOUNTER — TELEPHONE (OUTPATIENT)
Dept: FAMILY MEDICINE CLINIC | Facility: CLINIC | Age: 64
End: 2024-05-15
Payer: COMMERCIAL

## 2024-05-15 ENCOUNTER — LAB (OUTPATIENT)
Dept: OTHER | Facility: HOSPITAL | Age: 64
End: 2024-05-15
Payer: COMMERCIAL

## 2024-05-15 ENCOUNTER — OFFICE VISIT (OUTPATIENT)
Dept: ONCOLOGY | Facility: CLINIC | Age: 64
End: 2024-05-15
Payer: COMMERCIAL

## 2024-05-15 VITALS
DIASTOLIC BLOOD PRESSURE: 82 MMHG | RESPIRATION RATE: 18 BRPM | OXYGEN SATURATION: 98 % | HEIGHT: 74 IN | WEIGHT: 248.8 LBS | TEMPERATURE: 98 F | HEART RATE: 56 BPM | BODY MASS INDEX: 31.93 KG/M2 | SYSTOLIC BLOOD PRESSURE: 126 MMHG

## 2024-05-15 DIAGNOSIS — D45 POLYCYTHEMIA VERA: ICD-10-CM

## 2024-05-15 DIAGNOSIS — Z11.59 NEED FOR HEPATITIS C SCREENING TEST: ICD-10-CM

## 2024-05-15 DIAGNOSIS — Z79.899 HIGH RISK MEDICATION USE: ICD-10-CM

## 2024-05-15 DIAGNOSIS — D75.839 THROMBOCYTOSIS: ICD-10-CM

## 2024-05-15 DIAGNOSIS — R76.8 POSITIVE HEPATITIS C ANTIBODY TEST: ICD-10-CM

## 2024-05-15 DIAGNOSIS — D45 POLYCYTHEMIA VERA: Primary | ICD-10-CM

## 2024-05-15 DIAGNOSIS — Z12.5 SCREENING FOR PROSTATE CANCER: ICD-10-CM

## 2024-05-15 LAB
ALBUMIN SERPL-MCNC: 4.1 G/DL (ref 3.5–5.2)
ALBUMIN/GLOB SERPL: 1.5 G/DL
ALP SERPL-CCNC: 64 U/L (ref 39–117)
ALT SERPL W P-5'-P-CCNC: 41 U/L (ref 1–41)
ANION GAP SERPL CALCULATED.3IONS-SCNC: 5.5 MMOL/L (ref 5–15)
AST SERPL-CCNC: 44 U/L (ref 1–40)
BASOPHILS # BLD AUTO: 0.1 10*3/MM3 (ref 0–0.2)
BASOPHILS NFR BLD AUTO: 1.4 % (ref 0–1.5)
BILIRUB SERPL-MCNC: 0.4 MG/DL (ref 0–1.2)
BUN SERPL-MCNC: 16 MG/DL (ref 8–23)
BUN/CREAT SERPL: 15.5 (ref 7–25)
CALCIUM SPEC-SCNC: 9.6 MG/DL (ref 8.6–10.5)
CHLORIDE SERPL-SCNC: 102 MMOL/L (ref 98–107)
CO2 SERPL-SCNC: 31.5 MMOL/L (ref 22–29)
CREAT SERPL-MCNC: 1.03 MG/DL (ref 0.76–1.27)
DEPRECATED RDW RBC AUTO: 51.8 FL (ref 37–54)
EGFRCR SERPLBLD CKD-EPI 2021: 81.6 ML/MIN/1.73
EOSINOPHIL # BLD AUTO: 0.09 10*3/MM3 (ref 0–0.4)
EOSINOPHIL NFR BLD AUTO: 1.3 % (ref 0.3–6.2)
ERYTHROCYTE [DISTWIDTH] IN BLOOD BY AUTOMATED COUNT: 14.8 % (ref 12.3–15.4)
GLOBULIN UR ELPH-MCNC: 2.8 GM/DL
GLUCOSE SERPL-MCNC: 98 MG/DL (ref 65–99)
HCT VFR BLD AUTO: 37.8 % (ref 37.5–51)
HCV AB SER QL: REACTIVE
HGB BLD-MCNC: 12.4 G/DL (ref 13–17.7)
IMM GRANULOCYTES # BLD AUTO: 0.06 10*3/MM3 (ref 0–0.05)
IMM GRANULOCYTES NFR BLD AUTO: 0.9 % (ref 0–0.5)
LYMPHOCYTES # BLD AUTO: 2.38 10*3/MM3 (ref 0.7–3.1)
LYMPHOCYTES NFR BLD AUTO: 33.8 % (ref 19.6–45.3)
MCH RBC QN AUTO: 31.1 PG (ref 26.6–33)
MCHC RBC AUTO-ENTMCNC: 32.8 G/DL (ref 31.5–35.7)
MCV RBC AUTO: 94.7 FL (ref 79–97)
MONOCYTES # BLD AUTO: 0.74 10*3/MM3 (ref 0.1–0.9)
MONOCYTES NFR BLD AUTO: 10.5 % (ref 5–12)
NEUTROPHILS NFR BLD AUTO: 3.68 10*3/MM3 (ref 1.7–7)
NEUTROPHILS NFR BLD AUTO: 52.1 % (ref 42.7–76)
NRBC BLD AUTO-RTO: 0 /100 WBC (ref 0–0.2)
PLATELET # BLD AUTO: 487 10*3/MM3 (ref 140–450)
PMV BLD AUTO: 10 FL (ref 6–12)
POTASSIUM SERPL-SCNC: 4.5 MMOL/L (ref 3.5–5.2)
PROT SERPL-MCNC: 6.9 G/DL (ref 6–8.5)
PSA SERPL-MCNC: 1.48 NG/ML (ref 0–4)
RBC # BLD AUTO: 3.99 10*6/MM3 (ref 4.14–5.8)
SODIUM SERPL-SCNC: 139 MMOL/L (ref 136–145)
WBC NRBC COR # BLD AUTO: 7.05 10*3/MM3 (ref 3.4–10.8)

## 2024-05-15 PROCEDURE — 36415 COLL VENOUS BLD VENIPUNCTURE: CPT

## 2024-05-15 PROCEDURE — 80053 COMPREHEN METABOLIC PANEL: CPT | Performed by: NURSE PRACTITIONER

## 2024-05-15 PROCEDURE — 86803 HEPATITIS C AB TEST: CPT | Performed by: NURSE PRACTITIONER

## 2024-05-15 PROCEDURE — G0103 PSA SCREENING: HCPCS | Performed by: NURSE PRACTITIONER

## 2024-05-15 PROCEDURE — 85025 COMPLETE CBC W/AUTO DIFF WBC: CPT | Performed by: NURSE PRACTITIONER

## 2024-05-15 PROCEDURE — 87902 NFCT AGT GNTYP ALYS HEP C: CPT | Performed by: NURSE PRACTITIONER

## 2024-05-15 PROCEDURE — 87522 HEPATITIS C REVRS TRNSCRPJ: CPT | Performed by: NURSE PRACTITIONER

## 2024-05-15 NOTE — PROGRESS NOTES
Subjective     REASON FOR FOLLOW UP:  1.  Polycythemia vera, polycythemia and thrombocytosis and leukocytosis, EPO level 1.3, Tino 2+, peripheral blood for BCR/C ABL negative    HISTORY OF PRESENT ILLNESS:  The patient is a 63 y.o. year old male who is here for an opinion about the above issue.    History of Present Illness   Phani Landis is a 63 y.o. with the above-mentioned history who returns today 5/15/2024 for lab review and evaluation continuing on Jakafi 20 mg twice daily.  He is tolerating this dose quite well.  He remains active.  He denies issues with fevers, chills, night sweats.  He has a good appetite, and reports normal bowel function.  He has no other new problems or concerns today.        Hematological oncologic history:  Patient is a 59-year-old gentleman who has been referred by his primary care Castlilo Velasco for evaluation of thrombocytosis and polycythemia.  He has lost more than 50 pounds of weight.  In the last 6 months.  Looking back his blood counts have been high starting in 2016.  Initially his platelets were high around 670 and gradually increased in the 900 range.  More recently his hemoglobin has been increasing and his white count has been increasing.  He has not had a DVT or pulmonary embolism.  He has had no issues with itching with hot showers.  Has not had a stroke.  He had pneumonia last year but none recently.  He does have some shortness of breath but no chest pain.  He is exposed to secondhand smoking.    He does not have any nausea vomiting or abdominal pain at present.  He did have gout on several locations and was placed on meloxicam.  His renal function is mildly elevated.  He denies any fever or night sweats.    Patient does have a cough productive of yellow sputum but has no fevers.    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, EPO 1.3 low  CT chest negative, CT abdomen pelvis shows splenomegaly    Past Medical History:  "  Diagnosis Date    Anxiety     Arthritis     Cancer June 2020    Polycythemia vera    Hypertension         Past Surgical History:   Procedure Laterality Date    CHEST SURGERY      OTHER SURGICAL HISTORY  1985    \"Exploratory\"        Current Outpatient Medications on File Prior to Visit   Medication Sig Dispense Refill    allopurinol (ZYLOPRIM) 100 MG tablet TAKE 1 TABLET BY MOUTH TWICE A  tablet 0    ALPRAZolam (Xanax) 0.5 MG tablet Take 1 tablet by mouth 2 (Two) Times a Day As Needed for Anxiety or Sleep. Indications: Feeling Anxious 30 tablet 0    amLODIPine-benazepril (LOTREL) 5-40 MG per capsule Take 1 capsule by mouth Daily. Indications: High Blood Pressure Disorder 90 capsule 1    aspirin 81 MG chewable tablet Chew 1 tablet Daily.      bisoprolol (ZEBeta) 10 MG tablet Take 1 tablet by mouth Daily. Indications: High Blood Pressure Disorder 90 tablet 1    chlorthalidone (HYGROTON) 25 MG tablet Take 1 tablet by mouth Daily. Indications: High Blood Pressure Disorder 90 tablet 1    hydrALAZINE (APRESOLINE) 50 MG tablet TAKE 1 TABLET BY MOUTH TWICE A  tablet 0    potassium chloride 10 MEQ CR tablet Take 2 tablets in the morning and 1 in the evening 90 tablet 5    ruxolitinib (JAKAFI) 20 MG tablet Take 1 tablet by mouth 2 (Two) Times a Day. 60 tablet 5    sodium chloride 1 g tablet TAKE 1 TABLET BY MOUTH TWO TIMES A DAY 60 tablet 2     No current facility-administered medications on file prior to visit.        ALLERGIES:    Allergies   Allergen Reactions    Zoloft [Sertraline] Hallucinations    Cephalexin Itching        Social History     Socioeconomic History    Marital status:    Tobacco Use    Smoking status: Never    Smokeless tobacco: Never   Vaping Use    Vaping status: Never Used   Substance and Sexual Activity    Alcohol use: Yes     Comment: Occasional drinker    Drug use: Never    Sexual activity: Yes     Partners: Female        Family History   Problem Relation Age of Onset    " "Hypertension Father     Hypertension Other     Heart disease Other     Cervical cancer Other       I have reviewed the patient's medical history in detail and updated the computerized patient record.    Review of Systems  ROS as per HPI      Objective     Vitals:    05/15/24 1251   BP: 126/82   Pulse: 56   Resp: 18   Temp: 98 °F (36.7 °C)   TempSrc: Temporal   SpO2: 98%   Weight: 113 kg (248 lb 12.8 oz)   Height: 188 cm (74.02\")   PainSc: 0-No pain               5/15/2024    12:43 PM   Current Status   ECOG score 0     Physical Exam  Vitals reviewed.   Constitutional:       General: He is not in acute distress.     Appearance: Normal appearance. He is well-developed.   HENT:      Head: Normocephalic and atraumatic.   Eyes:      Pupils: Pupils are equal, round, and reactive to light.   Cardiovascular:      Rate and Rhythm: Normal rate and regular rhythm.      Heart sounds: Normal heart sounds. No murmur heard.  Pulmonary:      Effort: Pulmonary effort is normal. No respiratory distress.      Breath sounds: Normal breath sounds. No wheezing, rhonchi or rales.   Abdominal:      General: Bowel sounds are normal. There is no distension.      Palpations: Abdomen is soft.   Musculoskeletal:         General: No swelling. Normal range of motion.      Cervical back: Normal range of motion.   Skin:     General: Skin is warm and dry.      Findings: No rash.      Comments: Scattered ecchymosis on the upper extremities bilaterally.  Bandaged area on his shin of the right lower extremity.  Dressing clean dry and intact.   Neurological:      Mental Status: He is alert and oriented to person, place, and time.         RECENT LABS:  Results from last 7 days   Lab Units 05/15/24  1230   WBC 10*3/mm3 7.05   NEUTROS ABS 10*3/mm3 3.68   HEMOGLOBIN g/dL 12.4*   HEMATOCRIT % 37.8   PLATELETS 10*3/mm3 487*             Results from last 7 days   Lab Units 05/15/24  1230   SODIUM mmol/L 139   POTASSIUM mmol/L 4.5   CHLORIDE mmol/L 102   CO2 " mmol/L 31.5*   BUN mg/dL 16   CREATININE mg/dL 1.03   CALCIUM mg/dL 9.6   ALBUMIN g/dL 4.1   BILIRUBIN mg/dL 0.4   ALK PHOS U/L 64   ALT (SGPT) U/L 41   AST (SGOT) U/L 44*   GLUCOSE mg/dL 98                   Assessment & Plan     1.  Polycythemia with leukocytosis and thrombocytosis.  He does not have any itching with hot showers.  He does not have any headaches or DVT.  He does have blood counts worsening gradually from 2016.  7/8/2020  His platelets 974K.  Hemoglobin is 17.6 with hematocrit of 52.8 and white count of 11.56.  He denies fever night sweats but has significant weight loss greater than 50 pounds in 6 months.  He does have a cough which is productive of yellow sputum and some shortness of breath.  He has history of secondhand smoking exposure.  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  CT chest negative  Hydroxyurea 500 mg twice daily was initiated on July 16, 2020.  .  7/22/2020 Hydrea increased to 1500 mg (1000 mg in am and 500 mg in pm) Monday-Friday and 2000 mg (1000 mg in am and 1000 mg in PM) on Saturdays and Sundays  7/29/2020 patient's platelet count today is 821.  White count 7.78, hemoglobin 16.4.   Increased Hydrea at 1500 mg Monday through Friday, and 2000 mg on Saturdays and Sundays.   In future if hematocrit greater than 48 then he will receive phlebotomy.  Last phlebotomy 7/15/2020.   2/23/2022 patient continues on Hydrea 1500 mg on Monday, Wednesday, and Friday, 1500 mg all other days.  He is tolerating Hydrea well aside from some fatigue on the days he takes 1500 mg.  He also continues to receive phlebotomy for hematocrit greater than 48, hematocrit today 44.7 so patient will not receive phlebotomy.  Platelets today have increased to 575, so we will increase Hydrea to 1500 mg four times a week, 1000 mg all other days.  Because we are increasing dose of Hydrea, patient will return in 1 month  for CBC with RN review to monitor counts.  Discussed with the patient who is agreeable.  6/15/2022: Patient continues on Hydrea 1500 mg 4 days a week and 1000 mg all other days.  Platelets increased to 573,000.  Discussed with Dr. Fleming today plan to increase Hydrea to 1500 mg 5 days a week and 1000 mg 2 days a week.  He will return in 1 month for CBC with RN review to monitor counts.  Patient agreeable   September 7, 2022: Tolerating Hydrea very well.  His platelets are down to 497.  He continues with aspirin.  Given hematocrit of 44 we will hold off phlebotomy  11/30/2022 continuing on Hydrea 1500 mg 5 days a week and 1000 mg 2 days a week tolerating well.  May 24, 2023: Patient's hematocrit is 42.  But his platelet is elevated to 580.  We will increase the dose of his hydroxyurea to 1500 mg 6 days a week and 1000 mg 1 day a week  8/16/2023: Patient's hematocrit is normal at 41.9.  Platelets have normalized to 430,000.  Hemoglobin 14.9 and WBC 6.29.  Hydrea dosing will remain the same at 1500 mg 6 days a week and 100 mg once weekly.  He continues to tolerate this treatment well. Mild elevation was noted with his liver enzymes.  Historically he has had mild elevations when he was taking pain medications.  Today his AST is 42 and ALT is 42.  Total bilirubin is normal at 0.7 and alkaline phosphatase is normal at 72.  Does note that he takes occasional Tylenol for his arthritis.  He denies drinking any alcohol.  We will have him return in 1 month to monitor his labs.  November 15, 2023: Patient has new ulcerations in the right lower extremity for the last 2 3 months which are not healing.  On discussion with wound care they felt this is more likely hydroxyurea related rather than venous disease.  He has never been a smoker and so we discussed about switching to Jakafi.  He has had splenomegaly in the past but we will need to obtain ultrasound of the abdomen stat in order to rule out worsening  splenomegaly.  11/29/2023: Patient is currently 3 days into Jakafi 10 mg p.o. twice daily.  He is currently tolerating his medication well and denies any side effects.  His lower extremity ulcerations secondary to Hydrea continue to improve and patient is being followed closely by the wound care center at [facility].  Lower extremities are currently dressed today.  Platelet count today is more elevated at 669,000.  Mild leukocytosis with a white blood cell count of 13.60.  Patient did just complete a course of steroids but is most likely the cause.  ALT mildly elevated today at 47.  AST and total bilirubin are normal.  We will have patient repeat labs in 1 week for close monitoring we will follow back up with Dr. Fleming in 2 weeks.  January 17 2024: Patient was seen 2 weeks ago at [facility] by a nurse practitioner of Dr. Garcia the oncologist.  However his platelets had gone up to 275 and his white count was elevated to 11.41.  As a result they had increased his dose on Jakafi to 20 mg in the morning and 10 mg at bedtime.  However today after the increased dose his counts have improved though platelet count is still 578 and white count is normalized.  He prefers to follow-up at [facility].  Patient has never had a bone marrow and we discussed about consideration of bone marrow in order to make sure there is no progression to myelofibrosis.  His spleen is enlarged.  1/31/2024 platelet increased to 940,000.  Jakafi was increased to 20 mg twice daily.  2/14/2024 platelet count 729,000, hemoglobin 13.1, WBC 6.68.  Patient will currently continue on Jakafi 20 mg twice daily.  April 17, 2024: Blood counts are more stable.  His hemoglobin is down to 11.79 but his white count and platelet count are good..  Will repeat check CT scan at some point in January 2024's results show evidence of any decrease in splenomegaly.  He is tolerating Jakafi 20 mg p.o. twice daily  5/15/2024 platelet count 487,  hemoglobin 12.4, white count 7.05.  Continues on Jakafi 20 mg twice daily, tolerating well.      2.  Weight loss greater than 50 pounds at time of initial evaluation.  CT chest abdomen pelvis negative for malignancy  Patient admitting that his weight loss was actually been more intentional   Weight now stable    3. Hypokalemia, mild, chronic.  8/2/2021: Potassium 3.3. We discussed utilizing electrolyte drinks when he is working outside/sweating to help replenish what he is losing.  He does continue potassium chloride once daily as well.  9/28/2021: Potassium stable at 3.3. Continue Klor-con 20 mEq daily.  2/23/2022 potassium today 4.0, patient will continue on potassium 20 mEq daily.  6/15/2022: Currently taking 10 mEq daily, potassium 3.8.  11/30/2022 potassium 3.2, he will increase his potassium to 20 M EQ daily.  8/16/2023: Potassium 3.5.  Patient remains on potassium 20 mill equivalents daily.  11/29/2023: Potassium is normal at 3.6.  January 17, 2024: Patient's potassium is 3.2.  He is on chlorthalidone.  Will need to increase potassium intake  2/14/2024 potassium 4.3.  Monitor  5/15/2024 potassium 4.5.    5.  History of knee surgery.  Patient is to go for knee surgery in 3 weeks  Discussed with Dr. Chad Oliveros and he will start him on 2.5 mg p.o. twice daily of Eliquis 24 hours after surgery.      6.  Skin lesions on the lower extremity with delayed healing, will consult with [facility]  11/30/2022 patient states that he did not see dermatologist as a skin lesions began to heal.  He continues to use vitamin D on this area.  March 1, 2023: The skin ulceration on the left lower extremity is healing up but not completely healed up  Unsure if this is just secondary to chronic venous stasis as opposed to hydroxyurea.  However usually the hydroxyurea ulcerations on the medial malleolus and this is more superior  8/16/2023: No skin lesions noted today.  11/29/2023: Lower extremity ulcerations are  healing well and he continues to be followed by [facility] wound care center.  2/14/2024 continues to follow closely with wound care every 2 weeks.  Lower extremity lesions are healing well.    7.  Elevated liver function tests with ALT of 69 and AST of 90 likely secondary to Tylenol which she has been taking for a day for the sake of his knee pain  2/14/2024 AST 67, ALT 54, alkaline phosphatase 61, total bilirubin 0.5.  Monitor.  5/15/2024 AST 44, ALT 41, alkaline phosphatase 64, total bilirubin 0.4.    8.  Nausea related to Jakafi, patient takes with food and then he does not have nausea  Nausea has resolved.       Plan:   Continue Jakafi 20 mg twice daily.  Follow-up in 1 month with nurse practitioner with repeat labs reassessment.  Follow-up in 2 months with Dr. Fleming with repeat labs.  Consider ordering CT scan in the fall to reassess splenomegaly.  Call/ return sooner should the patient develop any new concerns or problems.    Patient is on a high risk medication requiring close monitoring for toxicity.      AG Choudhary

## 2024-05-16 ENCOUNTER — SPECIALTY PHARMACY (OUTPATIENT)
Dept: PHARMACY | Facility: HOSPITAL | Age: 64
End: 2024-05-16
Payer: COMMERCIAL

## 2024-05-16 NOTE — PROGRESS NOTES
Specialty Pharmacy Note: Jakafi (ruxolitinib)    Phani Landis is a 63 y.o. male with polycythemia vera was seen 5/15/24 by APRN. Per provider dictation, no changes to oral oncology regimen Jakafi (ruxolitinib).  Labs Review: The CMP and CBC from 5/15/24 have been reviewed. No dose adjustments are needed for the oral specialty medication(s) based on the labs.    Specialty pharmacy will continue to follow patient.    Eldre Holder, PharmD, [facility]  Clinical Oncology Pharmacist    5/16/2024  15:41 EDT

## 2024-05-17 ENCOUNTER — PATIENT ROUNDING (BHMG ONLY) (OUTPATIENT)
Dept: FAMILY MEDICINE CLINIC | Facility: CLINIC | Age: 64
End: 2024-05-17
Payer: COMMERCIAL

## 2024-05-18 LAB
HCV GENTYP SERPL NAA+PROBE: NORMAL
HCV RNA SERPL NAA+PROBE-ACNC: NORMAL IU/ML
HCV RNA SERPL NAA+PROBE-LOG IU: 6.45 LOG10 IU/ML
TEST INFORMATION: NORMAL

## 2024-05-22 ENCOUNTER — TELEPHONE (OUTPATIENT)
Dept: GASTROENTEROLOGY | Facility: CLINIC | Age: 64
End: 2024-05-22
Payer: COMMERCIAL

## 2024-05-22 NOTE — TELEPHONE ENCOUNTER
LEFT A VOICE MESSAGE IN REGARDS TO A REFERRAL WE RECEIVED FROM [facility] FOR [facility]. PATIENT CAN BE SCHEDULE NEXT AVAILABLE AT THE [facility].

## 2024-06-03 ENCOUNTER — TELEPHONE (OUTPATIENT)
Dept: ONCOLOGY | Facility: CLINIC | Age: 64
End: 2024-06-03
Payer: COMMERCIAL

## 2024-06-03 NOTE — TELEPHONE ENCOUNTER
Call back to Mr. Landis to let him know we had received his message, and his appointment has been moved up to Wednesday, 6/5/24.  Patient states he had seen it come up on My chart.  Let him know that if he has shortness of breath, or has any symptoms that are more concerning, he should proceed to the ER.  Patient verbalized understanding and agreeable with instructions.

## 2024-06-03 NOTE — TELEPHONE ENCOUNTER
Provider: DR FLORES     Caller: LITO CROW     Relationship to Patient: SELF         Phone Number: [phone]    Reason for Call: WANTED TO SEE IF CAN GET IN FOR A SOONER APPOINTMENT THEN 06/12    DUE TO AFTER SEEN NEPHROLOGIST ON  MAY 23RD THEY HAD CHANGED MEDICINE AROUND TOOK OFF ONE OF BLOOD PRESSURE PILL AND STOPPED SODIUM AND POTASSIUM HAS NOT FELT RIGHT , AND HAS HAD A SLIGHT HEADACHE FOR FEW DAYS OFF AND ON  SINCE THEN. AND HAS SOME SWELLING IN ANKLES  DOES NOT FEEL AS GOOD AS HE DID PRIOR TO MEDICINE CHANGE     When was the patient last seen: 05/15/24    When did it start: AFTER MEDICINE CHANGED 05/23

## 2024-06-05 ENCOUNTER — LAB (OUTPATIENT)
Dept: OTHER | Facility: HOSPITAL | Age: 64
End: 2024-06-05
Payer: COMMERCIAL

## 2024-06-05 ENCOUNTER — OFFICE VISIT (OUTPATIENT)
Dept: ONCOLOGY | Facility: CLINIC | Age: 64
End: 2024-06-05
Payer: COMMERCIAL

## 2024-06-05 VITALS
DIASTOLIC BLOOD PRESSURE: 90 MMHG | TEMPERATURE: 98.1 F | SYSTOLIC BLOOD PRESSURE: 153 MMHG | WEIGHT: 276.2 LBS | OXYGEN SATURATION: 98 % | RESPIRATION RATE: 16 BRPM | BODY MASS INDEX: 35.45 KG/M2 | HEIGHT: 74 IN | HEART RATE: 56 BPM

## 2024-06-05 DIAGNOSIS — Z79.899 HIGH RISK MEDICATION USE: ICD-10-CM

## 2024-06-05 DIAGNOSIS — I10 HYPERTENSION, UNSPECIFIED TYPE: ICD-10-CM

## 2024-06-05 DIAGNOSIS — D45: ICD-10-CM

## 2024-06-05 DIAGNOSIS — D75.839 THROMBOCYTOSIS: ICD-10-CM

## 2024-06-05 DIAGNOSIS — D45 POLYCYTHEMIA VERA: ICD-10-CM

## 2024-06-05 DIAGNOSIS — D45 POLYCYTHEMIA VERA: Primary | ICD-10-CM

## 2024-06-05 DIAGNOSIS — E87.1 HYPOSMOLALITY SYNDROME: ICD-10-CM

## 2024-06-05 DIAGNOSIS — E87.6 HYPOPOTASSEMIA: Primary | ICD-10-CM

## 2024-06-05 DIAGNOSIS — M10.9 GOUT, UNSPECIFIED CAUSE, UNSPECIFIED CHRONICITY, UNSPECIFIED SITE: ICD-10-CM

## 2024-06-05 LAB
ALBUMIN SERPL-MCNC: 4.3 G/DL (ref 3.5–5.2)
ALBUMIN SERPL-MCNC: 4.4 G/DL (ref 3.5–5.2)
ALBUMIN/GLOB SERPL: 1.7 G/DL
ALP SERPL-CCNC: 75 U/L (ref 39–117)
ALT SERPL W P-5'-P-CCNC: 27 U/L (ref 1–41)
ANION GAP SERPL CALCULATED.3IONS-SCNC: 8.8 MMOL/L (ref 5–15)
ANION GAP SERPL CALCULATED.3IONS-SCNC: 9 MMOL/L (ref 5–15)
AST SERPL-CCNC: 34 U/L (ref 1–40)
BACTERIA UR QL AUTO: NORMAL /HPF
BASOPHILS # BLD AUTO: 0.08 10*3/MM3 (ref 0–0.2)
BASOPHILS NFR BLD AUTO: 1 % (ref 0–1.5)
BILIRUB SERPL-MCNC: 0.5 MG/DL (ref 0–1.2)
BILIRUB UR QL STRIP: NEGATIVE
BUN SERPL-MCNC: 20 MG/DL (ref 8–23)
BUN SERPL-MCNC: 20 MG/DL (ref 8–23)
BUN/CREAT SERPL: 20.4 (ref 7–25)
BUN/CREAT SERPL: 20.8 (ref 7–25)
CALCIUM SPEC-SCNC: 9.2 MG/DL (ref 8.6–10.5)
CALCIUM SPEC-SCNC: 9.2 MG/DL (ref 8.6–10.5)
CHLORIDE SERPL-SCNC: 104 MMOL/L (ref 98–107)
CHLORIDE SERPL-SCNC: 105 MMOL/L (ref 98–107)
CLARITY UR: CLEAR
CO2 SERPL-SCNC: 25 MMOL/L (ref 22–29)
CO2 SERPL-SCNC: 25.2 MMOL/L (ref 22–29)
COLOR UR: YELLOW
CREAT SERPL-MCNC: 0.96 MG/DL (ref 0.76–1.27)
CREAT SERPL-MCNC: 0.98 MG/DL (ref 0.76–1.27)
CREAT UR-MCNC: 41 MG/DL
DEPRECATED RDW RBC AUTO: 55.9 FL (ref 37–54)
EGFRCR SERPLBLD CKD-EPI 2021: 86.6 ML/MIN/1.73
EGFRCR SERPLBLD CKD-EPI 2021: 88.8 ML/MIN/1.73
EOSINOPHIL # BLD AUTO: 0.07 10*3/MM3 (ref 0–0.4)
EOSINOPHIL NFR BLD AUTO: 0.9 % (ref 0.3–6.2)
ERYTHROCYTE [DISTWIDTH] IN BLOOD BY AUTOMATED COUNT: 15.9 % (ref 12.3–15.4)
GLOBULIN UR ELPH-MCNC: 2.6 GM/DL
GLUCOSE SERPL-MCNC: 93 MG/DL (ref 65–99)
GLUCOSE SERPL-MCNC: 93 MG/DL (ref 65–99)
GLUCOSE UR STRIP-MCNC: NEGATIVE MG/DL
HCT VFR BLD AUTO: 33.9 % (ref 37.5–51)
HGB BLD-MCNC: 11.1 G/DL (ref 13–17.7)
HGB UR QL STRIP.AUTO: NEGATIVE
HYALINE CASTS UR QL AUTO: NORMAL /LPF
IMM GRANULOCYTES # BLD AUTO: 0.15 10*3/MM3 (ref 0–0.05)
IMM GRANULOCYTES NFR BLD AUTO: 1.9 % (ref 0–0.5)
KETONES UR QL STRIP: NEGATIVE
LDH SERPL-CCNC: 364 U/L (ref 135–225)
LEUKOCYTE ESTERASE UR QL STRIP.AUTO: NEGATIVE
LYMPHOCYTES # BLD AUTO: 2.31 10*3/MM3 (ref 0.7–3.1)
LYMPHOCYTES NFR BLD AUTO: 29.9 % (ref 19.6–45.3)
MAGNESIUM SERPL-MCNC: 2.4 MG/DL (ref 1.6–2.4)
MCH RBC QN AUTO: 31.4 PG (ref 26.6–33)
MCHC RBC AUTO-ENTMCNC: 32.7 G/DL (ref 31.5–35.7)
MCV RBC AUTO: 95.8 FL (ref 79–97)
MONOCYTES # BLD AUTO: 0.83 10*3/MM3 (ref 0.1–0.9)
MONOCYTES NFR BLD AUTO: 10.7 % (ref 5–12)
NEUTROPHILS NFR BLD AUTO: 4.29 10*3/MM3 (ref 1.7–7)
NEUTROPHILS NFR BLD AUTO: 55.6 % (ref 42.7–76)
NITRITE UR QL STRIP: NEGATIVE
NRBC BLD AUTO-RTO: 0 /100 WBC (ref 0–0.2)
PH UR STRIP.AUTO: 7 [PH] (ref 5–8)
PHOSPHATE SERPL-MCNC: 3.8 MG/DL (ref 2.5–4.5)
PLATELET # BLD AUTO: 455 10*3/MM3 (ref 140–450)
PMV BLD AUTO: 10.3 FL (ref 6–12)
POTASSIUM SERPL-SCNC: 3.9 MMOL/L (ref 3.5–5.2)
POTASSIUM SERPL-SCNC: 3.9 MMOL/L (ref 3.5–5.2)
PROT ?TM UR-MCNC: 12.6 MG/DL
PROT SERPL-MCNC: 7 G/DL (ref 6–8.5)
PROT UR QL STRIP: NEGATIVE
PROT/CREAT UR: 307.3 MG/G CREA (ref 0–200)
RBC # BLD AUTO: 3.54 10*6/MM3 (ref 4.14–5.8)
RBC # UR STRIP: NORMAL /HPF
REF LAB TEST METHOD: NORMAL
SODIUM SERPL-SCNC: 138 MMOL/L (ref 136–145)
SODIUM SERPL-SCNC: 139 MMOL/L (ref 136–145)
SP GR UR STRIP: 1.01 (ref 1–1.03)
SQUAMOUS #/AREA URNS HPF: NORMAL /HPF
UROBILINOGEN UR QL STRIP: NORMAL
WBC # UR STRIP: NORMAL /HPF
WBC NRBC COR # BLD AUTO: 7.73 10*3/MM3 (ref 3.4–10.8)

## 2024-06-05 PROCEDURE — 84156 ASSAY OF PROTEIN URINE: CPT | Performed by: INTERNAL MEDICINE

## 2024-06-05 PROCEDURE — 81001 URINALYSIS AUTO W/SCOPE: CPT | Performed by: INTERNAL MEDICINE

## 2024-06-05 PROCEDURE — 83615 LACTATE (LD) (LDH) ENZYME: CPT | Performed by: NURSE PRACTITIONER

## 2024-06-05 PROCEDURE — 85025 COMPLETE CBC W/AUTO DIFF WBC: CPT | Performed by: NURSE PRACTITIONER

## 2024-06-05 PROCEDURE — 36415 COLL VENOUS BLD VENIPUNCTURE: CPT

## 2024-06-05 PROCEDURE — 82570 ASSAY OF URINE CREATININE: CPT | Performed by: INTERNAL MEDICINE

## 2024-06-05 PROCEDURE — 80053 COMPREHEN METABOLIC PANEL: CPT | Performed by: NURSE PRACTITIONER

## 2024-06-05 PROCEDURE — 84100 ASSAY OF PHOSPHORUS: CPT | Performed by: NURSE PRACTITIONER

## 2024-06-05 PROCEDURE — 83735 ASSAY OF MAGNESIUM: CPT | Performed by: INTERNAL MEDICINE

## 2024-06-05 NOTE — PROGRESS NOTES
Subjective     REASON FOR FOLLOW UP:  1.  Polycythemia vera, polycythemia and thrombocytosis and leukocytosis, EPO level 1.3, Tino 2+, peripheral blood for BCR/C ABL negative    HISTORY OF PRESENT ILLNESS:  The patient is a 63 y.o. year old male who is here for an opinion about the above issue.    History of Present Illness   Phani Landis is a 63 y.o. with the above-mentioned history returns today, 6/5/2024 for lab review continuing on Jakafi 20 mg twice daily.  He was seen by nephrology a few weeks ago.  At that appointment, they discontinued his chlorthalidone, potassium, and sodium.  Since then, he has gained 30 pounds.  He does note some shortness of breath as well as swelling in his lower extremities.  He states that he had to stop and rest while cutting the grass yesterday, which is not usual for him.  He also had difficulty sleeping last night.  He has not contacted the nephrology office yet.      He is otherwise tolerating his Jakafi well.      Hematological oncologic history:  Patient is a 59-year-old gentleman who has been referred by his primary care Castillo Velasco for evaluation of thrombocytosis and polycythemia.  He has lost more than 50 pounds of weight.  In the last 6 months.  Looking back his blood counts have been high starting in 2016.  Initially his platelets were high around 670 and gradually increased in the 900 range.  More recently his hemoglobin has been increasing and his white count has been increasing.  He has not had a DVT or pulmonary embolism.  He has had no issues with itching with hot showers.  Has not had a stroke.  He had pneumonia last year but none recently.  He does have some shortness of breath but no chest pain.  He is exposed to secondhand smoking.    He does not have any nausea vomiting or abdominal pain at present.  He did have gout on several locations and was placed on meloxicam.  His renal function is mildly elevated.  He denies any fever or night sweats.    Patient does  "have a cough productive of yellow sputum but has no fevers.    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, EPO 1.3 low  CT chest negative, CT abdomen pelvis shows splenomegaly    Past Medical History:   Diagnosis Date    Anxiety     Arthritis     Cancer June 2020    Polycythemia vera    Hypertension         Past Surgical History:   Procedure Laterality Date    CHEST SURGERY      OTHER SURGICAL HISTORY  1985    \"Exploratory\"        Current Outpatient Medications on File Prior to Visit   Medication Sig Dispense Refill    allopurinol (ZYLOPRIM) 100 MG tablet TAKE 1 TABLET BY MOUTH TWICE A  tablet 0    ALPRAZolam (Xanax) 0.5 MG tablet Take 1 tablet by mouth 2 (Two) Times a Day As Needed for Anxiety or Sleep. Indications: Feeling Anxious 30 tablet 0    amLODIPine-benazepril (LOTREL) 5-40 MG per capsule Take 1 capsule by mouth Daily. Indications: High Blood Pressure Disorder 90 capsule 1    aspirin 81 MG chewable tablet Chew 1 tablet Daily.      bisoprolol (ZEBeta) 10 MG tablet Take 1 tablet by mouth Daily. Indications: High Blood Pressure Disorder 90 tablet 1    hydrALAZINE (APRESOLINE) 50 MG tablet TAKE 1 TABLET BY MOUTH TWICE A  tablet 0    ruxolitinib (JAKAFI) 20 MG tablet Take 1 tablet by mouth 2 (Two) Times a Day. 60 tablet 5    chlorthalidone (HYGROTON) 25 MG tablet Take 1 tablet by mouth Daily. Indications: High Blood Pressure Disorder (Patient not taking: Reported on 6/5/2024) 90 tablet 1    potassium chloride 10 MEQ CR tablet Take 2 tablets in the morning and 1 in the evening (Patient not taking: Reported on 6/5/2024) 90 tablet 5    sodium chloride 1 g tablet TAKE 1 TABLET BY MOUTH TWO TIMES A DAY (Patient not taking: Reported on 6/5/2024) 60 tablet 2     No current facility-administered medications on file prior to visit.        ALLERGIES:    Allergies   Allergen Reactions    Zoloft [Sertraline] Hallucinations    Cephalexin Itching        Social " "History     Socioeconomic History    Marital status:    Tobacco Use    Smoking status: Never    Smokeless tobacco: Never   Vaping Use    Vaping status: Never Used   Substance and Sexual Activity    Alcohol use: Yes     Comment: Occasional drinker    Drug use: Never    Sexual activity: Yes     Partners: Female        Family History   Problem Relation Age of Onset    Hypertension Father     Hypertension Other     Heart disease Other     Cervical cancer Other       I have reviewed the patient's medical history in detail and updated the computerized patient record.    Review of Systems  ROS as per HPI      Objective     Vitals:    06/05/24 1447   BP: 153/90   Pulse: 56   Resp: 16   Temp: 98.1 °F (36.7 °C)   TempSrc: Oral   SpO2: 98%   Weight: 125 kg (276 lb 3.2 oz)   Height: 188 cm (74.02\")   PainSc: 0-No pain                 6/5/2024     2:48 PM   Current Status   ECOG score 0     Physical Exam  Vitals reviewed.   Constitutional:       General: He is not in acute distress.     Appearance: Normal appearance. He is well-developed.   HENT:      Head: Normocephalic and atraumatic.   Eyes:      Pupils: Pupils are equal, round, and reactive to light.   Cardiovascular:      Rate and Rhythm: Normal rate and regular rhythm.      Heart sounds: Normal heart sounds. No murmur heard.  Pulmonary:      Effort: Pulmonary effort is normal. No respiratory distress.      Breath sounds: Normal breath sounds. No wheezing, rhonchi or rales.   Abdominal:      General: Bowel sounds are normal. There is no distension.      Palpations: Abdomen is soft.   Musculoskeletal:         General: Swelling (1-2+ bilateral LE edema) present. Normal range of motion.      Cervical back: Normal range of motion.   Skin:     General: Skin is warm and dry.      Findings: No rash.      Comments: Scattered ecchymosis on the upper extremities bilaterally.  Bandaged area on his shin of the right lower extremity.  Dressing clean dry and intact. "   Neurological:      Mental Status: He is alert and oriented to person, place, and time.         RECENT LABS:  Results from last 7 days   Lab Units 06/05/24  1428   WBC 10*3/mm3 7.73   NEUTROS ABS 10*3/mm3 4.29   HEMOGLOBIN g/dL 11.1*   HEMATOCRIT % 33.9*   PLATELETS 10*3/mm3 455*               Results from last 7 days   Lab Units 06/05/24  1436 06/05/24  1428   SODIUM mmol/L 139 138   POTASSIUM mmol/L 3.9 3.9   CHLORIDE mmol/L 105 104   CO2 mmol/L 25.2 25.0   BUN mg/dL 20 20   CREATININE mg/dL 0.96 0.98   CALCIUM mg/dL 9.2 9.2   ALBUMIN g/dL 4.3 4.4   BILIRUBIN mg/dL  --  0.5   ALK PHOS U/L  --  75   ALT (SGPT) U/L  --  27   AST (SGOT) U/L  --  34   GLUCOSE mg/dL 93 93   MAGNESIUM mg/dL 2.4  --                      Assessment & Plan     1.  Polycythemia with leukocytosis and thrombocytosis.  He does not have any itching with hot showers.  He does not have any headaches or DVT.  He does have blood counts worsening gradually from 2016.  7/8/2020  His platelets 974K.  Hemoglobin is 17.6 with hematocrit of 52.8 and white count of 11.56.  He denies fever night sweats but has significant weight loss greater than 50 pounds in 6 months.  He does have a cough which is productive of yellow sputum and some shortness of breath.  He has history of secondhand smoking exposure.  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  CT chest negative  Hydroxyurea 500 mg twice daily was initiated on July 16, 2020.  .  7/22/2020 Hydrea increased to 1500 mg (1000 mg in am and 500 mg in pm) Monday-Friday and 2000 mg (1000 mg in am and 1000 mg in PM) on Saturdays and Sundays  7/29/2020 patient's platelet count today is 821.  White count 7.78, hemoglobin 16.4.   Increased Hydrea at 1500 mg Monday through Friday, and 2000 mg on Saturdays and Sundays.   In future if hematocrit greater than 48 then he will receive phlebotomy.  Last phlebotomy 7/15/2020.    2/23/2022 patient continues on Hydrea 1500 mg on Monday, Wednesday, and Friday, 1500 mg all other days.  He is tolerating Hydrea well aside from some fatigue on the days he takes 1500 mg.  He also continues to receive phlebotomy for hematocrit greater than 48, hematocrit today 44.7 so patient will not receive phlebotomy.  Platelets today have increased to 575, so we will increase Hydrea to 1500 mg four times a week, 1000 mg all other days.  Because we are increasing dose of Hydrea, patient will return in 1 month for CBC with RN review to monitor counts.  Discussed with the patient who is agreeable.  6/15/2022: Patient continues on Hydrea 1500 mg 4 days a week and 1000 mg all other days.  Platelets increased to 573,000.  Discussed with Dr. Fleming today plan to increase Hydrea to 1500 mg 5 days a week and 1000 mg 2 days a week.  He will return in 1 month for CBC with RN review to monitor counts.  Patient agreeable   September 7, 2022: Tolerating Hydrea very well.  His platelets are down to 497.  He continues with aspirin.  Given hematocrit of 44 we will hold off phlebotomy  11/30/2022 continuing on Hydrea 1500 mg 5 days a week and 1000 mg 2 days a week tolerating well.  May 24, 2023: Patient's hematocrit is 42.  But his platelet is elevated to 580.  We will increase the dose of his hydroxyurea to 1500 mg 6 days a week and 1000 mg 1 day a week  8/16/2023: Patient's hematocrit is normal at 41.9.  Platelets have normalized to 430,000.  Hemoglobin 14.9 and WBC 6.29.  Hydrea dosing will remain the same at 1500 mg 6 days a week and 100 mg once weekly.  He continues to tolerate this treatment well. Mild elevation was noted with his liver enzymes.  Historically he has had mild elevations when he was taking pain medications.  Today his AST is 42 and ALT is 42.  Total bilirubin is normal at 0.7 and alkaline phosphatase is normal at 72.  Does note that he takes occasional Tylenol for his arthritis.  He denies drinking any  alcohol.  We will have him return in 1 month to monitor his labs.  November 15, 2023: Patient has new ulcerations in the right lower extremity for the last 2 3 months which are not healing.  On discussion with wound care they felt this is more likely hydroxyurea related rather than venous disease.  He has never been a smoker and so we discussed about switching to Jakafi.  He has had splenomegaly in the past but we will need to obtain ultrasound of the abdomen stat in order to rule out worsening splenomegaly.  11/29/2023: Patient is currently 3 days into Jakafi 10 mg p.o. twice daily.  He is currently tolerating his medication well and denies any side effects.  His lower extremity ulcerations secondary to Hydrea continue to improve and patient is being followed closely by the wound care center at [facility].  Lower extremities are currently dressed today.  Platelet count today is more elevated at 669,000.  Mild leukocytosis with a white blood cell count of 13.60.  Patient did just complete a course of steroids but is most likely the cause.  ALT mildly elevated today at 47.  AST and total bilirubin are normal.  We will have patient repeat labs in 1 week for close monitoring we will follow back up with Dr. Fleming in 2 weeks.  January 17 2024: Patient was seen 2 weeks ago at [place] by a nurse practitioner of Dr. Garcia the oncologist.  However his platelets had gone up to 275 and his white count was elevated to 11.41.  As a result they had increased his dose on Jakafi to 20 mg in the morning and 10 mg at bedtime.  However today after the increased dose his counts have improved though platelet count is still 578 and white count is normalized.  He prefers to follow-up at [facility].  Patient has never had a bone marrow and we discussed about consideration of bone marrow in order to make sure there is no progression to myelofibrosis.  His spleen is enlarged.  1/31/2024 platelet increased to 940,000.   Jakafi was increased to 20 mg twice daily.  2/14/2024 platelet count 729,000, hemoglobin 13.1, WBC 6.68.  Patient will currently continue on Jakafi 20 mg twice daily.  April 17, 2024: Blood counts are more stable.  His hemoglobin is down to 11.79 but his white count and platelet count are good..  Will repeat check CT scan at some point in January 2024's results show evidence of any decrease in splenomegaly.  He is tolerating Jakafi 20 mg p.o. twice daily  5/15/2024 platelet count 487, hemoglobin 12.4, white count 7.05.  Continues on Jakafi 20 mg twice daily, tolerating well.  Seen in follow-up 6/5/2024 continuing on Jakafi 20 mg twice daily.  WBC 7.73, hemoglobin 11.1, platelet count 455,000.  Labs reviewed with Dr. Fleming as well as patient's new symptoms.  We did review that Jakafi does not cause issues with fluid retention and is likely from the recent discontinuation of his diuretics.  He will continue on Jakafi.  We will contact nephrology to let them know about the patient's symptoms.      2.  Weight loss greater than 50 pounds at time of initial evaluation.  CT chest abdomen pelvis negative for malignancy  Patient admitting that his weight loss was actually been more intentional   Weight now stable    3. Hypokalemia, mild, chronic.  8/2/2021: Potassium 3.3. We discussed utilizing electrolyte drinks when he is working outside/sweating to help replenish what he is losing.  He does continue potassium chloride once daily as well.  9/28/2021: Potassium stable at 3.3. Continue Klor-con 20 mEq daily.  2/23/2022 potassium today 4.0, patient will continue on potassium 20 mEq daily.  6/15/2022: Currently taking 10 mEq daily, potassium 3.8.  11/30/2022 potassium 3.2, he will increase his potassium to 20 M EQ daily.  8/16/2023: Potassium 3.5.  Patient remains on potassium 20 mill equivalents daily.  11/29/2023: Potassium is normal at 3.6.  January 17, 2024: Patient's potassium is 3.2.  He is on chlorthalidone.  Will  need to increase potassium intake  2/14/2024 potassium 4.3.  Monitor  5/15/2024 potassium 4.5.  6/5/2024 oral potassium was discontinued by nephrology.  Potassium level today 3.9    5.  History of knee surgery.  Patient is to go for knee surgery in 3 weeks  Discussed with Dr. Chad Oliveros and he will start him on 2.5 mg p.o. twice daily of Eliquis 24 hours after surgery.      6.  Skin lesions on the lower extremity with delayed healing, will consult with [facility]  11/30/2022 patient states that he did not see dermatologist as a skin lesions began to heal.  He continues to use vitamin D on this area.  March 1, 2023: The skin ulceration on the left lower extremity is healing up but not completely healed up  Unsure if this is just secondary to chronic venous stasis as opposed to hydroxyurea.  However usually the hydroxyurea ulcerations on the medial malleolus and this is more superior  8/16/2023: No skin lesions noted today.  11/29/2023: Lower extremity ulcerations are healing well and [facility]  2/14/2024 continues to follow closely with wound care every 2 weeks.  Lower extremity lesions are healing well.    7.  Elevated liver function tests with ALT of 69 and AST of 90 likely secondary to Tylenol which she has been taking for a day for the sake of his knee pain  2/14/2024 AST 67, ALT 54, alkaline phosphatase 61, total bilirubin 0.5.  Monitor.  5/15/2024 AST 44, ALT 41, alkaline phosphatase 64, total bilirubin 0.4.  6/5/2024 LFTs have normalized with an AST of 34, ALT of 27, alkaline phosphatase 75, total bilirubin 0.5.    8.  Nausea related to Jakafi, patient takes with food and then he does not have nausea  Nausea has resolved.       Plan:   Continue Jakafi 20 mg twice daily.  We will contact patient's nephrologist to let them know about the patient's weight gain and shortness of breath, as he will likely need to be resumed on some type of  diuretic.  Patient has 1 month follow-up with Dr. Fleming with repeat labs and reevaluation.  We will go ahead and plan a 2-month follow-up with Dr. Pablo to establish care with repeat CBC and CMP at that time.    Call/ return sooner should the patient develop any new concerns or problems.  Consider ordering CT scan in the fall to reassess splenomegaly.      Patient is on a high risk medication requiring close monitoring for toxicity.      AG Choudhary

## 2024-06-20 ENCOUNTER — OFFICE VISIT (OUTPATIENT)
Dept: WOUND CARE | Facility: HOSPITAL | Age: 64
End: 2024-06-20
Payer: COMMERCIAL

## 2024-06-20 VITALS
RESPIRATION RATE: 18 BRPM | DIASTOLIC BLOOD PRESSURE: 86 MMHG | HEART RATE: 60 BPM | SYSTOLIC BLOOD PRESSURE: 130 MMHG | TEMPERATURE: 98.3 F

## 2024-06-20 DIAGNOSIS — S81.801D OPEN WOUND OF RIGHT LOWER LEG, SUBSEQUENT ENCOUNTER: Primary | ICD-10-CM

## 2024-06-20 DIAGNOSIS — D45 POLYCYTHEMIA VERA: ICD-10-CM

## 2024-06-20 PROCEDURE — G0463 HOSPITAL OUTPT CLINIC VISIT: HCPCS | Performed by: PHYSICIAN ASSISTANT

## 2024-06-20 NOTE — PROGRESS NOTES
Chief Complaint  Wound Check (Follow up visit for trauma wound to E.  Patient is not diabetic or on antibiotics.  Has polycythemia vera.)    Subjective      History of Present Illness    Phani Landis  is a 63 y.o. male here today with a chronic ulcer to his right lower extremity.   He was on (hydroxyurea) for polycythemia vera. This was likely causing his wound. His oncologist switched him to jakifi and his wounds have been improving since.    He notes that he is not a diabetic and is not a smoker.     His shin wound is almost healed today.  He continues to wrap his legs daily and applying silvadene to wounds. He has also been applying triamcinolone to the tomy wound areas.  He tries to keep leg elevated when possible.     Allergies:  Zoloft [sertraline] and Cephalexin      Current Outpatient Medications:     allopurinol (ZYLOPRIM) 100 MG tablet, TAKE 1 TABLET BY MOUTH TWICE A DAY, Disp: 180 tablet, Rfl: 0    ALPRAZolam (Xanax) 0.5 MG tablet, Take 1 tablet by mouth 2 (Two) Times a Day As Needed for Anxiety or Sleep. Indications: Feeling Anxious, Disp: 30 tablet, Rfl: 0    amLODIPine-benazepril (LOTREL) 5-40 MG per capsule, Take 1 capsule by mouth Daily. Indications: High Blood Pressure Disorder, Disp: 90 capsule, Rfl: 1    aspirin 81 MG chewable tablet, Chew 1 tablet Daily., Disp: , Rfl:     bisoprolol (ZEBeta) 10 MG tablet, Take 1 tablet by mouth Daily. Indications: High Blood Pressure Disorder, Disp: 90 tablet, Rfl: 1    chlorthalidone (HYGROTON) 25 MG tablet, Take 1 tablet by mouth Daily. Indications: High Blood Pressure Disorder (Patient not taking: Reported on 6/5/2024), Disp: 90 tablet, Rfl: 1    hydrALAZINE (APRESOLINE) 50 MG tablet, TAKE 1 TABLET BY MOUTH TWICE A DAY, Disp: 180 tablet, Rfl: 0    potassium chloride 10 MEQ CR tablet, Take 2 tablets in the morning and 1 in the evening (Patient not taking: Reported on 6/5/2024), Disp: 90 tablet, Rfl: 5    ruxolitinib (JAKAFI) 20 MG tablet, Take 1 tablet by  "mouth 2 (Two) Times a Day., Disp: 60 tablet, Rfl: 5    sodium chloride 1 g tablet, TAKE 1 TABLET BY MOUTH TWO TIMES A DAY (Patient not taking: Reported on 6/5/2024), Disp: 60 tablet, Rfl: 2    Past Medical History:   Diagnosis Date    Anxiety     Arthritis     Cancer June 2020    Polycythemia vera    Hypertension      Past Surgical History:   Procedure Laterality Date    CHEST SURGERY      OTHER SURGICAL HISTORY  1985    \"Exploratory\"     Social History     Socioeconomic History    Marital status:    Tobacco Use    Smoking status: Never    Smokeless tobacco: Never   Vaping Use    Vaping status: Never Used   Substance and Sexual Activity    Alcohol use: Yes     Comment: Occasional drinker    Drug use: Never    Sexual activity: Yes     Partners: Female     Objective     Vitals:    06/20/24 1112   BP: 130/86   BP Location: Left arm   Patient Position: Sitting   Cuff Size: Adult   Pulse: 60   Resp: 18  Comment: 96% room air   Temp: 98.3 °F (36.8 °C)   TempSrc: Temporal   PainSc: 0-No pain     Review of Systems     ROS:  No fevers, chills, sweats, or body aches.     I have reviewed the HPI and ROS as documented by MA/RN. Keyla Bailey PA-C    Physical Exam     NAD  Normocephalic, atraumatic  EOMI, no scleral icterus  No respiratory distress, no cough  AAOx3, pleasant, cooperative    Right lower extremity: The 2 ulcers to his right lower extremity located on lateral lower leg are healed.  The anterior shin wound is circular in appearance and improved and very small today. It is almost healed.       Patient had strong palpable dorsalis pedis and posterior tibial pulses.  Edema has improved.     See photos for details:    Right lower extremity:        Result Review :  The following data was reviewed by: Keyla Bailey PA-C on 06/20/2024:    Prior notes and images.  Recent labs showed no signs of osteomyelitis.    Recent xray showed no signs of osteomyelitis.          Assessment and Plan   Diagnoses " and all orders for this visit:    1. Open wound of right lower leg, subsequent encounter (Primary)    2. Polycythemia vera    His wound to his right lower shin is almost healed.   We will continue to have him wrap his legs daily and apply silvadene cream daily.  He should also apply triamcinolone cream that we have previously prescribed him to the adjacent skin areas but not directly on the wound.  He should continue to prop his legs and elevate when possible. He will followup with us in 6 weeks. He should continue to followup with his oncologist and PCP.     Patient was given instructions and counseling regarding their condition or for health maintenance advice, as well as the wound care plan and recommendations. They understand and agree with the plan.  They will follow back up here in the clinic but are instructed to contact us in the interim should they have any new, returning, or worsening symptoms or concerns. Please see specific information pulled into the AVS if appropriate.     Dragon Dictation utilized for chart completion.  Follow Up   Return in about 6 weeks (around 8/1/2024) for Recheck.      Keyla Bailey PA-C

## 2024-07-05 ENCOUNTER — SPECIALTY PHARMACY (OUTPATIENT)
Dept: PHARMACY | Facility: HOSPITAL | Age: 64
End: 2024-07-05
Payer: COMMERCIAL

## 2024-07-05 DIAGNOSIS — D45 POLYCYTHEMIA VERA: ICD-10-CM

## 2024-07-05 NOTE — PROGRESS NOTES
Re: Refills of Oral Specialty Medication - Jakafi (ruxolitinib)    Drug-Drug Interactions: The current medication list was reviewed and there are no relevant drug-drug interactions with the specialty medication.  Medication Allergies: The patient has no relevant allergies as it relates to their oral specialty medication  Review of Labs/Dose Adjustments: NO DOSE CHANGE - I reviewed the most recent note and labs and the patient will continue without any dose changes.  I sent refills as described below.    Drug: Jakafi (ruxolitinib)  Strength: 20 mg  Directions: Take one tablet by mouth twice a day  Quantity: 60  Refills: 5  Pharmacy prescription sent to: [facility]    Name/Credentials: Amanda Morrison, Gagandeep, BCPS    7/5/2024  09:15 EDT

## 2024-07-08 NOTE — PROGRESS NOTES
Drug: Jakafi (ruxolitinib)  Strength: 20 mg  Directions: Take one tablet by mouth twice a day  Quantity: 60  Refills: 5  Pharmacy prescription sent to: [facility]    Completed independent double check on medication order/RX.  Elder Holder, NunoD, BCOP  Clinical Oncology Pharmacist

## 2024-07-10 ENCOUNTER — OFFICE VISIT (OUTPATIENT)
Dept: ONCOLOGY | Facility: CLINIC | Age: 64
End: 2024-07-10
Payer: COMMERCIAL

## 2024-07-10 ENCOUNTER — TELEPHONE (OUTPATIENT)
Dept: GASTROENTEROLOGY | Facility: CLINIC | Age: 64
End: 2024-07-10
Payer: COMMERCIAL

## 2024-07-10 ENCOUNTER — LAB (OUTPATIENT)
Dept: OTHER | Facility: HOSPITAL | Age: 64
End: 2024-07-10
Payer: COMMERCIAL

## 2024-07-10 VITALS
WEIGHT: 262.2 LBS | HEART RATE: 73 BPM | SYSTOLIC BLOOD PRESSURE: 151 MMHG | TEMPERATURE: 98.4 F | BODY MASS INDEX: 33.65 KG/M2 | HEIGHT: 74 IN | RESPIRATION RATE: 16 BRPM | DIASTOLIC BLOOD PRESSURE: 80 MMHG | OXYGEN SATURATION: 97 %

## 2024-07-10 DIAGNOSIS — E87.1 SODIUM (NA) DEFICIENCY: ICD-10-CM

## 2024-07-10 DIAGNOSIS — E10.10 TYPE 1 DIABETES MELLITUS WITH KETOACIDOSIS WITHOUT COMA: ICD-10-CM

## 2024-07-10 DIAGNOSIS — E79.0 ELEVATED URIC ACID IN BLOOD: ICD-10-CM

## 2024-07-10 DIAGNOSIS — E87.1 HYPONATREMIA WITH EXTRACELLULAR FLUID DEPLETION: Primary | ICD-10-CM

## 2024-07-10 DIAGNOSIS — Z79.899 HIGH RISK MEDICATION USE: ICD-10-CM

## 2024-07-10 DIAGNOSIS — D75.839 THROMBOCYTOSIS: ICD-10-CM

## 2024-07-10 DIAGNOSIS — D45 POLYCYTHEMIA VERA: ICD-10-CM

## 2024-07-10 DIAGNOSIS — D45: ICD-10-CM

## 2024-07-10 DIAGNOSIS — R60.0 LOCALIZED EDEMA: ICD-10-CM

## 2024-07-10 DIAGNOSIS — R80.0 ISOLATED NON-NEPHROTIC PROTEINURIA: ICD-10-CM

## 2024-07-10 DIAGNOSIS — D45 POLYCYTHEMIA VERA: Primary | ICD-10-CM

## 2024-07-10 LAB
ALBUMIN SERPL-MCNC: 4.4 G/DL (ref 3.5–5.2)
ALBUMIN/GLOB SERPL: 1.5 G/DL
ALP SERPL-CCNC: 75 U/L (ref 39–117)
ALT SERPL W P-5'-P-CCNC: 41 U/L (ref 1–41)
ANION GAP SERPL CALCULATED.3IONS-SCNC: 8.8 MMOL/L (ref 5–15)
AST SERPL-CCNC: 44 U/L (ref 1–40)
BACTERIA UR QL AUTO: NORMAL /HPF
BASOPHILS # BLD AUTO: 0.08 10*3/MM3 (ref 0–0.2)
BASOPHILS NFR BLD AUTO: 1.1 % (ref 0–1.5)
BILIRUB SERPL-MCNC: 0.7 MG/DL (ref 0–1.2)
BILIRUB UR QL STRIP: NEGATIVE
BUN SERPL-MCNC: 16 MG/DL (ref 8–23)
BUN/CREAT SERPL: 14.7 (ref 7–25)
CALCIUM SPEC-SCNC: 9.2 MG/DL (ref 8.6–10.5)
CHLORIDE SERPL-SCNC: 101 MMOL/L (ref 98–107)
CLARITY UR: CLEAR
CO2 SERPL-SCNC: 27.2 MMOL/L (ref 22–29)
COLOR UR: YELLOW
CREAT SERPL-MCNC: 1.09 MG/DL (ref 0.76–1.27)
CREAT UR-MCNC: 30 MG/DL
DEPRECATED RDW RBC AUTO: 52.3 FL (ref 37–54)
EGFRCR SERPLBLD CKD-EPI 2021: 76.3 ML/MIN/1.73
EOSINOPHIL # BLD AUTO: 0.06 10*3/MM3 (ref 0–0.4)
EOSINOPHIL NFR BLD AUTO: 0.8 % (ref 0.3–6.2)
ERYTHROCYTE [DISTWIDTH] IN BLOOD BY AUTOMATED COUNT: 15.4 % (ref 12.3–15.4)
GLOBULIN UR ELPH-MCNC: 3 GM/DL
GLUCOSE SERPL-MCNC: 99 MG/DL (ref 65–99)
GLUCOSE UR STRIP-MCNC: NEGATIVE MG/DL
HCT VFR BLD AUTO: 34.7 % (ref 37.5–51)
HGB BLD-MCNC: 11.5 G/DL (ref 13–17.7)
HGB UR QL STRIP.AUTO: ABNORMAL
HOLD SPECIMEN: NORMAL
HYALINE CASTS UR QL AUTO: NORMAL /LPF
IMM GRANULOCYTES # BLD AUTO: 0.12 10*3/MM3 (ref 0–0.05)
IMM GRANULOCYTES NFR BLD AUTO: 1.6 % (ref 0–0.5)
KETONES UR QL STRIP: NEGATIVE
LEUKOCYTE ESTERASE UR QL STRIP.AUTO: NEGATIVE
LYMPHOCYTES # BLD AUTO: 2.33 10*3/MM3 (ref 0.7–3.1)
LYMPHOCYTES NFR BLD AUTO: 31.9 % (ref 19.6–45.3)
MCH RBC QN AUTO: 30.9 PG (ref 26.6–33)
MCHC RBC AUTO-ENTMCNC: 33.1 G/DL (ref 31.5–35.7)
MCV RBC AUTO: 93.3 FL (ref 79–97)
MONOCYTES # BLD AUTO: 0.64 10*3/MM3 (ref 0.1–0.9)
MONOCYTES NFR BLD AUTO: 8.8 % (ref 5–12)
NEUTROPHILS NFR BLD AUTO: 4.08 10*3/MM3 (ref 1.7–7)
NEUTROPHILS NFR BLD AUTO: 55.8 % (ref 42.7–76)
NITRITE UR QL STRIP: NEGATIVE
NRBC BLD AUTO-RTO: 0 /100 WBC (ref 0–0.2)
PH UR STRIP.AUTO: 7.5 [PH] (ref 5–8)
PHOSPHATE SERPL-MCNC: 3.4 MG/DL (ref 2.5–4.5)
PLATELET # BLD AUTO: 423 10*3/MM3 (ref 140–450)
PMV BLD AUTO: 9.9 FL (ref 6–12)
POTASSIUM SERPL-SCNC: 3.6 MMOL/L (ref 3.5–5.2)
PROT ?TM UR-MCNC: 4.3 MG/DL
PROT SERPL-MCNC: 7.4 G/DL (ref 6–8.5)
PROT UR QL STRIP: NEGATIVE
PROT/CREAT UR: 143.3 MG/G CREA (ref 0–200)
RBC # BLD AUTO: 3.72 10*6/MM3 (ref 4.14–5.8)
RBC # UR STRIP: NORMAL /HPF
REF LAB TEST METHOD: NORMAL
SODIUM SERPL-SCNC: 137 MMOL/L (ref 136–145)
SP GR UR STRIP: 1.01 (ref 1–1.03)
SQUAMOUS #/AREA URNS HPF: NORMAL /HPF
UROBILINOGEN UR QL STRIP: ABNORMAL
WBC # UR STRIP: NORMAL /HPF
WBC NRBC COR # BLD AUTO: 7.31 10*3/MM3 (ref 3.4–10.8)

## 2024-07-10 PROCEDURE — 82570 ASSAY OF URINE CREATININE: CPT | Performed by: INTERNAL MEDICINE

## 2024-07-10 PROCEDURE — 36415 COLL VENOUS BLD VENIPUNCTURE: CPT

## 2024-07-10 PROCEDURE — 85025 COMPLETE CBC W/AUTO DIFF WBC: CPT | Performed by: INTERNAL MEDICINE

## 2024-07-10 PROCEDURE — 80053 COMPREHEN METABOLIC PANEL: CPT | Performed by: INTERNAL MEDICINE

## 2024-07-10 PROCEDURE — 84156 ASSAY OF PROTEIN URINE: CPT | Performed by: INTERNAL MEDICINE

## 2024-07-10 PROCEDURE — 81001 URINALYSIS AUTO W/SCOPE: CPT

## 2024-07-10 PROCEDURE — 84100 ASSAY OF PHOSPHORUS: CPT | Performed by: INTERNAL MEDICINE

## 2024-07-10 RX ORDER — FUROSEMIDE 20 MG/1
1 TABLET ORAL DAILY
COMMUNITY
Start: 2024-06-05 | End: 2025-06-05

## 2024-07-10 NOTE — PROGRESS NOTES
Subjective     REASON FOR FOLLOW UP:  1.  Polycythemia vera, polycythemia and thrombocytosis and leukocytosis, EPO level 1.3, Tino 2+, peripheral blood for BCR/C ABL negative    HISTORY OF PRESENT ILLNESS:  The patient is a 63 y.o. year old male who is here for an opinion about the above issue.    History of Present Illness   Phani Landis is a 63 y.o. with the above-mentioned history returns today, 6/5/2024 for lab review continuing on Jakafi 20 mg twice daily.  He was seen by nephrology a few weeks ago.  At that appointment, they discontinued his chlorthalidone, potassium, and sodium.  Since then, he has gained 30 pounds.  He does note some shortness of breath as well as swelling in his lower extremities.  He states that he had to stop and rest while cutting the grass yesterday, which is not usual for him.  He also had difficulty sleeping last night.  He has not contacted the nephrology office yet.      He is otherwise tolerating his Jakafi well.      July 10, 2024: Patient is currently on Jakafi.  He is tolerating it very well.  He does not have any side effects.  His liver function tests have improved.  Hemoglobin today 11.5 with white count of 7.31 and a platelet of 423.  Patient now has hepatitis C.  He is followed by hepatologist/gastroenterologist at [facility] and has an appointment for that.  I discussed with his hepatologist at [facility] and they are planning to start treatment when they see him.  I did tell them that patient is on Jakafi and they are going to check with pharmacy and make sure there is no interaction with the drugs that they will prescribe.    Otherwise patient is doing well  Hematological oncologic history:  Patient is a 59-year-old gentleman who has been referred by his primary care Cast[facility] Velasco for evaluation of thrombocytosis and polycythemia.  He has lost more than 50 pounds of weight.  In the last 6 months.  Looking back his blood counts have been high starting in 2016.   "Initially his platelets were high around 670 and gradually increased in the 900 range.  More recently his hemoglobin has been increasing and his white count has been increasing.  He has not had a DVT or pulmonary embolism.  He has had no issues with itching with hot showers.  Has not had a stroke.  He had pneumonia last year but none recently.  He does have some shortness of breath but no chest pain.  He is exposed to secondhand smoking.    He does not have any nausea vomiting or abdominal pain at present.  He did have gout on several locations and was placed on meloxicam.  His renal function is mildly elevated.  He denies any fever or night sweats.    Patient does have a cough productive of yellow sputum but has no fevers.    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, EPO 1.3 low  CT chest negative, CT abdomen pelvis shows splenomegaly    Past Medical History:   Diagnosis Date    Anxiety     Arthritis     Cancer June 2020    Polycythemia vera    Hypertension         Past Surgical History:   Procedure Laterality Date    CHEST SURGERY      OTHER SURGICAL HISTORY  1985    \"Exploratory\"        Current Outpatient Medications on File Prior to Visit   Medication Sig Dispense Refill    ALPRAZolam (Xanax) 0.5 MG tablet Take 1 tablet by mouth 2 (Two) Times a Day As Needed for Anxiety or Sleep. Indications: Feeling Anxious 30 tablet 0    amLODIPine-benazepril (LOTREL) 5-40 MG per capsule Take 1 capsule by mouth Daily. Indications: High Blood Pressure Disorder 90 capsule 1    aspirin 81 MG chewable tablet Chew 1 tablet Daily.      bisoprolol (ZEBeta) 10 MG tablet Take 1 tablet by mouth Daily. Indications: High Blood Pressure Disorder 90 tablet 1    furosemide (LASIX) 20 MG tablet Take 1 tablet by mouth Daily.      hydrALAZINE (APRESOLINE) 50 MG tablet TAKE 1 TABLET BY MOUTH TWICE A  tablet 0    [DISCONTINUED] chlorthalidone (HYGROTON) 25 MG tablet Take 1 tablet by mouth " "Daily. Indications: High Blood Pressure Disorder (Patient not taking: Reported on 6/5/2024) 90 tablet 1    [DISCONTINUED] potassium chloride 10 MEQ CR tablet Take 2 tablets in the morning and 1 in the evening (Patient not taking: Reported on 6/5/2024) 90 tablet 5    [DISCONTINUED] sodium chloride 1 g tablet TAKE 1 TABLET BY MOUTH TWO TIMES A DAY (Patient not taking: Reported on 6/5/2024) 60 tablet 2     No current facility-administered medications on file prior to visit.        ALLERGIES:    Allergies   Allergen Reactions    Zoloft [Sertraline] Hallucinations    Cephalexin Itching        Social History     Socioeconomic History    Marital status:    Tobacco Use    Smoking status: Never    Smokeless tobacco: Never   Vaping Use    Vaping status: Never Used   Substance and Sexual Activity    Alcohol use: Yes     Comment: Occasional drinker    Drug use: Never    Sexual activity: Yes     Partners: Female        Family History   Problem Relation Age of Onset    Hypertension Father     Hypertension Other     Heart disease Other     Cervical cancer Other       I have reviewed the patient's medical history in detail and updated the computerized patient record.    Review of Systems  ROS as per HPI      Objective     Vitals:    07/10/24 1454   BP: 151/80   Pulse: 73   Resp: 16   Temp: 98.4 °F (36.9 °C)   TempSrc: Oral   SpO2: 97%   Weight: 119 kg (262 lb 3.2 oz)   Height: 188 cm (74.02\")   PainSc: 0-No pain                 7/10/2024     2:56 PM   Current Status   ECOG score 0     Physical Exam  Vitals reviewed.   Constitutional:       General: He is not in acute distress.     Appearance: Normal appearance. He is well-developed.   HENT:      Head: Normocephalic and atraumatic.   Eyes:      Pupils: Pupils are equal, round, and reactive to light.   Cardiovascular:      Rate and Rhythm: Normal rate and regular rhythm.      Heart sounds: Normal heart sounds. No murmur heard.  Pulmonary:      Effort: Pulmonary effort is " normal. No respiratory distress.      Breath sounds: Normal breath sounds. No wheezing, rhonchi or rales.   Abdominal:      General: Bowel sounds are normal. There is no distension.      Palpations: Abdomen is soft.   Musculoskeletal:         General: Swelling (1-2+ bilateral LE edema) present. Normal range of motion.      Cervical back: Normal range of motion.   Skin:     General: Skin is warm and dry.      Findings: No rash.      Comments: Scattered ecchymosis on the upper extremities bilaterally.  Bandaged area on his shin of the right lower extremity.  Dressing clean dry and intact.   Neurological:      Mental Status: He is alert and oriented to person, place, and time.         RECENT LABS:  Results from last 7 days   Lab Units 07/10/24  1438   WBC 10*3/mm3 7.31   NEUTROS ABS 10*3/mm3 4.08   HEMOGLOBIN g/dL 11.5*   HEMATOCRIT % 34.7*   PLATELETS 10*3/mm3 423                 Results from last 7 days   Lab Units 07/10/24  1438   SODIUM mmol/L 137   POTASSIUM mmol/L 3.6   CHLORIDE mmol/L 101   CO2 mmol/L 27.2   BUN mg/dL 16   CREATININE mg/dL 1.09   CALCIUM mg/dL 9.2   ALBUMIN g/dL 4.4   BILIRUBIN mg/dL 0.7   ALK PHOS U/L 75   ALT (SGPT) U/L 41   AST (SGOT) U/L 44*   GLUCOSE mg/dL 99                       Assessment & Plan     1.  Polycythemia with leukocytosis and thrombocytosis.  He does not have any itching with hot showers.  He does not have any headaches or DVT.  He does have blood counts worsening gradually from 2016.  7/8/2020  His platelets 974K.  Hemoglobin is 17.6 with hematocrit of 52.8 and white count of 11.56.  He denies fever night sweats but has significant weight loss greater than 50 pounds in 6 months.  He does have a cough which is productive of yellow sputum and some shortness of breath.  He has history of secondhand smoking exposure.  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  CT chest  negative  Hydroxyurea 500 mg twice daily was initiated on July 16, 2020.  .  7/22/2020 Hydrea increased to 1500 mg (1000 mg in am and 500 mg in pm) Monday-Friday and 2000 mg (1000 mg in am and 1000 mg in PM) on Saturdays and Sundays  7/29/2020 patient's platelet count today is 821.  White count 7.78, hemoglobin 16.4.   Increased Hydrea at 1500 mg Monday through Friday, and 2000 mg on Saturdays and Sundays.   In future if hematocrit greater than 48 then he will receive phlebotomy.  Last phlebotomy 7/15/2020.   2/23/2022 patient continues on Hydrea 1500 mg on Monday, Wednesday, and Friday, 1500 mg all other days.  He is tolerating Hydrea well aside from some fatigue on the days he takes 1500 mg.  He also continues to receive phlebotomy for hematocrit greater than 48, hematocrit today 44.7 so patient will not receive phlebotomy.  Platelets today have increased to 575, so we will increase Hydrea to 1500 mg four times a week, 1000 mg all other days.  Because we are increasing dose of Hydrea, patient will return in 1 month for CBC with RN review to monitor counts.  Discussed with the patient who is agreeable.  6/15/2022: Patient continues on Hydrea 1500 mg 4 days a week and 1000 mg all other days.  Platelets increased to 573,000.  Discussed with Dr. Fleming today plan to increase Hydrea to 1500 mg 5 days a week and 1000 mg 2 days a week.  He will return in 1 month for CBC with RN review to monitor counts.  Patient agreeable   September 7, 2022: Tolerating Hydrea very well.  His platelets are down to 497.  He continues with aspirin.  Given hematocrit of 44 we will hold off phlebotomy  11/30/2022 continuing on Hydrea 1500 mg 5 days a week and 1000 mg 2 days a week tolerating well.  May 24, 2023: Patient's hematocrit is 42.  But his platelet is elevated to 580.  We will increase the dose of his hydroxyurea to 1500 mg 6 days a week and 1000 mg 1 day a week  8/16/2023: Patient's hematocrit is normal at 41.9.  Platelets have  normalized to 430,000.  Hemoglobin 14.9 and WBC 6.29.  Hydrea dosing will remain the same at 1500 mg 6 days a week and 100 mg once weekly.  He continues to tolerate this treatment well. Mild elevation was noted with his liver enzymes.  Historically he has had mild elevations when he was taking pain medications.  Today his AST is 42 and ALT is 42.  Total bilirubin is normal at 0.7 and alkaline phosphatase is normal at 72.  Does note that he takes occasional Tylenol for his arthritis.  He denies drinking any alcohol.  We will have him return in 1 month to monitor his labs.  November 15, 2023: Patient has new ulcerations in the right lower extremity for the last 2 3 months which are not healing.  On discussion with wound care they felt this is more likely hydroxyurea related rather than venous disease.  He has never been a smoker and so we discussed about switching to Jakafi.  He has had splenomegaly in the past but we will need to obtain ultrasound of the abdomen stat in order to rule out worsening splenomegaly.  11/29/2023: Patient is currently 3 days into Jakafi 10 mg p.o. twice daily.  He is currently tolerating his medication well and denies any side effects.  His lower extremity ulcerations secondary to Hydrea continue to improve and patient is being followed closely by the wound care center at [facility].  Lower extremities are currently dressed today.  Platelet count today is more elevated at 669,000.  Mild leukocytosis with a white blood cell count of 13.60.  Patient did just complete a course of steroids but is most likely the cause.  ALT mildly elevated today at 47.  AST and total bilirubin are normal.  We will have patient repeat labs in 1 week for close monitoring we will follow back up with Dr. Fleming in 2 weeks.  January 17 2024: Patient was seen 2 weeks ago at [facility] by a nurse practitioner of Dr. Garcia the oncologist.  However his platelets had gone up to 275 and his white count  was elevated to 11.41.  As a result they had increased his dose on Jakafi to 20 mg in the morning and 10 mg at bedtime.  However today after the increased dose his counts have improved though platelet count is still 578 and white count is normalized.  He prefers to follow-up at [place].  Patient has never had a bone marrow and we discussed about consideration of bone marrow in order to make sure there is no progression to myelofibrosis.  His spleen is enlarged.  1/31/2024 platelet increased to 940,000.  Jakafi was increased to 20 mg twice daily.  2/14/2024 platelet count 729,000, hemoglobin 13.1, WBC 6.68.  Patient will currently continue on Jakafi 20 mg twice daily.  April 17, 2024: Blood counts are more stable.  His hemoglobin is down to 11.79 but his white count and platelet count are good..  Will repeat check CT scan at some point in January 2024's results show evidence of any decrease in splenomegaly.  He is tolerating Jakafi 20 mg p.o. twice daily  5/15/2024 platelet count 487, hemoglobin 12.4, white count 7.05.  Continues on Jakafi 20 mg twice daily, tolerating well.  Seen in follow-up 6/5/2024 continuing on Jakafi 20 mg twice daily.  WBC 7.73, hemoglobin 11.1, platelet count 455,000.  Labs reviewed with Dr. Fleming as well as patient's new symptoms.  We did review that Jakafi does not cause issues with fluid retention and is likely from the recent discontinuation of his diuretics.  He will continue on Jakafi.  We will contact nephrology to let them know about the patient's symptoms.    July 10/2024: Patient is tolerating Jakafi very well.  He has new diagnosis of hepatitis C for which he is going to be treated by hepatologist at [place].  Discussion done with the physician at [place] and they plan to make sure that the drug to use to treat hepatitis C does not interact with Jakafi.      2.  Weight loss greater than 50 pounds at time of initial evaluation.  CT chest abdomen pelvis negative for  malignancy  Patient admitting that his weight loss was actually been more intentional   Weight now stable    3. Hypokalemia, mild, chronic.  8/2/2021: Potassium 3.3. We discussed utilizing electrolyte drinks when he is working outside/sweating to help replenish what he is losing.  He does continue potassium chloride once daily as well.  9/28/2021: Potassium stable at 3.3. Continue Klor-con 20 mEq daily.  2/23/2022 potassium today 4.0, patient will continue on potassium 20 mEq daily.  6/15/2022: Currently taking 10 mEq daily, potassium 3.8.  11/30/2022 potassium 3.2, he will increase his potassium to 20 M EQ daily.  8/16/2023: Potassium 3.5.  Patient remains on potassium 20 mill equivalents daily.  11/29/2023: Potassium is normal at 3.6.  January 17, 2024: Patient's potassium is 3.2.  He is on chlorthalidone.  Will need to increase potassium intake  2/14/2024 potassium 4.3.  Monitor  5/15/2024 potassium 4.5.  6/5/2024 oral potassium was discontinued by nephrology.  Potassium level today 3.9    5.  History of knee surgery.  Patient is to go for knee surgery in 3 weeks  Discussed with Dr. Chad Oliveros and he will start him on 2.5 mg p.o. twice daily of Eliquis 24 hours after surgery.      6.  Skin lesions on the lower extremity with delayed healing, will consult with [facility]  11/30/2022 patient states that he did not see dermatologist as a skin lesions began to heal.  He continues to use vitamin D on this area.  March 1, 2023: The skin ulceration on the left lower extremity is healing up but not completely healed up  Unsure if this is just secondary to chronic venous stasis as opposed to hydroxyurea.  However usually the hydroxyurea ulcerations on the medial malleolus and this is more superior  8/16/2023: No skin lesions noted today.  11/29/2023: Lower extremity ulcerations are healing well and he continues to be followed by [facility] wound care center.  2/14/2024 continues to follow closely  with wound care every 2 weeks.  Lower extremity lesions are healing well.    7.  Elevated liver function tests with ALT of 69 and AST of 90 likely secondary to Tylenol which she has been taking for a day for the sake of his knee pain  2/14/2024 AST 67, ALT 54, alkaline phosphatase 61, total bilirubin 0.5.  Monitor.  5/15/2024 AST 44, ALT 41, alkaline phosphatase 64, total bilirubin 0.4.  6/5/2024 LFTs have normalized with an AST of 34, ALT of 27, alkaline phosphatase 75, total bilirubin 0.5.    8.  Nausea related to Jakafi, patient takes with food and then he does not have nausea  Nausea has resolved.    9.  Hepatitis C new diagnosis to be followed by hepatologist at [place] who is going to start her treatment soon       Plan:   Continue Jakafi 20 mg twice daily.  Patient has a new diagnosis of hepatitis C and is to be followed by hepatologist/GI at [place]  Today I discussed with the physician at [place] and they know patient is on Jakafi and they will use drugs that do not interact with Jakafi  Consider ordering CT scan in the fall to reassess splenomegaly.  At a later date  Follow-up with me August 28, 2024 with labs      Patient is on a high risk medication requiring close monitoring for toxicity.    I spent 40 total minutes, face-to-face, caring for Phani today. Greater than 50% of this time involved counseling and/or coordination of care as documented within this note.    Eliana Fleming MD

## 2024-07-11 ENCOUNTER — SPECIALTY PHARMACY (OUTPATIENT)
Dept: PHARMACY | Facility: HOSPITAL | Age: 64
End: 2024-07-11
Payer: COMMERCIAL

## 2024-07-11 RX ORDER — ALLOPURINOL 100 MG/1
100 TABLET ORAL 2 TIMES DAILY
Qty: 180 TABLET | Refills: 0 | Status: SHIPPED | OUTPATIENT
Start: 2024-07-11

## 2024-07-11 NOTE — PROGRESS NOTES
Specialty Pharmacy Note: Jakafi (ruxolitinib)    Phani Landis is a 63 y.o. male with polycythemia vera was seen 7/10/24 by Dr. Fleming. Provider dictation still pending.  At this time no changes to oral oncology regimen Jakafi (ruxolitinib).  Labs Review: The CMP and CBC from 7/10/24 have been reviewed. No dose adjustments are needed for the oral specialty medication(s) based on the labs.    Specialty pharmacy will continue to follow patient.    Elder Holder, PharmD, [facility]  Clinical Oncology Pharmacist  7/11/2024  08:53 EDT

## 2024-07-13 DIAGNOSIS — D45 POLYCYTHEMIA VERA: ICD-10-CM

## 2024-07-13 DIAGNOSIS — E79.0 ELEVATED URIC ACID IN BLOOD: ICD-10-CM

## 2024-07-13 DIAGNOSIS — D75.839 THROMBOCYTOSIS: ICD-10-CM

## 2024-07-15 RX ORDER — ALLOPURINOL 100 MG/1
100 TABLET ORAL 2 TIMES DAILY
Qty: 180 TABLET | Refills: 0 | OUTPATIENT
Start: 2024-07-15

## 2024-07-26 ENCOUNTER — OFFICE VISIT (OUTPATIENT)
Dept: GASTROENTEROLOGY | Facility: HOSPITAL | Age: 64
End: 2024-07-26
Payer: COMMERCIAL

## 2024-07-26 VITALS
HEIGHT: 74 IN | WEIGHT: 258.3 LBS | SYSTOLIC BLOOD PRESSURE: 139 MMHG | BODY MASS INDEX: 33.15 KG/M2 | HEART RATE: 57 BPM | DIASTOLIC BLOOD PRESSURE: 83 MMHG

## 2024-07-26 DIAGNOSIS — B18.2 CHRONIC HEPATITIS C WITHOUT HEPATIC COMA: Primary | ICD-10-CM

## 2024-07-26 LAB
ALBUMIN SERPL-MCNC: 4.6 G/DL (ref 3.5–5.2)
ALBUMIN/GLOB SERPL: 1.6 G/DL
ALP SERPL-CCNC: 71 U/L (ref 39–117)
ALT SERPL W P-5'-P-CCNC: 42 U/L (ref 1–41)
ANION GAP SERPL CALCULATED.3IONS-SCNC: 12.7 MMOL/L (ref 5–15)
AST SERPL-CCNC: 66 U/L (ref 1–40)
BASOPHILS # BLD AUTO: 0.06 10*3/MM3 (ref 0–0.2)
BASOPHILS NFR BLD AUTO: 0.9 % (ref 0–1.5)
BILIRUB SERPL-MCNC: 0.5 MG/DL (ref 0–1.2)
BUN SERPL-MCNC: 26 MG/DL (ref 8–23)
BUN/CREAT SERPL: 18.6 (ref 7–25)
CALCIUM SPEC-SCNC: 9.4 MG/DL (ref 8.6–10.5)
CHLORIDE SERPL-SCNC: 104 MMOL/L (ref 98–107)
CO2 SERPL-SCNC: 21.3 MMOL/L (ref 22–29)
CREAT SERPL-MCNC: 1.4 MG/DL (ref 0.76–1.27)
DEPRECATED RDW RBC AUTO: 51.3 FL (ref 37–54)
EGFRCR SERPLBLD CKD-EPI 2021: 56.5 ML/MIN/1.73
EOSINOPHIL # BLD AUTO: 0.05 10*3/MM3 (ref 0–0.4)
EOSINOPHIL NFR BLD AUTO: 0.8 % (ref 0.3–6.2)
ERYTHROCYTE [DISTWIDTH] IN BLOOD BY AUTOMATED COUNT: 15.1 % (ref 12.3–15.4)
GLOBULIN UR ELPH-MCNC: 2.9 GM/DL
GLUCOSE SERPL-MCNC: 83 MG/DL (ref 65–99)
HAV IGM SERPL QL IA: ABNORMAL
HBV CORE IGM SERPL QL IA: ABNORMAL
HBV SURFACE AB SER RIA-ACNC: NORMAL
HBV SURFACE AG SERPL QL IA: ABNORMAL
HCT VFR BLD AUTO: 35 % (ref 37.5–51)
HCV AB SER QL: REACTIVE
HGB BLD-MCNC: 11.8 G/DL (ref 13–17.7)
HIV 1+2 AB+HIV1 P24 AG SERPL QL IA: NORMAL
IMM GRANULOCYTES # BLD AUTO: 0.07 10*3/MM3 (ref 0–0.05)
IMM GRANULOCYTES NFR BLD AUTO: 1.1 % (ref 0–0.5)
LYMPHOCYTES # BLD AUTO: 2.04 10*3/MM3 (ref 0.7–3.1)
LYMPHOCYTES NFR BLD AUTO: 31.5 % (ref 19.6–45.3)
MCH RBC QN AUTO: 31.3 PG (ref 26.6–33)
MCHC RBC AUTO-ENTMCNC: 33.7 G/DL (ref 31.5–35.7)
MCV RBC AUTO: 92.8 FL (ref 79–97)
MONOCYTES # BLD AUTO: 0.75 10*3/MM3 (ref 0.1–0.9)
MONOCYTES NFR BLD AUTO: 11.6 % (ref 5–12)
NEUTROPHILS NFR BLD AUTO: 3.51 10*3/MM3 (ref 1.7–7)
NEUTROPHILS NFR BLD AUTO: 54.1 % (ref 42.7–76)
NRBC BLD AUTO-RTO: 0 /100 WBC (ref 0–0.2)
PLATELET # BLD AUTO: 508 10*3/MM3 (ref 140–450)
PMV BLD AUTO: 9.9 FL (ref 6–12)
POTASSIUM SERPL-SCNC: 3.6 MMOL/L (ref 3.5–5.2)
PROT SERPL-MCNC: 7.5 G/DL (ref 6–8.5)
RBC # BLD AUTO: 3.77 10*6/MM3 (ref 4.14–5.8)
SODIUM SERPL-SCNC: 138 MMOL/L (ref 136–145)
WBC NRBC COR # BLD AUTO: 6.48 10*3/MM3 (ref 3.4–10.8)

## 2024-07-26 PROCEDURE — 80053 COMPREHEN METABOLIC PANEL: CPT | Performed by: NURSE PRACTITIONER

## 2024-07-26 PROCEDURE — G0432 EIA HIV-1/HIV-2 SCREEN: HCPCS | Performed by: NURSE PRACTITIONER

## 2024-07-26 PROCEDURE — 86704 HEP B CORE ANTIBODY TOTAL: CPT | Performed by: NURSE PRACTITIONER

## 2024-07-26 PROCEDURE — 85025 COMPLETE CBC W/AUTO DIFF WBC: CPT | Performed by: NURSE PRACTITIONER

## 2024-07-26 PROCEDURE — G0463 HOSPITAL OUTPT CLINIC VISIT: HCPCS | Performed by: NURSE PRACTITIONER

## 2024-07-26 PROCEDURE — 87522 HEPATITIS C REVRS TRNSCRPJ: CPT | Performed by: NURSE PRACTITIONER

## 2024-07-26 PROCEDURE — 80074 ACUTE HEPATITIS PANEL: CPT | Performed by: NURSE PRACTITIONER

## 2024-07-26 PROCEDURE — 91200 LIVER ELASTOGRAPHY: CPT | Performed by: NURSE PRACTITIONER

## 2024-07-26 PROCEDURE — 86706 HEP B SURFACE ANTIBODY: CPT | Performed by: NURSE PRACTITIONER

## 2024-07-26 NOTE — PROGRESS NOTES
"Chief Complaint      Chief Complaint  Hepatitis C    Subjective            History of Present Illness     Phani Landis presents to [facility] for evaluation and treatment of chronic HCV.      He is followed by hem/onc for polycythemia with leukocytosis and is maintained on Jakafi.  Reports dx of HCV in May.  Denies previous treatment.  Admits a history of blood transfusion in the 80's.  Denies hx of illicit drug use and unprofessional tattoos.  He is a retired .  Only rarely drinks beer.        Past Medical History     Allergies   Allergen Reactions    Zoloft [Sertraline] Hallucinations    Cephalexin Itching       Current Outpatient Medications:     allopurinol (ZYLOPRIM) 100 MG tablet, Take 1 tablet by mouth 2 (Two) Times a Day., Disp: 180 tablet, Rfl: 0    ALPRAZolam (Xanax) 0.5 MG tablet, Take 1 tablet by mouth 2 (Two) Times a Day As Needed for Anxiety or Sleep. Indications: Feeling Anxious, Disp: 30 tablet, Rfl: 0    amLODIPine-benazepril (LOTREL) 5-40 MG per capsule, Take 1 capsule by mouth Daily. Indications: High Blood Pressure Disorder, Disp: 90 capsule, Rfl: 1    aspirin 81 MG chewable tablet, Chew 1 tablet Daily., Disp: , Rfl:     bisoprolol (ZEBeta) 10 MG tablet, Take 1 tablet by mouth Daily. Indications: High Blood Pressure Disorder, Disp: 90 tablet, Rfl: 1    furosemide (LASIX) 20 MG tablet, Take 1 tablet by mouth Daily., Disp: , Rfl:     hydrALAZINE (APRESOLINE) 50 MG tablet, TAKE 1 TABLET BY MOUTH TWICE A DAY, Disp: 180 tablet, Rfl: 0    ruxolitinib (JAKAFI) 20 MG tablet, Take 1 tablet by mouth 2 (Two) Times a Day., Disp: 60 tablet, Rfl: 5  Past Medical History:   Diagnosis Date    Anxiety     Arthritis     Cancer June 2020    Polycythemia vera    Hypertension      Past Surgical History:   Procedure Laterality Date    CHEST SURGERY      OTHER SURGICAL HISTORY  1985    \"Exploratory\"     Social History     Socioeconomic History    Marital status: "    Tobacco Use    Smoking status: Never    Smokeless tobacco: Never   Vaping Use    Vaping status: Never Used   Substance and Sexual Activity    Alcohol use: Yes     Comment: Occasional drinker    Drug use: Never    Sexual activity: Yes     Partners: Female       Objective     Objective     Vitals:    07/26/24 0946   BP: 139/83   Pulse: 57     Body mass index is 33.15 kg/m².  Body surface area is 2.42 meters squared.    Physical Exam    Results       Result Review :     The following data was reviewed by: AG Nieves on 07/26/2024:    Katie Stiffness Consistent with: F4  CAP Score: S0    CMP:  Lab Results   Component Value Date    BUN 16 07/10/2024    CREATININE 1.09 07/10/2024    EGFRIFNONA 67 02/23/2022    EGFRIFAFRI 70 06/22/2020     07/10/2024    K 3.6 07/10/2024     07/10/2024    CALCIUM 9.2 07/10/2024    PROTENTOTREF 7.7 06/22/2020    ALBUMIN 4.4 07/10/2024    LABGLOBREF 3.1 06/22/2020    BILITOT 0.7 07/10/2024    ALKPHOS 75 07/10/2024    AST 44 (H) 07/10/2024    ALT 41 07/10/2024     CBC w/ diff:   Lab Results   Component Value Date    WBC 7.31 07/10/2024    RBC 3.72 (L) 07/10/2024    HGB 11.5 (L) 07/10/2024    HCT 34.7 (L) 07/10/2024    MCV 93.3 07/10/2024    MCH 30.9 07/10/2024    MCHC 33.1 07/10/2024    RDW 15.4 07/10/2024     07/10/2024    NEUTRORELPCT 55.8 07/10/2024    AUTOIGPER 1.6 (H) 07/10/2024    LYMPHORELPCT 31.9 07/10/2024    MONORELPCT 8.8 07/10/2024    EOSRELPCT 0.8 07/10/2024    BASORELPCT 1.1 07/10/2024     HCV Genotype, HIV   Lab Results   Component Value Date    HCVGENOTYPE 1a 05/15/2024     Lab Results   Component Value Date    HEPATITISC [id number] 05/15/2024    INZBDL65 6.446 05/15/2024          Liver Elastography    Performed by: Roselyn Hicks APRN  Authorized by: Roselyn Hicks APRN  Ordering Provider: Roselyn Hicks APRN    Probe:  M+  Procedure Details:  Procedure: After providing an oral and written explanation of the  Fibroscan vibration controlled transient elastography (VTCE) test procedure to the patient. The patient was placed in supine position with right arm in maximum abduction to allow optimal exposure of right lateral abdomen. Patient was briefly assessed, identifying terminus of the xyphoid process and locating an ideal transient elastography testing site, midline and lateral to this point. Patient was instructed to breathe normally and remain stationary during the test process. Pre-measurement data confirmed the transient elastography probe was centered over the liver parenchyma. A series of ten 50 Hz mechanical pulses were applied with controlled application pressure to induce a mechanical shear wave in the liver tissue. For each measurement, the shear wave propagation speed was detected, displayed and converted to its equivalent liver stiffness value in kilopascals. Skin to liver capsules distance and shear wave characteristics were monitored during the entire examination to assure quality data. Median liver stiffness measurement and interquartile range were calculated and displayed in real time. Acquired measurement data was stored and submitted to the provider for review and interpretation. Patient tolerated the procedure well and was discharged without incident.   Clinical Information:     NPO 3 Hours or More: Yes      Actively Drinking: No    Findings:     Median Liver Stiffness Score:  12.9    Interquartile Range (IQR) to Median Ratio:  13    Interpretation:  Taking into account the patient's history and recent liver test, this Liver Stiffness Score is consistent with below scale:    Katie Stiffness Consistent with:  F4 Cirrhosis    Current Scan Considered Reliab: Yes      Median Controlled Attenuation Parameter (dB/m):  250    IQR:  7    Liver Fat Interpretation:  Taking into account the patient's history, this CAP score is consistent with below scale:      CAP SCORE:  Normal/mild liver fat       Assessment and  Plan            Assessment:    Diagnoses and all orders for this visit:    1. Chronic hepatitis C without hepatic coma (Primary)  -     Liver Elastography  -     Hepatitis B Surface Antibody  -     Hepatitis B Core Antibody, Total  -     HIV-1 & HIV-2 Antibodies  -     Hepatitis C RNA, Quantitative, PCR (graph)  -     CBC Auto Differential  -     Comprehensive Metabolic Panel  -     HCV FibroSURE  -     US Abdomen Limited; Future  -     Hepatitis Panel, Acute         Plan:   Labs today to determine genotype and treatment plan.      Patient Instructions: Avoid Alcohol, Avoid Illicit Drug Use, Importance of keeping appointments.  Patient Education Provided: Yes    Follow Up     Follow Up   Return in about 4 weeks (around 8/23/2024) for Hepatitis C.  Patient was given instructions and counseling regarding his condition or for health maintenance advice. Please see specific information pulled into the AVS if appropriate.     Roselyn Hicks, APRN  07/26/2024

## 2024-07-27 DIAGNOSIS — I10 ESSENTIAL HYPERTENSION: ICD-10-CM

## 2024-07-27 LAB — HBV CORE AB SERPL QL IA: NEGATIVE

## 2024-07-28 DIAGNOSIS — I10 ESSENTIAL HYPERTENSION: ICD-10-CM

## 2024-07-28 LAB
A2 MACROGLOB SERPL-MCNC: 268 MG/DL (ref 110–276)
ALT SERPL W P-5'-P-CCNC: 51 IU/L (ref 0–55)
APO A-I SERPL-MCNC: 146 MG/DL (ref 101–178)
BILIRUB SERPL-MCNC: 0.5 MG/DL (ref 0–1.2)
FIBROSIS SCORING:: ABNORMAL
FIBROSIS STAGE SERPL QL: ABNORMAL
GGT SERPL-CCNC: 21 IU/L (ref 0–65)
HAPTOGLOB SERPL-MCNC: 77 MG/DL (ref 32–363)
HCV AB SER QL: ABNORMAL
LABORATORY COMMENT REPORT: ABNORMAL
LIVER FIBR SCORE SERPL CALC.FIBROSURE: 0.46 (ref 0–0.21)
NECROINFLAMM ACTIVITY SCORING:: ABNORMAL
NECROINFLAMMATORY ACT GRADE SERPL QL: ABNORMAL
NECROINFLAMMATORY ACT SCORE SERPL: 0.35 (ref 0–0.17)
SERVICE CMNT-IMP: ABNORMAL
TEST PERFORMANCE INFO SPEC: ABNORMAL

## 2024-07-29 ENCOUNTER — TELEPHONE (OUTPATIENT)
Dept: GASTROENTEROLOGY | Facility: CLINIC | Age: 64
End: 2024-07-29
Payer: COMMERCIAL

## 2024-07-29 LAB
HCV RNA SERPL NAA+PROBE-ACNC: NORMAL IU/ML
HCV RNA SERPL NAA+PROBE-LOG IU: 6.27 LOG10 IU/ML
TEST INFORMATION: NORMAL

## 2024-07-29 RX ORDER — BISOPROLOL FUMARATE 10 MG/1
10 TABLET, FILM COATED ORAL DAILY
Qty: 90 TABLET | Refills: 0 | Status: SHIPPED | OUTPATIENT
Start: 2024-07-29

## 2024-07-29 RX ORDER — AMLODIPINE AND BENAZEPRIL HYDROCHLORIDE 5; 40 MG/1; MG/1
1 CAPSULE ORAL DAILY
Qty: 90 CAPSULE | Refills: 0 | Status: SHIPPED | OUTPATIENT
Start: 2024-07-29

## 2024-07-29 NOTE — TELEPHONE ENCOUNTER
----- Message from Roselyn Hicks sent at 7/29/2024 12:43 PM EDT -----  Labs c/w HCV, GT 1a, please obtain approval for epclusa and schedule for training visit.

## 2024-08-04 DIAGNOSIS — I10 ESSENTIAL HYPERTENSION: ICD-10-CM

## 2024-08-05 ENCOUNTER — TELEPHONE (OUTPATIENT)
Dept: GASTROENTEROLOGY | Facility: CLINIC | Age: 64
End: 2024-08-05
Payer: COMMERCIAL

## 2024-08-05 RX ORDER — HYDRALAZINE HYDROCHLORIDE 50 MG/1
50 TABLET, FILM COATED ORAL 2 TIMES DAILY
Qty: 180 TABLET | Refills: 0 | Status: SHIPPED | OUTPATIENT
Start: 2024-08-05

## 2024-08-07 ENCOUNTER — TELEPHONE (OUTPATIENT)
Dept: GASTROENTEROLOGY | Facility: CLINIC | Age: 64
End: 2024-08-07
Payer: COMMERCIAL

## 2024-08-07 ENCOUNTER — HOSPITAL ENCOUNTER (OUTPATIENT)
Dept: ULTRASOUND IMAGING | Facility: HOSPITAL | Age: 64
Discharge: HOME | End: 2024-08-07
Admitting: NURSE PRACTITIONER
Payer: COMMERCIAL

## 2024-08-07 DIAGNOSIS — B18.2 CHRONIC HEPATITIS C WITHOUT HEPATIC COMA: ICD-10-CM

## 2024-08-07 PROCEDURE — 76705 ECHO EXAM OF ABDOMEN: CPT

## 2024-08-07 NOTE — TELEPHONE ENCOUNTER
[facility] states they need a PA. PA to be submitted electronically via covermymeds.

## 2024-08-07 NOTE — TELEPHONE ENCOUNTER
"Caller: Phani Landis \"Raj\"    Relationship: Self    Best call back number: [phone]    Which medication are you concerned about:   EPCLUSA 400-100 MG 4 TABS  Who prescribed you this medication: DOLORES    When did you start taking this medication: HASN'T STARTED TAKING IT. [facility] SAYING THEY NEEDED MORE INFO FROM PRESCRIBER. [phone] TO PROVIDE REQUESTED CLINICAL INFORMATION    What are your concerns: SEE ABOVE    How long have you had these concerns:   "

## 2024-08-15 NOTE — PROGRESS NOTES
"Chief Complaint  Nasal Congestion (Going on 3 weeks) and Cough    Subjective            Phani Landis is a 63 y.o. male who presents to [facility]   History of Present Illness  Acute visit.  He is complaining of cough and congestion that has been ongoing for about 3 weeks.  He states he has both upper and lower respiratory congestion.    Blood pressure is noted to be slightly elevated today but he is not feeling well with cough and congestion which can increase his blood pressure.  He is currently taking amlodipine-benazepril 5-40 mg daily and hydralazine 50 mg twice daily.      Tobacco Use: Low Risk  (8/16/2024)    Patient History     Smoking Tobacco Use: Never     Smokeless Tobacco Use: Never     Passive Exposure: Not on file   Recent Concern: Tobacco Use - Medium Risk (6/26/2024)    [facility]    Patient History     Smoking Tobacco Use: Never     Smokeless Tobacco Use: Never     Passive Exposure: Current      E-cigarette/Vaping    E-cigarette/Vaping Use Never User      E-cigarette/Vaping Substances     E-cigarette/Vaping Devices       Alcohol Use: Not on file         Objective   Vital Signs:   Vitals:    08/16/24 0956   BP: 138/90   Pulse: 57   Temp: 97.7 °F (36.5 °C)   SpO2: 98%   Height: 188 cm (74.02\")     Body mass index is 33.15 kg/m².    Wt Readings from Last 3 Encounters:   07/26/24 117 kg (258 lb 4.8 oz)   07/10/24 119 kg (262 lb 3.2 oz)   06/05/24 125 kg (276 lb 3.2 oz)     BP Readings from Last 3 Encounters:   08/16/24 138/90   07/26/24 139/83   07/10/24 151/80       Health Maintenance   Topic Date Due    Pneumococcal Vaccine 0-64 (1 of 2 - PCV) Never done    DIABETIC EYE EXAM  Never done    ANNUAL PHYSICAL  Never done    DIABETIC FOOT EXAM  Never done    Hepatitis B (1 of 3 - Risk 3-dose series) Never done    HEMOGLOBIN A1C  02/16/2024    TDAP/TD VACCINES (2 - Td or Tdap) 08/01/2024    BMI FOLLOWUP  08/14/2024    INFLUENZA VACCINE  08/01/2024    " "COVID-19 Vaccine (1 - 2023-24 season) 04/30/2025 (Originally 9/1/2023)    ZOSTER VACCINE (1 of 2) 05/02/2025 (Originally 9/8/2010)    PROSTATE CANCER SCREENING  05/15/2025    URINE MICROALBUMIN  07/10/2025    COLORECTAL CANCER SCREENING  03/08/2027    HEPATITIS C SCREENING  Completed       /90   Pulse 57   Temp 97.7 °F (36.5 °C)   Ht 188 cm (74.02\")   SpO2 98%   BMI 33.15 kg/m²       Current Outpatient Medications:     allopurinol (ZYLOPRIM) 100 MG tablet, Take 1 tablet by mouth 2 (Two) Times a Day., Disp: 180 tablet, Rfl: 0    ALPRAZolam (Xanax) 0.5 MG tablet, Take 1 tablet by mouth 2 (Two) Times a Day As Needed for Anxiety or Sleep. Indications: Feeling Anxious, Disp: 30 tablet, Rfl: 0    amLODIPine-benazepril (LOTREL) 5-40 MG per capsule, TAKE 1 CAPSULE BY MOUTH DAILY, Disp: 90 capsule, Rfl: 0    aspirin 81 MG chewable tablet, Chew 1 tablet Daily., Disp: , Rfl:     bisoprolol (ZEBeta) 10 MG tablet, TAKE 1 TABLET BY MOUTH DAILY, Disp: 90 tablet, Rfl: 0    furosemide (LASIX) 20 MG tablet, Take 1 tablet by mouth Daily., Disp: , Rfl:     hydrALAZINE (APRESOLINE) 50 MG tablet, TAKE 1 TABLET BY MOUTH 2 TIMES A DAY, Disp: 180 tablet, Rfl: 0    ruxolitinib (JAKAFI) 20 MG tablet, Take 1 tablet by mouth 2 (Two) Times a Day., Disp: 60 tablet, Rfl: 5    azithromycin (ZITHROMAX) 250 MG tablet, Take 2 tablets by mouth Daily for 1 day, THEN 1 tablet Daily for 4 days. Indications: bronchitis, Disp: 6 tablet, Rfl: 0    brompheniramine-pseudoephedrine-DM 30-2-10 MG/5ML syrup, Take 5 mL by mouth 4 (Four) Times a Day As Needed for Congestion or Cough., Disp: 240 mL, Rfl: 0    predniSONE (DELTASONE) 20 MG tablet, Take 1 tablet by mouth 2 (Two) Times a Day for 5 days. Indications: bronchitis, Disp: 10 tablet, Rfl: 0   Past Medical History:   Diagnosis Date    Anxiety     Arthritis     Cancer 06/2020    Polycythemia vera    Hypertension     Infectious viral hepatitis 05/2024    C        Physical Exam  Vitals reviewed. "   Constitutional:       Appearance: Normal appearance. He is well-developed. He is obese.   HENT:      Right Ear: Tympanic membrane, ear canal and external ear normal.      Left Ear: Tympanic membrane, ear canal and external ear normal.      Mouth/Throat:      Mouth: Mucous membranes are moist.      Pharynx: Posterior oropharyngeal erythema present. No pharyngeal swelling or oropharyngeal exudate.   Neck:      Thyroid: No thyroid mass, thyromegaly or thyroid tenderness.   Cardiovascular:      Rate and Rhythm: Normal rate and regular rhythm.      Heart sounds: No murmur heard.     No friction rub. No gallop.   Pulmonary:      Effort: Pulmonary effort is normal.      Breath sounds: Wheezing and rhonchi present.      Comments: Mild scattered wheezing and rhonchi noted.  Lymphadenopathy:      Cervical: No cervical adenopathy.   Skin:     General: Skin is warm and dry.   Neurological:      Mental Status: He is alert and oriented to person, place, and time.      Cranial Nerves: No cranial nerve deficit.   Psychiatric:         Mood and Affect: Mood and affect normal.         Behavior: Behavior normal.         Thought Content: Thought content normal. Thought content does not include homicidal or suicidal ideation.         Judgment: Judgment normal.          Result Review :    The following data was reviewed by: AG Plunkett on 08/16/2024:  CMP   CMP          6/5/2024    14:28 6/5/2024    14:36 7/10/2024    14:38 7/26/2024    10:06   CMP   Glucose 93  93  99  83    BUN 20  20  16  26    Creatinine 0.98  0.96  1.09  1.40    EGFR 86.6  88.8  76.3  56.5    Sodium 138  139  137  138    Potassium 3.9  3.9  3.6  3.6    Chloride 104  105  101  104    Calcium 9.2  9.2  9.2  9.4    Total Protein 7.0   7.4  7.5    Albumin 4.4  4.3  4.4  4.6    Globulin 2.6   3.0  2.9    Total Bilirubin 0.5   0.7  0.5    Alkaline Phosphatase 75   75  71    AST (SGOT) 34   44  66    ALT (SGPT) 27   41  42    Albumin/Globulin Ratio 1.7   1.5   1.6    BUN/Creatinine Ratio 20.4  20.8  14.7  18.6    Anion Gap 9.0  8.8  8.8  12.7        Assessment & Plan  Acute bronchitis, unspecified organism  I will start him on Z-Jordan and prednisone, advised to take with food.  I will give him Bromfed DM cough syrup to take as needed but advised he can buy over-the-counter Coricidin cough medication.  Advised to call or follow-up if no improvement or worsening of symptoms.  Essential hypertension  Hypertension is borderline  Continue current treatment regimen.  Blood pressure will be reassessedat the next regular appointment.     New Medications Ordered This Visit   Medications    azithromycin (ZITHROMAX) 250 MG tablet     Sig: Take 2 tablets by mouth Daily for 1 day, THEN 1 tablet Daily for 4 days. Indications: bronchitis     Dispense:  6 tablet     Refill:  0    predniSONE (DELTASONE) 20 MG tablet     Sig: Take 1 tablet by mouth 2 (Two) Times a Day for 5 days. Indications: bronchitis     Dispense:  10 tablet     Refill:  0    brompheniramine-pseudoephedrine-DM 30-2-10 MG/5ML syrup     Sig: Take 5 mL by mouth 4 (Four) Times a Day As Needed for Congestion or Cough.     Dispense:  240 mL     Refill:  0             Diagnosis Plan   1. Acute bronchitis, unspecified organism  azithromycin (ZITHROMAX) 250 MG tablet    predniSONE (DELTASONE) 20 MG tablet    brompheniramine-pseudoephedrine-DM 30-2-10 MG/5ML syrup      2. Essential hypertension              FOLLOW UP  Return for Keep Scheduled Follow-up.  Patient was given instructions and counseling regarding his condition or for health maintenance advice. Please see specific information pulled into the AVS if appropriate.       CURRENT & DISCONTINUED MEDICATIONS  Current Outpatient Medications   Medication Instructions    allopurinol (ZYLOPRIM) 100 mg, Oral, 2 Times Daily    ALPRAZolam (XANAX) 0.5 mg, Oral, 2 Times Daily PRN    amLODIPine-benazepril (LOTREL) 5-40 MG per capsule 1 capsule, Oral, Daily    aspirin 81 mg, Oral,  Daily    azithromycin (ZITHROMAX) 250 MG tablet Take 2 tablets by mouth Daily for 1 day, THEN 1 tablet Daily for 4 days. Indications: bronchitis    bisoprolol (ZEBETA) 10 mg, Oral, Daily    brompheniramine-pseudoephedrine-DM 30-2-10 MG/5ML syrup 5 mL, Oral, 4 Times Daily PRN    furosemide (LASIX) 20 MG tablet 1 tablet, Oral, Daily    hydrALAZINE (APRESOLINE) 50 mg, Oral, 2 Times Daily    predniSONE (DELTASONE) 20 mg, Oral, 2 Times Daily    ruxolitinib (JAKAFI) 20 mg, Oral, 2 Times Daily       There are no discontinued medications.     Parts of this note are electronic transcriptions/translations of spoken language to printed text using the Dragon Dictation system.    AG Plunkett  08/16/24  10:15 EDT

## 2024-08-16 ENCOUNTER — OFFICE VISIT (OUTPATIENT)
Dept: FAMILY MEDICINE CLINIC | Facility: CLINIC | Age: 64
End: 2024-08-16
Payer: COMMERCIAL

## 2024-08-16 VITALS
DIASTOLIC BLOOD PRESSURE: 90 MMHG | BODY MASS INDEX: 33.15 KG/M2 | HEIGHT: 74 IN | TEMPERATURE: 97.7 F | OXYGEN SATURATION: 98 % | HEART RATE: 57 BPM | SYSTOLIC BLOOD PRESSURE: 138 MMHG

## 2024-08-16 DIAGNOSIS — I10 ESSENTIAL HYPERTENSION: ICD-10-CM

## 2024-08-16 DIAGNOSIS — J20.9 ACUTE BRONCHITIS, UNSPECIFIED ORGANISM: Primary | ICD-10-CM

## 2024-08-16 PROCEDURE — 99213 OFFICE O/P EST LOW 20 MIN: CPT | Performed by: NURSE PRACTITIONER

## 2024-08-16 RX ORDER — BROMPHENIRAMINE MALEATE, PSEUDOEPHEDRINE HYDROCHLORIDE, AND DEXTROMETHORPHAN HYDROBROMIDE 2; 30; 10 MG/5ML; MG/5ML; MG/5ML
5 SYRUP ORAL 4 TIMES DAILY PRN
Qty: 240 ML | Refills: 0 | Status: SHIPPED | OUTPATIENT
Start: 2024-08-16

## 2024-08-16 RX ORDER — AZITHROMYCIN 250 MG/1
TABLET, FILM COATED ORAL
Qty: 6 TABLET | Refills: 0 | Status: SHIPPED | OUTPATIENT
Start: 2024-08-16 | End: 2024-08-21

## 2024-08-16 RX ORDER — PREDNISONE 20 MG/1
20 TABLET ORAL 2 TIMES DAILY
Qty: 10 TABLET | Refills: 0 | Status: SHIPPED | OUTPATIENT
Start: 2024-08-16 | End: 2024-08-21

## 2024-08-16 NOTE — ASSESSMENT & PLAN NOTE
Hypertension is borderline  Continue current treatment regimen.  Blood pressure will be reassessedat the next regular appointment.

## 2024-08-20 ENCOUNTER — TELEPHONE (OUTPATIENT)
Dept: GASTROENTEROLOGY | Facility: CLINIC | Age: 64
End: 2024-08-20
Payer: COMMERCIAL

## 2024-08-23 ENCOUNTER — CLINICAL SUPPORT (OUTPATIENT)
Dept: GASTROENTEROLOGY | Facility: HOSPITAL | Age: 64
End: 2024-08-23
Payer: COMMERCIAL

## 2024-08-23 DIAGNOSIS — B18.2 CHRONIC HEPATITIS C WITH HEPATIC COMA: Primary | ICD-10-CM

## 2024-08-23 NOTE — PROGRESS NOTES
Phani Landis 1960 completed a medication education visit on 8/23/24 for Epclusa prescribed by AG Ramey. Phani Landis verbalized understanding for all of the information outlined in the signed agreement. The patient has been provided with a copy of the agreement which has also been scanned into their chart as well.

## 2024-08-28 ENCOUNTER — OFFICE VISIT (OUTPATIENT)
Dept: ONCOLOGY | Facility: CLINIC | Age: 64
End: 2024-08-28
Payer: COMMERCIAL

## 2024-08-28 ENCOUNTER — LAB (OUTPATIENT)
Dept: OTHER | Facility: HOSPITAL | Age: 64
End: 2024-08-28
Payer: COMMERCIAL

## 2024-08-28 VITALS
RESPIRATION RATE: 16 BRPM | HEIGHT: 74 IN | TEMPERATURE: 98.5 F | BODY MASS INDEX: 34.52 KG/M2 | HEART RATE: 57 BPM | DIASTOLIC BLOOD PRESSURE: 87 MMHG | OXYGEN SATURATION: 97 % | SYSTOLIC BLOOD PRESSURE: 136 MMHG | WEIGHT: 269 LBS

## 2024-08-28 DIAGNOSIS — D45 POLYCYTHEMIA VERA: ICD-10-CM

## 2024-08-28 DIAGNOSIS — D45 POLYCYTHEMIA VERA: Primary | ICD-10-CM

## 2024-08-28 DIAGNOSIS — D75.839 THROMBOCYTOSIS: ICD-10-CM

## 2024-08-28 LAB
ALBUMIN SERPL-MCNC: 4.1 G/DL (ref 3.5–5.2)
ALBUMIN/GLOB SERPL: 1.4 G/DL
ALP SERPL-CCNC: 73 U/L (ref 39–117)
ALT SERPL W P-5'-P-CCNC: 41 U/L (ref 1–41)
ANION GAP SERPL CALCULATED.3IONS-SCNC: 8.2 MMOL/L (ref 5–15)
AST SERPL-CCNC: 35 U/L (ref 1–40)
BASOPHILS # BLD AUTO: 0.12 10*3/MM3 (ref 0–0.2)
BASOPHILS NFR BLD AUTO: 1.4 % (ref 0–1.5)
BILIRUB SERPL-MCNC: 0.5 MG/DL (ref 0–1.2)
BUN SERPL-MCNC: 18 MG/DL (ref 8–23)
BUN/CREAT SERPL: 17.3 (ref 7–25)
CALCIUM SPEC-SCNC: 9.3 MG/DL (ref 8.6–10.5)
CHLORIDE SERPL-SCNC: 105 MMOL/L (ref 98–107)
CO2 SERPL-SCNC: 27.8 MMOL/L (ref 22–29)
CREAT SERPL-MCNC: 1.04 MG/DL (ref 0.76–1.27)
DEPRECATED RDW RBC AUTO: 56 FL (ref 37–54)
EGFRCR SERPLBLD CKD-EPI 2021: 80.7 ML/MIN/1.73
EOSINOPHIL # BLD AUTO: 0.09 10*3/MM3 (ref 0–0.4)
EOSINOPHIL NFR BLD AUTO: 1 % (ref 0.3–6.2)
ERYTHROCYTE [DISTWIDTH] IN BLOOD BY AUTOMATED COUNT: 16.2 % (ref 12.3–15.4)
GLOBULIN UR ELPH-MCNC: 2.9 GM/DL
GLUCOSE SERPL-MCNC: 93 MG/DL (ref 65–99)
HCT VFR BLD AUTO: 36.2 % (ref 37.5–51)
HGB BLD-MCNC: 11.5 G/DL (ref 13–17.7)
IMM GRANULOCYTES # BLD AUTO: 0.18 10*3/MM3 (ref 0–0.05)
IMM GRANULOCYTES NFR BLD AUTO: 2.1 % (ref 0–0.5)
LYMPHOCYTES # BLD AUTO: 2.76 10*3/MM3 (ref 0.7–3.1)
LYMPHOCYTES NFR BLD AUTO: 31.4 % (ref 19.6–45.3)
MCH RBC QN AUTO: 30.3 PG (ref 26.6–33)
MCHC RBC AUTO-ENTMCNC: 31.8 G/DL (ref 31.5–35.7)
MCV RBC AUTO: 95.5 FL (ref 79–97)
MONOCYTES # BLD AUTO: 0.79 10*3/MM3 (ref 0.1–0.9)
MONOCYTES NFR BLD AUTO: 9 % (ref 5–12)
NEUTROPHILS NFR BLD AUTO: 4.84 10*3/MM3 (ref 1.7–7)
NEUTROPHILS NFR BLD AUTO: 55.1 % (ref 42.7–76)
NRBC BLD AUTO-RTO: 0.2 /100 WBC (ref 0–0.2)
PLATELET # BLD AUTO: 370 10*3/MM3 (ref 140–450)
PMV BLD AUTO: 10.8 FL (ref 6–12)
POTASSIUM SERPL-SCNC: 4 MMOL/L (ref 3.5–5.2)
PROT SERPL-MCNC: 7 G/DL (ref 6–8.5)
RBC # BLD AUTO: 3.79 10*6/MM3 (ref 4.14–5.8)
SODIUM SERPL-SCNC: 141 MMOL/L (ref 136–145)
WBC NRBC COR # BLD AUTO: 8.78 10*3/MM3 (ref 3.4–10.8)

## 2024-08-28 PROCEDURE — 80053 COMPREHEN METABOLIC PANEL: CPT | Performed by: INTERNAL MEDICINE

## 2024-08-28 PROCEDURE — 85025 COMPLETE CBC W/AUTO DIFF WBC: CPT | Performed by: INTERNAL MEDICINE

## 2024-08-28 PROCEDURE — 99214 OFFICE O/P EST MOD 30 MIN: CPT | Performed by: INTERNAL MEDICINE

## 2024-08-28 PROCEDURE — 36415 COLL VENOUS BLD VENIPUNCTURE: CPT

## 2024-08-28 NOTE — PROGRESS NOTES
Subjective     REASON FOR FOLLOW UP:  1.  Polycythemia vera, polycythemia and thrombocytosis and leukocytosis, EPO level 1.3, Tino 2+, peripheral blood for BCR/C ABL negative    HISTORY OF PRESENT ILLNESS:  The patient is a 63 y.o. year old male who is here for an opinion about the above issue.    History of Present Illness   Phani Landis is a 63 y.o. with the above-mentioned history returns today, 6/5/2024 for lab review continuing on Jakafi 20 mg twice daily.  He was seen by nephrology a few weeks ago.  At that appointment, they discontinued his chlorthalidone, potassium, and sodium.  Since then, he has gained 30 pounds.  He does note some shortness of breath as well as swelling in his lower extremities.  He states that he had to stop and rest while cutting the grass yesterday, which is not usual for him.  He also had difficulty sleeping last night.  He has not contacted the nephrology office yet.      He is otherwise tolerating his Jakafi well.      July 10, 2024: Patient is currently on Jakafi.  He is tolerating it very well.  He does not have any side effects.  His liver function tests have improved.  Hemoglobin today 11.5 with white count of 7.31 and a platelet of 423.  Patient now has hepatitis C.  He is followed by hepatologist/gastroenterologist at [facility] and has an appointment for that.  I discussed with his hepatologist at [facility] and they are planning to start treatment when they see him.  I did tell them that patient is on Jakafi and they are going to check with pharmacy and make sure there is no interaction with the drugs that they will prescribe.    Otherwise patient is doing well    August 28, 2024: Patient is doing well with this Jakafi.  He is tolerating it well.  He went to his hepatitis Dr. At [facility] and has been placed on Epclusa for his hepatitis.  He is supposed to take it for 3 months.  He just started it yesterday.  He has not lost weight.  His spleen is 15 cm which is  "strong from 17 cm since he has been on Jakafi.          Hematological oncologic history:  Patient is a 59-year-old gentleman who has been referred by his primary care Castillo Velasco for evaluation of thrombocytosis and polycythemia.  He has lost more than 50 pounds of weight.  In the last 6 months.  Looking back his blood counts have been high starting in 2016.  Initially his platelets were high around 670 and gradually increased in the 900 range.  More recently his hemoglobin has been increasing and his white count has been increasing.  He has not had a DVT or pulmonary embolism.  He has had no issues with itching with hot showers.  Has not had a stroke.  He had pneumonia last year but none recently.  He does have some shortness of breath but no chest pain.  He is exposed to secondhand smoking.    He does not have any nausea vomiting or abdominal pain at present.  He did have gout on several locations and was placed on meloxicam.  His renal function is mildly elevated.  He denies any fever or night sweats.    Patient does have a cough productive of yellow sputum but has no fevers.    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, EPO 1.3 low  CT chest negative, CT abdomen pelvis shows splenomegaly    Past Medical History:   Diagnosis Date    Anxiety     Arthritis     Cancer 06/2020    Polycythemia vera    Hypertension     Infectious viral hepatitis 05/2024    C        Past Surgical History:   Procedure Laterality Date    CHEST SURGERY      OTHER SURGICAL HISTORY  1985    \"Exploratory\"        Current Outpatient Medications on File Prior to Visit   Medication Sig Dispense Refill    allopurinol (ZYLOPRIM) 100 MG tablet Take 1 tablet by mouth 2 (Two) Times a Day. 180 tablet 0    ALPRAZolam (Xanax) 0.5 MG tablet Take 1 tablet by mouth 2 (Two) Times a Day As Needed for Anxiety or Sleep. Indications: Feeling Anxious 30 tablet 0    amLODIPine-benazepril (LOTREL) 5-40 MG per " "capsule TAKE 1 CAPSULE BY MOUTH DAILY 90 capsule 0    aspirin 81 MG chewable tablet Chew 1 tablet Daily.      bisoprolol (ZEBeta) 10 MG tablet TAKE 1 TABLET BY MOUTH DAILY 90 tablet 0    Epclusa 400-100 MG tablet Take 1 tablet by mouth Daily. 84 tablet 0    furosemide (LASIX) 20 MG tablet Take 1 tablet by mouth Daily.      hydrALAZINE (APRESOLINE) 50 MG tablet TAKE 1 TABLET BY MOUTH 2 TIMES A  tablet 0    ruxolitinib (JAKAFI) 20 MG tablet Take 1 tablet by mouth 2 (Two) Times a Day. 60 tablet 5    [DISCONTINUED] brompheniramine-pseudoephedrine-DM 30-2-10 MG/5ML syrup Take 5 mL by mouth 4 (Four) Times a Day As Needed for Congestion or Cough. 240 mL 0     No current facility-administered medications on file prior to visit.        ALLERGIES:    Allergies   Allergen Reactions    Zoloft [Sertraline] Hallucinations    Cephalexin Itching        Social History     Socioeconomic History    Marital status:    Tobacco Use    Smoking status: Never    Smokeless tobacco: Never   Vaping Use    Vaping status: Never Used   Substance and Sexual Activity    Alcohol use: Yes     Comment: Occasional drinker    Drug use: Never    Sexual activity: Not Currently     Partners: Female        Family History   Problem Relation Age of Onset    Hypertension Father     Hypertension Other     Heart disease Other     Cervical cancer Other       I have reviewed the patient's medical history in detail and updated the computerized patient record.    Review of Systems  ROS as per HPI      Objective     Vitals:    08/28/24 1316   BP: 136/87   Pulse: 57   Resp: 16   Temp: 98.5 °F (36.9 °C)   TempSrc: Oral   SpO2: 97%   Weight: 122 kg (269 lb)   Height: 188 cm (74.02\")   PainSc: 0-No pain                 8/28/2024     1:16 PM   Current Status   ECOG score 0     Physical Exam  Vitals reviewed.   Constitutional:       General: He is not in acute distress.     Appearance: Normal appearance. He is well-developed.   HENT:      Head: Normocephalic " and atraumatic.   Eyes:      Pupils: Pupils are equal, round, and reactive to light.   Cardiovascular:      Rate and Rhythm: Normal rate and regular rhythm.      Heart sounds: Normal heart sounds. No murmur heard.  Pulmonary:      Effort: Pulmonary effort is normal. No respiratory distress.      Breath sounds: Normal breath sounds. No wheezing, rhonchi or rales.   Abdominal:      General: Bowel sounds are normal. There is no distension.      Palpations: Abdomen is soft.   Musculoskeletal:         General: Swelling (1-2+ bilateral LE edema) present. Normal range of motion.      Cervical back: Normal range of motion.   Skin:     General: Skin is warm and dry.      Findings: No rash.      Comments: Scattered ecchymosis on the upper extremities bilaterally.  Bandaged area on his shin of the right lower extremity.  Dressing clean dry and intact.   Neurological:      Mental Status: He is alert and oriented to person, place, and time.         RECENT LABS:  Results from last 7 days   Lab Units 08/28/24  1305   WBC 10*3/mm3 8.78   NEUTROS ABS 10*3/mm3 4.84   HEMOGLOBIN g/dL 11.5*   HEMATOCRIT % 36.2*   PLATELETS 10*3/mm3 370                 Results from last 7 days   Lab Units 08/28/24  1305   SODIUM mmol/L 141   POTASSIUM mmol/L 4.0   CHLORIDE mmol/L 105   CO2 mmol/L 27.8   BUN mg/dL 18   CREATININE mg/dL 1.04   CALCIUM mg/dL 9.3   ALBUMIN g/dL 4.1   BILIRUBIN mg/dL 0.5   ALK PHOS U/L 73   ALT (SGPT) U/L 41   AST (SGOT) U/L 35   GLUCOSE mg/dL 93                         Assessment & Plan     1.  Polycythemia with leukocytosis and thrombocytosis.  He does not have any itching with hot showers.  He does not have any headaches or DVT.  He does have blood counts worsening gradually from 2016.  7/8/2020  His platelets 974K.  Hemoglobin is 17.6 with hematocrit of 52.8 and white count of 11.56.  He denies fever night sweats but has significant weight loss greater than 50 pounds in 6 months.  He does have a cough which is productive  of yellow sputum and some shortness of breath.  He has history of secondhand smoking exposure.  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  CT chest negative  Hydroxyurea 500 mg twice daily was initiated on July 16, 2020.  .  7/22/2020 Hydrea increased to 1500 mg (1000 mg in am and 500 mg in pm) Monday-Friday and 2000 mg (1000 mg in am and 1000 mg in PM) on Saturdays and Sundays  7/29/2020 patient's platelet count today is 821.  White count 7.78, hemoglobin 16.4.   Increased Hydrea at 1500 mg Monday through Friday, and 2000 mg on Saturdays and Sundays.   In future if hematocrit greater than 48 then he will receive phlebotomy.  Last phlebotomy 7/15/2020.   2/23/2022 patient continues on Hydrea 1500 mg on Monday, Wednesday, and Friday, 1500 mg all other days.  He is tolerating Hydrea well aside from some fatigue on the days he takes 1500 mg.  He also continues to receive phlebotomy for hematocrit greater than 48, hematocrit today 44.7 so patient will not receive phlebotomy.  Platelets today have increased to 575, so we will increase Hydrea to 1500 mg four times a week, 1000 mg all other days.  Because we are increasing dose of Hydrea, patient will return in 1 month for CBC with RN review to monitor counts.  Discussed with the patient who is agreeable.  6/15/2022: Patient continues on Hydrea 1500 mg 4 days a week and 1000 mg all other days.  Platelets increased to 573,000.  Discussed with Dr. Fleming today plan to increase Hydrea to 1500 mg 5 days a week and 1000 mg 2 days a week.  He will return in 1 month for CBC with RN review to monitor counts.  Patient agreeable   September 7, 2022: Tolerating Hydrea very well.  His platelets are down to 497.  He continues with aspirin.  Given hematocrit of 44 we will hold off phlebotomy  11/30/2022 continuing on Hydrea 1500 mg 5 days a week and 1000 mg 2 days a week tolerating  well.  May 24, 2023: Patient's hematocrit is 42.  But his platelet is elevated to 580.  We will increase the dose of his hydroxyurea to 1500 mg 6 days a week and 1000 mg 1 day a week  8/16/2023: Patient's hematocrit is normal at 41.9.  Platelets have normalized to 430,000.  Hemoglobin 14.9 and WBC 6.29.  Hydrea dosing will remain the same at 1500 mg 6 days a week and 100 mg once weekly.  He continues to tolerate this treatment well. Mild elevation was noted with his liver enzymes.  Historically he has had mild elevations when he was taking pain medications.  Today his AST is 42 and ALT is 42.  Total bilirubin is normal at 0.7 and alkaline phosphatase is normal at 72.  Does note that he takes occasional Tylenol for his arthritis.  He denies drinking any alcohol.  We will have him return in 1 month to monitor his labs.  November 15, 2023: Patient has new ulcerations in the right lower extremity for the last 2 3 months which are not healing.  On discussion with wound care they felt this is more likely hydroxyurea related rather than venous disease.  He has never been a smoker and so we discussed about switching to Jakafi.  He has had splenomegaly in the past but we will need to obtain ultrasound of the abdomen stat in order to rule out worsening splenomegaly.  11/29/2023: Patient is currently 3 days into Jakafi 10 mg p.o. twice daily.  He is currently tolerating his medication well and denies any side effects.  His lower extremity ulcerations secondary to Hydrea continue to improve and patient is being followed closely by the wound care center at [facility].  Lower extremities are currently dressed today.  Platelet count today is more elevated at 669,000.  Mild leukocytosis with a white blood cell count of 13.60.  Patient did just complete a course of steroids but is most likely the cause.  ALT mildly elevated today at 47.  AST and total bilirubin are normal.  We will have patient repeat labs in 1 week  for close monitoring we will follow back up with Dr. Fleming in 2 weeks.  January 17 2024: Patient was seen 2 weeks ago at [facility] by a nurse practitioner of Dr. Garcia the oncologist.  However his platelets had gone up to 275 and his white count was elevated to 11.41.  As a result they had increased his dose on Jakafi to 20 mg in the morning and 10 mg at bedtime.  However today after the increased dose his counts have improved though platelet count is still 578 and white count is normalized.  He prefers to follow-up at [place].  Patient has never had a bone marrow and we discussed about consideration of bone marrow in order to make sure there is no progression to myelofibrosis.  His spleen is enlarged.  1/31/2024 platelet increased to 940,000.  Jakafi was increased to 20 mg twice daily.  2/14/2024 platelet count 729,000, hemoglobin 13.1, WBC 6.68.  Patient will currently continue on Jakafi 20 mg twice daily.  April 17, 2024: Blood counts are more stable.  His hemoglobin is down to 11.79 but his white count and platelet count are good..  Will repeat check CT scan at some point in January 2024's results show evidence of any decrease in splenomegaly.  He is tolerating Jakafi 20 mg p.o. twice daily  5/15/2024 platelet count 487, hemoglobin 12.4, white count 7.05.  Continues on Jakafi 20 mg twice daily, tolerating well.  Seen in follow-up 6/5/2024 continuing on Jakafi 20 mg twice daily.  WBC 7.73, hemoglobin 11.1, platelet count 455,000.  Labs reviewed with Dr. Fleming as well as patient's new symptoms.  We did review that Jakafi does not cause issues with fluid retention and is likely from the recent discontinuation of his diuretics.  He will continue on Jakafi.  We will contact nephrology to let them know about the patient's symptoms.    July 10/2024: Patient is tolerating Jakafi very well.  He has new diagnosis of hepatitis C for which he is going to be treated by hepatologist at [facility].  Discussion  done with the physician at [facility] and they plan to make sure that the drug to use to treat hepatitis C does not interact with Jakafi.  August 28, 2024: Patient has been started on Epclusa for his hepatitis by hepatologist at [facility].  He is tolerating it well.  His hemoglobin today is 400 which is good.  His spleen has decreased in size from 17 cm to 15 cm.        2.  Weight loss greater than 50 pounds at time of initial evaluation.  CT chest abdomen pelvis negative for malignancy  Patient admitting that his weight loss was actually been more intentional   Weight now stable    3. Hypokalemia, mild, chronic.  8/2/2021: Potassium 3.3. We discussed utilizing electrolyte drinks when he is working outside/sweating to help replenish what he is losing.  He does continue potassium chloride once daily as well.  9/28/2021: Potassium stable at 3.3. Continue Klor-con 20 mEq daily.  2/23/2022 potassium today 4.0, patient will continue on potassium 20 mEq daily.  6/15/2022: Currently taking 10 mEq daily, potassium 3.8.  11/30/2022 potassium 3.2, he will increase his potassium to 20 M EQ daily.  8/16/2023: Potassium 3.5.  Patient remains on potassium 20 mill equivalents daily.  11/29/2023: Potassium is normal at 3.6.  January 17, 2024: Patient's potassium is 3.2.  He is on chlorthalidone.  Will need to increase potassium intake  2/14/2024 potassium 4.3.  Monitor  5/15/2024 potassium 4.5.  6/5/2024 oral potassium was discontinued by nephrology.  Potassium level today 3.9    5.  History of knee surgery.  Patient is to go for knee surgery in 3 weeks  Discussed with Dr. Chad Oliveros and he will start him on 2.5 mg p.o. twice daily of Eliquis 24 hours after surgery.      6.  Skin lesions on the lower extremity with delayed healing, will consult with [facility]  11/30/2022 patient states that he did not see dermatologist as a skin lesions began to heal.  He continues to use vitamin D on this area.  March 1,  2023: The skin ulceration on the left lower extremity is healing up but not completely healed up  Unsure if this is just secondary to chronic venous stasis as opposed to hydroxyurea.  However usually the hydroxyurea ulcerations on the medial malleolus and this is more superior  8/16/2023: No skin lesions noted today.  11/29/2023: Lower extremity ulcerations are healing well and he continues to be followed by [facility] wound care center.  2/14/2024 continues to follow closely with wound care every 2 weeks.  Lower extremity lesions are healing well.    7.  Elevated liver function tests with ALT of 69 and AST of 90 likely secondary to Tylenol which she has been taking for a day for the sake of his knee pain  2/14/2024 AST 67, ALT 54, alkaline phosphatase 61, total bilirubin 0.5.  Monitor.  5/15/2024 AST 44, ALT 41, alkaline phosphatase 64, total bilirubin 0.4.  6/5/2024 LFTs have normalized with an AST of 34, ALT of 27, alkaline phosphatase 75, total bilirubin 0.5.    8.  Nausea related to Jakafi, patient takes with food and then he does not have nausea  Nausea has resolved.    9.  Hepatitis C new diagnosis to be followed by hepatologist at [place] who is going to start her treatment soon       Plan:   Continue Jakafi 20 mg twice daily.  Patient has a new diagnosis of hepatitis C and is to be followed by hepatologist/GI at [place]  He has been started on Epclusa and the plan is to continue 3 months of treatment for his hepatitis C with the intent of cure.  Today I discussed with the physician at [place] and they know patient is on Jakafi and they will use drugs that do not interact with Jakafi  Consider ordering CT scan in the fall to reassess splenomegaly.  At a later date  Patient has follow-up with Dr. Pablo November 6, 2024.      Patient is on a high risk medication requiring close monitoring for toxicity.      Eliana Fleming MD

## 2024-08-29 ENCOUNTER — SPECIALTY PHARMACY (OUTPATIENT)
Dept: PHARMACY | Facility: HOSPITAL | Age: 64
End: 2024-08-29
Payer: COMMERCIAL

## 2024-08-29 NOTE — PROGRESS NOTES
Specialty Pharmacy Note: Jakafi (ruxolitinib)    Phani Landis is a 63 y.o. male with polycythemia vera with JAK2 mutation was seen 8/27/24 by Dr. Fleming. Per provider dictation, no changes to oral oncology regimen Jakafi (ruxolitinib) 20 mg po bid.  Labs Review: The CMP and CBC from 8/27/24 have been reviewed. No dose adjustments are needed for the oral specialty medication(s) based on the labs.    Specialty pharmacy will continue to follow patient.    Dawna Yun Rph, CARLOS  8/29/2024  09:33 EDT

## 2024-09-16 ENCOUNTER — TELEPHONE (OUTPATIENT)
Dept: GASTROENTEROLOGY | Facility: CLINIC | Age: 64
End: 2024-09-16
Payer: COMMERCIAL

## 2024-09-20 ENCOUNTER — OFFICE VISIT (OUTPATIENT)
Dept: GASTROENTEROLOGY | Facility: HOSPITAL | Age: 64
End: 2024-09-20
Payer: COMMERCIAL

## 2024-09-20 VITALS
WEIGHT: 275.8 LBS | BODY MASS INDEX: 35.39 KG/M2 | OXYGEN SATURATION: 97 % | SYSTOLIC BLOOD PRESSURE: 148 MMHG | DIASTOLIC BLOOD PRESSURE: 90 MMHG | HEIGHT: 74 IN | HEART RATE: 59 BPM

## 2024-09-20 DIAGNOSIS — B18.2 CHRONIC HEPATITIS C WITHOUT HEPATIC COMA: Primary | ICD-10-CM

## 2024-09-20 LAB
ALBUMIN SERPL-MCNC: 4.6 G/DL (ref 3.5–5.2)
ALBUMIN/GLOB SERPL: 1.6 G/DL
ALP SERPL-CCNC: 76 U/L (ref 39–117)
ALT SERPL W P-5'-P-CCNC: 23 U/L (ref 1–41)
ANION GAP SERPL CALCULATED.3IONS-SCNC: 7.8 MMOL/L (ref 5–15)
AST SERPL-CCNC: 42 U/L (ref 1–40)
BASOPHILS # BLD AUTO: 0.09 10*3/MM3 (ref 0–0.2)
BASOPHILS NFR BLD AUTO: 1.1 % (ref 0–1.5)
BILIRUB SERPL-MCNC: 0.5 MG/DL (ref 0–1.2)
BUN SERPL-MCNC: 24 MG/DL (ref 8–23)
BUN/CREAT SERPL: 17.9 (ref 7–25)
CALCIUM SPEC-SCNC: 9.6 MG/DL (ref 8.6–10.5)
CHLORIDE SERPL-SCNC: 104 MMOL/L (ref 98–107)
CO2 SERPL-SCNC: 27.2 MMOL/L (ref 22–29)
CREAT SERPL-MCNC: 1.34 MG/DL (ref 0.76–1.27)
DEPRECATED RDW RBC AUTO: 55.5 FL (ref 37–54)
EGFRCR SERPLBLD CKD-EPI 2021: 59.2 ML/MIN/1.73
EOSINOPHIL # BLD AUTO: 0.09 10*3/MM3 (ref 0–0.4)
EOSINOPHIL NFR BLD AUTO: 1.1 % (ref 0.3–6.2)
ERYTHROCYTE [DISTWIDTH] IN BLOOD BY AUTOMATED COUNT: 16.4 % (ref 12.3–15.4)
GLOBULIN UR ELPH-MCNC: 2.8 GM/DL
GLUCOSE SERPL-MCNC: 93 MG/DL (ref 65–99)
HCT VFR BLD AUTO: 38.7 % (ref 37.5–51)
HGB BLD-MCNC: 12.6 G/DL (ref 13–17.7)
IMM GRANULOCYTES # BLD AUTO: 0.09 10*3/MM3 (ref 0–0.05)
IMM GRANULOCYTES NFR BLD AUTO: 1.1 % (ref 0–0.5)
LYMPHOCYTES # BLD AUTO: 2.3 10*3/MM3 (ref 0.7–3.1)
LYMPHOCYTES NFR BLD AUTO: 28.3 % (ref 19.6–45.3)
MCH RBC QN AUTO: 30.3 PG (ref 26.6–33)
MCHC RBC AUTO-ENTMCNC: 32.6 G/DL (ref 31.5–35.7)
MCV RBC AUTO: 93 FL (ref 79–97)
MONOCYTES # BLD AUTO: 0.88 10*3/MM3 (ref 0.1–0.9)
MONOCYTES NFR BLD AUTO: 10.8 % (ref 5–12)
NEUTROPHILS NFR BLD AUTO: 4.69 10*3/MM3 (ref 1.7–7)
NEUTROPHILS NFR BLD AUTO: 57.6 % (ref 42.7–76)
NRBC BLD AUTO-RTO: 0 /100 WBC (ref 0–0.2)
PLATELET # BLD AUTO: 516 10*3/MM3 (ref 140–450)
PMV BLD AUTO: 11.1 FL (ref 6–12)
POTASSIUM SERPL-SCNC: 4.3 MMOL/L (ref 3.5–5.2)
PROT SERPL-MCNC: 7.4 G/DL (ref 6–8.5)
RBC # BLD AUTO: 4.16 10*6/MM3 (ref 4.14–5.8)
SODIUM SERPL-SCNC: 139 MMOL/L (ref 136–145)
WBC NRBC COR # BLD AUTO: 8.14 10*3/MM3 (ref 3.4–10.8)

## 2024-09-20 PROCEDURE — 85025 COMPLETE CBC W/AUTO DIFF WBC: CPT | Performed by: NURSE PRACTITIONER

## 2024-09-20 PROCEDURE — G0463 HOSPITAL OUTPT CLINIC VISIT: HCPCS | Performed by: NURSE PRACTITIONER

## 2024-09-20 PROCEDURE — 80053 COMPREHEN METABOLIC PANEL: CPT | Performed by: NURSE PRACTITIONER

## 2024-09-20 PROCEDURE — 87522 HEPATITIS C REVRS TRNSCRPJ: CPT | Performed by: NURSE PRACTITIONER

## 2024-09-23 LAB
HCV RNA SERPL NAA+PROBE-ACNC: <15 IU/ML
REF LAB TEST REF RANGE: NORMAL

## 2024-10-07 DIAGNOSIS — D75.839 THROMBOCYTOSIS: ICD-10-CM

## 2024-10-07 DIAGNOSIS — D45 POLYCYTHEMIA VERA: ICD-10-CM

## 2024-10-07 DIAGNOSIS — E79.0 ELEVATED URIC ACID IN BLOOD: ICD-10-CM

## 2024-10-07 RX ORDER — ALLOPURINOL 100 MG/1
100 TABLET ORAL 2 TIMES DAILY
Qty: 180 TABLET | Refills: 0 | Status: SHIPPED | OUTPATIENT
Start: 2024-10-07

## 2024-10-25 DIAGNOSIS — I10 ESSENTIAL HYPERTENSION: ICD-10-CM

## 2024-10-25 RX ORDER — AMLODIPINE AND BENAZEPRIL HYDROCHLORIDE 5; 40 MG/1; MG/1
1 CAPSULE ORAL DAILY
Qty: 30 CAPSULE | Refills: 0 | Status: SHIPPED | OUTPATIENT
Start: 2024-10-25

## 2024-10-25 RX ORDER — BISOPROLOL FUMARATE 10 MG/1
10 TABLET, FILM COATED ORAL DAILY
Qty: 30 TABLET | Refills: 0 | Status: SHIPPED | OUTPATIENT
Start: 2024-10-25

## 2024-10-31 ENCOUNTER — SPECIALTY PHARMACY (OUTPATIENT)
Dept: PHARMACY | Facility: HOSPITAL | Age: 64
End: 2024-10-31
Payer: COMMERCIAL

## 2024-10-31 NOTE — PROGRESS NOTES
Specialty Pharmacy Patient Management Program  Clinical Outreach     I called Phani Landis on 10/31/2024 14:12 EDT who is enrolled in the Oncology Patient Management program offered by [facility].  Patient did not answer today. I left a voice message with my call back number, [phone].    Elder Holder, PharmD, [facility]  Clinical Specialty Pharmacist, Oncology  10/31/2024  14:12 EDT

## 2024-11-01 ENCOUNTER — SPECIALTY PHARMACY (OUTPATIENT)
Dept: PHARMACY | Facility: HOSPITAL | Age: 64
End: 2024-11-01
Payer: COMMERCIAL

## 2024-11-01 NOTE — PROGRESS NOTES
Specialty Pharmacy Patient Management Program  Oncology Reassessment     Phani Landis was referred by an their provider to the Oncology Patient Management program offered by [facility] for polycythemia vera. A follow-up outreach was conducted, including assessment of continued therapy appropriateness, medication adherence, and side effect incidence and management for Jakafi (ruxolitinib).    Changes to Insurance Coverage or Financial Support  none    Relevant Past Medical History and Comorbidities  Relevant medical history and concomitant health conditions were discussed with the patient. The patient's chart has been reviewed for relevant past medical history and comorbid health conditions and updated as necessary.   Past Medical History:   Diagnosis Date    Anxiety     Arthritis     Cancer 06/2020    Polycythemia vera    Hypertension     Infectious viral hepatitis 05/2024    C     Social History     Socioeconomic History    Marital status:    Tobacco Use    Smoking status: Never    Smokeless tobacco: Never   Vaping Use    Vaping status: Never Used   Substance and Sexual Activity    Alcohol use: Yes     Comment: Occasional drinker    Drug use: Never    Sexual activity: Not Currently     Partners: Female     Problem list reviewed by Lyssa Holder RPH on 11/1/2024 at 10:32 AM    Hospitalizations and Urgent Care Since Last Assessment  ED Visits, Admissions, or Hospitalizations: none  Urgent Office Visits: none    Allergies  Known allergies and reactions were discussed with the patient. The patient's chart has been reviewed for allergy information and updated as necessary.   Allergies   Allergen Reactions    Zoloft [Sertraline] Hallucinations    Cephalexin Itching     Allergies reviewed by Lyssa Holder RPH on 11/1/2024 at 10:32 AM    Relevant Laboratory Values  Relevant laboratory values were discussed with the patient. The following specialty medication dose adjustment(s)  are recommended: No dose adjustments are needed for the oral specialty medication(s) based on the labs.    Lab Results   Component Value Date    GLUCOSE 93 09/20/2024    CALCIUM 9.6 09/20/2024     09/20/2024    K 4.3 09/20/2024    CO2 27.2 09/20/2024     09/20/2024    BUN 24 (H) 09/20/2024    CREATININE 1.34 (H) 09/20/2024    EGFRIFAFRI 70 06/22/2020    EGFRIFNONA 67 02/23/2022    BCR 17.9 09/20/2024    ANIONGAP 7.8 09/20/2024     Lab Results   Component Value Date    WBC 8.14 09/20/2024    RBC 4.16 09/20/2024    HGB 12.6 (L) 09/20/2024    HCT 38.7 09/20/2024    MCV 93.0 09/20/2024    MCH 30.3 09/20/2024    MCHC 32.6 09/20/2024    RDW 16.4 (H) 09/20/2024    RDWSD 55.5 (H) 09/20/2024    MPV 11.1 09/20/2024     (H) 09/20/2024    NEUTRORELPCT 57.6 09/20/2024    LYMPHORELPCT 28.3 09/20/2024    MONORELPCT 10.8 09/20/2024    EOSRELPCT 1.1 09/20/2024    BASORELPCT 1.1 09/20/2024    AUTOIGPER 1.1 (H) 09/20/2024    NEUTROABS 4.69 09/20/2024    LYMPHSABS 2.30 09/20/2024    MONOSABS 0.88 09/20/2024    EOSABS 0.09 09/20/2024    BASOSABS 0.09 09/20/2024    AUTOIGNUM 0.09 (H) 09/20/2024    NRBC 0.0 09/20/2024       Current Medication List  This medication list has been reviewed with the patient and evaluated for any interactions or necessary modifications/recommendations, and updated to include all prescription medications, OTC medications, and supplements the patient is currently taking.  This list reflects what is contained in the patient's profile, which has also been marked as reviewed to communicate to other providers it is the most up to date version of the patient's current medication therapy.     Current Outpatient Medications:     allopurinol (ZYLOPRIM) 100 MG tablet, Take 1 tablet by mouth 2 (Two) Times a Day., Disp: 180 tablet, Rfl: 0    ALPRAZolam (Xanax) 0.5 MG tablet, Take 1 tablet by mouth 2 (Two) Times a Day As Needed for Anxiety or Sleep. Indications: Feeling Anxious, Disp: 30 tablet, Rfl: 0     amLODIPine-benazepril (LOTREL) 5-40 MG per capsule, TAKE 1 CAPSULE BY MOUTH DAILY, Disp: 30 capsule, Rfl: 0    aspirin 81 MG chewable tablet, Chew 1 tablet Daily., Disp: , Rfl:     bisoprolol (ZEBeta) 10 MG tablet, TAKE 1 TABLET BY MOUTH DAILY, Disp: 30 tablet, Rfl: 0    Epclusa 400-100 MG tablet, Take 1 tablet by mouth Daily., Disp: 84 tablet, Rfl: 0    furosemide (LASIX) 20 MG tablet, Take 1 tablet by mouth Daily., Disp: , Rfl:     hydrALAZINE (APRESOLINE) 50 MG tablet, TAKE 1 TABLET BY MOUTH 2 TIMES A DAY, Disp: 180 tablet, Rfl: 0    ruxolitinib (JAKAFI) 20 MG tablet, Take 1 tablet by mouth 2 (Two) Times a Day., Disp: 60 tablet, Rfl: 5    Medicines reviewed by Lyssa Holder RPH on 11/1/2024 at 10:32 AM    Drug Interactions  Assessed medication list for interactions, no significant drug interactions noted.   Advised patient to call the clinic if any new medications are started so we can assess for drug-drug interactions.  Drug-food interactions discussed:  none today    Adverse Drug Reactions  Medication tolerability: Tolerating with no to minimal ADRs  Medication plan: Continue therapy with normal follow-up  Plan for ADR Management: N/A    Adherence, Self-Administration, and Current Therapy Problems  Adherence related to the patient's specialty therapy was discussed with the patient. The Adherence segment of this outreach has been reviewed and updated.     Adherence Questions  Linked Medication(s) Assessed: Ruxolitinib Phosphate (JAKAFI)  On average, how many doses/injections does the patient miss per month?: 0  What are the identified reasons for non-adherence or missed doses? : no problems identified  What is the estimated medication adherence level?: %  Based on the patient/caregiver response and refill history, does this patient require an MTP to track adherence improvements?: no    Additional Barriers to Patient Self-Administration: none  Methods for Supporting Patient Self-Administration: none  needed at this time  Patient has had no issues obtaining medication from pharmacy.    Open Medication Therapy Problems  No medication therapy recommendations to display    Goals of Therapy  Goals related to the patient's specialty therapy were discussed with the patient. The Patient Goals segment of this outreach has been reviewed and updated.   Goals Addressed Today        Specialty Pharmacy General Goal      Achieve and maintain a state of progression free survival  11/1/24: No new labs today.  Most recent labs stable for patient on 9/20/24 -- WBC 8.14, Hgb 12.6, platelets 516. Patient reports tolerating medication well. No recommended changes to oral therapy plan.              Quality of Life Assessment   Quality of Life related to the patient's enrollment in the patient management program and services provided was discussed with the patient. The QOL segment of this outreach has been reviewed and updated.  Quality of Life Improvement Scale: 8-Moderately better    Discussed aforementioned material with patient over the phone.     Reassessment Plan & Follow-Up  1. Medication Therapy Changes: none  2. Related Plans, Therapy Recommendations, or Issues to Be Addressed: none  3. Pharmacist to perform regular assessments no more than (6) months from the previous assessment.       Attestation  Therapeutic appropriateness: Appropriate   I attest the patient was actively involved in and has agreed to the above plan of care.  If the prescribed therapy is at any point deemed not appropriate based on the current or future assessments, a consultation will be initiated with the patient's specialty care provider to determine the best course of action. The revised plan of therapy will be documented along with any required assessments and/or additional patient education provided.     Elder Holder, Gagandeep, BCOP  Clinical Specialty Pharmacist, Oncology  11/1/2024  10:34 EDT

## 2024-11-03 DIAGNOSIS — I10 ESSENTIAL HYPERTENSION: ICD-10-CM

## 2024-11-03 NOTE — PROGRESS NOTES
"Chief Complaint  Hypertension and Depression    Subjective            Phani Landis is a 64 y.o. male who presents to [facility]   History of Present Illness  6-month follow-up.    Hypertension: Blood pressure is elevated today.  He denies any headache, dizziness or chest pain.  He is on amlodipine-benazepril 5-40 mg daily, bisoprolol 10 mg daily, and hydralazine 50 mg twice daily.  He states the nephrologist took him off of chlorthalidone.  He does not check his blood pressure at home.  He thinks that his blood pressure is elevated because he gets nervous sometimes    Anxiety: He is on alprazolam only as needed.  He is requesting a refill.    He has polycythemia vera and follows with hematology.  He gets his lab workup done with hematology and would like to wait to do all his labs when he follows up this week.    He has chronic hepatitis C and is following with the hepatitis C clinic.  He states he is doing well on Epclusa.     Body mass index is 36.23 kg/m².      Tobacco Use: Low Risk  (11/4/2024)    Patient History     Smoking Tobacco Use: Never     Smokeless Tobacco Use: Never     Passive Exposure: Not on file      E-cigarette/Vaping    E-cigarette/Vaping Use Never User     Passive Exposure No     Counseling Given No      E-cigarette/Vaping Substances    Nicotine No     THC No     CBD No     Flavoring No      E-cigarette/Vaping Devices    Disposable No     Pre-filled or Refillable Cartridge No     Refillable Tank No     Pre-filled Pod No        Alcohol Use: Not on file         Objective   Vital Signs:   Vitals:    11/04/24 1058 11/04/24 1106 11/04/24 1126   BP: (!) 182/100 174/92 140/90   BP Location: Left arm     Patient Position: Sitting     Cuff Size: Adult     Pulse: 60     Temp: 97.9 °F (36.6 °C)     SpO2: 98%     Weight: 128 kg (282 lb 4.8 oz)     Height: 188 cm (74.02\")     PainSc: 0-No pain       Body mass index is 36.23 kg/m².    Wt Readings from Last 3 Encounters: " "  11/04/24 128 kg (282 lb 4.8 oz)   09/20/24 125 kg (275 lb 12.8 oz)   08/28/24 122 kg (269 lb)     BP Readings from Last 3 Encounters:   11/04/24 140/90   09/20/24 148/90   08/28/24 136/87       Health Maintenance   Topic Date Due    Hepatitis B (1 of 3 - Risk 3-dose series) Never done    TDAP/TD VACCINES (2 - Td or Tdap) 08/01/2024    INFLUENZA VACCINE  03/31/2025 (Originally 8/1/2024)    ZOSTER VACCINE (1 of 2) 05/02/2025 (Originally 9/8/2010)    COVID-19 Vaccine (1 - 2024-25 season) 11/04/2025 (Originally 9/1/2024)    ANNUAL PHYSICAL  05/02/2025    PROSTATE CANCER SCREENING  05/15/2025    BMI FOLLOWUP  11/04/2025    COLORECTAL CANCER SCREENING  03/08/2027    HEPATITIS C SCREENING  Completed    Pneumococcal Vaccine 0-64  Aged Out       /90   Pulse 60   Temp 97.9 °F (36.6 °C)   Ht 188 cm (74.02\")   Wt 128 kg (282 lb 4.8 oz)   SpO2 98%   BMI 36.23 kg/m²       Current Outpatient Medications:     allopurinol (ZYLOPRIM) 100 MG tablet, Take 1 tablet by mouth 2 (Two) Times a Day., Disp: 180 tablet, Rfl: 0    ALPRAZolam (Xanax) 0.5 MG tablet, Take 1 tablet by mouth 2 (Two) Times a Day As Needed for Anxiety or Sleep. Indications: Feeling Anxious, Disp: 30 tablet, Rfl: 0    amLODIPine-benazepril (LOTREL) 5-40 MG per capsule, Take 1 capsule by mouth Daily. Indications: High Blood Pressure, Disp: 90 capsule, Rfl: 1    aspirin 81 MG chewable tablet, Chew 1 tablet Daily., Disp: , Rfl:     bisoprolol (ZEBeta) 10 MG tablet, Take 1 tablet by mouth Daily. Indications: High Blood Pressure, Disp: 90 tablet, Rfl: 1    Epclusa 400-100 MG tablet, Take 1 tablet by mouth Daily., Disp: 84 tablet, Rfl: 0    furosemide (LASIX) 20 MG tablet, Take 1 tablet by mouth Daily., Disp: , Rfl:     hydrALAZINE (APRESOLINE) 50 MG tablet, Take 1 tablet by mouth 2 (Two) Times a Day. Indications: High Blood Pressure, Disp: 180 tablet, Rfl: 1    ruxolitinib (JAKAFI) 20 MG tablet, Take 1 tablet by mouth 2 (Two) Times a Day., Disp: 60 tablet, " Rfl: 5   Past Medical History:   Diagnosis Date    Anxiety     Arthritis     Cancer 06/2020    Polycythemia vera    Hypertension     Infectious viral hepatitis 05/2024    C        Physical Exam  Vitals reviewed.   Constitutional:       Appearance: Normal appearance. He is well-developed. He is obese.   Neck:      Thyroid: No thyroid mass, thyromegaly or thyroid tenderness.   Cardiovascular:      Rate and Rhythm: Normal rate and regular rhythm.      Heart sounds: No murmur heard.     No friction rub. No gallop.   Pulmonary:      Effort: Pulmonary effort is normal.      Breath sounds: Normal breath sounds. No wheezing or rhonchi.   Lymphadenopathy:      Cervical: No cervical adenopathy.   Skin:     General: Skin is warm and dry.   Neurological:      Mental Status: He is alert and oriented to person, place, and time.      Cranial Nerves: No cranial nerve deficit.   Psychiatric:         Mood and Affect: Mood and affect normal.         Behavior: Behavior normal.         Thought Content: Thought content normal. Thought content does not include homicidal or suicidal ideation.         Judgment: Judgment normal.          Result Review :    The following data was reviewed by: AG Plunkett on 11/04/2024:  Common Labs   Common labs          7/26/2024    10:06 8/28/2024    13:05 9/20/2024    09:40   Common Labs   Glucose 83  93  93    BUN 26  18  24    Creatinine 1.40  1.04  1.34    Sodium 138  141  139    Potassium 3.6  4.0  4.3    Chloride 104  105  104    Calcium 9.4  9.3  9.6    Albumin 4.6  4.1  4.6    Total Bilirubin 0.5  0.5  0.5    Alkaline Phosphatase 71  73  76    AST (SGOT) 66  35  42    ALT (SGPT) 42  41  23    WBC 6.48  8.78  8.14    Hemoglobin 11.8  11.5  12.6    Hematocrit 35.0  36.2  38.7    Platelets 508  370  516      Assessment & Plan  Essential hypertension  Blood pressure is above goal today but stable when rechecked with manual blood pressure cuff.  Advised him to continue current medications.   Advised him to check blood pressure at home and to notify me if blood pressure remains above normal ranges.    Orders:    amLODIPine-benazepril (LOTREL) 5-40 MG per capsule; Take 1 capsule by mouth Daily. Indications: High Blood Pressure    bisoprolol (ZEBeta) 10 MG tablet; Take 1 tablet by mouth Daily. Indications: High Blood Pressure    hydrALAZINE (APRESOLINE) 50 MG tablet; Take 1 tablet by mouth 2 (Two) Times a Day. Indications: High Blood Pressure    Lipid Panel; Future    Anxiety  Anxiety is stable, takes Xanax only as needed.  Brent reviewed and is consistent.  I have ordered a urine drug screen for patient to get done when he goes to get his lab workup in a couple days.  Orders:    ALPRAZolam (Xanax) 0.5 MG tablet; Take 1 tablet by mouth 2 (Two) Times a Day As Needed for Anxiety or Sleep. Indications: Feeling Anxious    Class 2 severe obesity with serious comorbidity and body mass index (BMI) of 36.0 to 36.9 in adult, unspecified obesity type  Class 2 Severe Obesity (BMI >=35 and <=39.9). Obesity-related health conditions include the following: hypertension. Obesity is worsening. BMI is is above average; BMI management plan is completed. We discussed portion control, increasing exercise, and Information on healthy weight added to patient's after visit summary.    Orders:    Vitamin D,25-Hydroxy; Future    Chronic hepatitis C without hepatic coma  He is currently stable, following with hepatitis C clinic.       Medication monitoring encounter    Orders:    Urine Drug Screen - Urine, Clean Catch; Future       Diagnosis Plan   1. Essential hypertension  amLODIPine-benazepril (LOTREL) 5-40 MG per capsule    bisoprolol (ZEBeta) 10 MG tablet    hydrALAZINE (APRESOLINE) 50 MG tablet    Lipid Panel      2. Anxiety  ALPRAZolam (Xanax) 0.5 MG tablet      3. Class 2 severe obesity with serious comorbidity and body mass index (BMI) of 36.0 to 36.9 in adult, unspecified obesity type  Vitamin D,25-Hydroxy      4. Chronic  hepatitis C without hepatic coma        5. Medication monitoring encounter  Urine Drug Screen - Urine, Clean Catch            FOLLOW UP  Return in about 6 months (around 5/4/2025) for Annual physical, Next scheduled follow up.  Patient was given instructions and counseling regarding his condition or for health maintenance advice. Please see specific information pulled into the AVS if appropriate.       CURRENT & DISCONTINUED MEDICATIONS  Current Outpatient Medications   Medication Instructions    allopurinol (ZYLOPRIM) 100 mg, Oral, 2 Times Daily    ALPRAZolam (XANAX) 0.5 mg, Oral, 2 Times Daily PRN    amLODIPine-benazepril (LOTREL) 5-40 MG per capsule 1 capsule, Oral, Daily    aspirin 81 mg, Daily    bisoprolol (ZEBETA) 10 mg, Oral, Daily    Epclusa 400-100 MG tablet 1 tablet, Oral, Daily    furosemide (LASIX) 20 MG tablet 1 tablet, Daily    hydrALAZINE (APRESOLINE) 50 mg, Oral, 2 Times Daily    ruxolitinib (JAKAFI) 20 mg, Oral, 2 Times Daily       Medications Discontinued During This Encounter   Medication Reason    ALPRAZolam (Xanax) 0.5 MG tablet Reorder    hydrALAZINE (APRESOLINE) 50 MG tablet Reorder    bisoprolol (ZEBeta) 10 MG tablet Reorder    amLODIPine-benazepril (LOTREL) 5-40 MG per capsule Reorder        Parts of this note are electronic transcriptions/translations of spoken language to printed text using the Dragon Dictation system.    Roselyn Haywood, AG  11/04/24  13:48 EST

## 2024-11-04 ENCOUNTER — OFFICE VISIT (OUTPATIENT)
Dept: FAMILY MEDICINE CLINIC | Facility: CLINIC | Age: 64
End: 2024-11-04
Payer: COMMERCIAL

## 2024-11-04 VITALS
HEIGHT: 74 IN | SYSTOLIC BLOOD PRESSURE: 140 MMHG | OXYGEN SATURATION: 98 % | HEART RATE: 60 BPM | TEMPERATURE: 97.9 F | WEIGHT: 282.3 LBS | BODY MASS INDEX: 36.23 KG/M2 | DIASTOLIC BLOOD PRESSURE: 90 MMHG

## 2024-11-04 DIAGNOSIS — E66.01 CLASS 2 SEVERE OBESITY WITH SERIOUS COMORBIDITY AND BODY MASS INDEX (BMI) OF 36.0 TO 36.9 IN ADULT, UNSPECIFIED OBESITY TYPE: ICD-10-CM

## 2024-11-04 DIAGNOSIS — F41.9 ANXIETY: ICD-10-CM

## 2024-11-04 DIAGNOSIS — E66.812 CLASS 2 SEVERE OBESITY WITH SERIOUS COMORBIDITY AND BODY MASS INDEX (BMI) OF 36.0 TO 36.9 IN ADULT, UNSPECIFIED OBESITY TYPE: ICD-10-CM

## 2024-11-04 DIAGNOSIS — I10 ESSENTIAL HYPERTENSION: Primary | ICD-10-CM

## 2024-11-04 DIAGNOSIS — B18.2 CHRONIC HEPATITIS C WITHOUT HEPATIC COMA: ICD-10-CM

## 2024-11-04 DIAGNOSIS — Z51.81 MEDICATION MONITORING ENCOUNTER: ICD-10-CM

## 2024-11-04 PROBLEM — E87.1 HYPONATREMIA: Status: RESOLVED | Noted: 2024-02-01 | Resolved: 2024-11-04

## 2024-11-04 PROBLEM — E87.6 HYPOKALEMIA: Status: RESOLVED | Noted: 2024-02-01 | Resolved: 2024-11-04

## 2024-11-04 PROCEDURE — 99214 OFFICE O/P EST MOD 30 MIN: CPT | Performed by: NURSE PRACTITIONER

## 2024-11-04 RX ORDER — HYDRALAZINE HYDROCHLORIDE 50 MG/1
50 TABLET, FILM COATED ORAL 2 TIMES DAILY
Qty: 180 TABLET | Refills: 0 | OUTPATIENT
Start: 2024-11-04

## 2024-11-04 RX ORDER — AMLODIPINE AND BENAZEPRIL HYDROCHLORIDE 5; 40 MG/1; MG/1
1 CAPSULE ORAL DAILY
Qty: 90 CAPSULE | Refills: 1 | Status: SHIPPED | OUTPATIENT
Start: 2024-11-04

## 2024-11-04 RX ORDER — ALPRAZOLAM 0.5 MG
0.5 TABLET ORAL 2 TIMES DAILY PRN
Qty: 30 TABLET | Refills: 0 | Status: SHIPPED | OUTPATIENT
Start: 2024-11-04

## 2024-11-04 RX ORDER — HYDRALAZINE HYDROCHLORIDE 50 MG/1
50 TABLET, FILM COATED ORAL 2 TIMES DAILY
Qty: 180 TABLET | Refills: 1 | Status: SHIPPED | OUTPATIENT
Start: 2024-11-04

## 2024-11-04 RX ORDER — BISOPROLOL FUMARATE 10 MG/1
10 TABLET, FILM COATED ORAL DAILY
Qty: 90 TABLET | Refills: 1 | Status: SHIPPED | OUTPATIENT
Start: 2024-11-04

## 2024-11-04 NOTE — ASSESSMENT & PLAN NOTE
Class 2 Severe Obesity (BMI >=35 and <=39.9). Obesity-related health conditions include the following: hypertension. Obesity is worsening. BMI is is above average; BMI management plan is completed. We discussed portion control, increasing exercise, and Information on healthy weight added to patient's after visit summary.    Orders:    Vitamin D,25-Hydroxy; Future

## 2024-11-04 NOTE — ASSESSMENT & PLAN NOTE
Blood pressure is above goal today but stable when rechecked with manual blood pressure cuff.  Advised him to continue current medications.  Advised him to check blood pressure at home and to notify me if blood pressure remains above normal ranges.    Orders:    amLODIPine-benazepril (LOTREL) 5-40 MG per capsule; Take 1 capsule by mouth Daily. Indications: High Blood Pressure    bisoprolol (ZEBeta) 10 MG tablet; Take 1 tablet by mouth Daily. Indications: High Blood Pressure    hydrALAZINE (APRESOLINE) 50 MG tablet; Take 1 tablet by mouth 2 (Two) Times a Day. Indications: High Blood Pressure    Lipid Panel; Future

## 2024-11-04 NOTE — ASSESSMENT & PLAN NOTE
Anxiety is stable, takes Xanax only as needed.  Brent reviewed and is consistent.  I have ordered a urine drug screen for patient to get done when he goes to get his lab workup in a couple days.  Orders:    ALPRAZolam (Xanax) 0.5 MG tablet; Take 1 tablet by mouth 2 (Two) Times a Day As Needed for Anxiety or Sleep. Indications: Feeling Anxious

## 2024-11-07 ENCOUNTER — SPECIALTY PHARMACY (OUTPATIENT)
Dept: PHARMACY | Facility: HOSPITAL | Age: 64
End: 2024-11-07
Payer: COMMERCIAL

## 2024-11-11 ENCOUNTER — TELEPHONE (OUTPATIENT)
Dept: ONCOLOGY | Facility: CLINIC | Age: 64
End: 2024-11-11
Payer: COMMERCIAL

## 2024-11-11 NOTE — TELEPHONE ENCOUNTER
"    Caller: Phani Landis \"Raj\"    Relationship to patient: Self    Best call back number: [phone]     Chief complaint PATIENT TO RESCHEDULE FROM 11/6/24    Type of visit: LAB AND FU    Requested date: 11 AM OR LATER  "

## 2024-11-12 ENCOUNTER — TELEPHONE (OUTPATIENT)
Dept: GASTROENTEROLOGY | Facility: CLINIC | Age: 64
End: 2024-11-12
Payer: COMMERCIAL

## 2024-11-13 ENCOUNTER — TELEPHONE (OUTPATIENT)
Dept: FAMILY MEDICINE CLINIC | Facility: CLINIC | Age: 64
End: 2024-11-13

## 2024-11-13 NOTE — TELEPHONE ENCOUNTER
"  Caller: Phani Landis \"Raj\"    Relationship: Self    Best call back number: [phone]     What is the best time to reach you: ANYTIME     Who are you requesting to speak with (clinical staff, provider,  specific staff member): CLINICAL     What was the call regarding: FYI PATIENT IS WANTING TO INFORM PCP THAT HE WILL BE HAVING LABS AT [place] ON 12/2/2024.   "

## 2024-11-13 NOTE — TELEPHONE ENCOUNTER
What is [place], is this a [facility]? If not, we will have to change all his lab orders to External.

## 2024-11-15 ENCOUNTER — OFFICE VISIT (OUTPATIENT)
Dept: GASTROENTEROLOGY | Facility: HOSPITAL | Age: 64
End: 2024-11-15
Payer: COMMERCIAL

## 2024-11-15 VITALS
HEART RATE: 63 BPM | SYSTOLIC BLOOD PRESSURE: 164 MMHG | BODY MASS INDEX: 36.83 KG/M2 | DIASTOLIC BLOOD PRESSURE: 97 MMHG | WEIGHT: 287 LBS | HEIGHT: 74 IN

## 2024-11-15 DIAGNOSIS — B18.2 CHRONIC HEPATITIS C WITHOUT HEPATIC COMA: Primary | ICD-10-CM

## 2024-11-15 LAB
ALBUMIN SERPL-MCNC: 4.2 G/DL (ref 3.5–5.2)
ALBUMIN/GLOB SERPL: 1.3 G/DL
ALP SERPL-CCNC: 65 U/L (ref 39–117)
ALT SERPL W P-5'-P-CCNC: 28 U/L (ref 1–41)
ANION GAP SERPL CALCULATED.3IONS-SCNC: 9.7 MMOL/L (ref 5–15)
AST SERPL-CCNC: 42 U/L (ref 1–40)
BASOPHILS # BLD AUTO: 0.08 10*3/MM3 (ref 0–0.2)
BASOPHILS NFR BLD AUTO: 1.2 % (ref 0–1.5)
BILIRUB SERPL-MCNC: 0.3 MG/DL (ref 0–1.2)
BUN SERPL-MCNC: 12 MG/DL (ref 8–23)
BUN/CREAT SERPL: 9.2 (ref 7–25)
CALCIUM SPEC-SCNC: 9.6 MG/DL (ref 8.6–10.5)
CHLORIDE SERPL-SCNC: 103 MMOL/L (ref 98–107)
CO2 SERPL-SCNC: 26.3 MMOL/L (ref 22–29)
CREAT SERPL-MCNC: 1.31 MG/DL (ref 0.76–1.27)
DEPRECATED RDW RBC AUTO: 52.8 FL (ref 37–54)
EGFRCR SERPLBLD CKD-EPI 2021: 60.8 ML/MIN/1.73
EOSINOPHIL # BLD AUTO: 0.1 10*3/MM3 (ref 0–0.4)
EOSINOPHIL NFR BLD AUTO: 1.5 % (ref 0.3–6.2)
ERYTHROCYTE [DISTWIDTH] IN BLOOD BY AUTOMATED COUNT: 15.8 % (ref 12.3–15.4)
GLOBULIN UR ELPH-MCNC: 3.3 GM/DL
GLUCOSE SERPL-MCNC: 84 MG/DL (ref 65–99)
HCT VFR BLD AUTO: 39.1 % (ref 37.5–51)
HGB BLD-MCNC: 12.7 G/DL (ref 13–17.7)
IMM GRANULOCYTES # BLD AUTO: 0.1 10*3/MM3 (ref 0–0.05)
IMM GRANULOCYTES NFR BLD AUTO: 1.5 % (ref 0–0.5)
LYMPHOCYTES # BLD AUTO: 1.96 10*3/MM3 (ref 0.7–3.1)
LYMPHOCYTES NFR BLD AUTO: 29 % (ref 19.6–45.3)
MCH RBC QN AUTO: 30.1 PG (ref 26.6–33)
MCHC RBC AUTO-ENTMCNC: 32.5 G/DL (ref 31.5–35.7)
MCV RBC AUTO: 92.7 FL (ref 79–97)
MONOCYTES # BLD AUTO: 0.69 10*3/MM3 (ref 0.1–0.9)
MONOCYTES NFR BLD AUTO: 10.2 % (ref 5–12)
NEUTROPHILS NFR BLD AUTO: 3.84 10*3/MM3 (ref 1.7–7)
NEUTROPHILS NFR BLD AUTO: 56.6 % (ref 42.7–76)
NRBC BLD AUTO-RTO: 0 /100 WBC (ref 0–0.2)
PLATELET # BLD AUTO: 606 10*3/MM3 (ref 140–450)
PMV BLD AUTO: 11.2 FL (ref 6–12)
POTASSIUM SERPL-SCNC: 4 MMOL/L (ref 3.5–5.2)
PROT SERPL-MCNC: 7.5 G/DL (ref 6–8.5)
RBC # BLD AUTO: 4.22 10*6/MM3 (ref 4.14–5.8)
SODIUM SERPL-SCNC: 139 MMOL/L (ref 136–145)
WBC NRBC COR # BLD AUTO: 6.77 10*3/MM3 (ref 3.4–10.8)

## 2024-11-15 PROCEDURE — G0463 HOSPITAL OUTPT CLINIC VISIT: HCPCS | Performed by: NURSE PRACTITIONER

## 2024-11-15 PROCEDURE — 87522 HEPATITIS C REVRS TRNSCRPJ: CPT | Performed by: NURSE PRACTITIONER

## 2024-11-15 PROCEDURE — 85025 COMPLETE CBC W/AUTO DIFF WBC: CPT | Performed by: NURSE PRACTITIONER

## 2024-11-15 PROCEDURE — 80053 COMPREHEN METABOLIC PANEL: CPT | Performed by: NURSE PRACTITIONER

## 2024-11-15 NOTE — PROGRESS NOTES
"Chief Complaint        Hepatitis C    HPI     Mr. Landis is a 63 y/o male with chronic HCV, genotype 1a with F4 fibrosis determined by liver elastography, however fibrosure was c/w F1-F2 fibrosis and US was c/w normal liver.       He completed treatment with epclusa.  Reports that he's doing well.  Denies side effects.        Subjective            Past Medical History     Allergies   Allergen Reactions    Zoloft [Sertraline] Hallucinations    Cephalexin Itching       Current Outpatient Medications:     allopurinol (ZYLOPRIM) 100 MG tablet, Take 1 tablet by mouth 2 (Two) Times a Day., Disp: 180 tablet, Rfl: 0    ALPRAZolam (Xanax) 0.5 MG tablet, Take 1 tablet by mouth 2 (Two) Times a Day As Needed for Anxiety or Sleep. Indications: Feeling Anxious, Disp: 30 tablet, Rfl: 0    amLODIPine-benazepril (LOTREL) 5-40 MG per capsule, Take 1 capsule by mouth Daily. Indications: High Blood Pressure, Disp: 90 capsule, Rfl: 1    aspirin 81 MG chewable tablet, Chew 1 tablet Daily., Disp: , Rfl:     bisoprolol (ZEBeta) 10 MG tablet, Take 1 tablet by mouth Daily. Indications: High Blood Pressure, Disp: 90 tablet, Rfl: 1    Epclusa 400-100 MG tablet, Take 1 tablet by mouth Daily., Disp: 84 tablet, Rfl: 0    furosemide (LASIX) 20 MG tablet, Take 1 tablet by mouth Daily., Disp: , Rfl:     hydrALAZINE (APRESOLINE) 50 MG tablet, Take 1 tablet by mouth 2 (Two) Times a Day. Indications: High Blood Pressure, Disp: 180 tablet, Rfl: 1    ruxolitinib (JAKAFI) 20 MG tablet, Take 1 tablet by mouth 2 (Two) Times a Day., Disp: 60 tablet, Rfl: 5  Past Medical History:   Diagnosis Date    Anxiety     Arthritis     Cancer 06/2020    Polycythemia vera    Hypertension     Infectious viral hepatitis 05/2024    C     Past Surgical History:   Procedure Laterality Date    CHEST SURGERY      OTHER SURGICAL HISTORY  1985    \"Exploratory\"     Social History     Socioeconomic History    Marital status:    Tobacco Use    Smoking status: Never    " Smokeless tobacco: Never   Vaping Use    Vaping status: Never Used   Substance and Sexual Activity    Alcohol use: Yes     Comment: Occasional drinker    Drug use: Never    Sexual activity: Not Currently     Partners: Female       Objective     Objective     Vital Signs     Vitals:    11/15/24 0952   BP: 164/97   Pulse: 63     Body mass index is 36.83 kg/m².  Body surface area is 2.53 meters squared.      Physical Exam    Results       Result Review :     The following data was reviewed by: AG Nieves on 11/15/2024:      CMP:  Lab Results   Component Value Date    BUN 24 (H) 09/20/2024    CREATININE 1.34 (H) 09/20/2024    EGFRIFNONA 67 02/23/2022    EGFRIFAFRI 70 06/22/2020     09/20/2024    K 4.3 09/20/2024     09/20/2024    CALCIUM 9.6 09/20/2024    PROTENTOTREF 7.7 06/22/2020    ALBUMIN 4.6 09/20/2024    LABGLOBREF 3.1 06/22/2020    BILITOT 0.5 09/20/2024    ALKPHOS 76 09/20/2024    AST 42 (H) 09/20/2024    ALT 23 09/20/2024     CBC w/ diff:   Lab Results   Component Value Date    WBC 8.14 09/20/2024    RBC 4.16 09/20/2024    HGB 12.6 (L) 09/20/2024    HCT 38.7 09/20/2024    MCV 93.0 09/20/2024    MCH 30.3 09/20/2024    MCHC 32.6 09/20/2024    RDW 16.4 (H) 09/20/2024     (H) 09/20/2024    NEUTRORELPCT 57.6 09/20/2024    AUTOIGPER 1.1 (H) 09/20/2024    LYMPHORELPCT 28.3 09/20/2024    MONORELPCT 10.8 09/20/2024    EOSRELPCT 1.1 09/20/2024    BASORELPCT 1.1 09/20/2024     Acute Hepatitis Panel   Lab Results   Component Value Date    HEPBSAG Non-Reactive 07/26/2024    HEPAIGM Non-Reactive 07/26/2024    HEPBIGMCORE Non-Reactive 07/26/2024    HEPCVIRUSABY Reactive (A) 07/26/2024      Lab Results   Component Value Date    HEPATITISC <15 09/20/2024    JDTIVP89 6.272 07/26/2024                Assessment and Plan          Assessment & Plan:    Diagnoses and all orders for this visit:    1. Chronic hepatitis C without hepatic coma (Primary)  -     Comprehensive Metabolic Panel  -      Hepatitis C RNA, Quantitative, PCR (graph)  -     CBC & Differential        Labs today to ensure SVR.      Patient Instructions: Avoid Alcohol, Avoid Illicit Drug Use, Importance of keeping appointments.  Patient Education Provided: Yes      Follow Up     Follow Up   Return in about 3 months (around 2/15/2025) for Hepatitis C.  Patient was given instructions and counseling regarding his condition or for health maintenance advice. Please see specific information pulled into the AVS if appropriate.     Roselyn Hicks, APRN  11/15/2024

## 2024-11-18 LAB
HCV RNA SERPL NAA+PROBE-ACNC: NORMAL IU/ML
REF LAB TEST REF RANGE: NORMAL

## 2024-11-19 ENCOUNTER — SPECIALTY PHARMACY (OUTPATIENT)
Dept: PHARMACY | Facility: HOSPITAL | Age: 64
End: 2024-11-19
Payer: COMMERCIAL

## 2024-11-19 NOTE — PROGRESS NOTES
I have submitted a Prior Authorization Request for Jakafi through the [employer] Website and I'm waiting for a determination.       Yudy Howell - Care Coordinator   11/19/2024  11:49 EST

## 2024-11-20 ENCOUNTER — PATIENT ROUNDING (BHMG ONLY) (OUTPATIENT)
Dept: FAMILY MEDICINE CLINIC | Facility: CLINIC | Age: 64
End: 2024-11-20
Payer: COMMERCIAL

## 2024-11-20 NOTE — PROGRESS NOTES
A My-Chart message has been sent to the patient for PATIENT R[place]ING with [facility] [place].

## 2024-11-21 ENCOUNTER — OFFICE VISIT (OUTPATIENT)
Dept: ORTHOPEDIC SURGERY | Facility: CLINIC | Age: 64
End: 2024-11-21
Payer: COMMERCIAL

## 2024-11-21 ENCOUNTER — SPECIALTY PHARMACY (OUTPATIENT)
Dept: PHARMACY | Facility: HOSPITAL | Age: 64
End: 2024-11-21
Payer: COMMERCIAL

## 2024-11-21 VITALS — TEMPERATURE: 97.8 F | HEIGHT: 74 IN | BODY MASS INDEX: 36.95 KG/M2 | WEIGHT: 287.9 LBS

## 2024-11-21 DIAGNOSIS — M17.12 PRIMARY OSTEOARTHRITIS OF LEFT KNEE: ICD-10-CM

## 2024-11-21 DIAGNOSIS — R52 PAIN: Primary | ICD-10-CM

## 2024-11-21 RX ORDER — METHYLPREDNISOLONE ACETATE 80 MG/ML
80 INJECTION, SUSPENSION INTRA-ARTICULAR; INTRALESIONAL; INTRAMUSCULAR; SOFT TISSUE
Status: COMPLETED | OUTPATIENT
Start: 2024-11-21 | End: 2024-11-21

## 2024-11-21 RX ADMIN — METHYLPREDNISOLONE ACETATE 80 MG: 80 INJECTION, SUSPENSION INTRA-ARTICULAR; INTRALESIONAL; INTRAMUSCULAR; SOFT TISSUE at 14:31

## 2024-11-21 NOTE — PROGRESS NOTES
Answers submitted by the patient for this visit:  Primary Reason for Visit (Submitted on 11/15/2024)  What is the primary reason for your visit?: Extremity Pain  Lower Extremity Injury Questionnaire (Submitted on 11/15/2024)  Chief Complaint: Extremity pain  Injury: No  Pain location: left knee  Pain quality: aching, stabbing  Pain - numeric: 9/10  Progression since onset: worse  inability to bear weight: Yes  lower extremity swelling: Yes  11/21/2024    Phani Landis is here today for worsening knee pain. Pt has undergone injection of the knee in the past with good resolution of symptoms. Pt is requesting a repeat injection.     KNEE Injection Procedure Note:    Large Joint Arthrocentesis: L knee  Date/Time: 11/21/2024 2:31 PM  Consent given by: patient  Site marked: site marked  Timeout: Immediately prior to procedure a time out was called to verify the correct patient, procedure, equipment, support staff and site/side marked as required   Supporting Documentation  Indications: pain and joint swelling   Procedure Details  Location: knee - L knee  Preparation: Patient was prepped and draped in the usual sterile fashion  Needle gauge: 21 G.  Approach: anterolateral  Medications administered: 2 mL lidocaine (cardiac); 80 mg methylPREDNISolone acetate 80 MG/ML  Patient tolerance: patient tolerated the procedure well with no immediate complications    3 views of left knee were done for pain with comparison films show tricompartmental osteoarthritis worse in the medial compartment and patellofemoral  At the conclusion of the injection I discussed the importance of continued quad strengthening exercises on a daily basis. I will see the patient back if the symptoms should fail to improve or worsen.    Rowena Harmon, APRN  11/21/2024

## 2024-11-21 NOTE — PROGRESS NOTES
The Prior Authorization for [facility] is approved from 11/21/2024 to 11/21/2025.     Yudy Howell - Care Coordinator   11/21/2024  15:34 EST

## 2024-11-21 NOTE — PROGRESS NOTES
New Pablo MD Frantz, Shelbie, RN; Ev Yun [facility]; Lyssa Holder [facility]  Ok to receive the Tetanus vaccine.    Thank you          Previous Messages       ----- Message -----  From: Ev Yun [facility]  Sent: 11/21/2024   9:54 AM EST  To: New Pablo MD; Joyce Greer, YESSENIA; *    Good Morning,      Are you ok with him getting Tetanus vaccine?  He would like us to call him back once you ok it.    Thanks,  Dawna    Called Mr Landis and informed him of above.  He verbalized understanding.

## 2024-11-27 ENCOUNTER — TELEPHONE (OUTPATIENT)
Dept: ONCOLOGY | Facility: CLINIC | Age: 64
End: 2024-11-27
Payer: COMMERCIAL

## 2024-11-27 NOTE — TELEPHONE ENCOUNTER
"    Caller: Phani Landis \"Raj\"    Relationship to patient: Self    Best call back number: [phone]    Chief complaint:     Type of visit: LAB & F/U 1     Requested date: 12/9/2024 STARTING AT AROUND 12 OR 1, CALL TO R/S    If rescheduling, when is the original appointment: 12/9/2024    Additional notes:       "

## 2024-12-09 ENCOUNTER — OFFICE VISIT (OUTPATIENT)
Dept: ONCOLOGY | Facility: CLINIC | Age: 64
End: 2024-12-09
Payer: COMMERCIAL

## 2024-12-09 ENCOUNTER — LAB (OUTPATIENT)
Dept: OTHER | Facility: HOSPITAL | Age: 64
End: 2024-12-09
Payer: COMMERCIAL

## 2024-12-09 VITALS
HEART RATE: 63 BPM | SYSTOLIC BLOOD PRESSURE: 164 MMHG | WEIGHT: 291 LBS | RESPIRATION RATE: 16 BRPM | BODY MASS INDEX: 37.35 KG/M2 | DIASTOLIC BLOOD PRESSURE: 92 MMHG | TEMPERATURE: 97.5 F | HEIGHT: 74 IN | OXYGEN SATURATION: 95 %

## 2024-12-09 DIAGNOSIS — D45 POLYCYTHEMIA VERA: ICD-10-CM

## 2024-12-09 DIAGNOSIS — E66.812 CLASS 2 SEVERE OBESITY WITH SERIOUS COMORBIDITY AND BODY MASS INDEX (BMI) OF 36.0 TO 36.9 IN ADULT, UNSPECIFIED OBESITY TYPE: ICD-10-CM

## 2024-12-09 DIAGNOSIS — Z51.81 MEDICATION MONITORING ENCOUNTER: ICD-10-CM

## 2024-12-09 DIAGNOSIS — E66.01 CLASS 2 SEVERE OBESITY WITH SERIOUS COMORBIDITY AND BODY MASS INDEX (BMI) OF 36.0 TO 36.9 IN ADULT, UNSPECIFIED OBESITY TYPE: ICD-10-CM

## 2024-12-09 DIAGNOSIS — I10 ESSENTIAL HYPERTENSION: ICD-10-CM

## 2024-12-09 DIAGNOSIS — Z79.899 HIGH RISK MEDICATION USE: ICD-10-CM

## 2024-12-09 DIAGNOSIS — D45 POLYCYTHEMIA VERA: Primary | ICD-10-CM

## 2024-12-09 LAB
25(OH)D3 SERPL-MCNC: 46.1 NG/ML (ref 30–100)
ALBUMIN SERPL-MCNC: 4.6 G/DL (ref 3.5–5.2)
ALBUMIN/GLOB SERPL: 1.5 G/DL
ALP SERPL-CCNC: 67 U/L (ref 39–117)
ALT SERPL W P-5'-P-CCNC: 44 U/L (ref 1–41)
AMPHET+METHAMPHET UR QL: NEGATIVE
ANION GAP SERPL CALCULATED.3IONS-SCNC: 6.6 MMOL/L (ref 5–15)
AST SERPL-CCNC: 44 U/L (ref 1–40)
BARBITURATES UR QL SCN: NEGATIVE
BASOPHILS # BLD AUTO: 0.08 10*3/MM3 (ref 0–0.2)
BASOPHILS NFR BLD AUTO: 1.2 % (ref 0–1.5)
BENZODIAZ UR QL SCN: NEGATIVE
BILIRUB SERPL-MCNC: 0.5 MG/DL (ref 0–1.2)
BUN SERPL-MCNC: 16 MG/DL (ref 8–23)
BUN/CREAT SERPL: 10.6 (ref 7–25)
CALCIUM SPEC-SCNC: 9.2 MG/DL (ref 8.6–10.5)
CANNABINOIDS SERPL QL: NEGATIVE
CHLORIDE SERPL-SCNC: 104 MMOL/L (ref 98–107)
CHOLEST SERPL-MCNC: 214 MG/DL (ref 0–200)
CO2 SERPL-SCNC: 30.4 MMOL/L (ref 22–29)
COCAINE UR QL: NEGATIVE
CREAT SERPL-MCNC: 1.51 MG/DL (ref 0.76–1.27)
DEPRECATED RDW RBC AUTO: 53.7 FL (ref 37–54)
EGFRCR SERPLBLD CKD-EPI 2021: 51.3 ML/MIN/1.73
EOSINOPHIL # BLD AUTO: 0.09 10*3/MM3 (ref 0–0.4)
EOSINOPHIL NFR BLD AUTO: 1.3 % (ref 0.3–6.2)
ERYTHROCYTE [DISTWIDTH] IN BLOOD BY AUTOMATED COUNT: 15.9 % (ref 12.3–15.4)
FENTANYL UR-MCNC: NEGATIVE NG/ML
GLOBULIN UR ELPH-MCNC: 3.1 GM/DL
GLUCOSE SERPL-MCNC: 97 MG/DL (ref 65–99)
HCT VFR BLD AUTO: 39.6 % (ref 37.5–51)
HDLC SERPL-MCNC: 65 MG/DL (ref 40–60)
HGB BLD-MCNC: 12.8 G/DL (ref 13–17.7)
IMM GRANULOCYTES # BLD AUTO: 0.11 10*3/MM3 (ref 0–0.05)
IMM GRANULOCYTES NFR BLD AUTO: 1.6 % (ref 0–0.5)
LDLC SERPL CALC-MCNC: 133 MG/DL (ref 0–100)
LDLC/HDLC SERPL: 2.02 {RATIO}
LYMPHOCYTES # BLD AUTO: 1.78 10*3/MM3 (ref 0.7–3.1)
LYMPHOCYTES NFR BLD AUTO: 26.1 % (ref 19.6–45.3)
MCH RBC QN AUTO: 30 PG (ref 26.6–33)
MCHC RBC AUTO-ENTMCNC: 32.3 G/DL (ref 31.5–35.7)
MCV RBC AUTO: 93 FL (ref 79–97)
METHADONE UR QL SCN: NEGATIVE
MONOCYTES # BLD AUTO: 0.74 10*3/MM3 (ref 0.1–0.9)
MONOCYTES NFR BLD AUTO: 10.8 % (ref 5–12)
NEUTROPHILS NFR BLD AUTO: 4.03 10*3/MM3 (ref 1.7–7)
NEUTROPHILS NFR BLD AUTO: 59 % (ref 42.7–76)
NRBC BLD AUTO-RTO: 0 /100 WBC (ref 0–0.2)
OPIATES UR QL: NEGATIVE
OXYCODONE UR QL SCN: NEGATIVE
PLATELET # BLD AUTO: 407 10*3/MM3 (ref 140–450)
PMV BLD AUTO: 10.1 FL (ref 6–12)
POTASSIUM SERPL-SCNC: 4 MMOL/L (ref 3.5–5.2)
PROT SERPL-MCNC: 7.7 G/DL (ref 6–8.5)
RBC # BLD AUTO: 4.26 10*6/MM3 (ref 4.14–5.8)
SODIUM SERPL-SCNC: 141 MMOL/L (ref 136–145)
TRIGL SERPL-MCNC: 89 MG/DL (ref 0–150)
VLDLC SERPL-MCNC: 16 MG/DL (ref 5–40)
WBC NRBC COR # BLD AUTO: 6.83 10*3/MM3 (ref 3.4–10.8)

## 2024-12-09 PROCEDURE — 80307 DRUG TEST PRSMV CHEM ANLYZR: CPT | Performed by: NURSE PRACTITIONER

## 2024-12-09 PROCEDURE — 80061 LIPID PANEL: CPT | Performed by: NURSE PRACTITIONER

## 2024-12-09 PROCEDURE — 99214 OFFICE O/P EST MOD 30 MIN: CPT | Performed by: NURSE PRACTITIONER

## 2024-12-09 PROCEDURE — 82306 VITAMIN D 25 HYDROXY: CPT | Performed by: NURSE PRACTITIONER

## 2024-12-09 PROCEDURE — 80053 COMPREHEN METABOLIC PANEL: CPT | Performed by: INTERNAL MEDICINE

## 2024-12-09 PROCEDURE — 85025 COMPLETE CBC W/AUTO DIFF WBC: CPT | Performed by: INTERNAL MEDICINE

## 2024-12-09 PROCEDURE — 36415 COLL VENOUS BLD VENIPUNCTURE: CPT

## 2024-12-09 NOTE — PROGRESS NOTES
Subjective     REASON FOR FOLLOW UP:  1.  Polycythemia vera, polycythemia and thrombocytosis and leukocytosis, EPO level 1.3, Tino 2+, peripheral blood for BCR/C ABL negative    HISTORY OF PRESENT ILLNESS:  The patient is a 64 y.o. year old male who is here for an opinion about the above issue.    History of Present Illness   Phani Landis is a 64 y.o. above medical problems who returns today for follow-up.  He continues on Jakafi 20 mg twice daily with good tolerance.  He had an incident prior to his check last month where he was without his medication for 5 days and platelets during that time got as high as 606,000.  He also had associated GI symptoms with diarrhea and nausea.  Once back on medication he began to feel better and today his counts look excellent with platelet count improved at 407,000.     Patient just recently finished a 3-month course of Epclusa for hepatitis.  Last hepatitis check was negative.  He has follow-up with hepatologist forthcoming.    He is doing well with no new concerns otherwise.      Hematological oncologic history:  Patient is a 59-year-old gentleman who has been referred by his primary care Castillo Velasco for evaluation of thrombocytosis and polycythemia.  He has lost more than 50 pounds of weight.  In the last 6 months.  Looking back his blood counts have been high starting in 2016.  Initially his platelets were high around 670 and gradually increased in the 900 range.  More recently his hemoglobin has been increasing and his white count has been increasing.  He has not had a DVT or pulmonary embolism.  He has had no issues with itching with hot showers.  Has not had a stroke.  He had pneumonia last year but none recently.  He does have some shortness of breath but no chest pain.  He is exposed to secondhand smoking.    He does not have any nausea vomiting or abdominal pain at present.  He did have gout on several locations and was placed on meloxicam.  His renal function is  "mildly elevated.  He denies any fever or night sweats.    Patient does have a cough productive of yellow sputum but has no fevers.    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, EPO 1.3 low  CT chest negative, CT abdomen pelvis shows splenomegaly    Past Medical History:   Diagnosis Date    Anxiety     Arthritis     Arthritis of back 1990    Arthritis of neck 1990    Cancer 06/2020    Polycythemia vera    Cervical disc disorder 1990    CTS (carpal tunnel syndrome) 2001    Fracture, clavicle 1983    Hypertension     Infectious viral hepatitis 05/2024    C    Knee swelling 2019    Low back strain 1990    Neck strain 1990    Stress fracture 1990    Tear of meniscus of knee     Thoracic disc disorder 1990        Past Surgical History:   Procedure Laterality Date    CHEST SURGERY      OTHER SURGICAL HISTORY  1985    \"Exploratory\"        Current Outpatient Medications on File Prior to Visit   Medication Sig Dispense Refill    allopurinol (ZYLOPRIM) 100 MG tablet Take 1 tablet by mouth 2 (Two) Times a Day. 180 tablet 0    ALPRAZolam (Xanax) 0.5 MG tablet Take 1 tablet by mouth 2 (Two) Times a Day As Needed for Anxiety or Sleep. Indications: Feeling Anxious 30 tablet 0    amLODIPine-benazepril (LOTREL) 5-40 MG per capsule Take 1 capsule by mouth Daily. Indications: High Blood Pressure 90 capsule 1    aspirin 81 MG chewable tablet Chew 1 tablet Daily.      bisoprolol (ZEBeta) 10 MG tablet Take 1 tablet by mouth Daily. Indications: High Blood Pressure 90 tablet 1    furosemide (LASIX) 20 MG tablet Take 1 tablet by mouth Daily.      hydrALAZINE (APRESOLINE) 50 MG tablet Take 1 tablet by mouth 2 (Two) Times a Day. Indications: High Blood Pressure 180 tablet 1    ruxolitinib (JAKAFI) 20 MG tablet Take 1 tablet by mouth 2 (Two) Times a Day. 60 tablet 5    [DISCONTINUED] Epclusa 400-100 MG tablet Take 1 tablet by mouth Daily. 84 tablet 0     No current facility-administered " "medications on file prior to visit.        ALLERGIES:    Allergies   Allergen Reactions    Zoloft [Sertraline] Hallucinations    Cephalexin Itching        Social History     Socioeconomic History    Marital status:    Tobacco Use    Smoking status: Never    Smokeless tobacco: Never   Vaping Use    Vaping status: Never Used   Substance and Sexual Activity    Alcohol use: Yes     Comment: Occasional drinker    Drug use: Never    Sexual activity: Not Currently     Partners: Female        Family History   Problem Relation Age of Onset    Hypertension Father     Hypertension Other     Heart disease Other     Cervical cancer Other       I have reviewed the patient's medical history in detail and updated the computerized patient record.    Review of Systems  ROS as per HPI      Objective     Vitals:    12/09/24 1040   BP: 164/92   Pulse: 63   Resp: 16   Temp: 97.5 °F (36.4 °C)   TempSrc: Oral   SpO2: 95%   Weight: 132 kg (291 lb)   Height: 188 cm (74.02\")   PainSc: 0-No pain                   12/9/2024    10:45 AM   Current Status   ECOG score 0     Physical Exam  Vitals reviewed.   Constitutional:       General: He is not in acute distress.     Appearance: Normal appearance. He is well-developed.   HENT:      Head: Normocephalic and atraumatic.   Eyes:      Pupils: Pupils are equal, round, and reactive to light.   Cardiovascular:      Rate and Rhythm: Normal rate and regular rhythm.      Heart sounds: Normal heart sounds. No murmur heard.  Pulmonary:      Effort: Pulmonary effort is normal. No respiratory distress.      Breath sounds: Normal breath sounds. No wheezing, rhonchi or rales.   Abdominal:      General: Bowel sounds are normal. There is no distension.      Palpations: Abdomen is soft.   Musculoskeletal:         General: Swelling (1-2+ bilateral LE edema) present. Normal range of motion.      Cervical back: Normal range of motion.   Skin:     General: Skin is warm and dry.      Findings: No rash.      " Comments: Scattered ecchymosis on the upper extremities bilaterally.  Bandaged area on his shin of the right lower extremity.  Dressing clean dry and intact.   Neurological:      Mental Status: He is alert and oriented to person, place, and time.         RECENT LABS:  Results from last 7 days   Lab Units 12/09/24  1028   WBC 10*3/mm3 6.83   NEUTROS ABS 10*3/mm3 4.03   HEMOGLOBIN g/dL 12.8*   HEMATOCRIT % 39.6   PLATELETS 10*3/mm3 407       Assessment & Plan     1.  Polycythemia with leukocytosis and thrombocytosis.    He does not have any itching with hot showers.  He does not have any headaches or DVT.  He does have blood counts worsening gradually from 2016.  7/8/2020  His platelets 974K.  Hemoglobin is 17.6 with hematocrit of 52.8 and white count of 11.56.  He denies fever night sweats but has significant weight loss greater than 50 pounds in 6 months.  .  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  CT chest negative  Hydroxyurea 500 mg twice daily was initiated on July 16, 2020.  .  7/22/2020 Hydrea increased to 1500 mg (1000 mg in am and 500 mg in pm) Monday-Friday and 2000 mg (1000 mg in am and 1000 mg in PM) on Saturdays and Sundays  7/29/2020 patient's platelet count today is 821.  White count 7.78, hemoglobin 16.4.   Increased Hydrea at 1500 mg Monday through Friday, and 2000 mg on Saturdays and Sundays.   In future if hematocrit greater than 48 then he will receive phlebotomy.  Last phlebotomy 7/15/2020.   2/23/2022 patient continues on Hydrea 1500 mg on Monday, Wednesday, and Friday, 1500 mg all other days.  He is tolerating Hydrea well aside from some fatigue on the days he takes 1500 mg.  He also continues to receive phlebotomy for hematocrit greater than 48, hematocrit today 44.7 so patient will not receive phlebotomy.  Platelets 575, so we will increase Hydrea to 1500 mg four times a week, 1000 mg all other  days.    6/15/2022: Patient continues on Hydrea 1500 mg 4 days a week and 1000 mg all other days.  Platelets increased to 573,000.  Increase Hydrea to 1500 mg 5 days a week and 1000 mg 2 days a week.    September 7, 2022: Tolerating Hydrea very well.  His platelets are down to 497.  He continues with aspirin.  Given hematocrit of 44 we will hold off phlebotomy  11/30/2022 continuing on Hydrea 1500 mg 5 days a week and 1000 mg 2 days a week tolerating well.  May 24, 2023: Patient's hematocrit is 42.  But his platelet is elevated to 580.  We will increase the dose of his hydroxyurea to 1500 mg 6 days a week and 1000 mg 1 day a week  8/16/2023: Patient's hematocrit is normal at 41.9.  Platelets have normalized to 430,000.  Hemoglobin 14.9 and WBC 6.29.  Hydrea dosing will remain the same at 1500 mg 6 days a week and 100 mg once weekly.   November 15, 2023: Patient has new ulcerations in the right lower extremity for the last 2-3 months which are not healing.  On discussion with wound care they felt this is more likely hydroxyurea related rather than venous disease.  He has never been a smoker and so we discussed switching to Jakafi.   11/27/2023: Patient initiating Jakafi 10 mg p.o. twice daily.  Tolerating well and denies any side effects.    January 17 2024: Patient was seen 2 weeks ago at [place] by a nurse practitioner of Dr. Garcia the oncologist.  However his platelets had gone up to 275 and his white count was elevated to 11.41.  As a result they had increased his dose on Jakafi to 20 mg in the morning and 10 mg at bedtime.  However today after the increased dose his counts have improved though platelet count is still 578 and white count is normalized.  He prefers to follow-up at [place].  Patient has never had a bone marrow and we discussed about consideration of bone marrow in order to make sure there is no progression to myelofibrosis.  His spleen is enlarged.  1/31/2024 platelet increased to  940,000.  Jakafi was increased to 20 mg twice daily.  2/14/2024 platelet count 729,000, hemoglobin 13.1, WBC 6.68.  Patient will currently continue on Jakafi 20 mg twice daily.  April 17, 2024: Blood counts are more stable.  His hemoglobin is down to 11.79 but his white count and platelet count are good..  Will repeat check CT scan at some point in January 2024's results show evidence of any decrease in splenomegaly.  He is tolerating Jakafi 20 mg p.o. twice daily  5/15/2024 platelet count 487, hemoglobin 12.4, white count 7.05.  Continues on Jakafi 20 mg twice daily, tolerating well.  6/5/2024 continuing on Jakafi 20 mg twice daily.  WBC 7.73, hemoglobin 11.1, platelet count 455,000.  He will continue on Jakafi.    July 10/2024: Patient is tolerating Jakafi very well.  He has new diagnosis of hepatitis C for which he is going to be treated by hepatologist at [place].  Discussion done with the physician at [place] and they plan to make sure that the drug to use to treat hepatitis C does not interact with Jakafi.  August 28, 2024: Patient has been started on Epclusa for his hepatitis by hepatologist at [place].  He is tolerating it well.  His hemoglobin today is 400 which is good.   8/8/2024 abdominal ultrasound showing improved splenomegaly from 17 cm down to 15 cm.  12/9/2024 patient doing well today continuing on Jakafi 20 mg twice daily with good tolerance.  Platelet count 4 9 7000, WBC 6.8.  Hemoglobin 12.8, hematocrit 39%.  Continue current plan of care.    2.  Hepatitis C diagnosis 7/2024 followed by hepatologist at [place].  Treated with 3 months of Epclusa with negative hepatitis levels now.       Plan:   Continue Jakafi 20 mg twice daily.  Return in 1 and 2 months for CBC with RN review  Return in 3 months for follow-up with Dr. Pablo with CBC, CMP.    Call with any questions or concerns prior to scheduled return.        Patient is on a high risk medication requiring close  monitoring for toxicity.      Nadege Dover, APRN

## 2024-12-10 ENCOUNTER — SPECIALTY PHARMACY (OUTPATIENT)
Dept: PHARMACY | Facility: HOSPITAL | Age: 64
End: 2024-12-10
Payer: COMMERCIAL

## 2024-12-10 NOTE — PROGRESS NOTES
Specialty Pharmacy Note: Jakafi (ruxolitinib)    Phani Landis is a 64 y.o. male with polycythemia vera was seen 12/9/24 by APRN. Per provider dictation, no changes to oral oncology regimen  Jakafi 20 mg twice daily .  Labs Review: The CMP and CBC from 12/9/24 have been reviewed. No dose adjustments are needed for the oral specialty medication(s) based on the labs.    Specialty pharmacy will continue to follow patient.    Elder Holder, PharmD, [facility]  Clinical Oncology Pharmacist    12/10/2024  10:59 EST

## 2025-01-05 DIAGNOSIS — E79.0 ELEVATED URIC ACID IN BLOOD: ICD-10-CM

## 2025-01-05 DIAGNOSIS — D45 POLYCYTHEMIA VERA: ICD-10-CM

## 2025-01-05 DIAGNOSIS — D75.839 THROMBOCYTOSIS: ICD-10-CM

## 2025-01-07 ENCOUNTER — TELEPHONE (OUTPATIENT)
Dept: ONCOLOGY | Facility: CLINIC | Age: 65
End: 2025-01-07

## 2025-01-07 NOTE — TELEPHONE ENCOUNTER
"    Caller: Phani Landis \"Raj\"    Relationship to patient: Self    Best call back number:     [phone]       Chief complaint: PT NEEDS TO CANCEL 01/08/25 APPT AND WILL JUST COME TO HIS APPT IN FEBRUARY.     Type of visit: LAB AND RN REVIEW      "

## 2025-01-08 DIAGNOSIS — D45 POLYCYTHEMIA VERA: Primary | ICD-10-CM

## 2025-01-08 RX ORDER — ALLOPURINOL 100 MG/1
100 TABLET ORAL 2 TIMES DAILY
Qty: 180 TABLET | Refills: 0 | Status: SHIPPED | OUTPATIENT
Start: 2025-01-08

## 2025-01-09 DIAGNOSIS — F41.9 ANXIETY: ICD-10-CM

## 2025-01-09 RX ORDER — ALPRAZOLAM 0.5 MG
0.5 TABLET ORAL 2 TIMES DAILY PRN
Qty: 30 TABLET | Refills: 0 | Status: SHIPPED | OUTPATIENT
Start: 2025-01-09

## 2025-01-14 ENCOUNTER — SPECIALTY PHARMACY (OUTPATIENT)
Dept: PHARMACY | Facility: HOSPITAL | Age: 65
End: 2025-01-14
Payer: COMMERCIAL

## 2025-01-14 DIAGNOSIS — D45 POLYCYTHEMIA VERA: ICD-10-CM

## 2025-01-14 NOTE — PROGRESS NOTES
Specialty Pharmacy Patient Management Program  Per Protocol Prescription Order or Refill       Requested Prescriptions     Signed Prescriptions Disp Refills    ruxolitinib (JAKAFI) 20 MG tablet 60 tablet 5     Sig: Take 1 tablet by mouth 2 (Two) Times a Day.     Prescription orders above were sent to [facility] per Collaborative Care Agreement Protocol.     Completed independent double check on medication order/RX.    Nuno JensenD, [facility]  Clinical Specialty Pharmacist, Oncology  1/14/2025  12:49 EST

## 2025-01-14 NOTE — PROGRESS NOTES
Specialty Pharmacy Patient Management Program  Per Protocol Prescription Order or Refill     Patient will be filling or currently fills medications at [facility] and is enrolled in the Patient Management Program.    Requested Prescriptions     Signed Prescriptions Disp Refills    ruxolitinib (JAKAFI) 20 MG tablet 60 tablet 5     Sig: Take 1 tablet by mouth 2 (Two) Times a Day.     Prescription orders above were sent to the pharmacy per Collaborative Care Agreement Protocol.     Last Office Visit: 12/9/24  Next Office Visit: 3/5/25    Elder Holder PharmD, [facility]OP  Clinical Specialty Pharmacist, Oncology  1/14/2025  12:39 EST

## 2025-02-05 ENCOUNTER — LAB (OUTPATIENT)
Dept: OTHER | Facility: HOSPITAL | Age: 65
End: 2025-02-05
Payer: COMMERCIAL

## 2025-02-05 ENCOUNTER — CLINICAL SUPPORT (OUTPATIENT)
Dept: ONCOLOGY | Facility: HOSPITAL | Age: 65
End: 2025-02-05
Payer: COMMERCIAL

## 2025-02-05 DIAGNOSIS — D45 POLYCYTHEMIA VERA: ICD-10-CM

## 2025-02-05 DIAGNOSIS — D45 CHRONIC ERYTHREMIA IN REMISSION: ICD-10-CM

## 2025-02-05 DIAGNOSIS — Z79.899 HIGH RISK MEDICATION USE: ICD-10-CM

## 2025-02-05 DIAGNOSIS — E87.6 HYPOPOTASSEMIA: Primary | ICD-10-CM

## 2025-02-05 DIAGNOSIS — M16.9 HIP ARTHROSIS: ICD-10-CM

## 2025-02-05 LAB
ALBUMIN SERPL-MCNC: 4.7 G/DL (ref 3.5–5.2)
ANION GAP SERPL CALCULATED.3IONS-SCNC: 11 MMOL/L (ref 5–15)
BACTERIA UR QL AUTO: NORMAL /HPF
BASOPHILS # BLD AUTO: 0.1 10*3/MM3 (ref 0–0.2)
BASOPHILS NFR BLD AUTO: 1.5 % (ref 0–1.5)
BILIRUB UR QL STRIP: NEGATIVE
BUN SERPL-MCNC: 15 MG/DL (ref 8–23)
BUN/CREAT SERPL: 15.6 (ref 7–25)
CALCIUM SPEC-SCNC: 9.7 MG/DL (ref 8.6–10.5)
CHLORIDE SERPL-SCNC: 101 MMOL/L (ref 98–107)
CLARITY UR: CLEAR
CO2 SERPL-SCNC: 28 MMOL/L (ref 22–29)
COLOR UR: YELLOW
CREAT SERPL-MCNC: 0.96 MG/DL (ref 0.76–1.27)
CREAT UR-MCNC: 23.2 MG/DL
DEPRECATED RDW RBC AUTO: 57.6 FL (ref 37–54)
EGFRCR SERPLBLD CKD-EPI 2021: 88.3 ML/MIN/1.73
EOSINOPHIL # BLD AUTO: 0.07 10*3/MM3 (ref 0–0.4)
EOSINOPHIL NFR BLD AUTO: 1.1 % (ref 0.3–6.2)
ERYTHROCYTE [DISTWIDTH] IN BLOOD BY AUTOMATED COUNT: 17.1 % (ref 12.3–15.4)
GLUCOSE SERPL-MCNC: 102 MG/DL (ref 65–99)
GLUCOSE UR STRIP-MCNC: NEGATIVE MG/DL
HCT VFR BLD AUTO: 39.2 % (ref 37.5–51)
HGB BLD-MCNC: 12.9 G/DL (ref 13–17.7)
HGB UR QL STRIP.AUTO: NEGATIVE
HYALINE CASTS UR QL AUTO: NORMAL /LPF
IMM GRANULOCYTES # BLD AUTO: 0.15 10*3/MM3 (ref 0–0.05)
IMM GRANULOCYTES NFR BLD AUTO: 2.3 % (ref 0–0.5)
KETONES UR QL STRIP: NEGATIVE
LEUKOCYTE ESTERASE UR QL STRIP.AUTO: NEGATIVE
LYMPHOCYTES # BLD AUTO: 1.96 10*3/MM3 (ref 0.7–3.1)
LYMPHOCYTES NFR BLD AUTO: 29.7 % (ref 19.6–45.3)
MCH RBC QN AUTO: 30.1 PG (ref 26.6–33)
MCHC RBC AUTO-ENTMCNC: 32.9 G/DL (ref 31.5–35.7)
MCV RBC AUTO: 91.6 FL (ref 79–97)
MONOCYTES # BLD AUTO: 0.73 10*3/MM3 (ref 0.1–0.9)
MONOCYTES NFR BLD AUTO: 11.1 % (ref 5–12)
NEUTROPHILS NFR BLD AUTO: 3.58 10*3/MM3 (ref 1.7–7)
NEUTROPHILS NFR BLD AUTO: 54.3 % (ref 42.7–76)
NITRITE UR QL STRIP: NEGATIVE
NRBC BLD AUTO-RTO: 0.3 /100 WBC (ref 0–0.2)
PH UR STRIP.AUTO: 6.5 [PH] (ref 5–8)
PHOSPHATE SERPL-MCNC: 2.9 MG/DL (ref 2.5–4.5)
PLATELET # BLD AUTO: 363 10*3/MM3 (ref 140–450)
PMV BLD AUTO: 10.2 FL (ref 6–12)
POTASSIUM SERPL-SCNC: 3.6 MMOL/L (ref 3.5–5.2)
PROT ?TM UR-MCNC: 9 MG/DL
PROT UR QL STRIP: NEGATIVE
PROT/CREAT UR: 387.9 MG/G CREA (ref 0–200)
RBC # BLD AUTO: 4.28 10*6/MM3 (ref 4.14–5.8)
RBC # UR STRIP: NORMAL /HPF
REF LAB TEST METHOD: NORMAL
SODIUM SERPL-SCNC: 140 MMOL/L (ref 136–145)
SP GR UR STRIP: 1.01 (ref 1–1.03)
SQUAMOUS #/AREA URNS HPF: NORMAL /HPF
UROBILINOGEN UR QL STRIP: NORMAL
WBC # UR STRIP: NORMAL /HPF
WBC NRBC COR # BLD AUTO: 6.59 10*3/MM3 (ref 3.4–10.8)

## 2025-02-05 PROCEDURE — 82570 ASSAY OF URINE CREATININE: CPT | Performed by: INTERNAL MEDICINE

## 2025-02-05 PROCEDURE — 85025 COMPLETE CBC W/AUTO DIFF WBC: CPT | Performed by: NURSE PRACTITIONER

## 2025-02-05 PROCEDURE — 81001 URINALYSIS AUTO W/SCOPE: CPT | Performed by: INTERNAL MEDICINE

## 2025-02-05 PROCEDURE — 80069 RENAL FUNCTION PANEL: CPT | Performed by: INTERNAL MEDICINE

## 2025-02-05 PROCEDURE — 84156 ASSAY OF PROTEIN URINE: CPT | Performed by: INTERNAL MEDICINE

## 2025-02-05 PROCEDURE — 36415 COLL VENOUS BLD VENIPUNCTURE: CPT

## 2025-02-05 NOTE — PROGRESS NOTES
Phani Landis is a 64 y.o. male with Polycythemia with leukocytosis and thrombocytosis seen by an Hematology/Oncology provider, Dr. Pablo, and is scheduled with Clinical Pharmacy Review offered by [facility]. Patient's visit was held via telephone today.     Therapy plan  Jakafi 20 mg twice daily     Relevant Laboratory Values  Lab Results   Component Value Date    GLUCOSE 102 (H) 02/05/2025    CALCIUM 9.7 02/05/2025     02/05/2025    K 3.6 02/05/2025    CO2 28.0 02/05/2025     02/05/2025    BUN 15 02/05/2025    CREATININE 0.96 02/05/2025    EGFRIFAFRI 70 06/22/2020    EGFRIFNONA 67 02/23/2022    BCR 15.6 02/05/2025    ANIONGAP 11.0 02/05/2025     Lab Results   Component Value Date    WBC 6.59 02/05/2025    RBC 4.28 02/05/2025    HGB 12.9 (L) 02/05/2025    HCT 39.2 02/05/2025    MCV 91.6 02/05/2025    MCH 30.1 02/05/2025    MCHC 32.9 02/05/2025    RDW 17.1 (H) 02/05/2025    RDWSD 57.6 (H) 02/05/2025    MPV 10.2 02/05/2025     02/05/2025    NEUTRORELPCT 54.3 02/05/2025    LYMPHORELPCT 29.7 02/05/2025    MONORELPCT 11.1 02/05/2025    EOSRELPCT 1.1 02/05/2025    BASORELPCT 1.5 02/05/2025    AUTOIGPER 2.3 (H) 02/05/2025    NEUTROABS 3.58 02/05/2025    LYMPHSABS 1.96 02/05/2025    MONOSABS 0.73 02/05/2025    EOSABS 0.07 02/05/2025    BASOSABS 0.10 02/05/2025    AUTOIGNUM 0.15 (H) 02/05/2025    NRBC 0.3 (H) 02/05/2025       Clinical Review  Patient reports adherence to Jakafi 20 mg twice daily. Patient reports feeling well overall with no complaints at this time.    Hemoglobin 12.9  Platelets 363,000    The patient's current therapy plan and above labs have been reviewed.     Plan  CBC and Comprehensive Metabolic Panel reviewed - see above in Laboratory Results  Will instruct Phani Landis to continue Jakafi at current dose.  Follow up in one month when scheduled with Dr. Pablo. Next scheduled appointment(s) reviewed with patient.  Patient is instructed to call the office with  any concerns or new symptoms prior to next visit.  Verbal information provided. Patient expresses understanding and has no further questions at this time.            Mikaela Wynn, PharmD  Clinical Pharmacy Services   [facility]  2/5/2025  12:56 EST

## 2025-02-06 ENCOUNTER — TELEPHONE (OUTPATIENT)
Dept: GASTROENTEROLOGY | Facility: HOSPITAL | Age: 65
End: 2025-02-06
Payer: COMMERCIAL

## 2025-02-06 NOTE — TELEPHONE ENCOUNTER
"       Hub staff attempted to follow warm transfer process and was unsuccessful     Caller: Phani Landis \"Raj\"    Relationship to patient: Self    Best call back number:   [phone]    Patient is needing: PT NEEDS TO RESCHEDULE FOR LATER IN THE DAY AS HE IS NEEDING TO GET TRANSPORTATION. PLEASE CALL PT           "

## 2025-02-07 ENCOUNTER — TELEPHONE (OUTPATIENT)
Dept: GASTROENTEROLOGY | Facility: HOSPITAL | Age: 65
End: 2025-02-07

## 2025-02-07 NOTE — TELEPHONE ENCOUNTER
"Hub staff attempted to follow warm transfer process and was unsuccessful     Caller: Phani Landis \"Raj\"    Relationship to patient: Self    Best call back number: [phone]    Patient is needing: PATIENT CALLED IN AND STATED THAT HE NEEDS TO RESCHEDULE HIS APPOINTMENT FOR TODAY WITH [facility] DUE TO CAN NOT GET CAR STARTED AND HAS NO OTHER TRANSPORTATION. PLEASE CALL BACK ANYTIME TO RESCHEDULE, OKAY TO LEAVE VM.        "

## 2025-02-07 NOTE — TELEPHONE ENCOUNTER
Spoke with patient and he has been rescheduled to 2/21/2025 at 1015am. Patient verbalized understanding.

## 2025-02-28 ENCOUNTER — TELEPHONE (OUTPATIENT)
Dept: ONCOLOGY | Facility: CLINIC | Age: 65
End: 2025-02-28
Payer: COMMERCIAL

## 2025-02-28 NOTE — TELEPHONE ENCOUNTER
"  Caller: Phani Landis \"Raj\"    Relationship to patient: Self    Best call back number: [phone]    Chief complaint: REQUEST TO MOVE APPT OUT    Type of visit:  LAB AND FOLLOW UP 1    Requested date: AROUND 4/2/25    PREFER ON A WEDNESDAY IF CAN, AND NEEDING APPT TIME AROUND 11:30AM OR 12:30PM     If rescheduling, when is the original appointment: 03/05     "
Spoke with pt and rescheduled appt due to transportation. Pt confirmed new appt   
The patient has been re-examined and I agree with the above assessment or I updated with my findings.

## 2025-03-17 DIAGNOSIS — F41.9 ANXIETY: ICD-10-CM

## 2025-03-17 RX ORDER — ALPRAZOLAM 0.5 MG
0.5 TABLET ORAL 2 TIMES DAILY PRN
Qty: 30 TABLET | Refills: 0 | Status: SHIPPED | OUTPATIENT
Start: 2025-03-17

## 2025-04-04 ENCOUNTER — TELEPHONE (OUTPATIENT)
Dept: GASTROENTEROLOGY | Facility: CLINIC | Age: 65
End: 2025-04-04

## 2025-04-04 NOTE — TELEPHONE ENCOUNTER
"Caller: Phani Landis \"Raj\"    Relationship:  Self    Best call back number: [phone]    PATIENT CALLED REQUESTING TO CANCEL SAME DAY APPT.    Did the patient call AFTER the start time of their scheduled appointment?  []YES  [x]NO    Was the patient's appointment rescheduled? []YES  [x]NO    Any additional information: PATIENT STATED THAT THE WEATHER IS BAD WHERE HE IS. PATIENT STATED THAT HE DID NOT WANT TO RESCHEDULE DUE TO HE HAS AN APPOINTMENT SCHEDULED FOR 05/16/2025.    "

## 2025-04-09 ENCOUNTER — APPOINTMENT (OUTPATIENT)
Dept: ONCOLOGY | Facility: HOSPITAL | Age: 65
End: 2025-04-09
Payer: COMMERCIAL

## 2025-04-09 ENCOUNTER — OFFICE VISIT (OUTPATIENT)
Dept: ONCOLOGY | Facility: CLINIC | Age: 65
End: 2025-04-09
Payer: COMMERCIAL

## 2025-04-09 ENCOUNTER — LAB (OUTPATIENT)
Dept: OTHER | Facility: HOSPITAL | Age: 65
End: 2025-04-09
Payer: COMMERCIAL

## 2025-04-09 VITALS
DIASTOLIC BLOOD PRESSURE: 99 MMHG | RESPIRATION RATE: 16 BRPM | TEMPERATURE: 98.4 F | SYSTOLIC BLOOD PRESSURE: 162 MMHG | WEIGHT: 296.4 LBS | HEIGHT: 74 IN | HEART RATE: 61 BPM | BODY MASS INDEX: 38.04 KG/M2 | OXYGEN SATURATION: 98 %

## 2025-04-09 DIAGNOSIS — D45 POLYCYTHEMIA VERA: ICD-10-CM

## 2025-04-09 DIAGNOSIS — E87.1 HYPOSMOLALITY SYNDROME: ICD-10-CM

## 2025-04-09 DIAGNOSIS — Z79.899 HIGH RISK MEDICATION USE: ICD-10-CM

## 2025-04-09 DIAGNOSIS — E87.6 HYPOPOTASSEMIA: Primary | ICD-10-CM

## 2025-04-09 DIAGNOSIS — R60.0 LOCALIZED EDEMA: ICD-10-CM

## 2025-04-09 DIAGNOSIS — R80.0 ISOLATED NON-NEPHROTIC PROTEINURIA: ICD-10-CM

## 2025-04-09 DIAGNOSIS — M10.9 GOUT, UNSPECIFIED CAUSE, UNSPECIFIED CHRONICITY, UNSPECIFIED SITE: ICD-10-CM

## 2025-04-09 DIAGNOSIS — B18.2 CHRONIC HEPATITIS C WITHOUT HEPATIC COMA: ICD-10-CM

## 2025-04-09 DIAGNOSIS — D45 POLYCYTHEMIA VERA: Primary | ICD-10-CM

## 2025-04-09 DIAGNOSIS — D45 CHRONIC ERYTHREMIA IN REMISSION: ICD-10-CM

## 2025-04-09 DIAGNOSIS — I10 ESSENTIAL HYPERTENSION, MALIGNANT: ICD-10-CM

## 2025-04-09 LAB
25(OH)D3 SERPL-MCNC: 54.3 NG/ML (ref 30–100)
ALBUMIN SERPL-MCNC: 5 G/DL (ref 3.5–5.2)
ALBUMIN SERPL-MCNC: 5 G/DL (ref 3.5–5.2)
ALBUMIN/GLOB SERPL: 1.6 G/DL
ALP SERPL-CCNC: 72 U/L (ref 39–117)
ALT SERPL W P-5'-P-CCNC: 29 U/L (ref 1–41)
ANION GAP SERPL CALCULATED.3IONS-SCNC: 11.2 MMOL/L (ref 5–15)
ANION GAP SERPL CALCULATED.3IONS-SCNC: 12.3 MMOL/L (ref 5–15)
AST SERPL-CCNC: 39 U/L (ref 1–40)
BACTERIA UR QL AUTO: NORMAL /HPF
BASOPHILS # BLD AUTO: 0.1 10*3/MM3 (ref 0–0.2)
BASOPHILS NFR BLD AUTO: 1.5 % (ref 0–1.5)
BILIRUB SERPL-MCNC: 0.5 MG/DL (ref 0–1.2)
BILIRUB UR QL STRIP: NEGATIVE
BUN SERPL-MCNC: 14 MG/DL (ref 8–23)
BUN SERPL-MCNC: 15 MG/DL (ref 8–23)
BUN/CREAT SERPL: 12.4 (ref 7–25)
BUN/CREAT SERPL: 13.2 (ref 7–25)
CALCIUM SPEC-SCNC: 9.7 MG/DL (ref 8.6–10.5)
CALCIUM SPEC-SCNC: 9.9 MG/DL (ref 8.6–10.5)
CHLORIDE SERPL-SCNC: 102 MMOL/L (ref 98–107)
CHLORIDE SERPL-SCNC: 102 MMOL/L (ref 98–107)
CLARITY UR: CLEAR
CO2 SERPL-SCNC: 25.7 MMOL/L (ref 22–29)
CO2 SERPL-SCNC: 25.8 MMOL/L (ref 22–29)
COLOR UR: YELLOW
CREAT SERPL-MCNC: 1.13 MG/DL (ref 0.76–1.27)
CREAT SERPL-MCNC: 1.14 MG/DL (ref 0.76–1.27)
CREAT UR-MCNC: 120.6 MG/DL
DEPRECATED RDW RBC AUTO: 55.3 FL (ref 37–54)
EGFRCR SERPLBLD CKD-EPI 2021: 71.8 ML/MIN/1.73
EGFRCR SERPLBLD CKD-EPI 2021: 72.6 ML/MIN/1.73
EOSINOPHIL # BLD AUTO: 0.07 10*3/MM3 (ref 0–0.4)
EOSINOPHIL NFR BLD AUTO: 1 % (ref 0.3–6.2)
ERYTHROCYTE [DISTWIDTH] IN BLOOD BY AUTOMATED COUNT: 16.2 % (ref 12.3–15.4)
GLOBULIN UR ELPH-MCNC: 3.1 GM/DL
GLUCOSE SERPL-MCNC: 89 MG/DL (ref 65–99)
GLUCOSE SERPL-MCNC: 91 MG/DL (ref 65–99)
GLUCOSE UR STRIP-MCNC: NEGATIVE MG/DL
HCT VFR BLD AUTO: 42 % (ref 37.5–51)
HGB BLD-MCNC: 13.6 G/DL (ref 13–17.7)
HGB UR QL STRIP.AUTO: NEGATIVE
HYALINE CASTS UR QL AUTO: NORMAL /LPF
IMM GRANULOCYTES # BLD AUTO: 0.07 10*3/MM3 (ref 0–0.05)
IMM GRANULOCYTES NFR BLD AUTO: 1 % (ref 0–0.5)
KETONES UR QL STRIP: NEGATIVE
LEUKOCYTE ESTERASE UR QL STRIP.AUTO: NEGATIVE
LYMPHOCYTES # BLD AUTO: 1.67 10*3/MM3 (ref 0.7–3.1)
LYMPHOCYTES NFR BLD AUTO: 24.6 % (ref 19.6–45.3)
MCH RBC QN AUTO: 30 PG (ref 26.6–33)
MCHC RBC AUTO-ENTMCNC: 32.4 G/DL (ref 31.5–35.7)
MCV RBC AUTO: 92.7 FL (ref 79–97)
MONOCYTES # BLD AUTO: 0.69 10*3/MM3 (ref 0.1–0.9)
MONOCYTES NFR BLD AUTO: 10.1 % (ref 5–12)
NEUTROPHILS NFR BLD AUTO: 4.2 10*3/MM3 (ref 1.7–7)
NEUTROPHILS NFR BLD AUTO: 61.8 % (ref 42.7–76)
NITRITE UR QL STRIP: NEGATIVE
NRBC BLD AUTO-RTO: 0.3 /100 WBC (ref 0–0.2)
PH UR STRIP.AUTO: 6 [PH] (ref 5–8)
PHOSPHATE SERPL-MCNC: 3.1 MG/DL (ref 2.5–4.5)
PLATELET # BLD AUTO: 383 10*3/MM3 (ref 140–450)
PMV BLD AUTO: 10.8 FL (ref 6–12)
POTASSIUM SERPL-SCNC: 4.1 MMOL/L (ref 3.5–5.2)
POTASSIUM SERPL-SCNC: 4.1 MMOL/L (ref 3.5–5.2)
PROT ?TM UR-MCNC: 28 MG/DL
PROT SERPL-MCNC: 8.1 G/DL (ref 6–8.5)
PROT UR QL STRIP: ABNORMAL
PROT/CREAT UR: 232.2 MG/G CREA (ref 0–200)
PTH-INTACT SERPL-MCNC: 108 PG/ML (ref 15–65)
RBC # BLD AUTO: 4.53 10*6/MM3 (ref 4.14–5.8)
RBC # UR STRIP: NORMAL /HPF
REF LAB TEST METHOD: NORMAL
SODIUM SERPL-SCNC: 139 MMOL/L (ref 136–145)
SODIUM SERPL-SCNC: 140 MMOL/L (ref 136–145)
SP GR UR STRIP: 1.01 (ref 1–1.03)
SQUAMOUS #/AREA URNS HPF: NORMAL /HPF
URATE SERPL-MCNC: 4.8 MG/DL (ref 3.4–7)
UROBILINOGEN UR QL STRIP: ABNORMAL
WBC # UR STRIP: NORMAL /HPF
WBC NRBC COR # BLD AUTO: 6.8 10*3/MM3 (ref 3.4–10.8)

## 2025-04-09 PROCEDURE — 99214 OFFICE O/P EST MOD 30 MIN: CPT | Performed by: INTERNAL MEDICINE

## 2025-04-09 PROCEDURE — 80053 COMPREHEN METABOLIC PANEL: CPT | Performed by: NURSE PRACTITIONER

## 2025-04-09 PROCEDURE — 84550 ASSAY OF BLOOD/URIC ACID: CPT | Performed by: INTERNAL MEDICINE

## 2025-04-09 PROCEDURE — 81001 URINALYSIS AUTO W/SCOPE: CPT | Performed by: NURSE PRACTITIONER

## 2025-04-09 PROCEDURE — 85025 COMPLETE CBC W/AUTO DIFF WBC: CPT | Performed by: NURSE PRACTITIONER

## 2025-04-09 PROCEDURE — 84100 ASSAY OF PHOSPHORUS: CPT | Performed by: NURSE PRACTITIONER

## 2025-04-09 PROCEDURE — 82570 ASSAY OF URINE CREATININE: CPT | Performed by: NURSE PRACTITIONER

## 2025-04-09 PROCEDURE — 36415 COLL VENOUS BLD VENIPUNCTURE: CPT

## 2025-04-09 PROCEDURE — 82306 VITAMIN D 25 HYDROXY: CPT | Performed by: NURSE PRACTITIONER

## 2025-04-09 PROCEDURE — 83970 ASSAY OF PARATHORMONE: CPT | Performed by: NURSE PRACTITIONER

## 2025-04-09 PROCEDURE — 84156 ASSAY OF PROTEIN URINE: CPT | Performed by: NURSE PRACTITIONER

## 2025-04-09 NOTE — PROGRESS NOTES
Subjective     CHIEF COMPLAINT:      Chief Complaint   Patient presents with    Follow-up     HISTORY OF PRESENT ILLNESS:     Phani Landis is a 64 y.o. male patient who returns today for follow up on his polycythemia vera.  He returns today for follow-up reporting that he is feeling good.  He is on Jakafi 20 mg twice daily.  He is tolerating it well.  No nausea or vomiting.  No leg pain or swelling.  He developed extensive lower extremity ulceration while on Hydrea which required treatment for 1 year until the lesions healed.  He is not noticing any new lesions.    Patient is following with nephrology.  He had labs today and he has follow-up with nephrology later this month.    ROS:  Pertinent ROS is in the HPI.     Past medical, surgical, social and family history were reviewed.     MEDICATIONS:    Current Outpatient Medications:     allopurinol (ZYLOPRIM) 100 MG tablet, TAKE 1 TABLET BY MOUTH 2 TIMES A DAY, Disp: 180 tablet, Rfl: 0    ALPRAZolam (Xanax) 0.5 MG tablet, Take 1 tablet by mouth 2 (Two) Times a Day As Needed for Anxiety or Sleep. Indications: Feeling Anxious, Disp: 30 tablet, Rfl: 0    amLODIPine-benazepril (LOTREL) 5-40 MG per capsule, Take 1 capsule by mouth Daily. Indications: High Blood Pressure, Disp: 90 capsule, Rfl: 1    aspirin 81 MG chewable tablet, Chew 1 tablet Daily., Disp: , Rfl:     bisoprolol (ZEBeta) 10 MG tablet, Take 1 tablet by mouth Daily. Indications: High Blood Pressure, Disp: 90 tablet, Rfl: 1    furosemide (LASIX) 20 MG tablet, Take 1 tablet by mouth Daily., Disp: , Rfl:     hydrALAZINE (APRESOLINE) 50 MG tablet, Take 1 tablet by mouth 2 (Two) Times a Day. Indications: High Blood Pressure, Disp: 180 tablet, Rfl: 1    ruxolitinib (JAKAFI) 20 MG tablet, Take 1 tablet by mouth 2 (Two) Times a Day., Disp: 60 tablet, Rfl: 5  Objective     VITAL SIGNS:     Vitals:    04/09/25 1340   BP: 162/99   Pulse: 61   Resp: 16   Temp: 98.4 °F (36.9 °C)   TempSrc: Oral   SpO2: 98%   Weight: 134  "kg (296 lb 6.4 oz)   Height: 188 cm (74.02\")   PainSc: 0-No pain     Body mass index is 38.04 kg/m².     Wt Readings from Last 5 Encounters:   04/09/25 134 kg (296 lb 6.4 oz)   12/09/24 132 kg (291 lb)   11/21/24 131 kg (287 lb 14.4 oz)   11/15/24 130 kg (287 lb)   11/04/24 128 kg (282 lb 4.8 oz)     PHYSICAL EXAMINATION:   GENERAL: The patient appears in good general condition, not in acute distress.   SKIN: No Ecchymosis.  EYES: No jaundice. No Pallor.  CHEST: Normal respiratory effort. Normal breathing sounds bilaterally. No added sounds.  CVS: Normal S1 and S2. No Murmur.  ABDOMEN: Soft. No tenderness. No Hepatomegaly. No Splenomegaly. No masses.  EXTREMITIES: No joint deformity.     DIAGNOSTIC DATA:     Results from last 7 days   Lab Units 04/09/25  1317   WBC 10*3/mm3 6.80   NEUTROS ABS 10*3/mm3 4.20   HEMOGLOBIN g/dL 13.6   HEMATOCRIT % 42.0   PLATELETS 10*3/mm3 383     Results from last 7 days   Lab Units 04/09/25  1329 04/09/25  1317   SODIUM mmol/L 140 139   POTASSIUM mmol/L 4.1 4.1   CHLORIDE mmol/L 102 102   CO2 mmol/L 25.7 25.8   BUN mg/dL 14 15   CREATININE mg/dL 1.13 1.14   CALCIUM mg/dL 9.7 9.9   ALBUMIN g/dL 5.0 5.0   BILIRUBIN mg/dL  --  0.5   ALK PHOS U/L  --  72   ALT (SGPT) U/L  --  29   AST (SGOT) U/L  --  39   GLUCOSE mg/dL 91 89     Component      Latest Ref Rng 4/9/2025   Uric Acid      3.4 - 7.0 mg/dL 4.8      Assessment & Plan    *Polycythemia vera.  It is JAK2 mutation positive.  BCR ABL was negative.  Blood count started to increase in 2016.  7/8/2020: Platelet count increased 174,000.  Hemoglobin 17.6.  Hematocrit 52.8%.  WBC 11,560.  Erythropoietin level is 1.3.  CT abdomen pelvis revealed splenomegaly.  Patient was started on Hydrea 500 mg twice daily on 6/16/2020.  The dose of Hydrea was increased afterwards.  11/15/2023: Patient developed right lower extremity ulcers that started in mid 2023 and they were not healing.  They were concerned to be related to Hydrea.  Patient was " switched to Jakafi 10 mg twice daily on 11/27/2023.  Jakafi was subsequently increased to 20 mg twice daily.  8/8/2024: Abdominal ultrasound revealed improved splenomegaly with decrease in the size from 17 cm to 15 cm.  12/9/2024: Patient was tolerating Jakafi 20 mg twice daily.  Hemoglobin 12.8.  Hematocrit 39%.  WBC 6800.  Platelets 497,000.  He was continued on Jakafi 20 mg twice daily.  4/9/2025: WBC 6800.  Hemoglobin 13.6.  Platelets 383,000.  He is tolerating Jakafi.    *Hepatitis C.  Patient was placed on treatment for hepatitis C in 2024.  He was placed on Epclusa for 3 months with negative hepatitis levels afterwards.  Liver enzymes are normal.    PLAN:    1.  Continue Jakafi 20 mg twice daily.  2.  Continue aspirin 81 mg daily.  3.  Repeat CBC CMP in 2 months.  4.  I will see him in follow-up in 4 months with repeat labs.        [place] MD  04/09/25

## 2025-04-10 DIAGNOSIS — D75.839 THROMBOCYTOSIS: ICD-10-CM

## 2025-04-10 DIAGNOSIS — E79.0 ELEVATED URIC ACID IN BLOOD: ICD-10-CM

## 2025-04-10 DIAGNOSIS — D45 POLYCYTHEMIA VERA: ICD-10-CM

## 2025-04-10 RX ORDER — ALLOPURINOL 100 MG/1
100 TABLET ORAL 2 TIMES DAILY
Qty: 180 TABLET | Refills: 0 | Status: SHIPPED | OUTPATIENT
Start: 2025-04-10

## 2025-04-24 ENCOUNTER — SPECIALTY PHARMACY (OUTPATIENT)
Dept: PHARMACY | Facility: HOSPITAL | Age: 65
End: 2025-04-24
Payer: COMMERCIAL

## 2025-04-24 NOTE — PROGRESS NOTES
Specialty Pharmacy Note: Jakafi (ruxolitinib)    Phani Landis is a 64 y.o. male with polycythemia vera was seen 4/9/25 by Dr. Pablo. Per provider dictation, no changes to oral oncology regimen   Jakafi 20 mg twice daily .  Labs Review: The CMP and CBC from 4/9/25 have been reviewed. No dose adjustments are needed for the oral specialty medication(s) based on the labs.    Specialty pharmacy will continue to follow patient.    Elder Holder, PharmD, [facility]  Clinical Oncology Pharmacist    4/24/2025  07:45 EDT

## 2025-04-28 ENCOUNTER — SPECIALTY PHARMACY (OUTPATIENT)
Dept: PHARMACY | Facility: HOSPITAL | Age: 65
End: 2025-04-28
Payer: COMMERCIAL

## 2025-04-28 NOTE — PROGRESS NOTES
Specialty Pharmacy Patient Management Program  Oncology Reassessment     Phani Landis was referred by an their provider to the Oncology Patient Management program offered by [facility] for polycythemia vera. A follow-up outreach was conducted, including assessment of continued therapy appropriateness, medication adherence, and side effect incidence and management for Jakafi (ruxolitinib).    Changes to Insurance Coverage or Financial Support  none    Relevant Past Medical History and Comorbidities  Relevant medical history and concomitant health conditions were discussed with the patient. The patient's chart has been reviewed for relevant past medical history and comorbid health conditions and updated as necessary.   Past Medical History:   Diagnosis Date    Anxiety     Arthritis     Arthritis of back 1990    Arthritis of neck 1990    Cancer 06/2020    Polycythemia vera    Cervical disc disorder 1990    CTS (carpal tunnel syndrome) 2001    Fracture, clavicle 1983    Hypertension     Infectious viral hepatitis 05/2024    C    Knee swelling 2019    Low back strain 1990    Neck strain 1990    Stress fracture 1990    Tear of meniscus of knee     Thoracic disc disorder 1990     Social History     Socioeconomic History    Marital status:    Tobacco Use    Smoking status: Never    Smokeless tobacco: Never   Vaping Use    Vaping status: Never Used   Substance and Sexual Activity    Alcohol use: Yes     Comment: Occasional drinker    Drug use: Never    Sexual activity: Not Currently     Partners: Female     Problem list reviewed by Lyssa Holder RPH on 4/28/2025 at 11:41 AM    Hospitalizations and Urgent Care Since Last Assessment  ED Visits, Admissions, or Hospitalizations: none  Urgent Office Visits: none    Allergies  Known allergies and reactions were discussed with the patient. The patient's chart has been reviewed for allergy information and updated as necessary.   Allergies    Allergen Reactions    Zoloft [Sertraline] Hallucinations    Cephalexin Itching     Allergies reviewed by Lyssa Holder RPH on 4/28/2025 at 11:41 AM    Relevant Laboratory Values  Relevant laboratory values were discussed with the patient. The following specialty medication dose adjustment(s) are recommended: No dose adjustments are needed for the oral specialty medication(s) based on the labs.    Lab Results   Component Value Date    GLUCOSE 91 04/09/2025    CALCIUM 9.7 04/09/2025     04/09/2025    K 4.1 04/09/2025    CO2 25.7 04/09/2025     04/09/2025    BUN 14 04/09/2025    CREATININE 1.13 04/09/2025    EGFRIFAFRI 70 06/22/2020    EGFRIFNONA 67 02/23/2022    BCR 12.4 04/09/2025    ANIONGAP 12.3 04/09/2025     Lab Results   Component Value Date    WBC 6.80 04/09/2025    RBC 4.53 04/09/2025    HGB 13.6 04/09/2025    HCT 42.0 04/09/2025    MCV 92.7 04/09/2025    MCH 30.0 04/09/2025    MCHC 32.4 04/09/2025    RDW 16.2 (H) 04/09/2025    RDWSD 55.3 (H) 04/09/2025    MPV 10.8 04/09/2025     04/09/2025    NEUTRORELPCT 61.8 04/09/2025    LYMPHORELPCT 24.6 04/09/2025    MONORELPCT 10.1 04/09/2025    EOSRELPCT 1.0 04/09/2025    BASORELPCT 1.5 04/09/2025    AUTOIGPER 1.0 (H) 04/09/2025    NEUTROABS 4.20 04/09/2025    LYMPHSABS 1.67 04/09/2025    MONOSABS 0.69 04/09/2025    EOSABS 0.07 04/09/2025    BASOSABS 0.10 04/09/2025    AUTOIGNUM 0.07 (H) 04/09/2025    NRBC 0.3 (H) 04/09/2025       Current Medication List  This medication list has been reviewed with the patient and evaluated for any interactions or necessary modifications/recommendations, and updated to include all prescription medications, OTC medications, and supplements the patient is currently taking.  This list reflects what is contained in the patient's profile, which has also been marked as reviewed to communicate to other providers it is the most up to date version of the patient's current medication therapy.     Current Outpatient  Medications:     allopurinol (ZYLOPRIM) 100 MG tablet, TAKE 1 TABLET BY MOUTH 2 TIMES A DAY, Disp: 180 tablet, Rfl: 0    ALPRAZolam (Xanax) 0.5 MG tablet, Take 1 tablet by mouth 2 (Two) Times a Day As Needed for Anxiety or Sleep. Indications: Feeling Anxious, Disp: 30 tablet, Rfl: 0    amLODIPine-benazepril (LOTREL) 5-40 MG per capsule, Take 1 capsule by mouth Daily. Indications: High Blood Pressure, Disp: 90 capsule, Rfl: 1    aspirin 81 MG chewable tablet, Chew 1 tablet Daily., Disp: , Rfl:     bisoprolol (ZEBeta) 10 MG tablet, Take 1 tablet by mouth Daily. Indications: High Blood Pressure, Disp: 90 tablet, Rfl: 1    furosemide (LASIX) 20 MG tablet, Take 1 tablet by mouth Daily., Disp: , Rfl:     hydrALAZINE (APRESOLINE) 50 MG tablet, Take 1 tablet by mouth 2 (Two) Times a Day. Indications: High Blood Pressure, Disp: 180 tablet, Rfl: 1    ruxolitinib (JAKAFI) 20 MG tablet, Take 1 tablet by mouth 2 (Two) Times a Day., Disp: 60 tablet, Rfl: 5    Medicines reviewed by Lyssa Holder formerly [facility] on 4/28/2025 at 11:41 AM    Drug Interactions  Assessed medication list for interactions, no significant drug interactions noted.   Advised patient to call the clinic if any new medications are started so we can assess for drug-drug interactions.  Drug-food interactions discussed:  none today     Adverse Drug Reactions  Medication tolerability: Tolerating with no to minimal ADRs  Medication plan: Continue therapy with normal follow-up  Plan for ADR Management: N/A    Adherence, Self-Administration, and Current Therapy Problems  Adherence related to the patient's specialty therapy was discussed with the patient. The Adherence segment of this outreach has been reviewed and updated.     Adherence Questions  Linked Medication(s) Assessed: Ruxolitinib Phosphate (JAKAFI)  On average, how many doses/injections does the patient miss per month?: 0  What are the identified reasons for non-adherence or missed doses? : no problems  identified  What is the estimated medication adherence level?: %  Based on the patient/caregiver response and refill history, does this patient require an MTP to track adherence improvements?: no    Additional Barriers to Patient Self-Administration: none  Methods for Supporting Patient Self-Administration: none needed at this time  Patient has had no issues obtaining medication from pharmacy.    Open Medication Therapy Problems  No medication therapy recommendations to display    Goals of Therapy  Goals related to the patient's specialty therapy were discussed with the patient. The Patient Goals segment of this outreach has been reviewed and updated.   Goals Addressed Today        Specialty Pharmacy General Goal      Achieve and maintain a state of progression free survival  11/1/24: No new labs today.  Most recent labs stable for patient on 9/20/24 -- WBC 8.14, Hgb 12.6, platelets 516. Patient reports tolerating medication well. No recommended changes to oral therapy plan.  4/28/25: Most recent labs on 4/9/25 WNL -- WBC 6.8, Hgb 13.6, platelets 383.  Patient reports no complaints today. No changes to oral therapy.              Quality of Life Assessment   Quality of Life related to the patient's enrollment in the patient management program and services provided was discussed with the patient. The QOL segment of this outreach has been reviewed and updated.  Quality of Life Improvement Scale: 5-No change    Discussed aforementioned material with patient over the phone.     Reassessment Plan & Follow-Up  1. Medication Therapy Changes: none  2. Related Plans, Therapy Recommendations, or Issues to Be Addressed: none  3. Pharmacist to perform regular assessments no more than (6) months from the previous assessment.   4. Care Coordinator to set up future refill outreaches, coordinate prescription delivery, and escalate clinical questions to pharmacist.    Attestation  Therapeutic appropriateness: Appropriate   I  attest the patient was actively involved in and has agreed to the above plan of care.  If the prescribed therapy is at any point deemed not appropriate based on the current or future assessments, a consultation will be initiated with the patient's specialty care provider to determine the best course of action. The revised plan of therapy will be documented along with any required assessments and/or additional patient education provided.     Elder Holder, Gagandeep, BCOP  Clinical Specialty Pharmacist, Oncology  4/28/2025  11:43 EDT

## 2025-05-05 NOTE — PROGRESS NOTES
Chief Complaint  Annual Exam, Hypertension, and Anxiety    Patient or patient representative verbalized consent for the use of Ambient Listening during the visit with  AG Plunkett for chart documentation. 5/6/2025  10:56 EDT    Subjective            Phani Landis is a 64 y.o. male who presents to [facility]   History of Present Illness    History of Present Illness  The patient presents today for follow-up and his annual physical.    He reports no current health concerns. He has been adhering to his prescribed medication regimen, which includes bisoprolol 10 mg, amlodipine with benazepril 5-40 mg, and hydralazine 50 mg twice daily for hypertension. He does not monitor his blood pressure at home due to a malfunctioning blood pressure cuff. He acknowledges recent weight gain but has since lost some weight through dietary modifications, including the elimination of bread from his diet. He is making efforts to lose weight and has increased his physical activity, including walking and mowing his lawn.    He is currently on alprazolam for anxiety, which he takes as needed. He has a few doses remaining and requires a refill. He attempts to sleep without the aid of alprazolam but often experiences difficulty maintaining sleep after an initial hour.    He has recently undergone a kidney function test at the cancer center, which yielded normal results. He is scheduled for a final hepatitis check on 05/16/2025, with previous tests returning negative results. He is on Jakafi for polycythemia vera.  He is on Lasix, prescribed by his nephrologist, who also discontinued his chlorthalidone. He is also on allopurinol as a preventative measure against gout due to side effect of medication, and having experienced gout in his wrist and elbow on several occasions. He reports occasional alcohol consumption and no tobacco use. He has not had a dental examination recently due to insurance  "issues with his long-term dentist but reports no dental pain. He is due for a PSA test after 5/15/2025. He declines STD or HIV screenings and vaccinations.    SOCIAL HISTORY  He drinks alcohol occasionally. He does not smoke and does not use smokeless tobacco.        Tobacco Use: Low Risk  (5/6/2025)    Patient History     Smoking Tobacco Use: Never     Smokeless Tobacco Use: Never     Passive Exposure: Not on file   Recent Concern: Tobacco Use - Medium Risk (2/27/2025)    Received from [facility]    Patient History     Smoking Tobacco Use: Never     Smokeless Tobacco Use: Never     Passive Exposure: Current      E-cigarette/Vaping    E-cigarette/Vaping Use Never User     Passive Exposure No     Counseling Given No      E-cigarette/Vaping Substances    Nicotine No     THC No     CBD No     Flavoring No      E-cigarette/Vaping Devices    Disposable No     Pre-filled or Refillable Cartridge No     Refillable Tank No     Pre-filled Pod No        Alcohol Use: Not on file         Objective   Vital Signs:   Vitals:    05/06/25 1025 05/06/25 1031   BP: 143/91 138/92   BP Location: Left arm    Patient Position: Sitting    Cuff Size: Adult    Pulse: 56    Temp: 98.1 °F (36.7 °C)    SpO2: 95%    Weight: 134 kg (295 lb 12.8 oz)    Height: 188 cm (74.02\")    PainSc: 0-No pain      Body mass index is 37.96 kg/m².    Wt Readings from Last 3 Encounters:   05/06/25 134 kg (295 lb 12.8 oz)   04/09/25 134 kg (296 lb 6.4 oz)   12/09/24 132 kg (291 lb)     BP Readings from Last 3 Encounters:   05/06/25 138/92   04/09/25 162/99   12/09/24 164/92       Health Maintenance   Topic Date Due    ANNUAL PHYSICAL  05/02/2025    PROSTATE CANCER SCREENING  05/15/2025    COVID-19 Vaccine (1 - 2024-25 season) 11/04/2025 (Originally 9/1/2024)    Hepatitis B (1 of 3 - Risk 3-dose series) 05/06/2026 (Originally 9/8/2020)    Pneumococcal Vaccine 50+ (1 of 1 - PCV) 05/06/2026 (Originally 9/8/2010)    TDAP/TD VACCINES (2 - Td or Tdap) 05/06/2026 " "(Originally 8/1/2024)    ZOSTER VACCINE (1 of 2) 05/06/2026 (Originally 9/8/2010)    INFLUENZA VACCINE  07/01/2025    COLORECTAL CANCER SCREENING  03/08/2027    HEPATITIS C SCREENING  Completed    HEMOGLOBIN A1C  Discontinued       /92   Pulse 56   Temp 98.1 °F (36.7 °C)   Ht 188 cm (74.02\")   Wt 134 kg (295 lb 12.8 oz)   SpO2 95%   BMI 37.96 kg/m²       Current Outpatient Medications:     allopurinol (ZYLOPRIM) 100 MG tablet, TAKE 1 TABLET BY MOUTH 2 TIMES A DAY, Disp: 180 tablet, Rfl: 0    ALPRAZolam (Xanax) 0.5 MG tablet, Take 1 tablet by mouth 2 (Two) Times a Day As Needed for Anxiety or Sleep. Indications: Feeling Anxious, Disp: 30 tablet, Rfl: 0    amLODIPine-benazepril (LOTREL) 5-40 MG per capsule, Take 1 capsule by mouth Daily. Indications: High Blood Pressure, Disp: 90 capsule, Rfl: 1    aspirin 81 MG chewable tablet, Chew 1 tablet Daily., Disp: , Rfl:     bisoprolol (ZEBeta) 10 MG tablet, Take 1 tablet by mouth Daily. Indications: High Blood Pressure, Disp: 90 tablet, Rfl: 1    furosemide (LASIX) 20 MG tablet, Take 1 tablet by mouth Daily., Disp: , Rfl:     hydrALAZINE (APRESOLINE) 50 MG tablet, Take 1 tablet by mouth 2 (Two) Times a Day. Indications: High Blood Pressure, Disp: 180 tablet, Rfl: 1    ruxolitinib (JAKAFI) 20 MG tablet, Take 1 tablet by mouth 2 (Two) Times a Day., Disp: 60 tablet, Rfl: 5   Past Medical History:   Diagnosis Date    Anxiety     Arthritis     Arthritis of back 1990    Arthritis of neck 1990    Cancer 06/2020    Polycythemia vera    Cervical disc disorder 1990    CTS (carpal tunnel syndrome) 2001    Fracture, clavicle 1983    Hypertension     Infectious viral hepatitis 05/2024    C    Knee swelling 2019    Low back strain 1990    Neck strain 1990    Stress fracture 1990    Tear of meniscus of knee     Thoracic disc disorder 1990        Physical Exam  Vitals reviewed.   Constitutional:       Appearance: Normal appearance. He is well-developed. He is obese.   HENT:      " Right Ear: Tympanic membrane, ear canal and external ear normal.      Left Ear: Tympanic membrane, ear canal and external ear normal.      Mouth/Throat:      Mouth: Mucous membranes are moist.      Pharynx: No pharyngeal swelling, oropharyngeal exudate or posterior oropharyngeal erythema.   Neck:      Thyroid: No thyroid mass, thyromegaly or thyroid tenderness.   Cardiovascular:      Rate and Rhythm: Normal rate and regular rhythm.      Heart sounds: No murmur heard.     No friction rub. No gallop.   Pulmonary:      Effort: Pulmonary effort is normal.      Breath sounds: Normal breath sounds. No wheezing or rhonchi.   Lymphadenopathy:      Cervical: No cervical adenopathy.   Skin:     General: Skin is warm and dry.   Neurological:      Mental Status: He is alert and oriented to person, place, and time.      Cranial Nerves: No cranial nerve deficit.   Psychiatric:         Mood and Affect: Mood and affect normal.         Behavior: Behavior normal.         Thought Content: Thought content normal. Thought content does not include homicidal or suicidal ideation.         Judgment: Judgment normal.       Physical Exam        Result Review :    The following data was reviewed by: AG Plunkett on 05/06/2025:  Common Labs   Common labs          12/9/2024    10:28 12/9/2024    10:33 2/5/2025    10:30 2/5/2025    10:39 4/9/2025    13:17 4/9/2025    13:29   Common Labs   Glucose 97    102  89  91    BUN 16    15  15  14    Creatinine 1.51    0.96  1.14  1.13    Sodium 141    140  139  140    Potassium 4.0    3.6  4.1  4.1    Chloride 104    101  102  102    Calcium 9.2    9.7  9.9  9.7    Albumin 4.6    4.7  5.0  5.0    Total Bilirubin 0.5     0.5     Alkaline Phosphatase 67     72     AST (SGOT) 44     39     ALT (SGPT) 44     29     WBC 6.83   6.59   6.80     Hemoglobin 12.8   12.9   13.6     Hematocrit 39.6   39.2   42.0     Platelets 407   363   383     Total Cholesterol  214        Triglycerides  89        HDL  Cholesterol  65        LDL Cholesterol   133        Uric Acid     4.8            No Images in the past 120 days found.    Results  Labs   - Cholesterol level: 2024, Borderline elevated    Assessment & Plan  Annual physical exam    Orders:    Lipid Panel; Future    TSH Rfx On Abnormal To Free T4; Future    Essential hypertension      Orders:    amLODIPine-benazepril (LOTREL) 5-40 MG per capsule; Take 1 capsule by mouth Daily. Indications: High Blood Pressure    bisoprolol (ZEBeta) 10 MG tablet; Take 1 tablet by mouth Daily. Indications: High Blood Pressure    hydrALAZINE (APRESOLINE) 50 MG tablet; Take 1 tablet by mouth 2 (Two) Times a Day. Indications: High Blood Pressure    Anxiety    Orders:    ALPRAZolam (Xanax) 0.5 MG tablet; Take 1 tablet by mouth 2 (Two) Times a Day As Needed for Anxiety or Sleep. Indications: Feeling Anxious    Screening for prostate cancer    Orders:    PSA Screen; Future      Assessment & Plan  1. Hypertension.  His blood pressure remains slightly elevated despite being on multiple antihypertensive medications. His blood pressure was 162/99 six weeks ago and is now 138/92.  He is currently on bisoprolol 10 mg, amlodipine with benazepril 5-40 mg, and hydralazine 50 mg twice a day.  He was advised to start monitoring his blood pressure at home.  A prescription refill for his antihypertensive medications will be sent to the pharmacy.  Blood pressure will be reevaluated in 3 months.    2. Anxiety.  He is currently taking alprazolam as needed for anxiety.  A refill for alprazolam will be provided and sent to the pharmacy.  Narcotic contract was discussed and signed today.    Prescription Drug Monitoring Program was reviewed.    3. Hepatitis C.  He is scheduled for a final check on his hepatitis C status on 06/16/2025.  Previous tests have been negative.  No new medications or therapies prescribed at this time.    4. Gout.  He is on allopurinol as a preventative measure for gout due to his cancer  treatment.  He has had gout in his wrist and elbow in the past.  Discussion included the preventative nature of allopurinol due to cancer treatment.  No changes in medication or new referrals needed.    5. Health Maintenance.  He was advised to undergo annual vision examinations and regular dental check-ups.  He was encouraged to maintain a healthy diet, engage in regular physical activity, and aim for weight loss.  Lab orders for PSA, cholesterol and thyroid screening will be placed, and he should inform the lab about these additional tests during his upcoming appointment on 06/11/2025.  Follow-up visit scheduled in 3 months.    6.  Polycythemia vera.  He is following with oncology routinely and stable on medication Jakafi.  Review of records indicates ongoing monitoring at the cancer center.          Diagnosis Plan   1. Annual physical exam  Lipid Panel    TSH Rfx On Abnormal To Free T4      2. Essential hypertension  amLODIPine-benazepril (LOTREL) 5-40 MG per capsule    bisoprolol (ZEBeta) 10 MG tablet    hydrALAZINE (APRESOLINE) 50 MG tablet      3. Anxiety  ALPRAZolam (Xanax) 0.5 MG tablet      4. Screening for prostate cancer  PSA Screen            FOLLOW UP  Return in about 3 months (around 8/6/2025) for Next scheduled follow up.  Patient was given instructions and counseling regarding his condition or for health maintenance advice. Please see specific information pulled into the AVS if appropriate.       CURRENT & DISCONTINUED MEDICATIONS  Current Outpatient Medications   Medication Instructions    allopurinol (ZYLOPRIM) 100 mg, Oral, 2 Times Daily    ALPRAZolam (XANAX) 0.5 mg, Oral, 2 Times Daily PRN    amLODIPine-benazepril (LOTREL) 5-40 MG per capsule 1 capsule, Oral, Daily    aspirin 81 mg, Daily    bisoprolol (ZEBETA) 10 mg, Oral, Daily    furosemide (LASIX) 20 MG tablet 1 tablet, Daily    hydrALAZINE (APRESOLINE) 50 mg, Oral, 2 Times Daily    ruxolitinib (JAKAFI) 20 mg, Oral, 2 Times Daily        Medications Discontinued During This Encounter   Medication Reason    amLODIPine-benazepril (LOTREL) 5-40 MG per capsule Reorder    bisoprolol (ZEBeta) 10 MG tablet Reorder    hydrALAZINE (APRESOLINE) 50 MG tablet Reorder    ALPRAZolam (Xanax) 0.5 MG tablet Reorder        Parts of this note are electronic transcriptions/translations of spoken language to printed text using the Dragon Dictation system.    AG Plunkett  05/06/25  11:09 EDT

## 2025-05-06 ENCOUNTER — OFFICE VISIT (OUTPATIENT)
Dept: FAMILY MEDICINE CLINIC | Facility: CLINIC | Age: 65
End: 2025-05-06
Payer: COMMERCIAL

## 2025-05-06 VITALS
HEART RATE: 56 BPM | BODY MASS INDEX: 37.96 KG/M2 | DIASTOLIC BLOOD PRESSURE: 92 MMHG | SYSTOLIC BLOOD PRESSURE: 138 MMHG | WEIGHT: 295.8 LBS | HEIGHT: 74 IN | TEMPERATURE: 98.1 F | OXYGEN SATURATION: 95 %

## 2025-05-06 DIAGNOSIS — Z00.00 ANNUAL PHYSICAL EXAM: Primary | ICD-10-CM

## 2025-05-06 DIAGNOSIS — I10 ESSENTIAL HYPERTENSION: ICD-10-CM

## 2025-05-06 DIAGNOSIS — F41.9 ANXIETY: ICD-10-CM

## 2025-05-06 DIAGNOSIS — Z12.5 SCREENING FOR PROSTATE CANCER: ICD-10-CM

## 2025-05-06 PROBLEM — R60.0 LOCALIZED EDEMA: Status: ACTIVE | Noted: 2024-06-26

## 2025-05-06 PROBLEM — E87.1 HYPO-OSMOLALITY AND HYPONATREMIA: Chronic | Status: ACTIVE | Noted: 2024-06-26

## 2025-05-06 PROBLEM — M10.9 GOUT: Status: ACTIVE | Noted: 2024-06-26

## 2025-05-06 RX ORDER — BISOPROLOL FUMARATE 10 MG/1
10 TABLET, FILM COATED ORAL DAILY
Qty: 90 TABLET | Refills: 1 | Status: SHIPPED | OUTPATIENT
Start: 2025-05-06

## 2025-05-06 RX ORDER — ALPRAZOLAM 0.5 MG
0.5 TABLET ORAL 2 TIMES DAILY PRN
Qty: 30 TABLET | Refills: 0 | Status: SHIPPED | OUTPATIENT
Start: 2025-05-06

## 2025-05-06 RX ORDER — AMLODIPINE AND BENAZEPRIL HYDROCHLORIDE 5; 40 MG/1; MG/1
1 CAPSULE ORAL DAILY
Qty: 90 CAPSULE | Refills: 1 | Status: SHIPPED | OUTPATIENT
Start: 2025-05-06

## 2025-05-06 RX ORDER — HYDRALAZINE HYDROCHLORIDE 50 MG/1
50 TABLET, FILM COATED ORAL 2 TIMES DAILY
Qty: 180 TABLET | Refills: 1 | Status: SHIPPED | OUTPATIENT
Start: 2025-05-06

## 2025-05-06 NOTE — ASSESSMENT & PLAN NOTE
Orders:    amLODIPine-benazepril (LOTREL) 5-40 MG per capsule; Take 1 capsule by mouth Daily. Indications: High Blood Pressure    bisoprolol (ZEBeta) 10 MG tablet; Take 1 tablet by mouth Daily. Indications: High Blood Pressure    hydrALAZINE (APRESOLINE) 50 MG tablet; Take 1 tablet by mouth 2 (Two) Times a Day. Indications: High Blood Pressure

## 2025-05-06 NOTE — ASSESSMENT & PLAN NOTE
Orders:    ALPRAZolam (Xanax) 0.5 MG tablet; Take 1 tablet by mouth 2 (Two) Times a Day As Needed for Anxiety or Sleep. Indications: Feeling Anxious

## 2025-05-13 ENCOUNTER — TELEPHONE (OUTPATIENT)
Dept: GASTROENTEROLOGY | Facility: HOSPITAL | Age: 65
End: 2025-05-13
Payer: COMMERCIAL

## 2025-05-16 ENCOUNTER — OFFICE VISIT (OUTPATIENT)
Dept: GASTROENTEROLOGY | Facility: HOSPITAL | Age: 65
End: 2025-05-16
Payer: COMMERCIAL

## 2025-05-16 VITALS
HEART RATE: 61 BPM | BODY MASS INDEX: 38.63 KG/M2 | HEIGHT: 74 IN | WEIGHT: 301 LBS | SYSTOLIC BLOOD PRESSURE: 157 MMHG | DIASTOLIC BLOOD PRESSURE: 98 MMHG

## 2025-05-16 DIAGNOSIS — B18.2 CHRONIC HEPATITIS C WITHOUT HEPATIC COMA: Primary | ICD-10-CM

## 2025-05-16 DIAGNOSIS — K76.0 FATTY LIVER: ICD-10-CM

## 2025-05-16 PROCEDURE — 91200 LIVER ELASTOGRAPHY: CPT | Performed by: NURSE PRACTITIONER

## 2025-05-16 PROCEDURE — G0463 HOSPITAL OUTPT CLINIC VISIT: HCPCS | Performed by: NURSE PRACTITIONER

## 2025-05-16 NOTE — PROGRESS NOTES
Chief Complaint        Hepatitis C    HPI     Mr. Landis is a 65 y/o male with chronic HCV, genotype 1a with F4 fibrosis determined by liver elastography, however fibrosure was c/w F1-F2 fibrosis and US was c/w normal liver.  He presents for 6 mo post treatment f/u after completing treatment with epclusa.  Reports that he's doing well. He will be obtaining labs at his f/u next month with oncology.      Fibroscan repeated today and c/w F2 fibrosis.           Subjective            Past Medical History     Allergies   Allergen Reactions    Zoloft [Sertraline] Hallucinations    Cephalexin Itching       Current Outpatient Medications:     allopurinol (ZYLOPRIM) 100 MG tablet, TAKE 1 TABLET BY MOUTH 2 TIMES A DAY, Disp: 180 tablet, Rfl: 0    ALPRAZolam (Xanax) 0.5 MG tablet, Take 1 tablet by mouth 2 (Two) Times a Day As Needed for Anxiety or Sleep. Indications: Feeling Anxious, Disp: 30 tablet, Rfl: 0    amLODIPine-benazepril (LOTREL) 5-40 MG per capsule, Take 1 capsule by mouth Daily. Indications: High Blood Pressure, Disp: 90 capsule, Rfl: 1    aspirin 81 MG chewable tablet, Chew 1 tablet Daily., Disp: , Rfl:     bisoprolol (ZEBeta) 10 MG tablet, Take 1 tablet by mouth Daily. Indications: High Blood Pressure, Disp: 90 tablet, Rfl: 1    furosemide (LASIX) 20 MG tablet, Take 1 tablet by mouth Daily., Disp: , Rfl:     hydrALAZINE (APRESOLINE) 50 MG tablet, Take 1 tablet by mouth 2 (Two) Times a Day. Indications: High Blood Pressure, Disp: 180 tablet, Rfl: 1    ruxolitinib (JAKAFI) 20 MG tablet, Take 1 tablet by mouth 2 (Two) Times a Day., Disp: 60 tablet, Rfl: 5  Past Medical History:   Diagnosis Date    Anxiety     Arthritis     Arthritis of back 1990    Arthritis of neck 1990    Cancer 06/2020    Polycythemia vera    Cervical disc disorder 1990    CTS (carpal tunnel syndrome) 2001    Fracture, clavicle 1983    Hypertension     Infectious viral hepatitis 05/2024    C    Knee swelling 2019    Low back strain 1990    Neck  "strain 1990    Stress fracture 1990    Tear of meniscus of knee     Thoracic disc disorder 1990     Past Surgical History:   Procedure Laterality Date    ABDOMINAL SURGERY  1985    Exploratory    CHEST SURGERY      OTHER SURGICAL HISTORY  1985    \"Exploratory\"     Social History     Socioeconomic History    Marital status:    Tobacco Use    Smoking status: Never    Smokeless tobacco: Never   Vaping Use    Vaping status: Never Used   Substance and Sexual Activity    Alcohol use: Yes     Comment: Occasional drinker    Drug use: Never    Sexual activity: Not Currently     Partners: Female       Objective     Objective     Vital Signs     Vitals:    05/16/25 0911   BP: 157/98   Pulse: 61     Body mass index is 38.63 kg/m².  Body surface area is 2.59 meters squared.      Physical Exam    Results       Result Review :     The following data was reviewed by: AG Nieves on 05/16/2025:      CMP:  Lab Results   Component Value Date    BUN 14 04/09/2025    CREATININE 1.13 04/09/2025    EGFRIFNONA 67 02/23/2022    EGFRIFAFRI 70 06/22/2020     04/09/2025    K 4.1 04/09/2025     04/09/2025    CALCIUM 9.7 04/09/2025    ALBUMIN 5.0 04/09/2025    BILITOT 0.5 04/09/2025    ALKPHOS 72 04/09/2025    AST 39 04/09/2025    ALT 29 04/09/2025     CBC:   Lab Results   Component Value Date    WBC 6.80 04/09/2025    RBC 4.53 04/09/2025    HGB 13.6 04/09/2025    HCT 42.0 04/09/2025    MCV 92.7 04/09/2025    MCH 30.0 04/09/2025    MCHC 32.4 04/09/2025    RDW 16.2 (H) 04/09/2025     04/09/2025    NEUTRORELPCT 61.8 04/09/2025    AUTOIGPER 1.0 (H) 04/09/2025    LYMPHORELPCT 24.6 04/09/2025    MONORELPCT 10.1 04/09/2025    EOSRELPCT 1.0 04/09/2025    BASORELPCT 1.5 04/09/2025     HCV RNA   Lab Results   Component Value Date    HEPATITISC HCV Not Detected 11/15/2024          Liver Elastography    Performed by: Roselyn Hicks APRN  Authorized by: Roselyn Hicks, APRN  Ordering Provider: Kurt, " AG Kuo    Probe:  XL+  Procedure Details:  Procedure: After providing an oral and written explanation of the Fibroscan vibration controlled transient elastography (VTCE) test procedure to the patient. The patient was placed in supine position with right arm in maximum abduction to allow optimal exposure of right lateral abdomen. Patient was briefly assessed, identifying terminus of the xyphoid process and locating an ideal transient elastography testing site, midline and lateral to this point. Patient was instructed to breathe normally and remain stationary during the test process. Pre-measurement data confirmed the transient elastography probe was centered over the liver parenchyma. A series of ten 50 Hz mechanical pulses were applied with controlled application pressure to induce a mechanical shear wave in the liver tissue. For each measurement, the shear wave propagation speed was detected, displayed and converted to its equivalent liver stiffness value in kilopascals. Skin to liver capsules distance and shear wave characteristics were monitored during the entire examination to assure quality data. Median liver stiffness measurement and interquartile range were calculated and displayed in real time. Acquired measurement data was stored and submitted to the provider for review and interpretation. Patient tolerated the procedure well and was discharged without incident.   Clinical Information:     NPO 3 Hours or More: Yes      Actively Drinking: No    Findings:     Median Liver Stiffness Score:  9.6    Interquartile Range (IQR) to Median Ratio:  15    Interpretation:  Taking into account the patient's history and recent liver test, this Liver Stiffness Score is consistent with below scale:    Katie Stiffness Consistent with:  F2    Current Scan Considered Reliab: Yes      Median Controlled Attenuation Parameter (dB/m):  309    IQR:  8    Liver Fat Interpretation:  Taking into account the patient's history,  this CAP score is consistent with below scale:      CAP SCORE:  Moderate/severe liver fat     Assessment and Plan          Assessment & Plan:    Diagnoses and all orders for this visit:    1. Chronic hepatitis C without hepatic coma (Primary)  -     CBC & Differential  -     Hepatitis C RNA, Quantitative, PCR (graph)  -     Comprehensive Metabolic Panel  -     Comprehensive Metabolic Panel; Future  -     Hepatitis C RNA, Quantitative, PCR (graph); Future  -     CBC & Differential; Future    2. Fatty liver    Other orders  -     Liver Elastography        Labs today to ensure SVR.  Patient Instructions: Avoid Alcohol, Avoid Illicit Drug Use, Importance of keeping appointments.  Patient Education Provided: Yes      Follow Up     Follow Up   Return if symptoms worsen or fail to improve.  Patient was given instructions and counseling regarding his condition or for health maintenance advice. Please see specific information pulled into the AVS if appropriate.     Roselyn Hicks, APRN  05/16/2025

## 2025-06-02 ENCOUNTER — TELEPHONE (OUTPATIENT)
Dept: GASTROENTEROLOGY | Facility: CLINIC | Age: 65
End: 2025-06-02
Payer: COMMERCIAL

## 2025-06-02 DIAGNOSIS — D45 POLYCYTHEMIA VERA: ICD-10-CM

## 2025-06-03 NOTE — TELEPHONE ENCOUNTER
Specialty Pharmacy Patient Management Program  Per Protocol Prescription Order or Refill       Requested Prescriptions     Signed Prescriptions Disp Refills    ruxolitinib (Jakafi) 20 MG tablet 60 tablet 5     Sig: TAKE 1 TABLET BY MOUTH 2 TIMES A DAY     Authorizing Provider: FREDDIE CAMP     Ordering User: LETICIA HEWITT     Prescription orders above were sent to [facility] per Collaborative Care Agreement Protocol.     Completed independent double check on medication order/RX.    Dawna Yun Rph, BCOP  Clinical Specialty Pharmacist, Oncology  6/3/2025  15:42 EDT

## 2025-06-03 NOTE — TELEPHONE ENCOUNTER
Specialty Pharmacy Patient Management Program  Per Protocol Prescription Order or Refill     Patient will be filling or currently fills medications at [facility] and is enrolled in the Patient Management Program.    Requested Prescriptions     Pending Prescriptions Disp Refills    ruxolitinib (Jakafi) 20 MG tablet [Pharmacy Med Name: JAKAFI 20MG] 60 tablet 5     Sig: TAKE 1 TABLET BY MOUTH 2 TIMES A DAY     Prescription orders above were sent to the pharmacy per Collaborative Care Agreement Protocol.     Last Office Visit: 4/9/25  Next Office Visit: 8/6/25    Elder Holder PharmD, BCOP  Clinical Specialty Pharmacist, Oncology  6/3/2025  15:38 EDT

## 2025-06-11 ENCOUNTER — CLINICAL SUPPORT (OUTPATIENT)
Dept: ONCOLOGY | Facility: HOSPITAL | Age: 65
End: 2025-06-11
Payer: COMMERCIAL

## 2025-06-11 ENCOUNTER — LAB (OUTPATIENT)
Dept: OTHER | Facility: HOSPITAL | Age: 65
End: 2025-06-11
Payer: COMMERCIAL

## 2025-06-11 DIAGNOSIS — D75.839 THROMBOCYTOSIS: Primary | ICD-10-CM

## 2025-06-11 DIAGNOSIS — D75.839 THROMBOCYTOSIS: ICD-10-CM

## 2025-06-11 DIAGNOSIS — D45 POLYCYTHEMIA VERA: ICD-10-CM

## 2025-06-11 DIAGNOSIS — B18.2 CHRONIC HEPATITIS C WITHOUT HEPATIC COMA: ICD-10-CM

## 2025-06-11 DIAGNOSIS — Z00.00 ANNUAL PHYSICAL EXAM: ICD-10-CM

## 2025-06-11 DIAGNOSIS — Z12.5 SCREENING FOR PROSTATE CANCER: ICD-10-CM

## 2025-06-11 LAB
ALBUMIN SERPL-MCNC: 4.7 G/DL (ref 3.5–5.2)
ALBUMIN/GLOB SERPL: 1.5 G/DL
ALP SERPL-CCNC: 57 U/L (ref 39–117)
ALT SERPL W P-5'-P-CCNC: 25 U/L (ref 1–41)
ANION GAP SERPL CALCULATED.3IONS-SCNC: 6.4 MMOL/L (ref 5–15)
AST SERPL-CCNC: 38 U/L (ref 1–40)
BASOPHILS # BLD AUTO: 0.1 10*3/MM3 (ref 0–0.2)
BASOPHILS NFR BLD AUTO: 1.4 % (ref 0–1.5)
BILIRUB SERPL-MCNC: 0.4 MG/DL (ref 0–1.2)
BUN SERPL-MCNC: 21.4 MG/DL (ref 8–23)
BUN/CREAT SERPL: 17.4 (ref 7–25)
CALCIUM SPEC-SCNC: 9.9 MG/DL (ref 8.6–10.5)
CHLORIDE SERPL-SCNC: 102 MMOL/L (ref 98–107)
CHOLEST SERPL-MCNC: 213 MG/DL (ref 0–200)
CO2 SERPL-SCNC: 30.6 MMOL/L (ref 22–29)
CREAT SERPL-MCNC: 1.23 MG/DL (ref 0.76–1.27)
DEPRECATED RDW RBC AUTO: 55.8 FL (ref 37–54)
EGFRCR SERPLBLD CKD-EPI 2021: 65.6 ML/MIN/1.73
EOSINOPHIL # BLD AUTO: 0.08 10*3/MM3 (ref 0–0.4)
EOSINOPHIL NFR BLD AUTO: 1.1 % (ref 0.3–6.2)
ERYTHROCYTE [DISTWIDTH] IN BLOOD BY AUTOMATED COUNT: 17 % (ref 12.3–15.4)
GLOBULIN UR ELPH-MCNC: 3.2 GM/DL
GLUCOSE SERPL-MCNC: 96 MG/DL (ref 65–99)
HCT VFR BLD AUTO: 39.6 % (ref 37.5–51)
HDLC SERPL-MCNC: 72 MG/DL (ref 40–60)
HGB BLD-MCNC: 13.1 G/DL (ref 13–17.7)
IMM GRANULOCYTES # BLD AUTO: 0.12 10*3/MM3 (ref 0–0.05)
IMM GRANULOCYTES NFR BLD AUTO: 1.7 % (ref 0–0.5)
LDLC SERPL CALC-MCNC: 134 MG/DL (ref 0–100)
LDLC/HDLC SERPL: 1.85 {RATIO}
LYMPHOCYTES # BLD AUTO: 1.9 10*3/MM3 (ref 0.7–3.1)
LYMPHOCYTES NFR BLD AUTO: 26.8 % (ref 19.6–45.3)
MCH RBC QN AUTO: 29.9 PG (ref 26.6–33)
MCHC RBC AUTO-ENTMCNC: 33.1 G/DL (ref 31.5–35.7)
MCV RBC AUTO: 90.4 FL (ref 79–97)
MONOCYTES # BLD AUTO: 0.63 10*3/MM3 (ref 0.1–0.9)
MONOCYTES NFR BLD AUTO: 8.9 % (ref 5–12)
NEUTROPHILS NFR BLD AUTO: 4.26 10*3/MM3 (ref 1.7–7)
NEUTROPHILS NFR BLD AUTO: 60.1 % (ref 42.7–76)
NRBC BLD AUTO-RTO: 0.3 /100 WBC (ref 0–0.2)
PLATELET # BLD AUTO: 407 10*3/MM3 (ref 140–450)
PMV BLD AUTO: 10.8 FL (ref 6–12)
POTASSIUM SERPL-SCNC: 4.7 MMOL/L (ref 3.5–5.2)
PROT SERPL-MCNC: 7.9 G/DL (ref 6–8.5)
PSA SERPL-MCNC: 2.4 NG/ML (ref 0–4)
RBC # BLD AUTO: 4.38 10*6/MM3 (ref 4.14–5.8)
SODIUM SERPL-SCNC: 139 MMOL/L (ref 136–145)
TRIGL SERPL-MCNC: 39 MG/DL (ref 0–150)
TSH SERPL DL<=0.05 MIU/L-ACNC: 1.5 UIU/ML (ref 0.27–4.2)
URATE SERPL-MCNC: 4.6 MG/DL (ref 3.4–7)
VLDLC SERPL-MCNC: 7 MG/DL (ref 5–40)
WBC NRBC COR # BLD AUTO: 7.09 10*3/MM3 (ref 3.4–10.8)

## 2025-06-11 PROCEDURE — 80061 LIPID PANEL: CPT | Performed by: NURSE PRACTITIONER

## 2025-06-11 PROCEDURE — 87522 HEPATITIS C REVRS TRNSCRPJ: CPT | Performed by: NURSE PRACTITIONER

## 2025-06-11 PROCEDURE — 84550 ASSAY OF BLOOD/URIC ACID: CPT | Performed by: INTERNAL MEDICINE

## 2025-06-11 PROCEDURE — G0103 PSA SCREENING: HCPCS | Performed by: NURSE PRACTITIONER

## 2025-06-11 PROCEDURE — 36415 COLL VENOUS BLD VENIPUNCTURE: CPT

## 2025-06-11 PROCEDURE — 80050 GENERAL HEALTH PANEL: CPT | Performed by: INTERNAL MEDICINE

## 2025-06-11 NOTE — PROGRESS NOTES
Phani Landis is a 64 y.o. male with Polycythemia with leukocytosis and thrombocytosis seen by an Hematology/Oncology provider, Dr. Pablo, and is scheduled with Clinical Pharmacy Review offered by [facility]. Patient's visit was held via telephone today.     Therapy plan  Jakafi 20 mg twice daily     Relevant Laboratory Values  Lab Results   Component Value Date    GLUCOSE 96 06/11/2025    CALCIUM 9.9 06/11/2025     06/11/2025    K 4.7 06/11/2025    CO2 30.6 (H) 06/11/2025     06/11/2025    BUN 21.4 06/11/2025    CREATININE 1.23 06/11/2025    EGFRIFAFRI 70 06/22/2020    EGFRIFNONA 67 02/23/2022    BCR 17.4 06/11/2025    ANIONGAP 6.4 06/11/2025     Lab Results   Component Value Date    WBC 7.09 06/11/2025    RBC 4.38 06/11/2025    HGB 13.1 06/11/2025    HCT 39.6 06/11/2025    MCV 90.4 06/11/2025    MCH 29.9 06/11/2025    MCHC 33.1 06/11/2025    RDW 17.0 (H) 06/11/2025    RDWSD 55.8 (H) 06/11/2025    MPV 10.8 06/11/2025     06/11/2025    NEUTRORELPCT 60.1 06/11/2025    LYMPHORELPCT 26.8 06/11/2025    MONORELPCT 8.9 06/11/2025    EOSRELPCT 1.1 06/11/2025    BASORELPCT 1.4 06/11/2025    AUTOIGPER 1.7 (H) 06/11/2025    NEUTROABS 4.26 06/11/2025    LYMPHSABS 1.90 06/11/2025    MONOSABS 0.63 06/11/2025    EOSABS 0.08 06/11/2025    BASOSABS 0.10 06/11/2025    AUTOIGNUM 0.12 (H) 06/11/2025    NRBC 0.3 (H) 06/11/2025       Clinical Review  Patient reports adherence to Jakafi 20 mg twice daily. Patient reports feeling well overall with no complaints at this time.    Hemoglobin 13.1  Platelets 307,000    The patient's current therapy plan and above labs have been reviewed.     Plan  CBC and Comprehensive Metabolic Panel reviewed - see above in Laboratory Results  Will instruct Phani Landis to continue Jakafi at current dose.  Follow up in two months when scheduled with Dr. Pablo. Next scheduled appointment(s) reviewed with patient.  Patient is instructed to call the office with any  concerns or new symptoms prior to next visit.  Verbal information provided. Patient expresses understanding and has no further questions at this time.            Mikaela Wynn, PharmD  Clinical Pharmacy Services   [facility]  6/11/2025  14:55 EDT

## 2025-06-13 LAB
HCV RNA SERPL NAA+PROBE-ACNC: NORMAL IU/ML
REF LAB TEST REF RANGE: NORMAL

## 2025-06-16 ENCOUNTER — RESULTS FOLLOW-UP (OUTPATIENT)
Dept: GASTROENTEROLOGY | Facility: HOSPITAL | Age: 65
End: 2025-06-16
Payer: COMMERCIAL

## 2025-06-16 NOTE — TELEPHONE ENCOUNTER
Result Note  No HCV detected.  Patient is cured.  Please send letter to patient and PCP.    Letter mailed to pt and pcp.

## 2025-06-25 ENCOUNTER — TELEPHONE (OUTPATIENT)
Dept: ONCOLOGY | Facility: CLINIC | Age: 65
End: 2025-06-25
Payer: COMMERCIAL

## 2025-06-25 DIAGNOSIS — F41.9 ANXIETY: ICD-10-CM

## 2025-06-25 NOTE — TELEPHONE ENCOUNTER
"  Caller: Phani Landis \"Raj\"    Relationship to patient: Self    Best call back number: [phone]    Chief complaint: RESCHEDULE     Type of visit: LAB AND FOLLOW UP    Requested date: 8-6 BEFORE 12, OR ANOTHER DAY BEFORE 12    If rescheduling, when is the original appointment: 8-6     Additional notes:PLEASE ADVISE          "

## 2025-06-26 RX ORDER — ALPRAZOLAM 0.5 MG
0.5 TABLET ORAL 2 TIMES DAILY PRN
Qty: 30 TABLET | Refills: 0 | Status: SHIPPED | OUTPATIENT
Start: 2025-06-26

## 2025-07-10 DIAGNOSIS — E79.0 ELEVATED URIC ACID IN BLOOD: ICD-10-CM

## 2025-07-10 DIAGNOSIS — D75.839 THROMBOCYTOSIS: ICD-10-CM

## 2025-07-10 DIAGNOSIS — D45 POLYCYTHEMIA VERA: ICD-10-CM

## 2025-07-10 RX ORDER — ALLOPURINOL 100 MG/1
100 TABLET ORAL 2 TIMES DAILY
Qty: 180 TABLET | Refills: 0 | Status: SHIPPED | OUTPATIENT
Start: 2025-07-10

## 2025-08-05 ENCOUNTER — OFFICE VISIT (OUTPATIENT)
Dept: FAMILY MEDICINE CLINIC | Facility: CLINIC | Age: 65
End: 2025-08-05
Payer: COMMERCIAL

## 2025-08-05 VITALS
BODY MASS INDEX: 32.81 KG/M2 | DIASTOLIC BLOOD PRESSURE: 103 MMHG | WEIGHT: 255.7 LBS | TEMPERATURE: 96.9 F | HEIGHT: 74 IN | OXYGEN SATURATION: 97 % | HEART RATE: 60 BPM | SYSTOLIC BLOOD PRESSURE: 158 MMHG

## 2025-08-05 DIAGNOSIS — D45 POLYCYTHEMIA VERA: ICD-10-CM

## 2025-08-05 DIAGNOSIS — F41.9 ANXIETY: ICD-10-CM

## 2025-08-05 DIAGNOSIS — I10 PRIMARY HYPERTENSION: Primary | ICD-10-CM

## 2025-08-05 DIAGNOSIS — Z53.20 COLONOSCOPY REFUSED: ICD-10-CM

## 2025-08-05 DIAGNOSIS — Z51.81 MEDICATION MONITORING ENCOUNTER: ICD-10-CM

## 2025-08-05 PROCEDURE — G0480 DRUG TEST DEF 1-7 CLASSES: HCPCS | Performed by: NURSE PRACTITIONER

## 2025-08-05 RX ORDER — ALPRAZOLAM 0.5 MG
0.5 TABLET ORAL 2 TIMES DAILY PRN
Qty: 30 TABLET | Refills: 0 | Status: SHIPPED | OUTPATIENT
Start: 2025-08-05

## 2025-08-05 RX ORDER — FUROSEMIDE 20 MG/1
20 TABLET ORAL DAILY
COMMUNITY
Start: 2024-07-01

## 2025-08-06 PROBLEM — Z53.20 COLONOSCOPY REFUSED: Status: ACTIVE | Noted: 2025-08-06

## 2025-08-09 LAB — BENZODIAZ CTO UR CFM-MCNC: NEGATIVE NG/ML

## 2025-08-15 ENCOUNTER — PATIENT ROUNDING (BHMG ONLY) (OUTPATIENT)
Dept: FAMILY MEDICINE CLINIC | Facility: CLINIC | Age: 65
End: 2025-08-15
Payer: COMMERCIAL

## 2025-08-27 ENCOUNTER — OFFICE VISIT (OUTPATIENT)
Dept: ONCOLOGY | Facility: CLINIC | Age: 65
End: 2025-08-27
Payer: COMMERCIAL

## 2025-08-27 ENCOUNTER — OFFICE VISIT (OUTPATIENT)
Dept: ORTHOPEDIC SURGERY | Facility: CLINIC | Age: 65
End: 2025-08-27
Payer: COMMERCIAL

## 2025-08-27 ENCOUNTER — LAB (OUTPATIENT)
Dept: OTHER | Facility: HOSPITAL | Age: 65
End: 2025-08-27
Payer: COMMERCIAL

## 2025-08-27 VITALS — BODY MASS INDEX: 37.83 KG/M2 | TEMPERATURE: 98.4 F | WEIGHT: 294.8 LBS | HEIGHT: 74 IN

## 2025-08-27 VITALS
BODY MASS INDEX: 37.81 KG/M2 | SYSTOLIC BLOOD PRESSURE: 154 MMHG | DIASTOLIC BLOOD PRESSURE: 96 MMHG | TEMPERATURE: 97.9 F | HEART RATE: 60 BPM | OXYGEN SATURATION: 100 % | WEIGHT: 294.6 LBS | HEIGHT: 74 IN

## 2025-08-27 DIAGNOSIS — E79.0 ELEVATED URIC ACID IN BLOOD: ICD-10-CM

## 2025-08-27 DIAGNOSIS — D45 POLYCYTHEMIA VERA: ICD-10-CM

## 2025-08-27 DIAGNOSIS — D75.839 THROMBOCYTOSIS: Primary | ICD-10-CM

## 2025-08-27 DIAGNOSIS — D75.839 THROMBOCYTOSIS: ICD-10-CM

## 2025-08-27 DIAGNOSIS — D45 POLYCYTHEMIA VERA: Primary | ICD-10-CM

## 2025-08-27 DIAGNOSIS — M17.0 PRIMARY OSTEOARTHRITIS OF BOTH KNEES: ICD-10-CM

## 2025-08-27 DIAGNOSIS — R52 PAIN: Primary | ICD-10-CM

## 2025-08-27 LAB
ALBUMIN SERPL-MCNC: 4.7 G/DL (ref 3.5–5.2)
ALBUMIN/GLOB SERPL: 1.4 G/DL
ALP SERPL-CCNC: 74 U/L (ref 39–117)
ALT SERPL W P-5'-P-CCNC: 19 U/L (ref 1–41)
ANION GAP SERPL CALCULATED.3IONS-SCNC: 9.4 MMOL/L (ref 5–15)
AST SERPL-CCNC: 35 U/L (ref 1–40)
BASOPHILS # BLD AUTO: 0.1 10*3/MM3 (ref 0–0.2)
BASOPHILS NFR BLD AUTO: 1.4 % (ref 0–1.5)
BILIRUB SERPL-MCNC: 0.6 MG/DL (ref 0–1.2)
BUN SERPL-MCNC: 16 MG/DL (ref 8–23)
BUN/CREAT SERPL: 15.7 (ref 7–25)
CALCIUM SPEC-SCNC: 9.7 MG/DL (ref 8.6–10.5)
CHLORIDE SERPL-SCNC: 103 MMOL/L (ref 98–107)
CO2 SERPL-SCNC: 28.6 MMOL/L (ref 22–29)
CREAT SERPL-MCNC: 1.02 MG/DL (ref 0.76–1.27)
DEPRECATED RDW RBC AUTO: 55.9 FL (ref 37–54)
EGFRCR SERPLBLD CKD-EPI 2021: 82.1 ML/MIN/1.73
EOSINOPHIL # BLD AUTO: 0.06 10*3/MM3 (ref 0–0.4)
EOSINOPHIL NFR BLD AUTO: 0.9 % (ref 0.3–6.2)
ERYTHROCYTE [DISTWIDTH] IN BLOOD BY AUTOMATED COUNT: 16.6 % (ref 12.3–15.4)
GLOBULIN UR ELPH-MCNC: 3.3 GM/DL
GLUCOSE SERPL-MCNC: 96 MG/DL (ref 65–99)
HCT VFR BLD AUTO: 40.8 % (ref 37.5–51)
HGB BLD-MCNC: 13.3 G/DL (ref 13–17.7)
IMM GRANULOCYTES # BLD AUTO: 0.13 10*3/MM3 (ref 0–0.05)
IMM GRANULOCYTES NFR BLD AUTO: 1.9 % (ref 0–0.5)
LYMPHOCYTES # BLD AUTO: 1.7 10*3/MM3 (ref 0.7–3.1)
LYMPHOCYTES NFR BLD AUTO: 24.3 % (ref 19.6–45.3)
MCH RBC QN AUTO: 29.8 PG (ref 26.6–33)
MCHC RBC AUTO-ENTMCNC: 32.6 G/DL (ref 31.5–35.7)
MCV RBC AUTO: 91.5 FL (ref 79–97)
MONOCYTES # BLD AUTO: 0.72 10*3/MM3 (ref 0.1–0.9)
MONOCYTES NFR BLD AUTO: 10.3 % (ref 5–12)
NEUTROPHILS NFR BLD AUTO: 4.29 10*3/MM3 (ref 1.7–7)
NEUTROPHILS NFR BLD AUTO: 61.2 % (ref 42.7–76)
NRBC BLD AUTO-RTO: 0.3 /100 WBC (ref 0–0.2)
PLATELET # BLD AUTO: 419 10*3/MM3 (ref 140–450)
PMV BLD AUTO: 10.7 FL (ref 6–12)
POTASSIUM SERPL-SCNC: 3.9 MMOL/L (ref 3.5–5.2)
PROT SERPL-MCNC: 8 G/DL (ref 6–8.5)
RBC # BLD AUTO: 4.46 10*6/MM3 (ref 4.14–5.8)
SODIUM SERPL-SCNC: 141 MMOL/L (ref 136–145)
URATE SERPL-MCNC: 4.5 MG/DL (ref 3.4–7)
WBC NRBC COR # BLD AUTO: 7 10*3/MM3 (ref 3.4–10.8)

## 2025-08-27 PROCEDURE — 36415 COLL VENOUS BLD VENIPUNCTURE: CPT

## 2025-08-27 PROCEDURE — 84550 ASSAY OF BLOOD/URIC ACID: CPT | Performed by: INTERNAL MEDICINE

## 2025-08-27 PROCEDURE — 80053 COMPREHEN METABOLIC PANEL: CPT | Performed by: INTERNAL MEDICINE

## 2025-08-27 PROCEDURE — 85025 COMPLETE CBC W/AUTO DIFF WBC: CPT | Performed by: INTERNAL MEDICINE

## 2025-08-27 RX ORDER — METHYLPREDNISOLONE ACETATE 80 MG/ML
80 INJECTION, SUSPENSION INTRA-ARTICULAR; INTRALESIONAL; INTRAMUSCULAR; SOFT TISSUE
Status: COMPLETED | OUTPATIENT
Start: 2025-08-27 | End: 2025-08-27

## 2025-08-27 RX ADMIN — METHYLPREDNISOLONE ACETATE 80 MG: 80 INJECTION, SUSPENSION INTRA-ARTICULAR; INTRALESIONAL; INTRAMUSCULAR; SOFT TISSUE at 14:33

## 2025-08-28 ENCOUNTER — SPECIALTY PHARMACY (OUTPATIENT)
Dept: PHARMACY | Facility: HOSPITAL | Age: 65
End: 2025-08-28
Payer: COMMERCIAL